# Patient Record
Sex: MALE | Race: WHITE | NOT HISPANIC OR LATINO | Employment: OTHER | URBAN - METROPOLITAN AREA
[De-identification: names, ages, dates, MRNs, and addresses within clinical notes are randomized per-mention and may not be internally consistent; named-entity substitution may affect disease eponyms.]

---

## 2017-02-20 ENCOUNTER — TRANSFERRED RECORDS (OUTPATIENT)
Dept: HEALTH INFORMATION MANAGEMENT | Facility: CLINIC | Age: 72
End: 2017-02-20

## 2017-03-27 ENCOUNTER — TRANSFERRED RECORDS (OUTPATIENT)
Dept: HEALTH INFORMATION MANAGEMENT | Facility: CLINIC | Age: 72
End: 2017-03-27

## 2017-05-08 ENCOUNTER — OFFICE VISIT (OUTPATIENT)
Dept: DERMATOLOGY | Facility: CLINIC | Age: 72
End: 2017-05-08
Payer: COMMERCIAL

## 2017-05-08 DIAGNOSIS — L82.0 INFLAMED SEBORRHEIC KERATOSIS: ICD-10-CM

## 2017-05-08 DIAGNOSIS — Z85.828 HISTORY OF NONMELANOMA SKIN CANCER: Primary | ICD-10-CM

## 2017-05-08 PROCEDURE — 99213 OFFICE O/P EST LOW 20 MIN: CPT | Mod: 25 | Performed by: DERMATOLOGY

## 2017-05-08 PROCEDURE — 17110 DESTRUCTION B9 LES UP TO 14: CPT | Performed by: DERMATOLOGY

## 2017-05-08 ASSESSMENT — PAIN SCALES - GENERAL: PAINLEVEL: NO PAIN (0)

## 2017-05-08 NOTE — MR AVS SNAPSHOT
After Visit Summary   5/8/2017    Andre Tyler    MRN: [id number]           Patient Information     Date Of Birth          1945        Visit Information        Provider Department      5/8/2017 4:00 PM Rachel Manuel MD [facility]        Care Instructions    Cryotherapy    What is it?    Use of a very cold liquid, such as liquid nitrogen, to freeze and destroy abnormal skin cells that need to be removed    What should I expect?    Tenderness and redness    A small blister that might grow and fill with dark purple blood. There may be crusting.    More than one treatment may be needed if the lesions do not go away.    How do I care for the treated area?    Gently wash the area with your hands when bathing.    Use a thin layer of Vaseline to help with healing. You may use a Band-Aid.     The area should heal within 7-10 days and may leave behind a pink or lighter color.     Do not use an antibiotic or Neosporin ointment.     You may take acetaminophen (Tylenol) for pain.     Call your Doctor if you have:    Severe pain    Signs of infection (warmth, redness, cloudy yellow drainage, and or a bad smell)    Questions or concerns    Who should I call with questions?       [facility]: [phone]       [facility]: [phone]       For urgent needs outside of business hours call the [facility] [place] at [phone]        and ask for the dermatology resident on call          Follow-ups after your visit        Your next 10 appointments already scheduled     May 08, 2018  9:45 AM CDT   Return Visit with Berta Monge MD   [facility]    [id number] [place] [address] [phone]              Who to contact     If you have questions or need follow up information about today's clinic visit or your schedule please contact [facility]  directly at [phone].  Normal or non-critical lab and imaging results will be communicated to you by Captimohart, letter or phone within 4 business days after the clinic has received the results. If you do not hear from us within 7 days, please contact the clinic through Captimohart or phone. If you have a critical or abnormal lab result, we will notify you by phone as soon as possible.  Submit refill requests through DineGasm or call your pharmacy and they will forward the refill request to us. Please allow 3 business days for your refill to be completed.          Additional Information About Your Visit        [employer] Information     DineGasm gives you secure access to your electronic health record. If you see a primary care provider, you can also send messages to your care team and make appointments. If you have questions, please call your primary care clinic.  If you do not have a primary care provider, please call [phone] and they will assist you.      DineGasm is an electronic gateway that provides easy, online access to your medical records. With DineGasm, you can request a clinic appointment, read your test results, renew a prescription or communicate with your care team.     To access your existing account, please contact your [facility] or call [phone] for assistance.        Care EveryWhere ID     This is your Care EveryWhere ID. This could be used by other organizations to access your [place] medical records  [id number]         Blood Pressure from Last 3 Encounters:   11/21/16 (!) 139/93   06/13/16 132/82   11/19/15 138/86    Weight from Last 3 Encounters:   06/13/16 96.6 kg (213 lb)   11/19/15 99.6 kg (219 lb 9.6 oz)   11/09/15 95.3 kg (210 lb)              Today, you had the following     No orders found for display       Primary Care Provider Office Phone # Fax #    Kelli Talavera [phone]       [facility] [address]  [place] [facility] [address]        Thank you!     Thank you for choosing [facility]  for your care. Our goal is always to provide you with excellent care. Hearing back from our patients is one way we can continue to improve our services. Please take a few minutes to complete the written survey that you may receive in the mail after your visit with us. Thank you!             Your Updated Medication List - Protect others around you: Learn how to safely use, store and throw away your medicines at www.disposemymeds.org.          This list is accurate as of: 5/8/17  4:19 PM.  Always use your most recent med list.                   Brand Name Dispense Instructions for use    GLUCOSAMINE PO          lisinopril-hydrochlorothiazide 10-12.5 MG per tablet    PRINZIDE/ZESTORETIC    90 tablet    Take 1 tablet by mouth daily

## 2017-05-08 NOTE — PATIENT INSTRUCTIONS
Cryotherapy    What is it?    Use of a very cold liquid, such as liquid nitrogen, to freeze and destroy abnormal skin cells that need to be removed    What should I expect?    Tenderness and redness    A small blister that might grow and fill with dark purple blood. There may be crusting.    More than one treatment may be needed if the lesions do not go away.    How do I care for the treated area?    Gently wash the area with your hands when bathing.    Use a thin layer of Vaseline to help with healing. You may use a Band-Aid.     The area should heal within 7-10 days and may leave behind a pink or lighter color.     Do not use an antibiotic or Neosporin ointment.     You may take acetaminophen (Tylenol) for pain.     Call your Doctor if you have:    Severe pain    Signs of infection (warmth, redness, cloudy yellow drainage, and or a bad smell)    Questions or concerns    Who should I call with questions?       [facility]: [phone]       [facility]: [phone]       For urgent needs outside of business hours call the [facility] at [phone]        and ask for the dermatology resident on call

## 2017-05-08 NOTE — PROGRESS NOTES
"[facility] Dermatology Note      Dermatology Problem List:  1. NMSC  -BCC, vertex scalp, s/p MMS 11/21/2016  -SCCis, R lateral thigh, s/p ED&C 11/21/2016  -SCCIS, right medial ankle, s/p Mohs 4/21/2016  -SCCIS, right ventral forearm, s/p ED&C 4/21/2016  -BCC, left preauricular, s/p Mohs 11/10/2015  -BCC, superficial type, left upper chest, s/p biopsy 4/9/2015, s/p ED&C 10/5/2015  -SCCIS, posterior right lower earlobe, records via [facility] transferred records, Dr. Arnulfo Keyes, 2011. s/p excision in 2005, recurrent  -SCCIS, left dorsal hand, s/p via [facility] transferred records, Dr. Arnulfo Keyes, 2011  2. Actinic keratosis  -s/p cryotherapy  3. HAK, right dorsal hand  -s/p biopsy 4/4/2016  -s/p cryotherapy  4. Compound melanocytic nevus, left lower back   -s/p biopsy 4/9/2015  5. Benign lichenoid keratosis, right lower leg  -s/p biopsy 4/9/2015    Encounter Date: May 8, 2017    CC:  Chief Complaint   Patient presents with     Derm Problem     6 month skin check.  Lesion on left lower back that itches.         History of Present Illness:  Mr. Andre Tyler is a 71 year old male  with a history of non-melanoma skin cancer presents for a total body skin exam.    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    The patient reports concern today for a \"crusty\" lesion on his back which does itch. He scratches it off, but it soon recurs.     Since his last visit, (in fact, just this morning) Mr. Tyler has been diagnosed with diabetes.    Past Medical History:   Patient Active Problem List   Diagnosis     Health Care Home     Hypertension goal BP (blood pressure) < 140/90     Squamous cell skin cancer     Prostate cancer - surgically treated     Advanced directives, counseling/discussion     " CARDIOVASCULAR SCREENING; LDL GOAL LESS THAN 130     Sensorineural hearing loss, bilateral     Past Medical History:   Diagnosis Date     Diabetes (H)      Hypertension goal BP (blood pressure) < 140/90      Prostate cancer (H)      Squamous cell skin cancer      Past Surgical History:   Procedure Laterality Date     APPENDECTOMY       HC MOHS TRUNK/ARM/LEG 1ST STAGE UP T0 5 BLOCKS       PROSTATE SURGERY  8/28/12       Social History:  The patient works as a  and is retired from working as a . The patient denies use of tanning beds. [employer] . Plays hockey.     Family History:  There is a family history of skin cancer in the patient's mother and sister (possible SCC).    Medications:  Current Outpatient Prescriptions   Medication Sig Dispense Refill     Glucosamine HCl (GLUCOSAMINE PO)        lisinopril-hydrochlorothiazide (PRINZIDE,ZESTORETIC) 10-12.5 MG per tablet Take 1 tablet by mouth daily 90 tablet 3          No Known Allergies      Review of Systems:   -Skin: As above in HPI. No additional skin concerns.  Const: No fevers, chills, changes in weight, or night sweats.    Physical exam:  GEN: This is a well developed, well-nourished male in no acute distress, in a pleasant mood.    SKIN: Total skin excluding the undergarment areas was performed. The exam included the head/face, neck, both arms, chest, back, abdomen, both legs, digits and/or nails.   -There are erythematous macules with overyling adherent scale on the right tragus, right preauricular and left helix.   -Mid back: There is a waxy stuck on traumatized tan to brown papule on an erythematous base  -There is no erythema, telangectasias, nodularity, or pigmentation on the sites of prior skin cancer (see Derm Problem List).  -No other lesions of concern on areas examined.     Impression/Plan:  1. History of nonmelanoma skin cancer, no clinical evidence or recurrence    2. Inflamed seborrheic keratosis, symptomatic.  Symptoms include pruritus and trauma:    Cryotherapy procedure note: After verbal consent and discussion of risks and benefits including but no limited to dyspigmentation/scar, blister, and pain, 1 were treated with 1-2mm freeze border for 1 cycle with liquid nitrogen. Post cryotherapy instructions were provided.     Follow-up in 3-4 months, earlier for new or changing lesions.     Staff Involved:  Scribe/Staff    Scribe Disclosure:   I, Davis Wolf, am serving as a scribe to document services personally performed by Dr. Manuel, based on data collection and the provider's statements to me.     Provider Disclosure:   I agree with above History, Review of Systems, Physical exam and Plan. I have reviewed the content of the documentation and have edited it as needed. I have personally performed the services documented here and the documentation accurately represents those services and the decisions I have made.   Rachel Manuel MD    Department of Dermatology  [facility]

## 2017-05-08 NOTE — NURSING NOTE
Dermatology Rooming Note    Andre Tyler's goals for this visit include:   Chief Complaint   Patient presents with     Derm Problem     6 month skin check.  Lesion on left lower back that itches.       Is a scribe okay for this visit:YES    Are records needed for this visit(If yes, obtain release of information): No     Vitals: There were no vitals taken for this visit.    Referring Provider:  No referring provider defined for this encounter.      Jessica Reyna RN

## 2017-05-08 NOTE — LETTER
"5/8/2017       RE: Andre Tyler  [address] [address]     Dear Colleague,    Thank you for referring your patient, Andre Tyler, to the [facility]. Please see a copy of my visit note below.    [facility] Dermatology Note      Dermatology Problem List:  1. NMSC  -BCC, vertex scalp, s/p MMS 11/21/2016  -SCCis, R lateral thigh, s/p ED&C 11/21/2016  -SCCIS, right medial ankle, s/p Mohs 4/21/2016  -SCCIS, right ventral forearm, s/p ED&C 4/21/2016  -BCC, left preauricular, s/p Mohs 11/10/2015  -BCC, superficial type, left upper chest, s/p biopsy 4/9/2015, s/p ED&C 10/5/2015  -SCCIS, posterior right lower earlobe, records via [facility] transferred records, Dr. Arnulfo Keyes, 2011. s/p excision in 2005, recurrent  -SCCIS, left dorsal hand, s/p via [facility] transferred records, Dr. Arnulfo Keyes, 2011  2. Actinic keratosis  -s/p cryotherapy  3. HAK, right dorsal hand  -s/p biopsy 4/4/2016  -s/p cryotherapy  4. Compound melanocytic nevus, left lower back   -s/p biopsy 4/9/2015  5. Benign lichenoid keratosis, right lower leg  -s/p biopsy 4/9/2015    Encounter Date: May 8, 2017    CC:  Chief Complaint   Patient presents with     Derm Problem     6 month skin check.  Lesion on left lower back that itches.         History of Present Illness:  Mr. Andre Tyler is a 71 year old male  with a history of non-melanoma skin cancer presents for a total body skin exam.    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    The patient reports concern today for a \"crusty\" lesion on his back which does itch. He scratches it off, but it soon recurs.     Since his last visit, (in fact, just this morning) Mr. Tyler " has been diagnosed with diabetes.    Past Medical History:   Patient Active Problem List   Diagnosis     Health Care Home     Hypertension goal BP (blood pressure) < 140/90     Squamous cell skin cancer     Prostate cancer - surgically treated     Advanced directives, counseling/discussion     CARDIOVASCULAR SCREENING; LDL GOAL LESS THAN 130     Sensorineural hearing loss, bilateral     Past Medical History:   Diagnosis Date     Diabetes (H)      Hypertension goal BP (blood pressure) < 140/90      Prostate cancer (H)      Squamous cell skin cancer      Past Surgical History:   Procedure Laterality Date     APPENDECTOMY       HC MOHS TRUNK/ARM/LEG 1ST STAGE UP T0 5 BLOCKS       PROSTATE SURGERY  8/28/12       Social History:  The patient works as a  and is retired from working as a . The patient denies use of tanning beds. [employer] . Plays hockey.     Family History:  There is a family history of skin cancer in the patient's mother and sister (possible SCC).    Medications:  Current Outpatient Prescriptions   Medication Sig Dispense Refill     Glucosamine HCl (GLUCOSAMINE PO)        lisinopril-hydrochlorothiazide (PRINZIDE,ZESTORETIC) 10-12.5 MG per tablet Take 1 tablet by mouth daily 90 tablet 3          No Known Allergies      Review of Systems:   -Skin: As above in HPI. No additional skin concerns.  Const: No fevers, chills, changes in weight, or night sweats.    Physical exam:  GEN: This is a well developed, well-nourished male in no acute distress, in a pleasant mood.    SKIN: Total skin excluding the undergarment areas was performed. The exam included the head/face, neck, both arms, chest, back, abdomen, both legs, digits and/or nails.   -There are erythematous macules with overyling adherent scale on the right tragus, right preauricular and left helix.   -Mid back: There is a waxy stuck on traumatized tan to brown papule on an erythematous base  -There is no erythema,  telangectasias, nodularity, or pigmentation on the sites of prior skin cancer (see Derm Problem List).  -No other lesions of concern on areas examined.     Impression/Plan:  1. History of nonmelanoma skin cancer, no clinical evidence or recurrence    2. Inflamed seborrheic keratosis, symptomatic. Symptoms include pruritus and trauma:    Cryotherapy procedure note: After verbal consent and discussion of risks and benefits including but no limited to dyspigmentation/scar, blister, and pain, 1 were treated with 1-2mm freeze border for 1 cycle with liquid nitrogen. Post cryotherapy instructions were provided.     Follow-up in 3-4 months, earlier for new or changing lesions.     Staff Involved:  Scribe/Staff    Scribe Disclosure:   I, Davis Wolf, am serving as a scribe to document services personally performed by Dr. Manuel, based on data collection and the provider's statements to me.     Provider Disclosure:   I agree with above History, Review of Systems, Physical exam and Plan. I have reviewed the content of the documentation and have edited it as needed. I have personally performed the services documented here and the documentation accurately represents those services and the decisions I have made.   Rachel Manuel MD    Department of Dermatology  [facility]        Again, thank you for allowing me to participate in the care of your patient.      Sincerely,    Rachel Manuel MD

## 2017-09-22 ENCOUNTER — OFFICE VISIT (OUTPATIENT)
Dept: FAMILY MEDICINE | Facility: CLINIC | Age: 72
End: 2017-09-22
Payer: COMMERCIAL

## 2017-09-22 VITALS
WEIGHT: 200 LBS | HEIGHT: 72 IN | DIASTOLIC BLOOD PRESSURE: 84 MMHG | HEART RATE: 76 BPM | SYSTOLIC BLOOD PRESSURE: 126 MMHG | BODY MASS INDEX: 27.09 KG/M2 | TEMPERATURE: 98 F

## 2017-09-22 DIAGNOSIS — Z23 NEED FOR PROPHYLACTIC VACCINATION AND INOCULATION AGAINST INFLUENZA: ICD-10-CM

## 2017-09-22 DIAGNOSIS — R35.0 URINARY FREQUENCY: Primary | ICD-10-CM

## 2017-09-22 DIAGNOSIS — R82.90 NONSPECIFIC FINDING ON EXAMINATION OF URINE: ICD-10-CM

## 2017-09-22 LAB
ALBUMIN UR-MCNC: NEGATIVE MG/DL
APPEARANCE UR: ABNORMAL
BACTERIA #/AREA URNS HPF: ABNORMAL /HPF
BILIRUB UR QL STRIP: NEGATIVE
COLOR UR AUTO: YELLOW
GLUCOSE UR STRIP-MCNC: NEGATIVE MG/DL
HGB UR QL STRIP: NEGATIVE
KETONES UR STRIP-MCNC: NEGATIVE MG/DL
LEUKOCYTE ESTERASE UR QL STRIP: ABNORMAL
NITRATE UR QL: POSITIVE
NON-SQ EPI CELLS #/AREA URNS LPF: ABNORMAL /LPF
PH UR STRIP: 6 PH (ref 5–7)
RBC #/AREA URNS AUTO: ABNORMAL /HPF
SOURCE: ABNORMAL
SP GR UR STRIP: 1.02 (ref 1–1.03)
UROBILINOGEN UR STRIP-ACNC: 1 EU/DL (ref 0.2–1)
WBC #/AREA URNS AUTO: ABNORMAL /HPF

## 2017-09-22 PROCEDURE — 90662 IIV NO PRSV INCREASED AG IM: CPT | Performed by: FAMILY MEDICINE

## 2017-09-22 PROCEDURE — G0008 ADMIN INFLUENZA VIRUS VAC: HCPCS | Performed by: FAMILY MEDICINE

## 2017-09-22 PROCEDURE — 99213 OFFICE O/P EST LOW 20 MIN: CPT | Mod: 25 | Performed by: FAMILY MEDICINE

## 2017-09-22 PROCEDURE — 87088 URINE BACTERIA CULTURE: CPT | Performed by: FAMILY MEDICINE

## 2017-09-22 PROCEDURE — 81001 URINALYSIS AUTO W/SCOPE: CPT | Performed by: FAMILY MEDICINE

## 2017-09-22 PROCEDURE — 87086 URINE CULTURE/COLONY COUNT: CPT | Performed by: FAMILY MEDICINE

## 2017-09-22 PROCEDURE — 87186 SC STD MICRODIL/AGAR DIL: CPT | Performed by: FAMILY MEDICINE

## 2017-09-22 RX ORDER — CIPROFLOXACIN 500 MG/1
500 TABLET, FILM COATED ORAL 2 TIMES DAILY
Qty: 14 TABLET | Refills: 0 | Status: SHIPPED | OUTPATIENT
Start: 2017-09-22 | End: 2018-05-08

## 2017-09-22 NOTE — MR AVS SNAPSHOT
After Visit Summary   9/22/2017    Andre Tyler    MRN: [id number]           Patient Information     Date Of Birth          1945        Visit Information        Provider Department      9/22/2017 1:40 PM Sommer Wilburn MD [facility]        Today's Diagnoses     Urinary frequency    -  1    Nonspecific finding on examination of urine        Need for prophylactic vaccination and inoculation against influenza           Follow-ups after your visit        Your next 10 appointments already scheduled     May 08, 2018  9:45 AM CDT   Return Visit with Berta Monge MD   [facility]    [address] [place] [address] [phone]              Who to contact     If you have questions or need follow up information about today's clinic visit or your schedule please contact [facility] directly at [phone].  Normal or non-critical lab and imaging results will be communicated to you by MyChart, letter or phone within 4 business days after the clinic has received the results. If you do not hear from us within 7 days, please contact the clinic through [email or web address] or phone. If you have a critical or abnormal lab result, we will notify you by phone as soon as possible.  Submit refill requests through ReadyPulse or call your pharmacy and they will forward the refill request to us. Please allow 3 business days for your refill to be completed.          Additional Information About Your Visit        MyChart Information     ReadyPulse gives you secure access to your electronic health record. If you see a primary care provider, you can also send messages to your care team and make appointments. If you have questions, please call your primary care clinic.  If you do not have a primary care provider, please call [phone] and they will assist you.        Care EveryWhere ID     This is your Care EveryWhere  ID. This could be used by other organizations to access your [place] medical records  [id number]        Your Vitals Were     Pulse Temperature Height BMI (Body Mass Index)          76 98  F (36.7  C) (Oral) 6' (1.829 m) 27.12 kg/m2         Blood Pressure from Last 3 Encounters:   09/22/17 126/84   11/21/16 (!) 139/93   06/13/16 132/82    Weight from Last 3 Encounters:   09/22/17 200 lb (90.7 kg)   06/13/16 213 lb (96.6 kg)   11/19/15 219 lb 9.6 oz (99.6 kg)              We Performed the Following     UA reflex to Microscopic and Culture     Urine Culture Aerobic Bacterial     Urine Microscopic          Today's Medication Changes          These changes are accurate as of: 9/22/17  2:25 PM.  If you have any questions, ask your nurse or doctor.               Start taking these medicines.        Dose/Directions    ciprofloxacin 500 MG tablet   Commonly known as:  CIPRO   Used for:  Urinary frequency, Nonspecific finding on examination of urine   Started by:  Sommer Wilburn MD        Dose:  500 mg   Take 1 tablet (500 mg) by mouth 2 times daily   Quantity:  14 tablet   Refills:  0            Where to get your medicines      These medications were sent to [facility] [place], [place] - [address]  [address] [facility] [address]     Phone:  [phone]     ciprofloxacin 500 MG tablet                Primary Care Provider Office Phone # Fax #    Kelli Diane Nam Talavera [phone]       [address]  [place] [address]        Equal Access to Services     [facility]: Hadii nory ku hadasho Soomaali, waaxda luqadaha, qaybta kaalmada adeegsamina, ariane wu . So [facility] [phone].    ATENCIÓN: Si habla español, tiene a wise disposición servicios gratuitos de asistencia lingüística. Llame al [phone].    We comply with applicable federal civil rights laws and [place] laws. We do not  discriminate on the basis of race, color, national origin, age, disability sex, sexual orientation or gender identity.            Thank you!     Thank you for choosing [facility]  for your care. Our goal is always to provide you with excellent care. Hearing back from our patients is one way we can continue to improve our services. Please take a few minutes to complete the written survey that you may receive in the mail after your visit with us. Thank you!             Your Updated Medication List - Protect others around you: Learn how to safely use, store and throw away your medicines at www.disposemymeds.org.          This list is accurate as of: 9/22/17  2:25 PM.  Always use your most recent med list.                   Brand Name Dispense Instructions for use Diagnosis    ciprofloxacin 500 MG tablet    CIPRO    14 tablet    Take 1 tablet (500 mg) by mouth 2 times daily    Urinary frequency, Nonspecific finding on examination of urine       GLUCOSAMINE PO       Basal cell carcinoma of left preauricular region       lisinopril-hydrochlorothiazide 10-12.5 MG per tablet    PRINZIDE/ZESTORETIC    90 tablet    Take 1 tablet by mouth daily    Hypertension goal BP (blood pressure) < 140/90       metFORMIN 500 MG tablet    GLUCOPHAGE     2 times daily

## 2017-09-22 NOTE — NURSING NOTE
Chief Complaint   Patient presents with     UTI       Initial /84  Pulse 76  Temp 98  F (36.7  C) (Oral)  Ht 6' (1.829 m)  Wt 200 lb (90.7 kg)  BMI 27.12 kg/m2 Estimated body mass index is 27.12 kg/(m^2) as calculated from the following:    Height as of this encounter: 6' (1.829 m).    Weight as of this encounter: 200 lb (90.7 kg).  Medication Reconciliation: complete  John Craven MA

## 2017-09-22 NOTE — PROGRESS NOTES
Results discussed with patient during the clinic visit.     .Sommer Wilburn MD.   Family Physician.  [facility].

## 2017-09-22 NOTE — PROGRESS NOTES
SUBJECTIVE:   Andre Tyler is a 72 year old male who presents to clinic today for the following health issues:    Genitourinary - Male  Onset: x long time on and off     Description:   Dysuria (painful urination): no   Hematuria (blood in urine): no   Frequency: YES  Are you urinating at night : YES  Hesitancy (delay in urine): no   Retention (unable to empty): YES- sometimes   Decrease in urinary flow: no   Incontinence: no     Progression of Symptoms:  same    Accompanying Signs & Symptoms:  Fever: no   Back/Flank pain: no   Urethral discharge: no   Testicle lumps/masses/pain: no   Nausea and/or vomiting: no   Abdominal pain: no     History:   History of frequent UTI's: YES  History of kidney stones: no   History of hernias: no   Personal or Family history of Prostate problems: yes  Sexually active: no     Precipitating factors:   None     Alleviating factors:AZO       Problem list and histories reviewed & adjusted, as indicated.  Additional history: as documented    Patient Active Problem List   Diagnosis     Health Care Home     Hypertension goal BP (blood pressure) < 140/90     Squamous cell skin cancer     Prostate cancer - surgically treated     Advanced directives, counseling/discussion     CARDIOVASCULAR SCREENING; LDL GOAL LESS THAN 130     Sensorineural hearing loss, bilateral     Past Surgical History:   Procedure Laterality Date     APPENDECTOMY       HC MOHS TRUNK/ARM/LEG 1ST STAGE UP T0 5 BLOCKS       PROSTATE SURGERY  8/28/12       Social History   Substance Use Topics     Smoking status: Never Smoker     Smokeless tobacco: Never Used     Alcohol use No     Family History   Problem Relation Age of Onset     Hypertension Father      DIABETES Father      Prostate Cancer Father      Alcohol/Drug Sister      C.A.D. No family hx of      CEREBROVASCULAR DISEASE No family hx of          Current Outpatient Prescriptions   Medication Sig Dispense Refill     metFORMIN (GLUCOPHAGE) 500 MG tablet 2 times  daily       Glucosamine HCl (GLUCOSAMINE PO)        lisinopril-hydrochlorothiazide (PRINZIDE,ZESTORETIC) 10-12.5 MG per tablet Take 1 tablet by mouth daily 90 tablet 3     BP Readings from Last 3 Encounters:   09/22/17 126/84   11/21/16 (!) 139/93   06/13/16 132/82    Wt Readings from Last 3 Encounters:   09/22/17 200 lb (90.7 kg)   06/13/16 213 lb (96.6 kg)   11/19/15 219 lb 9.6 oz (99.6 kg)            Labs reviewed in EPIC    Reviewed and updated as needed this visit by clinical staffTobacco  Allergies  Meds  Med Hx  Surg Hx  Fam Hx  Soc Hx      Reviewed and updated as needed this visit by Provider         ROS:  Constitutional, HEENT, cardiovascular, pulmonary, gi and gu systems are negative, except as otherwise noted.      OBJECTIVE:   /84  Pulse 76  Temp 98  F (36.7  C) (Oral)  Ht 6' (1.829 m)  Wt 200 lb (90.7 kg)  BMI 27.12 kg/m2  Body mass index is 27.12 kg/(m^2).  GENERAL: healthy, alert and no distress  ABDOMEN: soft, nontender, no hepatosplenomegaly, no masses and bowel sounds normal  BACK: no CVA tenderness, no paralumbar tenderness    Results for orders placed or performed in visit on 09/22/17   UA reflex to Microscopic and Culture   Result Value Ref Range    Color Urine Yellow     Appearance Urine Slightly Cloudy     Glucose Urine Negative NEG^Negative mg/dL    Bilirubin Urine Negative NEG^Negative    Ketones Urine Negative NEG^Negative mg/dL    Specific Gravity Urine 1.025 1.003 - 1.035    Blood Urine Negative NEG^Negative    pH Urine 6.0 5.0 - 7.0 pH    Protein Albumin Urine Negative NEG^Negative mg/dL    Urobilinogen Urine 1.0 0.2 - 1.0 EU/dL    Nitrite Urine Positive (A) NEG^Negative    Leukocyte Esterase Urine Small (A) NEG^Negative    Source Midstream Urine    Urine Microscopic   Result Value Ref Range    WBC Urine 25-50 (A) OTO2^O - 2 /HPF    RBC Urine 2-5 (A) OTO2^O - 2 /HPF    Squamous Epithelial /LPF Urine Few FEW^Few /LPF    Bacteria Urine Moderate (A) NEG^Negative /HPF        ASSESSMENT/PLAN:         ICD-10-CM    1. Urinary frequency R35.0 UA reflex to Microscopic and Culture     Urine Microscopic     ciprofloxacin (CIPRO) 500 MG tablet   2. Nonspecific finding on examination of urine R82.90 Urine Culture Aerobic Bacterial     ciprofloxacin (CIPRO) 500 MG tablet   3. Need for prophylactic vaccination and inoculation against influenza Z23      Push fluids by mouth.     [facility] He says he is up to date on his labs and other HM .       Sommer Wilburn MD  [facility]

## 2017-09-22 NOTE — PROGRESS NOTES
Injectable Influenza Immunization Documentation    1.  Is the person to be vaccinated sick today?   No    2. Does the person to be vaccinated have an allergy to a component   of the vaccine?   No    3. Has the person to be vaccinated ever had a serious reaction   to influenza vaccine in the past?   No    4. Has the person to be vaccinated ever had Guillain-Barré syndrome?   No    Form completed by John Craven MA

## 2017-09-24 LAB
BACTERIA SPEC CULT: ABNORMAL
SPECIMEN SOURCE: ABNORMAL

## 2017-09-25 NOTE — PROGRESS NOTES
Dear Andre Tyler,     Your urine culture came back positive for bacteria, Klebsiella Oxytoca.   Continue and complete the ciprofloxacin course.     Sommer Wilburn MD.   Family Physician.  [facility].

## 2017-09-28 ENCOUNTER — OFFICE VISIT (OUTPATIENT)
Dept: FAMILY MEDICINE | Facility: CLINIC | Age: 72
End: 2017-09-28
Payer: COMMERCIAL

## 2017-09-28 VITALS
HEIGHT: 72 IN | SYSTOLIC BLOOD PRESSURE: 130 MMHG | HEART RATE: 77 BPM | DIASTOLIC BLOOD PRESSURE: 80 MMHG | TEMPERATURE: 97.7 F | WEIGHT: 201 LBS | BODY MASS INDEX: 27.22 KG/M2 | OXYGEN SATURATION: 98 %

## 2017-09-28 DIAGNOSIS — R30.0 DYSURIA: ICD-10-CM

## 2017-09-28 DIAGNOSIS — N34.2 URETHRITIS: Primary | ICD-10-CM

## 2017-09-28 LAB
ALBUMIN UR-MCNC: NEGATIVE MG/DL
APPEARANCE UR: CLEAR
BILIRUB UR QL STRIP: NEGATIVE
COLOR UR AUTO: YELLOW
GLUCOSE UR STRIP-MCNC: NEGATIVE MG/DL
HGB UR QL STRIP: NEGATIVE
KETONES UR STRIP-MCNC: NEGATIVE MG/DL
LEUKOCYTE ESTERASE UR QL STRIP: NEGATIVE
NITRATE UR QL: NEGATIVE
PH UR STRIP: 5.5 PH (ref 5–7)
SOURCE: NORMAL
SP GR UR STRIP: >1.03 (ref 1–1.03)
UROBILINOGEN UR STRIP-ACNC: 0.2 EU/DL (ref 0.2–1)

## 2017-09-28 PROCEDURE — 87086 URINE CULTURE/COLONY COUNT: CPT | Performed by: FAMILY MEDICINE

## 2017-09-28 PROCEDURE — 81003 URINALYSIS AUTO W/O SCOPE: CPT | Performed by: FAMILY MEDICINE

## 2017-09-28 PROCEDURE — 99213 OFFICE O/P EST LOW 20 MIN: CPT | Performed by: FAMILY MEDICINE

## 2017-09-28 RX ORDER — CIPROFLOXACIN 500 MG/1
500 TABLET, FILM COATED ORAL 2 TIMES DAILY
Qty: 6 TABLET | Refills: 0 | Status: SHIPPED | OUTPATIENT
Start: 2017-09-28 | End: 2018-05-08

## 2017-09-28 NOTE — PROGRESS NOTES
SUBJECTIVE:   Andre Tyler is a 72 year old male who presents to clinic today for the following health issues:    Patient presents with:  RECHECK: UTI- was getting better after taking ciprofloxacin (CIPRO) 500 MG tablet but for the last 2 days symptoms got worse again, will be taking last dose of ciprofloxacin (CIPRO) 500 MG tablet tonight @ 6-7 PM    Patient presents for urethritis f/u visit today.  Patient states symptoms improved significantly after starting antibiotics, however they have returned towards the end of his treatment course.  Patient admits to some frequency, sensation of not emptying bladder completely, no burning when urinating, however has some penile discomfort when sitting. Of note, patient had his prostate removed 5 years ago.    Problem list and histories reviewed & adjusted, as indicated.  Additional history: as documented    Current Outpatient Prescriptions   Medication Sig Dispense Refill     ciprofloxacin (CIPRO) 500 MG tablet Take 1 tablet (500 mg) by mouth 2 times daily 6 tablet 0     metFORMIN (GLUCOPHAGE) 500 MG tablet 2 times daily       ciprofloxacin (CIPRO) 500 MG tablet Take 1 tablet (500 mg) by mouth 2 times daily 14 tablet 0     Glucosamine HCl (GLUCOSAMINE PO)        lisinopril-hydrochlorothiazide (PRINZIDE,ZESTORETIC) 10-12.5 MG per tablet Take 1 tablet by mouth daily 90 tablet 3       Reviewed and updated as needed this visit by clinical staff  Tobacco  Allergies  Meds  Med Hx  Surg Hx  Fam Hx  Soc Hx      Reviewed and updated as needed this visit by Provider         ROS:  Constitutional, HEENT, cardiovascular, pulmonary, gi and gu systems are negative, except as otherwise noted.    OBJECTIVE:   /80  Pulse 77  Temp 97.7  F (36.5  C) (Oral)  Ht 6' (1.829 m)  Wt 201 lb (91.2 kg)  SpO2 98%  BMI 27.26 kg/m2  Body mass index is 27.26 kg/(m^2).  GENERAL: healthy, alert and no distress  EYES: Eyes grossly normal to inspection, PERRL and conjunctivae and  sclerae normal  NECK: no adenopathy, no asymmetry, masses, or scars and thyroid normal to palpation  RESP: lungs clear to auscultation - no rales, rhonchi or wheezes  CV: regular rate and rhythm, normal S1 S2, no S3 or S4, no murmur, click or rub, no peripheral edema and peripheral pulses strong  ABDOMEN: soft, nontender, no hepatosplenomegaly, no masses and bowel sounds normal  MS: no gross musculoskeletal defects noted, no edema    Diagnostic Test Results:  Urinalysis - negative    ASSESSMENT/PLAN:     1. Urethritis   Patient adequately treated however with return of symptoms, recommend that patient use azo as needed and will extend course to total of 10 days.  Will f/u urine culture today.  - ciprofloxacin (CIPRO) 500 MG tablet; Take 1 tablet (500 mg) by mouth 2 times daily  Dispense: 6 tablet; Refill: 0    2. Dysuria  Recommend that patient use azo as needed for the remainder of treatment course.  - UA reflex to Microscopic and Culture  - Urine Culture Aerobic Bacterial  - ciprofloxacin (CIPRO) 500 MG tablet; Take 1 tablet (500 mg) by mouth 2 times daily  Dispense: 6 tablet; Refill: 0    F/u 1 week if symptoms fail to improve    Arnulfo Aguirre MD  [facility]

## 2017-09-28 NOTE — PATIENT INSTRUCTIONS
Urethritis in Men     An inflamed urethra can cause pain during urination.   The urethra is the tube that runs from the bladder through the penis. When the urethra is inflamed, it is called urethritis. The urethra becomes swollen and causes burning pain when you urinate. Other symptoms of urethritis may include itching or tingling of the penis or pus discharge from the penis. You may also have pain with sex and masturbation. Some men may not have symptoms.  What causes urethritis?  Urethritis can be caused by a bacterial or viral infection. Such an infection can lead to conditions such as a urinary tract infection (UTI) or sexually transmitted diseases (STD). Urethritis can also be caused by injury or sensitivity or allergy to chemicals in lotions and other products.  How is urethritis diagnosed?  Your healthcare provider will examine you and ask about your symptoms and health history. You may also have one or more of the following tests:    Urine test to take samples of urine and have them checked for problems.    Blood test to take a sample of blood and have it checked for problems.    Urethral discharge to take a sample of fluid from inside the urethra. A cotton swab is inserted into the opening of the penis and into the urethra.    Cystoscopy to allow the healthcare provider to look for problems in the urinary tract. The test uses a thin, flexible telescope called a cystoscope with a light and camera attached. The scope is inserted into the urethra.  How is urethritis treated?  Treatment depends on the cause of urethritis. If it s due to a bacterial infection, antibiotics (medicines that fight infection) will be given. Your healthcare provider can tell you more about your treatment options. In the meantime, your symptoms can be treated. To relieve pain and swelling, anti-inflammatory medications, such as ibuprofen, may be given. Untreated, symptoms may get worse. Also, scar tissue can form in the urethra,  causing it to narrow.  When to call your healthcare provider   Call your healthcare provider right away if you have any of the following:    Fever of 100.4 F (38.0 C) or higher     Blood in the urine or semen    Burning pain with urination    Increased urge to urinate    Discharge from the penis    Itching, tenderness, or swelling in the penis or groin    Pain during sex or masturbation   Preventing STDs  When it comes to sex, it s important to take care and be safe. Any sexual contact with the penis, vagina, anus, or mouth can spread an STD. The only sure way to prevent STDs is not having sex. But there are ways to make sex safer. Use a latex condom each time you have sex. And talk to your partner about STDs before you have sex.  Date Last Reviewed: 1/1/2017 2000-2017 [employer]. [address] [place] [address]. All rights reserved. This information is not intended as a substitute for professional medical care. Always follow your healthcare professional's instructions.      [facility]    If you have any questions regarding to your visit please contact your care team:       Team Purple:   Clinic Hours Telephone Number   Dr. Heidi Alejandro     7am-7pm  Monday - Thursday   7am-5pm  Fridays  [phone]  (Appointment scheduling available 24/7)    Questions about your Visit?   Team Line:  [phone]   Urgent [facility] - [place] - 11am-9pm Monday-Friday Saturday-Sunday- 9am-5pm   [place] - 5pm-9pm Monday-Friday Saturday-Sunday- 9am-5pm  [phone] - [place]   [phone] - [place]       What options do I have for visits at the clinic other than the traditional office visit?  To expand how we care for you, many of our providers are utilizing electronic visits (e-visits) and telephone visits, when medically appropriate, for interactions with their patients rather than a visit in the clinic.   We also  offer nurse visits for many medical concerns. Just like any other service, we will bill your insurance company for this type of visit based on time spent on the phone with your provider. Not all insurance companies cover these visits. Please check with your medical insurance if this type of visit is covered. You will be responsible for any charges that are not paid by your insurance.      E-visits via [employer]:  generally incur a $35.00 fee.  Telephone visits:  Time spent on the phone: *charged based on time that is spent on the phone in increments of 10 minutes. Estimated cost:   5-10 mins $30.00   11-20 mins. $59.00   21-30 mins. $85.00     Use [employer] (secure email communication and access to your chart) to send your primary care provider a message or make an appointment. Ask someone on your Team how to sign up for [employer].  For a Price Quote for your services, please call our Consumer Price Line at [phone].  As always, Thank you for trusting us with your health care needs!      Discharged By: Joyce

## 2017-09-28 NOTE — NURSING NOTE
Chief Complaint   Patient presents with     RECHECK     UTI- was getting better after taking ciprofloxacin (CIPRO) 500 MG tablet but for the last 2 days symptoms got worse again, will be taking last dose of ciprofloxacin (CIPRO) 500 MG tablet tonight @ 6-7 PM       Initial /80  Pulse 77  Temp 97.7  F (36.5  C) (Oral)  Ht 6' (1.829 m)  Wt 201 lb (91.2 kg)  SpO2 98%  BMI 27.26 kg/m2 Estimated body mass index is 27.26 kg/(m^2) as calculated from the following:    Height as of this encounter: 6' (1.829 m).    Weight as of this encounter: 201 lb (91.2 kg).  Medication Reconciliation: complete     An HELGA Yuan

## 2017-09-28 NOTE — MR AVS SNAPSHOT
After Visit Summary   9/28/2017    Andre Tyler    MRN: [id number]           Patient Information     Date Of Birth          1945        Visit Information        Provider Department      9/28/2017 6:00 PM Arnulfo Joaquin MD [facility]        Today's Diagnoses     Dysuria    -  1    Urethritis          Care Instructions      Urethritis in Men     An inflamed urethra can cause pain during urination.   The urethra is the tube that runs from the bladder through the penis. When the urethra is inflamed, it is called urethritis. The urethra becomes swollen and causes burning pain when you urinate. Other symptoms of urethritis may include itching or tingling of the penis or pus discharge from the penis. You may also have pain with sex and masturbation. Some men may not have symptoms.  What causes urethritis?  Urethritis can be caused by a bacterial or viral infection. Such an infection can lead to conditions such as a urinary tract infection (UTI) or sexually transmitted diseases (STD). Urethritis can also be caused by injury or sensitivity or allergy to chemicals in lotions and other products.  How is urethritis diagnosed?  Your healthcare provider will examine you and ask about your symptoms and health history. You may also have one or more of the following tests:    Urine test to take samples of urine and have them checked for problems.    Blood test to take a sample of blood and have it checked for problems.    Urethral discharge to take a sample of fluid from inside the urethra. A cotton swab is inserted into the opening of the penis and into the urethra.    Cystoscopy to allow the healthcare provider to look for problems in the urinary tract. The test uses a thin, flexible telescope called a cystoscope with a light and camera attached. The scope is inserted into the urethra.  How is urethritis treated?  Treatment depends on the cause of urethritis. If it s due to a  bacterial infection, antibiotics (medicines that fight infection) will be given. Your healthcare provider can tell you more about your treatment options. In the meantime, your symptoms can be treated. To relieve pain and swelling, anti-inflammatory medications, such as ibuprofen, may be given. Untreated, symptoms may get worse. Also, scar tissue can form in the urethra, causing it to narrow.  When to call your healthcare provider   Call your healthcare provider right away if you have any of the following:    Fever of 100.4 F (38.0 C) or higher     Blood in the urine or semen    Burning pain with urination    Increased urge to urinate    Discharge from the penis    Itching, tenderness, or swelling in the penis or groin    Pain during sex or masturbation   Preventing STDs  When it comes to sex, it s important to take care and be safe. Any sexual contact with the penis, vagina, anus, or mouth can spread an STD. The only sure way to prevent STDs is not having sex. But there are ways to make sex safer. Use a latex condom each time you have sex. And talk to your partner about STDs before you have sex.  Date Last Reviewed: 1/1/2017 2000-2017 [employer]. [address] [place] [address]. All rights reserved. This information is not intended as a substitute for professional medical care. Always follow your healthcare professional's instructions.      [facility]    If you have any questions regarding to your visit please contact your care team:       Team Purple:   Clinic Hours Telephone Number   Dr. Heidi Alejandro     7am-7pm  Monday - Thursday   7am-5pm  Fridays  [phone]  (Appointment scheduling available 24/7)    Questions about your Visit?   Team Line:  [phone]   Ur[facility] - [facility] - 11am-9pm Monday-Friday Saturday-Sunday- 9am-5pm   [place] - 5pm-9pm Monday-Friday Saturday-Sunday- 9am-5pm  [phone]  [phone] - [place]   [phone] - [place]       What options do I have for visits at the clinic other than the traditional office visit?  To expand how we care for you, many of our providers are utilizing electronic visits (e-visits) and telephone visits, when medically appropriate, for interactions with their patients rather than a visit in the clinic.   We also offer nurse visits for many medical concerns. Just like any other service, we will bill your insurance company for this type of visit based on time spent on the phone with your provider. Not all insurance companies cover these visits. Please check with your medical insurance if this type of visit is covered. You will be responsible for any charges that are not paid by your insurance.      E-visits via [employer]:  generally incur a $35.00 fee.  Telephone visits:  Time spent on the phone: *charged based on time that is spent on the phone in increments of 10 minutes. Estimated cost:   5-10 mins $30.00   11-20 mins. $59.00   21-30 mins. $85.00     Use [employer] (secure email communication and access to your chart) to send your primary care provider a message or make an appointment. Ask someone on your Team how to sign up for [employer].  For a Price Quote for your services, please call our Consumer Price Line at [phone].  As always, Thank you for trusting us with your health care needs!      Discharged By: An            Follow-ups after your visit        Follow-up notes from your care team     Return in about 1 week (around 10/5/2017).      Your next 10 appointments already scheduled     May 08, 2018  9:45 AM CDT   [facility] with [facility]a [facility]ge MD   [facility]    [address] [address] [place] [address] [phone]              Who to contact     If you have questions or need follow up information about today's clinic visit or your schedule please contact [facility] directly at  [phone].  Normal or non-critical lab and imaging results will be communicated to you by 8x8 Inchart, letter or phone within 4 business days after the clinic has received the results. If you do not hear from us within 7 days, please contact the clinic through [employer] or phone. If you have a critical or abnormal lab result, we will notify you by phone as soon as possible.  Submit refill requests through [employer] or call your pharmacy and they will forward the refill request to us. Please allow 3 business days for your refill to be completed.          Additional Information About Your Visit        8x8 Inchart Information     [employer] gives you secure access to your electronic health record. If you see a primary care provider, you can also send messages to your care team and make appointments. If you have questions, please call your primary care clinic.  If you do not have a primary care provider, please call [phone] and they will assist you.        Care EveryWhere ID     This is your Care EveryWhere ID. This could be used by other organizations to access your [facility] medical records  [id number]        Your Vitals Were     Pulse Temperature Height Pulse Oximetry BMI (Body Mass Index)       77 97.7  F (36.5  C) (Oral) 6' (1.829 m) 98% 27.26 kg/m2        Blood Pressure from Last 3 Encounters:   09/28/17 130/80   09/22/17 126/84   11/21/16 (!) 139/93    Weight from Last 3 Encounters:   09/28/17 201 lb (91.2 kg)   09/22/17 200 lb (90.7 kg)   06/13/16 213 lb (96.6 kg)              We Performed the Following     UA reflex to Microscopic and Culture     Urine Culture Aerobic Bacterial          Today's Medication Changes          These changes are accurate as of: 9/28/17  6:39 PM.  If you have any questions, ask your nurse or doctor.               These medicines have changed or have updated prescriptions.        Dose/Directions    * ciprofloxacin 500 MG tablet   Commonly known as:  CIPRO   This may have changed:  Another  medication with the same name was added. Make sure you understand how and when to take each.   Used for:  Urinary frequency, Nonspecific finding on examination of urine   Changed by:  Sommer Wilburn MD        Dose:  500 mg   Take 1 tablet (500 mg) by mouth 2 times daily   Quantity:  14 tablet   Refills:  0       * ciprofloxacin 500 MG tablet   Commonly known as:  CIPRO   This may have changed:  You were already taking a medication with the same name, and this prescription was added. Make sure you understand how and when to take each.   Used for:  Dysuria, Urethritis   Changed by:  Arnulfo Joaquin MD        Dose:  500 mg   Take 1 tablet (500 mg) by mouth 2 times daily   Quantity:  6 tablet   Refills:  0       * Notice:  This list has 2 medication(s) that are the same as other medications prescribed for you. Read the directions carefully, and ask your doctor or other care provider to review them with you.         Where to get your medicines      These medications were sent to [facility] [place]  [place], [place] - [address]  [address], [place] [address]     Phone:  [phone]     ciprofloxacin 500 MG tablet                Primary Care Provider Office Phone # Fax #    Kelli Roxi Talavera [phone] [address]  [place] [facility] [address]        Equal Access to Services     [facility]: Hadii aad ku hadasho Soomaali, waaxda luqadaha, qaybta kaalmada adeegyada, ariane kendrick. So [facility] [phone].    ATENCIÓN: Si habla español, tiene a wise disposición servicios gratuitos de asistencia lingüística. Denice al [phone].    We comply with applicable federal civil rights laws and [place] laws. We do not discriminate on the basis of race, color, national origin, age, disability sex, sexual orientation or gender identity.            Thank you!     Thank you for choosing [facility]  [place]  for your care. Our goal is always to provide you with excellent care. Hearing back from our patients is one way we can continue to improve our services. Please take a few minutes to complete the written survey that you may receive in the mail after your visit with us. Thank you!             Your Updated Medication List - Protect others around you: Learn how to safely use, store and throw away your medicines at www.disposemymeds.org.          This list is accurate as of: 9/28/17  6:39 PM.  Always use your most recent med list.                   Brand Name Dispense Instructions for use Diagnosis    * ciprofloxacin 500 MG tablet    CIPRO    14 tablet    Take 1 tablet (500 mg) by mouth 2 times daily    Urinary frequency, Nonspecific finding on examination of urine       * ciprofloxacin 500 MG tablet    CIPRO    6 tablet    Take 1 tablet (500 mg) by mouth 2 times daily    Dysuria, Urethritis       GLUCOSAMINE PO       Basal cell carcinoma of left preauricular region       lisinopril-hydrochlorothiazide 10-12.5 MG per tablet    PRINZIDE/ZESTORETIC    90 tablet    Take 1 tablet by mouth daily    Hypertension goal BP (blood pressure) < 140/90       metFORMIN 500 MG tablet    GLUCOPHAGE     2 times daily        * Notice:  This list has 2 medication(s) that are the same as other medications prescribed for you. Read the directions carefully, and ask your doctor or other care provider to review them with you.

## 2017-09-29 LAB
BACTERIA SPEC CULT: NO GROWTH
SPECIMEN SOURCE: NORMAL

## 2018-04-03 ENCOUNTER — TRANSFERRED RECORDS (OUTPATIENT)
Dept: HEALTH INFORMATION MANAGEMENT | Facility: CLINIC | Age: 73
End: 2018-04-03

## 2018-04-28 ENCOUNTER — TELEPHONE (OUTPATIENT)
Dept: FAMILY MEDICINE | Facility: CLINIC | Age: 73
End: 2018-04-28

## 2018-05-08 ENCOUNTER — OFFICE VISIT (OUTPATIENT)
Dept: DERMATOLOGY | Facility: CLINIC | Age: 73
End: 2018-05-08
Payer: COMMERCIAL

## 2018-05-08 DIAGNOSIS — L82.1 SEBORRHEIC KERATOSIS: ICD-10-CM

## 2018-05-08 DIAGNOSIS — L57.0 AK (ACTINIC KERATOSIS): ICD-10-CM

## 2018-05-08 DIAGNOSIS — L30.8 OTHER ECZEMA: Primary | ICD-10-CM

## 2018-05-08 DIAGNOSIS — D48.5 NEOPLASM OF UNCERTAIN BEHAVIOR OF SKIN: ICD-10-CM

## 2018-05-08 PROCEDURE — 17004 DESTROY PREMAL LESIONS 15/>: CPT | Performed by: DERMATOLOGY

## 2018-05-08 PROCEDURE — 88305 TISSUE EXAM BY PATHOLOGIST: CPT | Mod: TC | Performed by: DERMATOLOGY

## 2018-05-08 PROCEDURE — 11100 HC BIOPSY SKIN/SUBQ/MUC MEM, SINGLE LESION: CPT | Mod: 59 | Performed by: DERMATOLOGY

## 2018-05-08 PROCEDURE — 11101 HC BIOPSY SKIN/SUBQ/MUC MEM, EACH ADDTL LESION: CPT | Performed by: DERMATOLOGY

## 2018-05-08 PROCEDURE — 99213 OFFICE O/P EST LOW 20 MIN: CPT | Mod: 25 | Performed by: DERMATOLOGY

## 2018-05-08 RX ORDER — CLOBETASOL PROPIONATE 0.5 MG/G
CREAM TOPICAL
Qty: 30 G | Refills: 0 | Status: SHIPPED | OUTPATIENT
Start: 2018-05-08 | End: 2019-09-06

## 2018-05-08 ASSESSMENT — PAIN SCALES - GENERAL: PAINLEVEL: NO PAIN (0)

## 2018-05-08 NOTE — MR AVS SNAPSHOT
After Visit Summary   5/8/2018    Andre Tyler    MRN: [id number]           Patient Information     Date Of Birth          1945        Visit Information        Provider Department      5/8/2018 9:45 AM Berta Monge MD [facility]        Today's Diagnoses     Other eczema    -  1    AK (actinic keratosis)        Neoplasm of uncertain behavior of skin        Seborrheic keratosis          Care Instructions    CRYOTHERAPY    What is it?    Use of a very cold liquid, such as liquid nitrogen, to freeze and destroy abnormal skin cells that need to be removed    What should I expect?    Tenderness and redness    A small blister that might grow and fill with dark purple blood. There may be crusting.    More than one treatment may be needed if the lesions do not go away.    How do I care for the treated area?    Gently wash the area with your hands when bathing.    Use a thin layer of Vaselin to help with healing. You may use a Band-Aid.     The area should heal within 7-10 days.    Do not use an antibiotic or Neosporin ointment.     You may take acetaminophen (Tylenol) for pain.     Call your Doctor if you have:    Severe pain    Signs of infection (warmth, redness, cloudy yellow drainage, and or a bad smell)    Questions or concerns    Contact infromation:  [facility] [phone]  [facility] [phone]    Wound Care After a Biopsy    What is a skin biopsy?  A skin biopsy allows the doctor to examine a very small piece of tissue under the microscope to determine the diagnosis and the best treatment for the skin condition. A local anesthetic (numbing medicine)  is injected with a very small needle into the skin area to be tested. A small piece of skin is taken from the area. Sometimes a suture (stitch) is used.     What are the risks of a skin biopsy?  I will experience scar, bleeding, swelling, pain, crusting and  redness. I may experience incomplete removal or recurrence. Risks of this procedure are excessive bleeding, bruising, infection, nerve damage, numbness, thick (hypertrophic or keloidal) scar and non-diagnostic biopsy.    How should I care for my wound for the first 24 hours?    Keep the wound dry and covered for 24 hours    If it bleeds, hold direct pressure on the area for 15 minutes. If bleeding does not stop then go to the emergency room    Avoid strenuous exercise the first 1-2 days or as your doctor instructs you    How should I care for the wound after 24 hours?    After 24 hours, remove the bandage    You may bathe or shower as normal    If you had a scalp biopsy, you can shampoo as usual and can use shower water to clean the biopsy site daily    Clean the wound twice a day with gentle soap and water    Do not scrub, be gentle    Apply white petroleum/Vaseline after cleaning the wound with a cotton swab or a clean finger, and keep the site covered with a Bandaid /bandage. Bandages are not necessary with a scalp biopsy    If you are unable to cover the site with a Bandaid /bandage, re-apply ointment 2-3 times a day to keep the site moist. Moisture will help with healing    Avoid strenuous activity for first 1-2 days    Avoid lakes, rivers, pools, and oceans until the stitches are removed or the site is healed    How do I clean my wound?    Wash hands thoroughly with soap or use hand  before all wound care    Clean the wound with gentle soap and water    Apply white petroleum/Vaseline  to wound after it is clean    Replace the Bandaid /bandage to keep the wound covered for the first few days or as instructed by your doctor    If you had a scalp biopsy, warm shower water to the area on a daily basis should suffice    What should I use to clean my wound?     Cotton-tipped applicators (Qtips )    White petroleum jelly (Vaseline ). Use a clean new container and use Q-tips to apply.    Bandaids   as  needed    Gentle soap     How should I care for my wound long term?    Do not get your wound dirty    Keep up with wound care for one week or until the area is healed.    A small scab will form and fall off by itself when the area is completely healed. The area will be red and will become pink in color as it heals. Sun protection is very important for how your scar will turn out. Sunscreen with an SPF 30 or greater is recommended once the area is healed.    You should have some soreness but it should be mild and slowly go away over several days. Talk to your doctor about using tylenol for pain,    When should I call my doctor?  If you have increased:     Pain or swelling    Pus or drainage (clear or slightly yellow drainage is ok)    Temperature over 100F    Spreading redness or warmth around wound    When will I hear about my results?  The biopsy results can take 2-3 weeks to come back. The clinic will call you with the results, send you a [employer] message, or have you schedule a follow-up clinic or phone time to discuss the results. Contact our clinics if you do not hear from us in 3 weeks.     Who should I call with questions?    [facility]: [phone]     [facility]: [phone]    For urgent needs outside of business hours call the [facility] at [phone] and ask for the dermatology resident on call              Follow-ups after your visit        Follow-up notes from your care team     Return in about 1 year (around 5/8/2019).      Your next 10 appointments already scheduled     Nov 13, 2018 10:30 AM CST   Return Visit with Berta [facility]ge MD   [facility]    [address] [address] [place] [address] [phone]              Who to contact     If you have questions or need follow up information about today's clinic visit or your schedule please contact [facility]  directly at [phone].  Normal or non-critical lab and imaging results will be communicated to you by MyChart, letter or phone within 4 business days after the clinic has received the results. If you do not hear from us within 7 days, please contact the clinic through MyChart or phone. If you have a critical or abnormal lab result, we will notify you by phone as soon as possible.  Submit refill requests through [employer] or call your pharmacy and they will forward the refill request to us. Please allow 3 business days for your refill to be completed.          Additional Information About Your Visit        Care EveryWhere ID     This is your Care EveryWhere ID. This could be used by other organizations to access your [facility] medical records  [id number]         Blood Pressure from Last 3 Encounters:   09/28/17 130/80   09/22/17 126/84   11/21/16 (!) 139/93    Weight from Last 3 Encounters:   09/28/17 91.2 kg (201 lb)   09/22/17 90.7 kg (200 lb)   06/13/16 96.6 kg (213 lb)              We Performed the Following     BIOPSY SKIN/SUBQ/MUC MEM, EACH ADDTL LESION     BIOPSY SKIN/SUBQ/MUC MEM, SINGLE LESION     Dermatological path order and indications     DESTRUCT PREMALIGNANT LESION, 15 OR MORE          Today's Medication Changes          These changes are accurate as of 5/8/18 10:32 AM.  If you have any questions, ask your nurse or doctor.               Start taking these medicines.        Dose/Directions    clobetasol 0.05 % cream   Commonly known as:  TEMOVATE   Used for:  Other eczema   Started by:  Berta Monge MD        Apply twice daily for 2 weeks on the left calf   Quantity:  30 g   Refills:  0            Where to get your medicines      These medications were sent to [facility] [place], [place] - [address] [facility] [address]  [address] [facility] [address], [place] [address]     Phone:  [phone]     clobetasol 0.05 % cream                Primary Care Provider Office Phone # Fax #    Hegscgf  Roxi Talavera -[phone]       [address]  [place] [facility] [address]        Equal Access to Services     [facility]CO[facility] : Hadfawn nory gonzales megano Socoltali, waaxda luqadaha, qaybta kaalmada adeegyada, ariane alvarez augustojose d frias laJorgejae kendrick. So [facility] [phone].    ATENCIÓN: Si habla español, tiene a wise disposición servicios gratuitos de asistencia lingüística. Llame al [phone].    We comply with applicable federal civil rights laws and [place] laws. We do not discriminate on the basis of race, color, national origin, age, disability, sex, sexual orientation, or gender identity.            Thank you!     Thank you for choosing [facility]  for your care. Our goal is always to provide you with excellent care. Hearing back from our patients is one way we can continue to improve our services. Please take a few minutes to complete the written survey that you may receive in the mail after your visit with us. Thank you!             Your Updated Medication List - Protect others around you: Learn how to safely use, store and throw away your medicines at www.disposemymeds.org.          This list is accurate as of 5/8/18 10:32 AM.  Always use your most recent med list.                   Brand Name Dispense Instructions for use Diagnosis    clobetasol 0.05 % cream    TEMOVATE    30 g    Apply twice daily for 2 weeks on the left calf    Other eczema       GLUCOSAMINE PO       Basal cell carcinoma of left preauricular region       lisinopril-hydrochlorothiazide 10-12.5 MG per tablet    PRINZIDE/ZESTORETIC    90 tablet    Take 1 tablet by mouth daily    Hypertension goal BP (blood pressure) < 140/90       metFORMIN 500 MG tablet    GLUCOPHAGE     2 times daily

## 2018-05-08 NOTE — PATIENT INSTRUCTIONS
CRYOTHERAPY    What is it?    Use of a very cold liquid, such as liquid nitrogen, to freeze and destroy abnormal skin cells that need to be removed    What should I expect?    Tenderness and redness    A small blister that might grow and fill with dark purple blood. There may be crusting.    More than one treatment may be needed if the lesions do not go away.    How do I care for the treated area?    Gently wash the area with your hands when bathing.    Use a thin layer of Vaselin to help with healing. You may use a Band-Aid.     The area should heal within 7-10 days.    Do not use an antibiotic or Neosporin ointment.     You may take acetaminophen (Tylenol) for pain.     Call your Doctor if you have:    Severe pain    Signs of infection (warmth, redness, cloudy yellow drainage, and or a bad smell)    Questions or concerns    Contact infromation:  [facility] [phone]  [facility] [phone]    Wound Care After a Biopsy    What is a skin biopsy?  A skin biopsy allows the doctor to examine a very small piece of tissue under the microscope to determine the diagnosis and the best treatment for the skin condition. A local anesthetic (numbing medicine)  is injected with a very small needle into the skin area to be tested. A small piece of skin is taken from the area. Sometimes a suture (stitch) is used.     What are the risks of a skin biopsy?  I will experience scar, bleeding, swelling, pain, crusting and redness. I may experience incomplete removal or recurrence. Risks of this procedure are excessive bleeding, bruising, infection, nerve damage, numbness, thick (hypertrophic or keloidal) scar and non-diagnostic biopsy.    How should I care for my wound for the first 24 hours?    Keep the wound dry and covered for 24 hours    If it bleeds, hold direct pressure on the area for 15 minutes. If bleeding does not stop then go to the emergency room    Avoid  strenuous exercise the first 1-2 days or as your doctor instructs you    How should I care for the wound after 24 hours?    After 24 hours, remove the bandage    You may bathe or shower as normal    If you had a scalp biopsy, you can shampoo as usual and can use shower water to clean the biopsy site daily    Clean the wound twice a day with gentle soap and water    Do not scrub, be gentle    Apply white petroleum/Vaseline after cleaning the wound with a cotton swab or a clean finger, and keep the site covered with a Bandaid /bandage. Bandages are not necessary with a scalp biopsy    If you are unable to cover the site with a Bandaid /bandage, re-apply ointment 2-3 times a day to keep the site moist. Moisture will help with healing    Avoid strenuous activity for first 1-2 days    Avoid lakes, rivers, pools, and oceans until the stitches are removed or the site is healed    How do I clean my wound?    Wash hands thoroughly with soap or use hand  before all wound care    Clean the wound with gentle soap and water    Apply white petroleum/Vaseline  to wound after it is clean    Replace the Bandaid /bandage to keep the wound covered for the first few days or as instructed by your doctor    If you had a scalp biopsy, warm shower water to the area on a daily basis should suffice    What should I use to clean my wound?     Cotton-tipped applicators (Qtips )    White petroleum jelly (Vaseline ). Use a clean new container and use Q-tips to apply.    Bandaids   as needed    Gentle soap     How should I care for my wound long term?    Do not get your wound dirty    Keep up with wound care for one week or until the area is healed.    A small scab will form and fall off by itself when the area is completely healed. The area will be red and will become pink in color as it heals. Sun protection is very important for how your scar will turn out. Sunscreen with an SPF 30 or greater is recommended once the area is  healed.    You should have some soreness but it should be mild and slowly go away over several days. Talk to your doctor about using tylenol for pain,    When should I call my doctor?  If you have increased:     Pain or swelling    Pus or drainage (clear or slightly yellow drainage is ok)    Temperature over 100F    Spreading redness or warmth around wound    When will I hear about my results?  The biopsy results can take 2-3 weeks to come back. The clinic will call you with the results, send you a Pigeonlyt message, or have you schedule a follow-up clinic or phone time to discuss the results. Contact our clinics if you do not hear from us in 3 weeks.     Who should I call with questions?    [facility]: [phone]     [facility]: [phone]    For urgent needs outside of business hours call the [facility] at [phone] and ask for the dermatology resident on call

## 2018-05-08 NOTE — PROGRESS NOTES
[facility] Dermatology Note      Dermatology Problem List:  1. NMSC  -BCC, vertex scalp, s/p MMS 11/21/2016  -SCCis, R lateral thigh, s/p ED&C 11/21/2016  -SCCIS, right medial ankle, s/p Mohs 4/21/2016  -SCCIS, right ventral forearm, s/p ED&C 4/21/2016  -BCC, left preauricular, s/p Mohs 11/10/2015  -BCC, superficial type, left upper chest, s/p biopsy 4/9/2015, s/p ED&C 10/5/2015  -SCCIS, posterior right lower earlobe, records via [facility] transferred records, Dr. Arnulfo Keyes, 2011. s/p excision in 2005, recurrent  -SCCIS, left dorsal hand, s/p via [facility] transferred records, Dr. Arnulfo Keyes, 2011  2. Actinic keratosis  -s/p cryotherapy  3. HAK,   -right dorsal hand, s/p biopsy 4/4/2016  -left upper arm s/p biopsy 10/17/2017   -left dorsal hand, s/p biopsy 10/17/2017   4. Compound melanocytic nevus, left lower back   -s/p biopsy 4/9/2015  5. Benign lichenoid keratosis, right lower leg  -s/p biopsy 4/9/2015    Encounter Date: May 8, 2018    CC:  Chief Complaint   Patient presents with     Skin Check     area of concern left calf hx NMSC         History of Present Illness:  Mr. Andre Tyler is a 72 year old male  with a history of non-melanoma skin cancer presents for a total body skin exam. The patient was last seen 5/8/2017 by Dr. Manuel for skin check where cryo was used to treated inflamed SKs. The patient reports today that he has itchy spots on the left calf.     Past Medical History:   Patient Active Problem List   Diagnosis     Health Care Home     Hypertension goal BP (blood pressure) < 140/90     Squamous cell skin cancer     Prostate cancer - surgically treated     Advanced directives, counseling/discussion     CARDIOVASCULAR SCREENING; LDL GOAL LESS THAN 130     Sensorineural hearing loss, bilateral     Past Medical History:   Diagnosis Date     Diabetes (H)      Hypertension goal BP (blood pressure) < 140/90      Prostate cancer (H)       Squamous cell skin cancer      Past Surgical History:   Procedure Laterality Date     APPENDECTOMY       HC MOHS TRUNK/ARM/LEG 1ST STAGE UP T0 5 BLOCKS       PROSTATE SURGERY  8/28/12       Social History:  The patient works as a  and is retired from working as a . The patient denies use of tanning beds. [employer] . Plays hockey.     Family History:  There is a family history of skin cancer in the patient's mother and sister (possible SCC).    Medications:  Current Outpatient Prescriptions   Medication Sig Dispense Refill     Glucosamine HCl (GLUCOSAMINE PO)        lisinopril-hydrochlorothiazide (PRINZIDE,ZESTORETIC) 10-12.5 MG per tablet Take 1 tablet by mouth daily 90 tablet 3     metFORMIN (GLUCOPHAGE) 500 MG tablet 2 times daily            No Known Allergies      Review of Systems:   -Skin: As above in HPI. No additional skin concerns.  Const: No fevers, chills, changes in weight, or night sweats.    Physical exam:  GEN: This is a well developed, well-nourished male in no acute distress, in a pleasant mood.    SKIN: Total skin excluding the undergarment areas was performed. The exam included the head/face, neck, both arms, chest, back, abdomen, both legs, digits and/or nails.   -There are erythematous macules with overyling adherent scale on the right temple, left upper arm, right preauricular, left posterior neck, right lateral elbow , right hand, right forearm, right dorsal wrist.   1 cm erythematous patch with scale on the right elbow.   -There is no erythema, telangectasias, nodularity, or pigmentation on the sites of prior skin cancer  - There are pink scaly patches and plaques on the left calf X2, approx 1cm  =pearly 5mm papule  -No other lesions of concern on areas examined.     Impression/Plan:  1. History of nonmelanoma skin cancer, no clinical evidence or recurrence    Recommend sunscreens SPF #30 or greater, protective clothing and avoidance of tanning  beds.  2. AK   Cryotherapy procedure note: After verbal consent and discussion of risks and benefits including2 but no limited to dyspigmentation/scar, blister, and pain, 17 was(were) treated with 1-2mm freeze border for 2 cycles with liquid nitrogen. Post cryotherapy instructions were provided.   3. 1 cm erythematous patch with scale on the right elbow. SCC vs other . and 5mm pearly papule central chest-bcc versus other    After discussion of benefits and risks including but not limited to bleeding, infection, scar, incomplete removal, recurrence, and non-diagnostic biopsy, written consent and photographs were obtained. The area was cleaned with isopropyl alcohol. 0.5 mL of 1% lidocaine with epinephrine was injected to obtain adequate anesthesia of the lesion on the right elbow and central chest. A shave biopsy was performed at each site. Hemostasis was achieved with aluminium chloride. Vaseline and a sterile dressing were applied. The patient tolerated the procedure and no complications were noted. The patient was provided with verbal and written post care instructions.   4. Eczematous dermatitis, right lower leg    Start clobetasol 0.05% cream to the lower leg for 2-3 weeks. If this does not resolve return for further evaluation.   5. SK     No further intervention required at this time.     Follow-up in 1 year, earlier for new or changing lesions.     Staff Involved:  Scribe/Staff    Scribe Disclosure:   I, Sarai Webster, am serving as a scribe to document services personally performed by Dr. Berta Monge, based on data collection and the provider's statements to me.     Provider Disclosure:   The documentation recorded by the scribe accurately reflects the services I personally performed and the decisions made by me.    Berta oMnge MD    Department of Dermatology  [facility]: Phone: [phone], Fax:[phone]  [facility]  [facility]: Phone: [phone], Fax: [phone]

## 2018-05-10 LAB — COPATH REPORT: NORMAL

## 2018-05-25 ENCOUNTER — TELEPHONE (OUTPATIENT)
Dept: DERMATOLOGY | Facility: CLINIC | Age: 73
End: 2018-05-25

## 2018-05-25 NOTE — TELEPHONE ENCOUNTER
Notes Recorded by Christi Rahman LPN on 5/25/2018 at 4:14 PM  Writer spoke with patient and informed him that it is recommended to have the ED&C completed on June 11th as scheduled. Patient verbalized understanding and had no further questions or concerns.     Christi Rahman LPN    ------    Notes Recorded by Boby Nazario MD on 5/25/2018 at 3:51 PM  Sorry if my explanation was vague.   I think an ED&C is the appropriate treatment. Please keep him scheduled.     The pathologist only looks at about 1% of the margin of a biopsy. If the SCCIS is close to the viewed margin (like it was), there is a good chance it's still present somewhere along the other 99% of the margin. The ED&C is appropriate to treat the other 99% of the margin in case there is still some left.       ------    Notes Recorded by Christi Rahman LPN on 5/25/2018 at 10:52 AM  Dr. Nazario please clarify if you are having this path reread otherwise patient would like to proceed and is scheduled for an ED&C.    Thanks  Christi Rahman LPN    ------    Notes Recorded by Asha Carmona MA on 5/22/2018 at 2:03 PM  This cma talked with Patient, and went through results, patient verbalized understanding. Also let him know if it is removed we wouldn't need to have the procedure done, we are getting a clarification on the path report. Patient verbalized he understood but would like to have the ED and C if it is not. Patient scheduled June 11 at 1 pm with Dr. Nazario.     Asha Carmona [facility]   ------    Notes Recorded by Boby Nazario MD on 5/22/2018 at 12:52 AM  The way I read this, it looks like SCCIS excised but close to the lateral margin.   I think for a small lesion, in a low risk site, that may be excised, ED&C is appropriate.   If the patient would prefer a linear scar and a pathologist confirm things, excision is appropriate. Thank you.  ------    Notes Recorded by Christi Rahman LPN on 5/21/2018 at 2:07 PM  Dr. Nazario please review  and advise on chest MOHS vs Excision on spot on chest.     If MOHS would you like a consult? Patient has previously had MOHS in 2015 and 2016.     Photo in chart.    Christi Rahman LPN    ------    Notes Recorded by Berta Mathew MD on 5/21/2018 at 12:33 PM  5mm SCCIS on chest, up to Aravind if he wants excision or Mohs  Part A is AK,recheck at 6 months follow up        2wk ago       Copath Report Patient Name: LUI SOTO   MR#: [id number]   Specimen #: [id number]   Collected: 5/8/2018   Received: 5/8/2018   Reported: 5/10/2018 14:20   Ordering Phy(s): BERTA MATHEW     For improved result formatting, select 'View Enhanced Report Format' under    Linked Documents section.     SPECIMEN(S):   A: Skin, right elbow   B: Skin, central chest     FINAL DIAGNOSIS:   A. Skin, right elbow:   - Features suggestive of actinic keratosis  (see description)     B. Skin, central chest:   - Squamous cell carcinoma in situ, extending close to the lateral margin -    (see description)                Christi Rahman LPN

## 2018-06-11 ENCOUNTER — OFFICE VISIT (OUTPATIENT)
Dept: DERMATOLOGY | Facility: CLINIC | Age: 73
End: 2018-06-11
Payer: COMMERCIAL

## 2018-06-11 DIAGNOSIS — D04.5 SQUAMOUS CELL CARCINOMA IN SITU OF SKIN OF TRUNK: ICD-10-CM

## 2018-06-11 DIAGNOSIS — D48.5 NEOPLASM OF UNCERTAIN BEHAVIOR OF SKIN: Primary | ICD-10-CM

## 2018-06-11 PROCEDURE — 17261 DSTRJ MAL LES T/A/L .6-1.0CM: CPT | Performed by: DERMATOLOGY

## 2018-06-11 PROCEDURE — 11100 HC DESTR MALIG LESION TRUNK/ARM/LEG 1.1-2.0 CM: CPT | Mod: 59 | Performed by: DERMATOLOGY

## 2018-06-11 PROCEDURE — 88305 TISSUE EXAM BY PATHOLOGIST: CPT | Mod: TC | Performed by: DERMATOLOGY

## 2018-06-11 ASSESSMENT — PAIN SCALES - GENERAL: PAINLEVEL: NO PAIN (0)

## 2018-06-11 NOTE — MR AVS SNAPSHOT
After Visit Summary   6/11/2018    Andre Tyler    MRN: [id number]           Patient Information     Date Of Birth          1945        Visit Information        Provider Department      6/11/2018 1:00 PM Boby Nazario MD [facility]        Care Instructions    Wound Care:  Electrodesiccation and Curettage     I will experience scar, altered skin color, bleeding, swelling, pain, crusting and redness. I may experience altered sensation. Risks are excessive bleeding, infection, muscle weakness, thick (hypertrophic or keloidal) scar, and recurrence,. A second procedure may be recommended to obtain the best cosmetic or functional result.    What is electrodesiccation and curettage ?    Scraping off tissue (curettage)    Destroy tissue using electric current or cautery (electrodessication)    How do I perform wound care?    Keep dressing in place for two days. You may shower with the dressing in place(do not get wet)    After 2 days, wash hands and remove dressing. Clean wound with cotton-swab soaked in hydrogen peroxide to remove drainage and crust    Put on a thick layer of Vaseline on the wound using a cotton-swab     Cover the wound with a Band-AidTM to protect from dust and tight clothing    If wound is draining before two days, change your dressing as described above sooner    During wound care, do not allow the area to dry out or form a scab    What do I need?    Hydrogen peroxide     Cotton-swabs     Vaseline or petroleum jelly     Band-AidsTM or dressing supplies as needed     When should I call the doctor?  [facility]: [phone]  [facility]: [phone]  For urgent needs outside of business hours call the [facility] at [phone] and ask for the dermatology resident on call            Follow-ups after your visit        Follow-up notes from your care team     Return in about 6 months (around  12/11/2018) for skin check.      Your next 10 appointments already scheduled     Nov 13, 2018 10:30 AM CST   Return Visit with Berta Monge MD   [facility]    [address] [place] [id number] [phone]              Who to contact     If you have questions or need follow up information about today's clinic visit or your schedule please contact [facility] directly at [phone].  Normal or non-critical lab and imaging results will be communicated to you by MyChart, letter or phone within 4 business days after the clinic has received the results. If you do not hear from us within 7 days, please contact the clinic through MyChart or phone. If you have a critical or abnormal lab result, we will notify you by phone as soon as possible.  Submit refill requests through [employer] or call your pharmacy and they will forward the refill request to us. Please allow 3 business days for your refill to be completed.          Additional Information About Your Visit        Care EveryWhere ID     This is your Care EveryWhere ID. This could be used by other organizations to access your [facility] medical records  [id number]         Blood Pressure from Last 3 Encounters:   09/28/17 130/80   09/22/17 126/84   11/21/16 (!) 139/93    Weight from Last 3 Encounters:   09/28/17 91.2 kg (201 lb)   09/22/17 90.7 kg (200 lb)   06/13/16 96.6 kg (213 lb)              Today, you had the following     No orders found for display       Primary Care Provider Office Phone # Fax #    Kelli Roxi Talavera [phone]       [address]  [place] [facility] [address]        Equal Access to Services     [facility] : Hadii nory hodge Somanas, waaxda luqadaha, qaybta kaalmada adeegyada, ariane kendrick. So [facility] [phone].    ATENCIÓN: Si habla español, tiene a wise disposición servicios gratuitos de asistencia  lingüística. Denice al [phone].    We comply with applicable federal civil rights laws and [place] laws. We do not discriminate on the basis of race, color, national origin, age, disability, sex, sexual orientation, or gender identity.            Thank you!     Thank you for choosing [facility]  for your care. Our goal is always to provide you with excellent care. Hearing back from our patients is one way we can continue to improve our services. Please take a few minutes to complete the written survey that you may receive in the mail after your visit with us. Thank you!             Your Updated Medication List - Protect others around you: Learn how to safely use, store and throw away your medicines at www.disposemymeds.org.          This list is accurate as of 6/11/18  1:34 PM.  Always use your most recent med list.                   Brand Name Dispense Instructions for use Diagnosis    clobetasol 0.05 % cream    TEMOVATE    30 g    Apply twice daily for 2 weeks on the left calf    Other eczema       GLUCOSAMINE PO       Basal cell carcinoma of left preauricular region       lisinopril-hydrochlorothiazide 10-12.5 MG per tablet    PRINZIDE/ZESTORETIC    90 tablet    Take 1 tablet by mouth daily    Hypertension goal BP (blood pressure) < 140/90       metFORMIN 500 MG tablet    GLUCOPHAGE     2 times daily

## 2018-06-11 NOTE — NURSING NOTE
Excision Intake  There were no vitals taken for this visit.    artificial heart valve: No    artificial joint within the last 3 years: No    heart stent in the last 3 months: No    pacemaker: No    defibrillator: No    nerve/brain stimulator: No    bleeding disorder: No    coumadin use: No    heart valve disease: No    site location lower limb or groin: No    hepatitis B/C or HIV: No    smoker: No    Iodine allergy: No     Christi Rahman LPN

## 2018-06-11 NOTE — NURSING NOTE
Andre Tyler's goals for this visit include: ED&C central chest SCCIS and bump on right hand near thumb  He requests these members of his care team be copied on today's visit information:     PCP: Kelli Graham    Referring Provider:  No referring provider defined for this encounter.    There were no vitals taken for this visit.    Do you need any medication refills at today's visit? Florence Rahman LPN

## 2018-06-11 NOTE — PATIENT INSTRUCTIONS
Wound Care:  Electrodesiccation and Curettage     I will experience scar, altered skin color, bleeding, swelling, pain, crusting and redness. I may experience altered sensation. Risks are excessive bleeding, infection, muscle weakness, thick (hypertrophic or keloidal) scar, and recurrence,. A second procedure may be recommended to obtain the best cosmetic or functional result.    What is electrodesiccation and curettage ?    Scraping off tissue (curettage)    Destroy tissue using electric current or cautery (electrodessication)    How do I perform wound care?    Keep dressing in place for two days. You may shower with the dressing in place(do not get wet)    After 2 days, wash hands and remove dressing. Clean wound with cotton-swab soaked in hydrogen peroxide to remove drainage and crust    Put on a thick layer of Vaseline on the wound using a cotton-swab     Cover the wound with a Band-AidTM to protect from dust and tight clothing    If wound is draining before two days, change your dressing as described above sooner    During wound care, do not allow the area to dry out or form a scab    What do I need?    Hydrogen peroxide     Cotton-swabs     Vaseline or petroleum jelly     Band-AidsTM or dressing supplies as needed     When should I call the doctor?  [facility]: [phone]  [facility]: [phone]  For urgent needs outside of business hours call the [facility] at [phone] and ask for the dermatology resident on call

## 2018-06-11 NOTE — LETTER
6/11/2018         RE: Andre Tyler  [address] [facility] [address]        Dear Colleague,    Thank you for referring your patient, Andre Tyler, to the [facility]. Please see a copy of my visit note below.    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  The expected cure rates, healing times and anticipated scars of each option were discussed and the patient elects to proceed with ED and C.     The risks and benefits of the procedure were described to the patient.  These include but are not limited to bleeding, infection, scar, incomplete removal, and recurrence. Written informed consent was obtained. Time-out was performed. The above site was cleansed with and injected with 3.0 mL 1% lidocaine with epinephrine. Once anesthesia was obtained, the site was prepped with Chlorhexidine and rinsed with sterile saline. The lesion was curetted with 4mm curette in 3 directions with a 2 mm margin and this was followed by electrodessication.  This process was repeated three times. The defect measured 1.2 x 1.1 cm. Vaseline and a bandage were applied to the wound. The patient tolerated the procedure well and was given post care instructions.    -------------------------------------------------------------------------------------------      [facility] Dermatology Note      Dermatology Problem List:  1. NMSC  - SCCIS, central chest, s/p ED & C 6/11/18   - BCC, vertex scalp, s/p Mohs 11/21/2016  - SCCis, R lateral thigh, s/p ED & C 11/21/2016  - SCCIS, R medial ankle, s/p Mohs 4/21/2016  - SCCIS, R ventral forearm, s/p  ED & C 4/21/2016  - BCC, L preauricular, s/p Mohs 11/10/2015  - BCC, L upper, chest, s/p ED & C 10/5/2015  - SCCIS, posterior R lower earlobe, records via [facility] transferred records, Dr. Arnulfo Keyes, 2011. s/p excision in 2005, recurrent  - SCCIS, L dorsal hand, s/p via [facility] transferred records, Dr. Arnulfo Keyes, 2011  2. Actinic keratosis  - s/p cryotherapy  3. HAK   - R dorsal hand, s/p biopsy 4/4/2016  - L upper arm, s/p biopsy 10/17/2017   - L dorsal hand, s/p biopsy 10/17/2017   4. Compound melanocytic nevus, L lower back   - s/p biopsy 4/9/2015  5. Benign lichenoid keratosis, R lower leg  - s/p biopsy 4/9/2015      Encounter Date: Jun 11, 2018    CC:  Chief Complaint   Patient presents with     Procedure     ED&C central chest     Derm Problem     bump on right hand near thumb         History of Present Illness:  Mr. Andre Tyler is a 72 year old male with history of a non-melanoma skin cancer who presents today for electrodesiccation and curettage of a squamous cell carcinoma in situ of the central chest. At today's visit, the patient also reports a lesion of concern on base of his right thumb that he would like checked. He is concerned it could be another skin cancer. The lesion is mostly asymptomatic, but has been growing in size since his last visit. Otherwise, the patient reports no other painful, bleeding, nonhealing, or pruritic lesions, and denies new or changing moles.       Past Medical History:   Patient Active Problem List   Diagnosis     Health Care Home     Hypertension goal BP (blood pressure) < 140/90     Squamous cell skin cancer     Prostate cancer - surgically treated     Advanced directives, counseling/discussion     CARDIOVASCULAR SCREENING; LDL GOAL LESS THAN 130     Sensorineural hearing loss, bilateral     Past Medical History:   Diagnosis Date     Diabetes (H)      Hypertension goal BP (blood pressure) < 140/90      Prostate cancer (H)       Squamous cell skin cancer      Past Surgical History:   Procedure Laterality Date     APPENDECTOMY       HC MOHS TRUNK/ARM/LEG 1ST STAGE UP T0 5 BLOCKS       PROSTATE SURGERY  8/28/12       Social History:  The patient works as a  and is retired from working as a . The patient denies use of tanning beds. [employer] . Plays hockey.     Family History:  There is a family history of skin cancer in the patient's mother and sister (possible SCC).    Medications:  Current Outpatient Prescriptions   Medication Sig Dispense Refill     Glucosamine HCl (GLUCOSAMINE PO)        lisinopril-hydrochlorothiazide (PRINZIDE,ZESTORETIC) 10-12.5 MG per tablet Take 1 tablet by mouth daily 90 tablet 3     metFORMIN (GLUCOPHAGE) 500 MG tablet 2 times daily       clobetasol (TEMOVATE) 0.05 % cream Apply twice daily for 2 weeks on the left calf (Patient not taking: Reported on 6/11/2018) 30 g 0       No Known Allergies    Review of Systems:  -Skin Establ Pt: The patient denies any new rash, pruritus, or lesions that are symptomatic, changing or bleeding, except as per HPI.  -Constitutional: The patient is feeling generally well.    Physical exam:  Vitals: There were no vitals taken for this visit.  GEN: This is a well developed, well-nourished male in no acute distress, in a pleasant mood.    SKIN: Focused examination of the lesion of conern was performed.  - Approximately 8 mm flesh colored hyperkeratotic gritty papule on the base of right thumb.   - No other lesions of concern on areas examined.       Impression/Plan:  1. Neoplasm of uncertain behavior on the base of right thumb. The differential diagnosis includes SCC vs HAK.     After discussion of benefits and risks including but not limited to bleeding, infection, scar, incomplete removal, recurrence, and non-diagnostic biopsy, written consent and photographs were obtained. The area was cleaned with isopropyl alcohol. 0.75 mL of 1% lidocaine with  epinephrine was injected to obtain adequate anesthesia of the lesion on the base of right thumb. A shave biopsy was performed. Hemostasis was achieved with aluminium chloride. Vaseline and a sterile dressing were applied. The patient tolerated the procedure and no complications were noted. The patient was provided with verbal and written post care instructions.     Patient will be notified with results.     Follow-up pending biopsy results.        Staff Involved:    Scribe Disclosure  I, Igor Jalloh, am serving as a scribe to document services personally performed by Dr. Boby Nazario DO, based on data collection and the provider's statements to me.     Provider Disclosure:   The documentation recorded by the scribe accurately reflects the services I personally performed and the decisions made by me.  I personally performed the procedures today.    Boby Nazario DO    Department of Dermatology  [facility]: Phone: [phone], Fax:[phone]  [facility]: Phone: [phone], Fax: [phone]               Again, thank you for allowing me to participate in the care of your patient.        Sincerely,        Boby Nazario MD

## 2018-06-11 NOTE — PROGRESS NOTES
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  The expected cure rates, healing times and anticipated scars of each option were discussed and the patient elects to proceed with ED and C.     The risks and benefits of the procedure were described to the patient.  These include but are not limited to bleeding, infection, scar, incomplete removal, and recurrence. Written informed consent was obtained. Time-out was performed. The above site was cleansed with and injected with 3.0 mL 1% lidocaine with epinephrine. Once anesthesia was obtained, the site was prepped with Chlorhexidine and rinsed with sterile saline. The lesion was curetted with 4mm curette in 3 directions with a 2 mm margin and this was followed by electrodessication.  This process was repeated three times. The defect measured 1.2 x 1.1 cm. Vaseline and a bandage were applied to the wound. The patient tolerated the procedure well and was given post care instructions.    -------------------------------------------------------------------------------------------      [facility] Dermatology Note      Dermatology Problem List:  1. NMSC  - SCCIS, central chest, s/p ED & C 6/11/18   - BCC, vertex scalp, s/p Mohs 11/21/2016  - SCCis, R lateral thigh, s/p ED & C 11/21/2016  - SCCIS, R medial ankle, s/p Mohs 4/21/2016  - SCCIS, R ventral forearm, s/p ED & C 4/21/2016  - BCC, L preauricular, s/p Mohs 11/10/2015  - BCC, L upper, chest, s/p ED & C 10/5/2015  - SCCIS, posterior R lower earlobe, records via [facility] transferred records, Dr. Arnulfo Keyes, 2011. s/p excision in 2005,  recurrent  - SCCIS, L dorsal hand, s/p via [facility] transferred records, Dr. Arnulfo Keyes, 2011  2. Actinic keratosis  - s/p cryotherapy  3. HAK   - R dorsal hand, s/p biopsy 4/4/2016  - L upper arm, s/p biopsy 10/17/2017   - L dorsal hand, s/p biopsy 10/17/2017   4. Compound melanocytic nevus, L lower back   - s/p biopsy 4/9/2015  5. Benign lichenoid keratosis, R lower leg  - s/p biopsy 4/9/2015      Encounter Date: Jun 11, 2018    CC:  Chief Complaint   Patient presents with     Procedure     ED&C central chest     Derm Problem     bump on right hand near thumb         History of Present Illness:  Mr. Andre Tyler is a 72 year old male with history of a non-melanoma skin cancer who presents today for electrodesiccation and curettage of a squamous cell carcinoma in situ of the central chest. At today's visit, the patient also reports a lesion of concern on base of his right thumb that he would like checked. He is concerned it could be another skin cancer. The lesion is mostly asymptomatic, but has been growing in size since his last visit. Otherwise, the patient reports no other painful, bleeding, nonhealing, or pruritic lesions, and denies new or changing moles.       Past Medical History:   Patient Active Problem List   Diagnosis     Health Care Home     Hypertension goal BP (blood pressure) < 140/90     Squamous cell skin cancer     Prostate cancer - surgically treated     Advanced directives, counseling/discussion     CARDIOVASCULAR SCREENING; LDL GOAL LESS THAN 130     Sensorineural hearing loss, bilateral     Past Medical History:   Diagnosis Date     Diabetes (H)      Hypertension goal BP (blood pressure) < 140/90      Prostate cancer (H)      Squamous cell skin cancer      Past Surgical History:   Procedure Laterality Date     APPENDECTOMY       HC MOHS TRUNK/ARM/LEG 1ST STAGE UP T0 5 BLOCKS       PROSTATE SURGERY  8/28/12       Social History:  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 The patient denies use of tanning beds. [employer] . Plays hockey.     Family History:  There is a family history of skin cancer in the patient's mother and sister (possible SCC).    Medications:  Current Outpatient Prescriptions   Medication Sig Dispense Refill     Glucosamine HCl (GLUCOSAMINE PO)        lisinopril-hydrochlorothiazide (PRINZIDE,ZESTORETIC) 10-12.5 MG per tablet Take 1 tablet by mouth daily 90 tablet 3     metFORMIN (GLUCOPHAGE) 500 MG tablet 2 times daily       clobetasol (TEMOVATE) 0.05 % cream Apply twice daily for 2 weeks on the left calf (Patient not taking: Reported on 6/11/2018) 30 g 0       No Known Allergies    Review of Systems:  -Skin Establ Pt: The patient denies any new rash, pruritus, or lesions that are symptomatic, changing or bleeding, except as per HPI.  -Constitutional: The patient is feeling generally well.    Physical exam:  Vitals: There were no vitals taken for this visit.  GEN: This is a well developed, well-nourished male in no acute distress, in a pleasant mood.    SKIN: Focused examination of the lesion of conern was performed.  - Approximately 8 mm flesh colored hyperkeratotic gritty papule on the base of right thumb.   - No other lesions of concern on areas examined.       Impression/Plan:  1. Neoplasm of uncertain behavior on the base of right thumb. The differential diagnosis includes SCC vs HAK.     After discussion of benefits and risks including but not limited to bleeding, infection, scar, incomplete removal, recurrence, and non-diagnostic biopsy, written consent and photographs were obtained. The area was cleaned with isopropyl alcohol. 0.75 mL of 1% lidocaine with epinephrine was injected to obtain adequate anesthesia of the lesion on the base of right thumb. A shave biopsy was performed. Hemostasis was achieved with aluminium chloride. Vaseline and a sterile dressing were applied. The patient tolerated  the procedure and no complications were noted. The patient was provided with verbal and written post care instructions.     Patient will be notified with results.     Follow-up pending biopsy results.        Staff Involved:    Scribe Disclosure  I, Igor Jalloh, am serving as a scribe to document services personally performed by Dr. Boby Nazario DO, based on data collection and the provider's statements to me.     Provider Disclosure:   The documentation recorded by the scribe accurately reflects the services I personally performed and the decisions made by me.  I personally performed the procedures today.    Boby Nazario DO    Department of Dermatology  [facility]: Phone: [phone], Fax:[phone]  [facility]: Phone: [phone], Fax: [phone]

## 2018-06-13 LAB — COPATH REPORT: NORMAL

## 2018-06-15 ENCOUNTER — TELEPHONE (OUTPATIENT)
Dept: DERMATOLOGY | Facility: CLINIC | Age: 73
End: 2018-06-15

## 2018-06-15 NOTE — TELEPHONE ENCOUNTER
History of Skin cancer : Yes    History of Mohs Surgery: Yes    History of a solid organ transplant: no Organ(s):  Year(s):  Creatinine:  Bone Marrow Transplant : no (year, )    Immunosuppressive Medications: no (if yes, which ones: )    HIV or Hepatitis B or C : no    Chronic lymphocytic leukemia: no    Diabetes: Yes (Type 1 or 2: Type 2- takes metformin)    Any Bleeding disorders: no    Do you have a Pacemaker or Defibrillator: no (year placed: )    Artificial heart valve: no (mechanical or porcine: )    Joint Replacement in the last 2 years: no Joint(s):  Year(s):     Do you typically take Prophylactic Antibiotics before seeing a dentist or having a procedure?: no    If yes, verify that patient has the antibiotic on hand and instruct to take one hour before their surgery appointment.    If they do not have the antibiotic, verify the patients pharmacy and notify Dr. Harris by printing the pre-mohs call sheet.    Do you wear a C Pap Mask: no    If yes, and procedure is on the face, ask patient to bring in the mask with them (Mask only)    Do you have any mobility issues: no    If yes, is a van service is required to transport you to/from your appointment? no    Smoking History (tobacco of any sort): no    Do you have any other health issues that we should know about? no    Do you take any of the following Blood Thinners:    Aspirin: no    Plavix/Aggrastat/Brilinta: no    Warfarin: no (Last INR:  Date: )    Pradaxa/Eliquis/Xarelto: no    Ibuprofen (Advil/Motrin): no    Naproxen (Aleve): no    Vitamin E: no    Fish Oil: no    Ginkgo biloba: no    Other:    Done-Paient was instructed of the following: Ibuprofen, naproxen, Vitamin E, Fish Oil, and Ginkgo biloba should be stopped 1 week prior to and 1 week after the procedure.    Medication Allergies: in EHR    Are medications in Epic: in EHR    Done-Patient was reminded to take all medications as usual, other than those listed above, on the day of  surgery    Done-Patient was instructed to bring all medications to clinic on day of surgery    Done-Patient will bring a     (A  is helpful for the following reasons: some patients need medications to relax, but once given, patients should not drive; sometimes bandages obstruct your vision making it difficult to see and unsafe to drive; the day can get long so it s nice to have a eligio; sometimes the procedure wears people out/makes them quite tired)    Done-Photograph of the surgical site(s) is/are available    Done-Patient was reminded that this can be an all-day procedure and that no other appointments should be scheduled on the day of Mohs

## 2018-06-20 ENCOUNTER — TELEPHONE (OUTPATIENT)
Dept: DERMATOLOGY | Facility: CLINIC | Age: 73
End: 2018-06-20

## 2018-06-20 NOTE — TELEPHONE ENCOUNTER
Writer noted when completing path book that patient was scheduled at the [facility] for MOHS. Writer called patient to see if he wanted to be seen at the [facility] or  to which the patient states he thought he was scheduled at . Writer offered to help reschedule patient to which he states Tuesday actually works best for him and therefore will go to the [facility].     Patient is currently driving and is unsure where the [facility] is and unable to take down the address at this time.     Patient requesting to be called in the am on 6-21-18 with the address and details.     Christi Rahman LPN

## 2018-06-21 NOTE — TELEPHONE ENCOUNTER
I spoke with patient and he received a letter and map in the mail with details, but he appreciated the follow up.  Jessica Reyna RN

## 2018-06-24 ENCOUNTER — NURSE TRIAGE (OUTPATIENT)
Dept: NURSING | Facility: CLINIC | Age: 73
End: 2018-06-24

## 2018-06-24 ENCOUNTER — OFFICE VISIT (OUTPATIENT)
Dept: URGENT CARE | Facility: URGENT CARE | Age: 73
End: 2018-06-24
Payer: COMMERCIAL

## 2018-06-24 VITALS
WEIGHT: 200 LBS | RESPIRATION RATE: 16 BRPM | OXYGEN SATURATION: 97 % | BODY MASS INDEX: 27.12 KG/M2 | HEART RATE: 80 BPM | TEMPERATURE: 98.1 F | DIASTOLIC BLOOD PRESSURE: 87 MMHG | SYSTOLIC BLOOD PRESSURE: 141 MMHG

## 2018-06-24 DIAGNOSIS — R30.0 DYSURIA: ICD-10-CM

## 2018-06-24 DIAGNOSIS — R35.0 URINARY FREQUENCY: ICD-10-CM

## 2018-06-24 DIAGNOSIS — N30.01 ACUTE CYSTITIS WITH HEMATURIA: Primary | ICD-10-CM

## 2018-06-24 LAB
ALBUMIN UR-MCNC: NEGATIVE MG/DL
APPEARANCE UR: CLEAR
BILIRUB UR QL STRIP: NEGATIVE
COLOR UR AUTO: YELLOW
GLUCOSE UR STRIP-MCNC: NEGATIVE MG/DL
HGB UR QL STRIP: ABNORMAL
KETONES UR STRIP-MCNC: NEGATIVE MG/DL
LEUKOCYTE ESTERASE UR QL STRIP: NEGATIVE
NITRATE UR QL: NEGATIVE
NON-SQ EPI CELLS #/AREA URNS LPF: ABNORMAL /LPF
PH UR STRIP: 5.5 PH (ref 5–7)
RBC #/AREA URNS AUTO: ABNORMAL /HPF
SOURCE: ABNORMAL
SP GR UR STRIP: >1.03 (ref 1–1.03)
UROBILINOGEN UR STRIP-ACNC: 0.2 EU/DL (ref 0.2–1)
WBC #/AREA URNS AUTO: ABNORMAL /HPF

## 2018-06-24 PROCEDURE — 99213 OFFICE O/P EST LOW 20 MIN: CPT | Performed by: FAMILY MEDICINE

## 2018-06-24 PROCEDURE — 81001 URINALYSIS AUTO W/SCOPE: CPT | Performed by: FAMILY MEDICINE

## 2018-06-24 PROCEDURE — 87086 URINE CULTURE/COLONY COUNT: CPT | Performed by: FAMILY MEDICINE

## 2018-06-24 RX ORDER — CIPROFLOXACIN 500 MG/1
500 TABLET, FILM COATED ORAL 2 TIMES DAILY
Qty: 14 TABLET | Refills: 0 | Status: SHIPPED | OUTPATIENT
Start: 2018-06-24 | End: 2018-07-01

## 2018-06-24 NOTE — MR AVS SNAPSHOT
After Visit Summary   6/24/2018    Andre Tyler    MRN: [id number]           Patient Information     Date Of Birth          1945        Visit Information        Provider Department      6/24/2018 1:50 PM Carmel Real MD [facility]        Today's Diagnoses     Acute cystitis with hematuria    -  1    Dysuria        Urinary frequency          Care Instructions      Blood in the Urine    Blood in the urine (hematuria) has many possible causes. If it occurs after an injury (such as a car accident or fall), it is most often a sign of bruising to the kidney or bladder. Common causes of blood in the urine include urinary tract infections, kidney stones, inflammation, tumors, or certain other diseases of the kidney or bladder. Menstruation can cause blood to appear in the urine sample, although it is not coming from the urinary tract.  If only a trace amount of blood is present, it will show up on the urine test, even though the urine may be yellow and not pink or red. This may occur with any of the above conditions, as well as heavy exercise or high fever. In this case, your doctor may want to repeat the urine test on another day. This will show if the blood is still present. If it is, then other tests can be done to find out the cause.  Home care  Follow these home care guidelines:    If your urine does not appear bloody (pink, brown or red) then you do not need to restrict your activity in any way.    If you can see blood in your urine, rest and avoid heavy exertion until your next exam. Do not use aspirin, blood thinners, or anti-platelet or anti-inflammatory medicines. These include ibuprofen and naproxen. These thin the blood and may increase bleeding.  Follow-up care  Follow up with your healthcare provider, or as advised. If you were injured and had blood in your urine, you should have a repeat urine test in 1 to 2 days. Contact your doctor for this test.  A  radiologist will review any X-rays that were taken. You will be told of any new findings that may affect your care.  When to seek medical advice  Call your healthcare provider right away if any of these occur:    Bright red blood or blood clots in the urine (if you did not have this before)    Weakness, dizziness or fainting    New groin, abdominal, or back pain    Fever of 100.4 F (38 C) or higher, or as directed by your healthcare provider    Repeated vomiting    Bleeding from the nose or gums or easy bruising  Date Last Reviewed: 9/1/2016 2000-2017 [employer]. [address] [place] [address]. All rights reserved. This information is not intended as a substitute for professional medical care. Always follow your healthcare professional's instructions.                Follow-ups after your visit        Follow-up notes from your care team     Return in about 2 weeks (around 7/8/2018) for Follow up.      Your next 10 appointments already scheduled     Jun 26, 2018  7:30 AM CDT   (Arrive by 7:15 AM)   Return Mohs with Boby Nazario MD   [facility] Dermatologic Surgery ([facility])    [address] [address] [phone]            Nov 13, 2018 10:30 AM CST   Return Visit with Berta Monge MD   [facility]    [id number] [place] [address] [phone]              Who to contact     If you have questions or need follow up information about today's clinic visit or your schedule please contact [facility] directly at [phone].  Normal or non-critical lab and imaging results will be communicated to you by MyChart, letter or phone within 4 business days after the clinic has received the results. If you do not hear from us within 7 days, please contact the clinic through MyChart or phone. If you have a critical or abnormal lab result, we will notify  you by phone as soon as possible.  Submit refill requests through [employer] or call your pharmacy and they will forward the refill request to us. Please allow 3 business days for your refill to be completed.          Additional Information About Your Visit        Care EveryWhere ID     This is your Care EveryWhere ID. This could be used by other organizations to access your [place] medical records  [id number]        Your Vitals Were     Pulse Temperature Respirations Pulse Oximetry BMI (Body Mass Index)       80 98.1  F (36.7  C) (Oral) 16 97% 27.12 kg/m2        Blood Pressure from Last 3 Encounters:   06/24/18 141/87   09/28/17 130/80   09/22/17 126/84    Weight from Last 3 Encounters:   06/24/18 200 lb (90.7 kg)   09/28/17 201 lb (91.2 kg)   09/22/17 200 lb (90.7 kg)              We Performed the Following     *UA reflex to Microscopic and Culture ([place] [facility] (except [place] and [place])     Urine Culture Aerobic Bacterial     Urine Microscopic          Today's Medication Changes          These changes are accurate as of 6/24/18  2:47 PM.  If you have any questions, ask your nurse or doctor.               Start taking these medicines.        Dose/Directions    ciprofloxacin 500 MG tablet   Commonly known as:  CIPRO   Used for:  Acute cystitis with hematuria   Started by:  Carmel Real MD        Dose:  500 mg   Take 1 tablet (500 mg) by mouth 2 times daily for 7 days   Quantity:  14 tablet   Refills:  0            Where to get your medicines      These medications were sent to [facility] [place] - [address]  [address], [facility] [address]     Phone:  [phone]     ciprofloxacin 500 MG tablet                Primary Care Provider Office Phone # Fax #    Kelli Roxi Talavera [phone]       [address]  [place] [address]        Equal Access to Services     KARTHIK ACOSTA AH: Yazan hodge  Steffi, gabrielleda lumeshaadaha, qaybta kayarely olvera, ariane darrinin hayaan augustojose d mariposayoly laJorgejae aroldo. So [facility] [phone].    ATENCIÓN: Si deborah breaux, tiene a wise disposición servicios gratuitos de asistencia lingüística. Denice al [phone].    We comply with applicable federal civil rights laws and [place] laws. We do not discriminate on the basis of race, color, national origin, age, disability, sex, sexual orientation, or gender identity.            Thank you!     Thank you for choosing [facility]  for your care. Our goal is always to provide you with excellent care. Hearing back from our patients is one way we can continue to improve our services. Please take a few minutes to complete the written survey that you may receive in the mail after your visit with us. Thank you!             Your Updated Medication List - Protect others around you: Learn how to safely use, store and throw away your medicines at www.disposemymeds.org.          This list is accurate as of 6/24/18  2:47 PM.  Always use your most recent med list.                   Brand Name Dispense Instructions for use Diagnosis    ciprofloxacin 500 MG tablet    CIPRO    14 tablet    Take 1 tablet (500 mg) by mouth 2 times daily for 7 days    Acute cystitis with hematuria       clobetasol 0.05 % cream    TEMOVATE    30 g    Apply twice daily for 2 weeks on the left calf    Other eczema       GLUCOSAMINE PO       Basal cell carcinoma of left preauricular region       lisinopril-hydrochlorothiazide 10-12.5 MG per tablet    PRINZIDE/ZESTORETIC    90 tablet    Take 1 tablet by mouth daily    Hypertension goal BP (blood pressure) < 140/90       metFORMIN 500 MG tablet    GLUCOPHAGE     2 times daily

## 2018-06-24 NOTE — PATIENT INSTRUCTIONS
Blood in the Urine    Blood in the urine (hematuria) has many possible causes. If it occurs after an injury (such as a car accident or fall), it is most often a sign of bruising to the kidney or bladder. Common causes of blood in the urine include urinary tract infections, kidney stones, inflammation, tumors, or certain other diseases of the kidney or bladder. Menstruation can cause blood to appear in the urine sample, although it is not coming from the urinary tract.  If only a trace amount of blood is present, it will show up on the urine test, even though the urine may be yellow and not pink or red. This may occur with any of the above conditions, as well as heavy exercise or high fever. In this case, your doctor may want to repeat the urine test on another day. This will show if the blood is still present. If it is, then other tests can be done to find out the cause.  Home care  Follow these home care guidelines:    If your urine does not appear bloody (pink, brown or red) then you do not need to restrict your activity in any way.    If you can see blood in your urine, rest and avoid heavy exertion until your next exam. Do not use aspirin, blood thinners, or anti-platelet or anti-inflammatory medicines. These include ibuprofen and naproxen. These thin the blood and may increase bleeding.  Follow-up care  Follow up with your healthcare provider, or as advised. If you were injured and had blood in your urine, you should have a repeat urine test in 1 to 2 days. Contact your doctor for this test.  A radiologist will review any X-rays that were taken. You will be told of any new findings that may affect your care.  When to seek medical advice  Call your healthcare provider right away if any of these occur:    Bright red blood or blood clots in the urine (if you did not have this before)    Weakness, dizziness or fainting    New groin, abdominal, or back pain    Fever of 100.4 F (38 C) or higher, or as directed by  your healthcare provider    Repeated vomiting    Bleeding from the nose or gums or easy bruising  Date Last Reviewed: 9/1/2016 2000-2017 [employer]. [address] [place] [address], [place] [address]. All rights reserved. This information is not intended as a substitute for professional medical care. Always follow your healthcare professional's instructions.

## 2018-06-24 NOTE — TELEPHONE ENCOUNTER
"\"I think I have a urinary tract infection\". Jayson has had a UTI in the past and [facility] to [facility]. [facility]  Vidhi WALTERS RN [facility]     "

## 2018-06-24 NOTE — PROGRESS NOTES
URINARY TRACT SYMPTOMS  Onset: x 2-3 weeks    Description:   Painful urination (Dysuria): YES  Blood in urine (Hematuria): no   Delay in urine (Hesitency): no     Intensity: mild    Progression of Symptoms:  same    Accompanying Signs & Symptoms:  Fever/chills: no   Flank pain no   Nausea and vomiting: no   Abdominal/Pelvic Pain: no     History:   History of frequent UTI's: no   History of kidney stones: no   Sexually Active: no     Precipitating factors:   unknown    Therapies Tried and outcome:  Increase fluid intake      Reports that he had a UTI in 9/2017- symptoms feel the same but less severe.   Urine culture grew Klebsiella oxytoca that was pan sensitive.    Problem list and histories reviewed & adjusted, as indicated.  Additional history: as documented    Patient Active Problem List   Diagnosis     Health Care Home     Hypertension goal BP (blood pressure) < 140/90     Squamous cell skin cancer     Prostate cancer - surgically treated     Advanced directives, counseling/discussion     CARDIOVASCULAR SCREENING; LDL GOAL LESS THAN 130     Sensorineural hearing loss, bilateral     Past Surgical History:   Procedure Laterality Date     APPENDECTOMY       HC MOHS TRUNK/ARM/LEG 1ST STAGE UP T0 5 BLOCKS       PROSTATE SURGERY  8/28/12       Social History   Substance Use Topics     Smoking status: Never Smoker     Smokeless tobacco: Never Used     Alcohol use No     Family History   Problem Relation Age of Onset     Hypertension Father      Diabetes Father      Prostate Cancer Father      Alcohol/Drug Sister      C.A.D. No family hx of      Cerebrovascular Disease No family hx of          Current Outpatient Prescriptions   Medication Sig Dispense Refill     ciprofloxacin (CIPRO) 500 MG tablet Take 1 tablet (500 mg) by mouth 2 times daily for 7 days 14 tablet 0     Glucosamine HCl (GLUCOSAMINE PO)        lisinopril-hydrochlorothiazide (PRINZIDE,ZESTORETIC) 10-12.5 MG per tablet Take 1 tablet by mouth daily 90  tablet 3     metFORMIN (GLUCOPHAGE) 500 MG tablet 2 times daily       clobetasol (TEMOVATE) 0.05 % cream Apply twice daily for 2 weeks on the left calf (Patient not taking: Reported on 6/11/2018) 30 g 0     No Known Allergies         ROS:  Constitutional, HEENT, cardiovascular, pulmonary, gi  systems are negative, except as otherwise noted.    OBJECTIVE:     /87  Pulse 80  Temp 98.1  F (36.7  C) (Oral)  Resp 16  Wt 200 lb (90.7 kg)  SpO2 97%  BMI 27.12 kg/m2  Body mass index is 27.12 kg/(m^2).  GENERAL: healthy, alert and no distress  ABDOMEN: soft, nontender, no hepatosplenomegaly, no masses and bowel sounds normal  PSYCH: mentation appears normal, affect normal/bright    Diagnostic Test Results:  Reviewed and discussed with patient prior to discharge.  Results for orders placed or performed in visit on 06/24/18   *UA reflex to Microscopic and Culture ([place] [facility] (except [place] and [place])   Result Value Ref Range    Color Urine Yellow     Appearance Urine Clear     Glucose Urine Negative NEG^Negative mg/dL    Bilirubin Urine Negative NEG^Negative    Ketones Urine Negative NEG^Negative mg/dL    Specific Gravity Urine >1.030 1.003 - 1.035    Blood Urine Small (A) NEG^Negative    pH Urine 5.5 5.0 - 7.0 pH    Protein Albumin Urine Negative NEG^Negative mg/dL    Urobilinogen Urine 0.2 0.2 - 1.0 EU/dL    Nitrite Urine Negative NEG^Negative    Leukocyte Esterase Urine Negative NEG^Negative    Source Midstream Urine    Urine Microscopic   Result Value Ref Range    WBC Urine 0 - 5 OTO5^0 - 5 /HPF    RBC Urine 5-10 (A) OTO2^O - 2 /HPF    Squamous Epithelial /LPF Urine Few FEW^Few /LPF         ASSESSMENT/PLAN:     Andre was seen today for uti.    Diagnoses and all orders for this visit:    Acute cystitis with hematuria  -     ciprofloxacin (CIPRO) 500 MG tablet; Take 1 tablet (500 mg) by mouth 2 times daily for 7 days  -     Urine Culture Aerobic Bacterial    Dysuria  -     *UA reflex to  Microscopic and Culture ([place] [facility] (except [place] and [place])  -     Urine Microscopic    Urinary frequency  -     *UA reflex to Microscopic and Culture ([place] [facility] (except [place] and [place])  -     Urine Microscopic      Follow up with PCP in 2 weeks, repeat urinalysis; if hematuria persists consider referral to Urology for further evaluation. Patient verbalized understanding.    Patient education and Handout given       The patient was in agreement with the plan today and had no questions or concerns prior to leaving the clinic.        Carmel Real MD  [facility]

## 2018-06-25 LAB
BACTERIA SPEC CULT: NO GROWTH
SPECIMEN SOURCE: NORMAL

## 2018-06-26 ENCOUNTER — OFFICE VISIT (OUTPATIENT)
Dept: DERMATOLOGY | Facility: CLINIC | Age: 73
End: 2018-06-26
Payer: COMMERCIAL

## 2018-06-26 VITALS — DIASTOLIC BLOOD PRESSURE: 90 MMHG | SYSTOLIC BLOOD PRESSURE: 139 MMHG | HEART RATE: 62 BPM

## 2018-06-26 DIAGNOSIS — C44.622: Primary | ICD-10-CM

## 2018-06-26 ASSESSMENT — PAIN SCALES - GENERAL
PAINLEVEL: NO PAIN (0)
PAINLEVEL: NO PAIN (0)

## 2018-06-26 NOTE — NURSING NOTE
Granulating wound closure. Vaseline and telfa applied. Pressure dressing applied with dental rolls and tape. No bleeding noted. Denies pain.  Wound care instructions reviewed. Supplies given. All questions answered.   Manuela Alvares LPN

## 2018-06-26 NOTE — MR AVS SNAPSHOT
After Visit Summary   6/26/2018    Andre Tyler    MRN: [id number]           Patient Information     Date Of Birth          1945        Visit Information        Provider Department      6/26/2018 7:30 AM Boby Nazario MD [facility] Dermatologic Surgery        Care Instructions    A dressing that can be helpful and is somewhat waterproof is called duoderm extra thin - this can be cut to size.     https://www.amazon.com/s/?ie=UTF8&keywords=duoderm+extra+thin&tag=googhydr-20&index=aps&xefuuy=[email or web address]&[email or web address]&[email or web address]&hvpone=&hvptwo=&hvqmt=b&hvdev=c&hvdvcmdl=&hvlocint=&[id number]    Wound care instructions for Granulating Wounds    For the First 48 hours After Surgery:  1. Leave the pressure bandage on and keep it dry. If it should come loose, you may retape it, but do not take it off.  2. Relax and take it easy. Do not do any vigorous exercise, heavy lifting, or bending forward. This could cause the wound to bleed.  3. Post-operative pain is usually mild. You may take plain or extra strength Tylenol every 4 hours as needed (do not take more than 4,000mg in one day). Do not take any medicine that contains aspirin, ibuprofen or motrin unless you have been recommended these by a doctor.  Avoid alcohol and vitamin E as these may increase your tendency to bleed.  4. You may put an ice pack around the bandaged area for 20 minutes every 2-3 hours. This may help reduce swelling, bruising, and pain. Make sure the ice pack is waterproof so that the pressure bandage does not get wet.   5. You may see a small amount of drainage or blood on your pressure bandage. This is normal. However, if drainage or bleeding continues or saturates the bandage, you will need to apply firm pressure over the bandage with a washcloth for 15 minutes. If bleeding continues after applying pressure for 15 minutes then go to the nearest  emergency room.    After 48 hours you should remove the bandage and begin daily dressing changes as follows:    1. Remove Dressing    2.   Clean and dry the area with tap water using a Q-tip or sterile gauze pad    3.   Apply polysporin ointment, bacitracin ointment, aquaphor ointment, or vaseline ointment over entire wound. Do NOT use neosporin ointment.    4.   Cover the wound with a band-aid or a sterile non-stick gauze pad and micropore paper tape.    Repeat these instructions at least once a day until the wound has completely healed.    No dietary restrictions.    Continue normal activity    The wound will not heal better when exposed to air and allowed to dry out. The wound will heal faster with a better cosmetic result if it is kept moist with ointment and covered with a bandage.  Do not let the wound dry out.    What to expect:    All wounds develop a small ring of redness surrounding the wound that indicate healing. Severe itching with extensive redness usually indicates sensitivity to the ointment or bandage tape used to dress the wound. You should call the nurse triage line if this occurs.    It is normal for there to be bruising or swelling around your surgical site, especially when it is near, or on, the eyelid.    If your wound is draining, this is normal. Larger wounds tend to drain more than smaller wounds. Please call if the draining is extensive or if there is yellow/green discharge. After about a week, the wound size should shrink. The wound is healed when you can see skin has formed over the entire area. A healed wound has a healthy shiny appearance and is red/dark pink in color. Wounds may take four to six weeks to heal. Larger wounds generally take a few weeks longer to heal than smaller wounds. Once the wound is healed, you may stop dressing changes.    There may be a tightness as the wound heals, but this is normal and will gradually decrease and disappear.    Your wound may be sensitive to  temperature changes after it is healed. Avoid extreme temperature changes if you are having discomfort, but this will improve with time.    If the wound seems to itch once it is healed, you may use vaseline to help relieve the itching.     Call Us If:  1. You have pain that is not controlled with Tylenol.  2. You have signs or symptoms of an infection, such as: fever over 100 degrees F, redness, warmth, or foul-smelling or yellow/creamy drainage from the wound.  Who should I call with questions?    [facility]: [phone]     [facility]: [phone]    For urgent needs outside of business hours call the [facility] at [phone] and ask for the dermatology resident on call                  Follow-ups after your visit        Your next 10 appointments already scheduled     Nov 13, 2018 10:30 AM CST   Return Visit with Berta [facility]ge MD   [facility]    [address] [place] [address] [phone]              Who to contact     Please call your clinic at [phone] to:    Ask questions about your health    Make or cancel appointments    Discuss your medicines    Learn about your test results    Speak to your doctor            Additional Information About Your Visit        Care EveryWhere ID     This is your Care EveryWhere ID. This could be used by other organizations to access your [place] medical records  [id number]        Your Vitals Were     Pulse                   62            Blood Pressure from Last 3 Encounters:   06/26/18 139/90   06/24/18 141/87   09/28/17 130/80    Weight from Last 3 Encounters:   06/24/18 90.7 kg (200 lb)   09/28/17 91.2 kg (201 lb)   09/22/17 90.7 kg (200 lb)              Today, you had the following     No orders found for display       Primary Care Provider Office Phone # Fax #    Kelli Talavera [phone]        [address]  [place] [facility] [address]        Equal Access to Services     DEJONPARADISE DAVE : Hadii aad ku hadroqueisabela Somanas, waaxda luqadaha, qaybta kaalmada wade, ariane mongetawanaemory wu . So [facility] [phone].    ATENCIÓN: Si habla español, tiene a wise disposición servicios gratuitos de asistencia lingüística. Llame al [phone].    We comply with applicable federal civil rights laws and [place] laws. We do not discriminate on the basis of race, color, national origin, age, disability, sex, sexual orientation, or gender identity.            Thank you!     Thank you for choosing [facility] DERMATOLOGIC SURGERY  for your care. Our goal is always to provide you with excellent care. Hearing back from our patients is one way we can continue to improve our services. Please take a few minutes to complete the written survey that you may receive in the mail after your visit with us. Thank you!             Your Updated Medication List - Protect others around you: Learn how to safely use, store and throw away your medicines at www.disposemymeds.org.          This list is accurate as of 6/26/18 10:32 AM.  Always use your most recent med list.                   Brand Name Dispense Instructions for use Diagnosis    ciprofloxacin 500 MG tablet    CIPRO    14 tablet    Take 1 tablet (500 mg) by mouth 2 times daily for 7 days    Acute cystitis with hematuria       clobetasol 0.05 % cream    TEMOVATE    30 g    Apply twice daily for 2 weeks on the left calf    Other eczema       GLUCOSAMINE PO       Basal cell carcinoma of left preauricular region       lisinopril-hydrochlorothiazide 10-12.5 MG per tablet    PRINZIDE/ZESTORETIC    90 tablet    Take 1 tablet by mouth daily    Hypertension goal BP (blood pressure) < 140/90       metFORMIN 500 MG tablet    GLUCOPHAGE     2 times daily

## 2018-06-26 NOTE — NURSING NOTE
Chief Complaint   Patient presents with     Skin Cancer     Hilario is here today to have MOHS on the right base of thumb for an SCC.      Nena Sutherland, CMA

## 2018-06-26 NOTE — PATIENT INSTRUCTIONS
A dressing that can be helpful and is somewhat waterproof is called duoderm extra thin - this can be cut to size.     https://www.amazon.com/s/?[email or web address]&keywords=duoderm+extra+thin&tag=googhydr-20&index=aps&rjdnoj=[email or web address]&hvpos=[email or web address]&hvnetw=g&qdsotm=[email or web address]&hvpone=&hvptwo=&hvqmt=b&hvdev=c&hvdvcmdl=&hvlocint=&atkecphg=[id number]&hvtargid=[id number]=pd_[email or web address]    Wound care instructions for Granulating Wounds    For the First 48 hours After Surgery:  1. Leave the pressure bandage on and keep it dry. If it should come loose, you may retape it, but do not take it off.  2. Relax and take it easy. Do not do any vigorous exercise, heavy lifting, or bending forward. This could cause the wound to bleed.  3. Post-operative pain is usually mild. You may take plain or extra strength Tylenol every 4 hours as needed (do not take more than 4,000mg in one day). Do not take any medicine that contains aspirin, ibuprofen or motrin unless you have been recommended these by a doctor.  Avoid alcohol and vitamin E as these may increase your tendency to bleed.  4. You may put an ice pack around the bandaged area for 20 minutes every 2-3 hours. This may help reduce swelling, bruising, and pain. Make sure the ice pack is waterproof so that the pressure bandage does not get wet.   5. You may see a small amount of drainage or blood on your pressure bandage. This is normal. However, if drainage or bleeding continues or saturates the bandage, you will need to apply firm pressure over the bandage with a washcloth for 15 minutes. If bleeding continues after applying pressure for 15 minutes then go to the nearest emergency room.    After 48 hours you should remove the bandage and begin daily dressing changes as follows:    1. Remove Dressing    2.   Clean and dry the area with tap water using a Q-tip or sterile gauze pad    3.   Apply polysporin ointment, bacitracin ointment, aquaphor ointment, or vaseline ointment  over entire wound. Do NOT use neosporin ointment.    4.   Cover the wound with a band-aid or a sterile non-stick gauze pad and micropore paper tape.    Repeat these instructions at least once a day until the wound has completely healed.    No dietary restrictions.    Continue normal activity    The wound will not heal better when exposed to air and allowed to dry out. The wound will heal faster with a better cosmetic result if it is kept moist with ointment and covered with a bandage.  Do not let the wound dry out.    What to expect:    All wounds develop a small ring of redness surrounding the wound that indicate healing. Severe itching with extensive redness usually indicates sensitivity to the ointment or bandage tape used to dress the wound. You should call the nurse triage line if this occurs.    It is normal for there to be bruising or swelling around your surgical site, especially when it is near, or on, the eyelid.    If your wound is draining, this is normal. Larger wounds tend to drain more than smaller wounds. Please call if the draining is extensive or if there is yellow/green discharge. After about a week, the wound size should shrink. The wound is healed when you can see skin has formed over the entire area. A healed wound has a healthy shiny appearance and is red/dark pink in color. Wounds may take four to six weeks to heal. Larger wounds generally take a few weeks longer to heal than smaller wounds. Once the wound is healed, you may stop dressing changes.    There may be a tightness as the wound heals, but this is normal and will gradually decrease and disappear.    Your wound may be sensitive to temperature changes after it is healed. Avoid extreme temperature changes if you are having discomfort, but this will improve with time.    If the wound seems to itch once it is healed, you may use vaseline to help relieve the itching.     Call Us If:  1. You have pain that is not controlled with  Tylenol.  2. You have signs or symptoms of an infection, such as: fever over 100 degrees F, redness, warmth, or foul-smelling or yellow/creamy drainage from the wound.  Who should I call with questions?    [facility]: [phone]     [facility]: [phone]    For urgent needs outside of business hours call the [facility] at [phone] and ask for the dermatology resident on call

## 2018-06-26 NOTE — NURSING NOTE
1st layer performed on the right base of the thumb. Injected 4.5ml of Xylocaine  (Lidocaine 1% and Epinephrine  (1:100,000)      Present for procedure:  ELMA Mixon, Resident MD

## 2018-06-26 NOTE — LETTER
6/26/2018       RE: Andre Tyler  [address] [address]     Dear Colleague,    Thank you for referring your patient, Andre Tyler, to the [facility] DERMATOLOGIC SURGERY at [facility]. Please see a copy of my visit note below.    MOHS MICROGRAPHIC SURGERY REPORT   Jun 26, 2018    Surgeon:  Boby Nazario DO  Resident:  Lilibeth Villauneva MD   Scrub:  Nena Sutherland CMA     INDICATION:    Preoperative Diagnosis: primary squamous cell carcinoma, well differentiated   Location: base of right thumb   Postoperative Diagnosis: same  Preoperative Lesion size: 1.1 x 1.1 cm     After appropriate discussion and informed consent for Mohs surgery and possible repair of the Mohs surgery defect, the patient underwent Mohs surgery as follows:    STAGE I:  The patient was placed on the operating room table.  The area was cleansed with chlorhexidine and infiltrated with 1% Lidocaine and epinephrine. Tumor was debulked. Using a #15-blade, complete excision was made around the tumor in 1 section.  Hemostasis was obtained by electrodesiccation.  A dressing was placed.  Tissue was kept as 1 tissue block that was subsequently mapped, color coded and processed in the Mohs Laboratory.  Microscopic tumor (SCCIS) was found in the tissue block.    STAGE II: The patient returned to the operating room.  By reference to the Mohs map, the area of positivity was delineated, infiltrated with the anesthetic mixture described above and excised in 1 section. Tissue was kept as 1 tissue block that was again mapped, color coded and processed in the Mohs Laboratory.  Hemostasis was obtained in the usual manner and a dressing placed.  Microscopic tumor was not found in the tissue block.    With the lesion clear of micrographic tumor, surgery was considered complete.  The defect extended to the fascia and measured 3.0 x 1.8 cm.    RECONSTRUCTIVE DERMATOLOGIC SURGERY REPORT  We discussed  the options for wound management in full with the patient including risks/benefits/possible outcomes.     The patient is status post Mohs' micrographic surgery.  The surgical site was examined with attention to normal anatomic and functional relationships.  After consideration and discussion of multiple options with the patient, it was determined that healing by second intention was preferred by the patient as he hoped to avoid additional cutting and suturing. The patient verbalized understanding and agrees with this plan. It is also understood that should second intention healing be sub-optimal, additional procedures such as scar revision, steroid injection or dermabrasion may be recommended.     The open wound was cleaned with hibiclens, and a thick layer of ointment was applied.  A pressure dressing consisting of non-adherent gauze, gauze and hypafix was applied. Wound care was discussed with patient both orally and in writing. The patient stated understanding and agreement of the course of care. He was discharged from the surgical suite in good condition.      He will return for evaluation of his wound as needed.    Staff Involved:    Scribe Disclosure  I, Igor Torres, am serving as a scribe to document services personally performed by Dr. Boby Nazario DO, based on data collection and the provider's statements to me.     Provider Disclosure:   The documentation recorded by the scribe accurately reflects the services I personally performed and the decisions made by me.  I personally performed the procedures today.    Boby Nazario DO    Department of Dermatology  [facility]: Phone: [phone], Fax:[phone]  [facility]: Phone: [phone], Fax: [phone]

## 2018-06-26 NOTE — NURSING NOTE
2nd layer performed on the right base of the thumb. Injected 1.5ml of Xylocaine  (Lidocaine 1% and Epinephrine  (1:100,000)      Present for procedure:  ELMA Mixon, Resident MD

## 2018-06-26 NOTE — PROGRESS NOTES
MOHS MICROGRAPHIC SURGERY REPORT   Jun 26, 2018    Surgeon:  Boby Nazario DO  Resident:  Lilibeth Villanueva MD   Scrub:  Nena Sutherland CMA     INDICATION:    Preoperative Diagnosis: primary squamous cell carcinoma, well differentiated   Location: base of right thumb   Postoperative Diagnosis: same  Preoperative Lesion size: 1.1 x 1.1 cm     After appropriate discussion and informed consent for Mohs surgery and possible repair of the Mohs surgery defect, the patient underwent Mohs surgery as follows:    STAGE I:  The patient was placed on the operating room table.  The area was cleansed with chlorhexidine and infiltrated with 1% Lidocaine and epinephrine. Tumor was debulked. Using a #15-blade, complete excision was made around the tumor in 1 section.  Hemostasis was obtained by electrodesiccation.  A dressing was placed.  Tissue was kept as 1 tissue block that was subsequently mapped, color coded and processed in the Mohs Laboratory.  Microscopic tumor (SCCIS) was found in the tissue block.    STAGE II: The patient returned to the operating room.  By reference to the Mohs map, the area of positivity was delineated, infiltrated with the anesthetic mixture described above and excised in 1 section. Tissue was kept as 1 tissue block that was again mapped, color coded and processed in the Mohs Laboratory.  Hemostasis was obtained in the usual manner and a dressing placed.  Microscopic tumor was not found in the tissue block.    With the lesion clear of micrographic tumor, surgery was considered complete.  The defect extended to the fascia and measured 3.0 x 1.8 cm.    RECONSTRUCTIVE DERMATOLOGIC SURGERY REPORT  We discussed the options for wound management in full with the patient including risks/benefits/possible outcomes.     The patient is status post Mohs' micrographic surgery.  The surgical site was examined with attention to normal anatomic and functional relationships.  After consideration and discussion of  multiple options with the patient, it was determined that healing by second intention was preferred by the patient as he hoped to avoid additional cutting and suturing. The patient verbalized understanding and agrees with this plan. It is also understood that should second intention healing be sub-optimal, additional procedures such as scar revision, steroid injection or dermabrasion may be recommended.     The open wound was cleaned with hibiclens, and a thick layer of ointment was applied.  A pressure dressing consisting of non-adherent gauze, gauze and hypafix was applied. Wound care was discussed with patient both orally and in writing. The patient stated understanding and agreement of the course of care. He was discharged from the surgical suite in good condition.      He will return for evaluation of his wound as needed.          Picture placed in chart for future reference.     Staff Involved:    Scribe Disclosure  I, Igor Torres, am serving as a scribe to document services personally performed by Dr. Boby Nazario DO, based on data collection and the provider's statements to me.     Provider Disclosure:   The documentation recorded by the scribe accurately reflects the services I personally performed and the decisions made by me.  I personally performed the procedures today.    Boby Nazario DO    Department of Dermatology  [facility]: Phone: [phone], Fax:[phone]  [facility]: Phone: [phone], Fax: [phone]

## 2018-06-28 ENCOUNTER — TELEPHONE (OUTPATIENT)
Dept: DERMATOLOGY | Facility: CLINIC | Age: 73
End: 2018-06-28

## 2018-06-28 NOTE — TELEPHONE ENCOUNTER
Follow up call completed following Mohs procedure with Dr. Nazario.     The following questions were asked:    What are you doing to manage your pain? Nothing at this time  Is it helping? n/a  Are you applying ice?  no  Have you had any noticeable bleeding through the bandage? no   Do you have any concerns? None at this time.          Please call [phone] option 3 if you have any additional questions.

## 2018-11-13 ENCOUNTER — OFFICE VISIT (OUTPATIENT)
Dept: DERMATOLOGY | Facility: CLINIC | Age: 73
End: 2018-11-13
Payer: COMMERCIAL

## 2018-11-13 DIAGNOSIS — D48.5 NEOPLASM OF UNCERTAIN BEHAVIOR OF SKIN: ICD-10-CM

## 2018-11-13 DIAGNOSIS — L57.0 ACTINIC KERATOSIS: ICD-10-CM

## 2018-11-13 DIAGNOSIS — Z85.828 HISTORY OF NONMELANOMA SKIN CANCER: Primary | ICD-10-CM

## 2018-11-13 DIAGNOSIS — L82.1 SEBORRHEIC KERATOSIS: ICD-10-CM

## 2018-11-13 PROCEDURE — 17000 DESTRUCT PREMALG LESION: CPT | Performed by: DERMATOLOGY

## 2018-11-13 PROCEDURE — 11100 HC BIOPSY SKIN/SUBQ/MUC MEM, SINGLE LESION: CPT | Mod: 59 | Performed by: DERMATOLOGY

## 2018-11-13 PROCEDURE — 88305 TISSUE EXAM BY PATHOLOGIST: CPT | Mod: TC | Performed by: DERMATOLOGY

## 2018-11-13 PROCEDURE — 99213 OFFICE O/P EST LOW 20 MIN: CPT | Mod: 25 | Performed by: DERMATOLOGY

## 2018-11-13 PROCEDURE — 17003 DESTRUCT PREMALG LES 2-14: CPT | Performed by: DERMATOLOGY

## 2018-11-13 ASSESSMENT — PAIN SCALES - GENERAL: PAINLEVEL: NO PAIN (0)

## 2018-11-13 NOTE — LETTER
"    Patient:  Lui Soto  :   1945  MRN:     [id number]        Mr.Lawrence Soto  [address] [address]        2018    Dear Lui Soto,     We are writing to inform you of your test results that show a harmless keratosis.     Thank you for taking the time to be seen in our dermatology clinic. If you have further questions or concerns, please contact the clinic(see phone number listed below).       Sincerely,     Berta Monge MD      Department of Dermatology   [facility]: Phone: [phone], Fax:[phone]   [facility]: Phone: [phone], Fax: [phone]     Resulted Orders   Dermatological path order and indications   Result Value Ref Range    Copath Report       Patient Name: LUI SOTO  MR#: [id number]  Specimen #: [id number]  Collected: 2018  Received: 2018  Reported: 2018 11:57  Ordering Phy(s): BERTA MONGE    For improved result formatting, select 'View Enhanced Report Format' under   Linked Documents section.    SPECIMEN(S):  Shave, left dorsal hand    FINAL DIAGNOSIS:  Shave, left dorsal hand:  - Verrucous keratosis - (see description)    I have personally reviewed all specimens and/or slides, including the   listed special stains, and used them  with my medical judgement to determine or confirm the final diagnosis.    Electronically signed out by:    Ramsey Varma M.D., [facility]    CLINICAL HISTORY:  The patient is a 73 year old male.    GROSS:  The specimen is received in formalin with proper patient identification,   labeled \"L. dorsal hand\" and the  specimen consists of a single, irregular skin shave measuring 0.8 x 0.6   cm.  The skin surface displays a 0.7 x  0.5 cm white-tan lesion.  The resection ma rgin is inked black.  It is   trisected and entirely submitted in  cassette A1. (Dictated by: Malka" Mayuri PEREA ASCP 11/14/2018 12:31 PM)    MICROSCOPIC:  The specimen exhibits compact orthokeratosis, papillomatous epidermal   hyperplasia with hypergranulosis,  telangiectasia and papillary dermal fibrosis.    CPT Codes:  A: 55922-ZS6.P, 46596-ZN0.T    TESTING LAB LOCATION:  [facility], [address]   [address]  [phone]    COLLECTION SITE:  Client: [facility]  Location: DAVID BENNETT)

## 2018-11-13 NOTE — PROGRESS NOTES
[facility] Dermatology Note      Dermatology Problem List:  1. 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 Arnulfo Keyes, 2011. s/p excision in 2005, recurrent  -SCCIS, left dorsal hand, s/p via [facility] transferred records, Dr. Arnulfo Keyes, 2011  2. Actinic keratosis  -s/p cryotherapy  3. HAK,   -right dorsal hand, s/p biopsy 4/4/2016  -left upper arm s/p biopsy 10/17/2017   -left dorsal hand, s/p biopsy 10/17/2017   4. Compound melanocytic nevus, left lower back   -s/p biopsy 4/9/2015  5. Benign lichenoid keratosis, right lower leg  -s/p biopsy 4/9/2015    Encounter Date: Nov 13, 2018    CC:  Chief Complaint   Patient presents with     Skin Check     no areas of concern hx NMSC         History of Present Illness:  Mr. Andre Tyler is a 73 year old male  with a history of non-melanoma skin cancer presents for a total body skin exam. The patient was last seen 6/26/18 for excision of an SCC on the right base of the thumb. Today, the patient reports that this area healed well. In terms of new concerns, there is a lesion on the left leg which he would like to make sure gets examined. Otherwise, the patient denies any new bleeding, crusting, or changing lesions of concern.     Past Medical History:   Patient Active Problem List   Diagnosis     Health Care Home     Hypertension goal BP (blood pressure) < 140/90     Squamous cell skin cancer     Prostate cancer - surgically treated     Advanced directives,  counseling/discussion     CARDIOVASCULAR SCREENING; LDL GOAL LESS THAN 130     Sensorineural hearing loss, bilateral     Past Medical History:   Diagnosis Date     Diabetes (H)      Hypertension goal BP (blood pressure) < 140/90      Prostate cancer (H)      Squamous cell skin cancer      Past Surgical History:   Procedure Laterality Date     APPENDECTOMY       HC MOHS TRUNK/ARM/LEG 1ST STAGE UP T0 5 BLOCKS       PROSTATE SURGERY  8/28/12       Social History:  The patient works as a  and is retired from working as a . The patient denies use of tanning beds. [employer] . Plays hockey. Has triplet grandkids.     Family History:  There is a family history of skin cancer in the patient's mother and sister (possible SCC).  Kept in chart for convenience.       Medications:  Current Outpatient Prescriptions   Medication Sig Dispense Refill     Glucosamine HCl (GLUCOSAMINE PO)        lisinopril-hydrochlorothiazide (PRINZIDE,ZESTORETIC) 10-12.5 MG per tablet Take 1 tablet by mouth daily 90 tablet 3     metFORMIN (GLUCOPHAGE) 500 MG tablet 2 times daily       clobetasol (TEMOVATE) 0.05 % cream Apply twice daily for 2 weeks on the left calf (Patient not taking: Reported on 6/11/2018) 30 g 0          No Known Allergies      Review of Systems:   -Skin: As above in HPI. No additional skin concerns.  Const: No fevers, chills, changes in weight, or night sweats.    Physical exam:  GEN: This is a well developed, well-nourished male in no acute distress, in a pleasant mood.    SKIN: Total skin excluding the undergarment areas was performed. The exam included the head/face, neck, both arms, chest, back, abdomen, both legs, digits and/or nails. Exam included the buttocks. Declines genital exam. Declines genital exam  - There are waxy stuck on tan to brown papules on the left thigh, trunk, extremities.  - There is no erythema, telangectasias, nodularity, or pigmentation on the sites of prior skin  cancer.  - There are erythematous macules with overyling adherent scale on the right elbow, right nasal sidewall, left temple, left cheek, right tragus, right cheek, right antihelix, left frontal scalp, left dorsal hand  - 1.2 cm erythematous patch on the left dorsal hand  - Excoriated stuck on pink papule on the right clavicle  -No other lesions of concern on areas examined.     Impression/Plan:  1. History of nonmelanoma skin cancer, no clinical evidence or recurrence    Recommend sunscreens SPF #30 or greater, protective clothing and avoidance of tanning beds.    2. AK, including the lesion biopsied on the right elbow, right nasal sidewall, left temple, left cheek, right tragus, right cheek, right antihelix, left frontal scalp, left dorsal hand  Cryotherapy procedure note: After verbal consent and discussion of risks and benefits including2 but no limited to dyspigmentation/scar, blister, and pain, 12 was(were) treated with 1-2mm freeze border for 2 cycles with liquid nitrogen. Post cryotherapy instructions were provided.     3. 1.2 cm erythematous patch on the left dorsal hand. NUB. Differential includes AK vs other.     Shave biopsy:  After discussion of benefits and risks including but not limited to bleeding/bruising, pain/swelling, infection, scar, incomplete removal, nerve damage/numbness, recurrence, and non-diagnostic biopsy, written consent, verbal consent and photographs were obtained. Time-out was performed. The area was cleaned with isopropyl alcohol. 0.5mL of 1% lidocaine with epinephrine was injected to obtain adequate anesthesia of the lesion on the left dorsal hand. A shave biopsy was performed. Hemostasis was achieved with aluminium chloride. Vaseline and a sterile dressing were applied. The patient tolerated the procedure and no complications were noted. The patient was provided with verbal and written post care instructions.     4. SKs, left thigh, trunk, extremities    No further intervention  needed.     5. Excoriated stuck on papule on the right clavicle. Appears to be an SK    Will recheck at next appt.    Follow-up in 4 months for waist up skin exam, earlier for new or changing lesions.      Staff Involved:  Scribe/Staff    Scribe Disclosure  I, Bharath Donato, am serving as a scribe to document services personally performed by Dr. Berta Monge MD, based on data collection and the provider's statements to me.     Provider Disclosure:   The documentation recorded by the scribe accurately reflects the services I personally performed and the decisions made by me.    Berta Monge MD    Department of Dermatology  [facility]: Phone: [phone], Fax:[phone]  [facility]: Phone: [phone], Fax: [phone]

## 2018-11-13 NOTE — MR AVS SNAPSHOT
After Visit Summary   11/13/2018    Andre Tyler    MRN: [id number]           Patient Information     Date Of Birth          1945        Visit Information        Provider Department      11/13/2018 10:30 AM Berta Monge MD [facility]        Today's Diagnoses     History of nonmelanoma skin cancer    -  1    Neoplasm of uncertain behavior of skin        Actinic keratosis        Seborrheic keratosis          Care Instructions    Cryotherapy    What is it?    Use of a very cold liquid, such as liquid nitrogen, to freeze and destroy abnormal skin cells that need to be removed    What should I expect?    Tenderness and redness    A small blister that might grow and fill with dark purple blood. There may be crusting.    More than one treatment may be needed if the lesions do not go away.    How do I care for the treated area?    Gently wash the area with your hands when bathing.    Use a thin layer of Vaseline to help with healing. You may use a Band-Aid.     The area should heal within 7-10 days and may leave behind a pink or lighter color.     Do not use an antibiotic or Neosporin ointment.     You may take acetaminophen (Tylenol) for pain.     Call your Doctor if you have:    Severe pain    Signs of infection (warmth, redness, cloudy yellow drainage, and or a bad smell)    Questions or concerns    Who should I call with questions?       [facility]: [phone]       [facility]: [phone]       For urgent needs outside of business hours call the [facility] at [phone]        and ask for the dermatology resident on call  Wound Care After a Biopsy    What is a skin biopsy?  A skin biopsy allows the doctor to examine a very small piece of tissue under the microscope to determine the diagnosis and the best treatment for the skin condition. A local anesthetic (numbing medicine)  is injected with a very  small needle into the skin area to be tested. A small piece of skin is taken from the area. Sometimes a suture (stitch) is used.     What are the risks of a skin biopsy?  I will experience scar, bleeding, swelling, pain, crusting and redness. I may experience incomplete removal or recurrence. Risks of this procedure are excessive bleeding, bruising, infection, nerve damage, numbness, thick (hypertrophic or keloidal) scar and non-diagnostic biopsy.    How should I care for my wound for the first 24 hours?    Keep the wound dry and covered for 24 hours    If it bleeds, hold direct pressure on the area for 15 minutes. If bleeding does not stop then go to the emergency room    Avoid strenuous exercise the first 1-2 days or as your doctor instructs you    How should I care for the wound after 24 hours?    After 24 hours, remove the bandage    You may bathe or shower as normal    If you had a scalp biopsy, you can shampoo as usual and can use shower water to clean the biopsy site daily    Clean the wound twice a day with gentle soap and water    Do not scrub, be gentle    Apply white petroleum/Vaseline after cleaning the wound with a cotton swab or a clean finger, and keep the site covered with a Bandaid /bandage. Bandages are not necessary with a scalp biopsy    If you are unable to cover the site with a Bandaid /bandage, re-apply ointment 2-3 times a day to keep the site moist. Moisture will help with healing    Avoid strenuous activity for first 1-2 days    Avoid lakes, rivers, pools, and oceans until the stitches are removed or the site is healed    How do I clean my wound?    Wash hands thoroughly with soap or use hand  before all wound care    Clean the wound with gentle soap and water    Apply white petroleum/Vaseline  to wound after it is clean    Replace the Bandaid /bandage to keep the wound covered for the first few days or as instructed by your doctor    If you had a scalp biopsy, warm shower water to  the area on a daily basis should suffice    What should I use to clean my wound?     Cotton-tipped applicators (Qtips )    White petroleum jelly (Vaseline ). Use a clean new container and use Q-tips to apply.    Bandaids   as needed    Gentle soap     How should I care for my wound long term?    Do not get your wound dirty    your doctor s office.Keep up with wound care for one week or until the area is healed.    A small scab will form and fall off by itself when the area is completely healed. The area will be red and will become pink in color as it heals. Sun protection is very important for how your scar will turn out. Sunscreen with an SPF 30 or greater is recommended once the area is healed.      You should have some soreness but it should be mild and slowly go away over several days. Talk to your doctor about using tylenol for pain,    When should I call my doctor?  If you have increased:     Pain or swelling    Pus or drainage (clear or slightly yellow drainage is ok)    Temperature over 100F    Spreading redness or warmth around wound    When will I hear about my results?  The biopsy results can take 2-3 weeks to come back. The clinic will call you with the results, send you a RadioFrame message, or have you schedule a follow-up clinic or phone time to discuss the results. Contact our clinics if you do not hear from us in 3 weeks.     Who should I call with questions?    [facility]: [phone]     [facility]: [phone]    For urgent needs outside of business hours call the [facility] at [phone] and ask for the dermatology resident on call              Follow-ups after your visit        Follow-up notes from your care team     Return in about 4 months (around 3/13/2019) for wait up exam.      Your next 10 appointments already scheduled     Mar 26, 2019 10:30 AM CDT   Return Visit with Berta Monge MD   [facility] (  [facility]    [address] [address] [place] [id number] [phone]              Who to contact     If you have questions or need follow up information about today's clinic visit or your schedule please contact [facility] directly at [phone].  Normal or non-critical lab and imaging results will be communicated to you by MyChart, letter or phone within 4 business days after the clinic has received the results. If you do not hear from us within 7 days, please contact the clinic through MyChart or phone. If you have a critical or abnormal lab result, we will notify you by phone as soon as possible.  Submit refill requests through TouchPo Android POS or call your pharmacy and they will forward the refill request to us. Please allow 3 business days for your refill to be completed.          Additional Information About Your Visit        ThinkEcoharKiko Information     TouchPo Android POS is an electronic gateway that provides easy, online access to your medical records. With TouchPo Android POS, you can request a clinic appointment, read your test results, renew a prescription or communicate with your care team.     To sign up for TouchPo Android POS visit the website at [email or web address]   You will be asked to enter the access code listed below, as well as some personal information. Please follow the directions to create your username and password.     Your access code is: [email or web address]  Expires: 2019 10:58 AM     Your access code will  in 90 days. If you need help or a new code, please contact your [facility] or call [phone] for assistance.        Care EveryWhere ID     This is your Care EveryWhere ID. This could be used by other organizations to access your [facility] medical records  [id number]         Blood Pressure from Last 3 Encounters:   18 139/90   18 141/87   17 130/80    Weight from Last 3 Encounters:   18 90.7 kg (200 lb)   17 91.2  kg (201 lb)   09/22/17 90.7 kg (200 lb)              We Performed the Following     BIOPSY SKIN/SUBQ/MUC MEM, SINGLE LESION     Dermatological path order and indications     DESTRUCT PREMALIGNANT LESION, 2-14     DESTRUCT PREMALIGNANT LESION, FIRST        Primary Care Provider Office Phone # Fax Valerie Diane Nam Talavera [phone] [address] [facility] [address]        Equal Access to Services     [facility] : Hadii aad ku hadasho Soomaali, waaxda luqadaha, qaybta kaalmada adeegyada, waxay idiin hayaan adeeg kharash lakaitlyn . So [facility] [phone].    ATENCIÓN: Si habla español, tiene a wise disposición servicios gratuitos de asistencia lingüística. [facility] [phone].    We comply with applicable federal civil rights laws and [place] laws. We do not discriminate on the basis of race, color, national origin, age, disability, sex, sexual orientation, or gender identity.            Thank you!     Thank you for choosing [facility]  for your care. Our goal is always to provide you with excellent care. Hearing back from our patients is one way we can continue to improve our services. Please take a few minutes to complete the written survey that you may receive in the mail after your visit with us. Thank you!             Your Updated Medication List - Protect others around you: Learn how to safely use, store and throw away your medicines at www.disposemymeds.org.          This list is accurate as of 11/13/18 11:04 AM.  Always use your most recent med list.                   Brand Name Dispense Instructions for use Diagnosis    clobetasol 0.05 % cream    TEMOVATE    30 g    Apply twice daily for 2 weeks on the left calf    Other eczema       GLUCOSAMINE PO       Basal cell carcinoma of left preauricular region       lisinopril-hydrochlorothiazide 10-12.5 MG per tablet    PRINZIDE/ZESTORETIC    90 tablet    Take 1 tablet by mouth daily    Hypertension  goal BP (blood pressure) < 140/90       metFORMIN 500 MG tablet    GLUCOPHAGE     2 times daily

## 2018-11-13 NOTE — PATIENT INSTRUCTIONS
Cryotherapy    What is it?    Use of a very cold liquid, such as liquid nitrogen, to freeze and destroy abnormal skin cells that need to be removed    What should I expect?    Tenderness and redness    A small blister that might grow and fill with dark purple blood. There may be crusting.    More than one treatment may be needed if the lesions do not go away.    How do I care for the treated area?    Gently wash the area with your hands when bathing.    Use a thin layer of Vaseline to help with healing. You may use a Band-Aid.     The area should heal within 7-10 days and may leave behind a pink or lighter color.     Do not use an antibiotic or Neosporin ointment.     You may take acetaminophen (Tylenol) for pain.     Call your Doctor if you have:    Severe pain    Signs of infection (warmth, redness, cloudy yellow drainage, and or a bad smell)    Questions or concerns    Who should I call with questions?       [facility]: [phone]       [facility]: [phone]       For urgent needs outside of business hours call the [facility] at [phone]        and ask for the dermatology resident on call  Wound Care After a Biopsy    What is a skin biopsy?  A skin biopsy allows the doctor to examine a very small piece of tissue under the microscope to determine the diagnosis and the best treatment for the skin condition. A local anesthetic (numbing medicine)  is injected with a very small needle into the skin area to be tested. A small piece of skin is taken from the area. Sometimes a suture (stitch) is used.     What are the risks of a skin biopsy?  I will experience scar, bleeding, swelling, pain, crusting and redness. I may experience incomplete removal or recurrence. Risks of this procedure are excessive bleeding, bruising, infection, nerve damage, numbness, thick (hypertrophic or keloidal) scar and non-diagnostic biopsy.    How should I care for  my wound for the first 24 hours?    Keep the wound dry and covered for 24 hours    If it bleeds, hold direct pressure on the area for 15 minutes. If bleeding does not stop then go to the emergency room    Avoid strenuous exercise the first 1-2 days or as your doctor instructs you    How should I care for the wound after 24 hours?    After 24 hours, remove the bandage    You may bathe or shower as normal    If you had a scalp biopsy, you can shampoo as usual and can use shower water to clean the biopsy site daily    Clean the wound twice a day with gentle soap and water    Do not scrub, be gentle    Apply white petroleum/Vaseline after cleaning the wound with a cotton swab or a clean finger, and keep the site covered with a Bandaid /bandage. Bandages are not necessary with a scalp biopsy    If you are unable to cover the site with a Bandaid /bandage, re-apply ointment 2-3 times a day to keep the site moist. Moisture will help with healing    Avoid strenuous activity for first 1-2 days    Avoid lakes, rivers, pools, and oceans until the stitches are removed or the site is healed    How do I clean my wound?    Wash hands thoroughly with soap or use hand  before all wound care    Clean the wound with gentle soap and water    Apply white petroleum/Vaseline  to wound after it is clean    Replace the Bandaid /bandage to keep the wound covered for the first few days or as instructed by your doctor    If you had a scalp biopsy, warm shower water to the area on a daily basis should suffice    What should I use to clean my wound?     Cotton-tipped applicators (Qtips )    White petroleum jelly (Vaseline ). Use a clean new container and use Q-tips to apply.    Bandaids   as needed    Gentle soap     How should I care for my wound long term?    Do not get your wound dirty    your doctor s office.Keep up with wound care for one week or until the area is healed.    A small scab will form and fall off by itself when the  area is completely healed. The area will be red and will become pink in color as it heals. Sun protection is very important for how your scar will turn out. Sunscreen with an SPF 30 or greater is recommended once the area is healed.      You should have some soreness but it should be mild and slowly go away over several days. Talk to your doctor about using tylenol for pain,    When should I call my doctor?  If you have increased:     Pain or swelling    Pus or drainage (clear or slightly yellow drainage is ok)    Temperature over 100F    Spreading redness or warmth around wound    When will I hear about my results?  The biopsy results can take 2-3 weeks to come back. The clinic will call you with the results, send you a bitmovin message, or have you schedule a follow-up clinic or phone time to discuss the results. Contact our clinics if you do not hear from us in 3 weeks.     Who should I call with questions?    [facility]: [phone]     [facility]: [phone]    For urgent needs outside of business hours call the [facility] at [phone] and ask for the dermatology resident on call

## 2018-11-13 NOTE — NURSING NOTE
Andre Tyler's goals for this visit include:   Chief Complaint   Patient presents with     Skin Check     no areas of concern hx NMSC       He requests these members of his care team be copied on today's visit information:     PCP: Kelli Graham    Referring Provider:  No referring provider defined for this encounter.    There were no vitals taken for this visit.    Do you need any medication refills at today's visit? No    Christi Rahman LPN

## 2018-11-16 LAB — COPATH REPORT: NORMAL

## 2019-03-26 ENCOUNTER — OFFICE VISIT (OUTPATIENT)
Dept: DERMATOLOGY | Facility: CLINIC | Age: 74
End: 2019-03-26
Payer: COMMERCIAL

## 2019-03-26 DIAGNOSIS — Z85.828 HISTORY OF NONMELANOMA SKIN CANCER: Primary | ICD-10-CM

## 2019-03-26 PROCEDURE — 99213 OFFICE O/P EST LOW 20 MIN: CPT | Performed by: DERMATOLOGY

## 2019-03-26 ASSESSMENT — PAIN SCALES - GENERAL: PAINLEVEL: NO PAIN (0)

## 2019-03-26 NOTE — NURSING NOTE
Andre Tyler's goals for this visit include:   Chief Complaint   Patient presents with     RECHECK     Recheck; no areas of concern       He requests these members of his care team be copied on today's visit information:     PCP: No Ref-Primary, Physician    Referring Provider:  No referring provider defined for this encounter.    There were no vitals taken for this visit.    Do you need any medication refills at today's visit? No  Opal Azevedo LPN

## 2019-03-26 NOTE — PROGRESS NOTES
[facility] Dermatology Note      Dermatology Problem List:  1. 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 Arnulfo Keyes, 2011. s/p excision in 2005, recurrent  -SCCIS, left dorsal hand, s/p via [facility] transferred records, Dr. Arnulfo Keyes, 2011  2. Actinic keratosis  -s/p cryotherapy  3. HAK,   -right dorsal hand, s/p biopsy 4/4/2016  -left upper arm s/p biopsy 10/17/2017   -left dorsal hand, s/p biopsy 10/17/2017   4. Compound melanocytic nevus, left lower back   -s/p biopsy 4/9/2015  5. Benign lichenoid keratosis, right lower leg  -s/p biopsy 4/9/2015    Encounter Date: Mar 26, 2019    CC:  Chief Complaint   Patient presents with     RECHECK     Recheck; no areas of concern         History of Present Illness:  Mr. Andre Tyler is a 73 year old male  with a history of non-melanoma skin cancer presents for a skin exam. He was last seen in dermatology on 11/13/18 when a verrucous keratosis on the left dorsal hand was biopsied. Today, the patient reports that he has no new areas of concern to address.     Past Medical History:   Patient Active Problem List   Diagnosis     Health Care Home     Hypertension goal BP (blood pressure) < 140/90     Squamous cell skin cancer     Prostate cancer - surgically treated     Advanced directives, counseling/discussion     CARDIOVASCULAR SCREENING; LDL GOAL LESS THAN 130     Sensorineural hearing loss, bilateral     Past Medical History:   Diagnosis Date     Diabetes (H)       Hypertension goal BP (blood pressure) < 140/90      Prostate cancer (H)      Squamous cell skin cancer      Past Surgical History:   Procedure Laterality Date     APPENDECTOMY       HC MOHS TRUNK/ARM/LEG 1ST STAGE UP T0 5 BLOCKS       PROSTATE SURGERY  8/28/12       Social History:  The patient works as a  and is retired from working as a . The patient denies use of tanning beds. [employer] . Plays hockey. Has triplet grandkids.     Family History:  There is a family history of skin cancer in the patient's mother and sister (possible SCC).  Kept in chart for convenience.       Medications:  Current Outpatient Medications   Medication Sig Dispense Refill     Glucosamine HCl (GLUCOSAMINE PO)        lisinopril-hydrochlorothiazide (PRINZIDE,ZESTORETIC) 10-12.5 MG per tablet Take 1 tablet by mouth daily 90 tablet 3     metFORMIN (GLUCOPHAGE) 500 MG tablet 2 times daily       clobetasol (TEMOVATE) 0.05 % cream Apply twice daily for 2 weeks on the left calf (Patient not taking: Reported on 6/11/2018) 30 g 0          No Known Allergies      Review of Systems:   -Skin: As above in HPI. No additional skin concerns.  Const: No fevers, chills, changes in weight, or night sweats.    Physical exam:  GEN: This is a well developed, well-nourished male in no acute distress, in a pleasant mood.    SKIN: Total skin excluding the undergarment areas was performed. The exam included the head/face, neck, both arms, chest, back, abdomen, both legs, digits and/or nails.  - Right clavicle is clear.   - There is no erythema, telangectasias, nodularity, or pigmentation on the sites of prior skin cancer.  -No other lesions of concern on areas examined.     Impression/Plan:  1. History of nonmelanoma skin cancer, no clinical evidence or recurrence    Recommend sunscreens SPF #30 or greater, protective clothing and avoidance of tanning beds.    2. The patient is taking hydrochlorothiazide - reviewed with him  that this might be putting him at increased risk of skin cancer. Recommended follow up with PCP for discussion of starting an alternate medication. IF not possible, then we will follow his skin closely.     3. Excoriated stuck on papule on the right clavicle - resolved.     No further intervention needed.     Follow-up in 6 months.     Staff Involved:  Scribe/Staff    Scribe Disclosure  I, Bharath Donato, am serving as a scribe to document services personally performed by Dr. Berta Monge MD, based on data collection and the provider's statements to me.     Provider Disclosure:   The documentation recorded by the scribe accurately reflects the services I personally performed and the decisions made by me.    Berta Monge MD    Department of Dermatology  [facility]: Phone: [phone], Fax:[phone]  [facility]: Phone: [phone], Fax: [phone]

## 2019-03-26 NOTE — LETTER
3/26/2019         RE: Andre Tyler  [address] [place] [address]        Dear Colleague,    Thank you for referring your patient, Andre Tyler, to the [facility]. Please see a copy of my visit note below.    [facility] Dermatology Note      Dermatology Problem List:  1. 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 Arnulfo Keyes, 2011. s/p excision in 2005, recurrent  -SCCIS, left dorsal hand, s/p via [facility] transferred records, Dr. Arnulfo Keyes, 2011  2. Actinic keratosis  -s/p cryotherapy  3. HAK,   -right dorsal hand, s/p biopsy 4/4/2016  -left upper arm s/p biopsy 10/17/2017   -left dorsal hand, s/p biopsy 10/17/2017   4. Compound melanocytic nevus, left lower back   -s/p biopsy 4/9/2015  5. Benign lichenoid keratosis, right lower leg  -s/p biopsy 4/9/2015    Encounter Date: Mar 26, 2019    CC:  Chief Complaint   Patient presents with     RECHECK     Recheck; no areas of concern         History of Present Illness:  Mr. Andre Tyler is a 73 year old male  with a history of non-melanoma skin cancer presents for a skin exam. He was last seen in dermatology on 11/13/18 when a verrucous keratosis on the left dorsal hand was biopsied. Today, the patient reports that he has no new areas of concern to address.     Past Medical History:   Patient Active Problem List   Diagnosis     Health Care Home     Hypertension goal BP (blood pressure) < 140/90     Squamous cell skin  cancer     Prostate cancer - surgically treated     Advanced directives, counseling/discussion     CARDIOVASCULAR SCREENING; LDL GOAL LESS THAN 130     Sensorineural hearing loss, bilateral     Past Medical History:   Diagnosis Date     Diabetes (H)      Hypertension goal BP (blood pressure) < 140/90      Prostate cancer (H)      Squamous cell skin cancer      Past Surgical History:   Procedure Laterality Date     APPENDECTOMY       HC MOHS TRUNK/ARM/LEG 1ST STAGE UP T0 5 BLOCKS       PROSTATE SURGERY  8/28/12       Social History:  The patient works as a  and is retired from working as a . The patient denies use of tanning beds. [employer] . Plays hockey. Has triplet grandkids.     Family History:  There is a family history of skin cancer in the patient's mother and sister (possible SCC).  Kept in chart for convenience.       Medications:  Current Outpatient Medications   Medication Sig Dispense Refill     Glucosamine HCl (GLUCOSAMINE PO)        lisinopril-hydrochlorothiazide (PRINZIDE,ZESTORETIC) 10-12.5 MG per tablet Take 1 tablet by mouth daily 90 tablet 3     metFORMIN (GLUCOPHAGE) 500 MG tablet 2 times daily       clobetasol (TEMOVATE) 0.05 % cream Apply twice daily for 2 weeks on the left calf (Patient not taking: Reported on 6/11/2018) 30 g 0          No Known Allergies      Review of Systems:   -Skin: As above in HPI. No additional skin concerns.  Const: No fevers, chills, changes in weight, or night sweats.    Physical exam:  GEN: This is a well developed, well-nourished male in no acute distress, in a pleasant mood.    SKIN: Total skin excluding the undergarment areas was performed. The exam included the head/face, neck, both arms, chest, back, abdomen, both legs, digits and/or nails.  - Right clavicle is clear.   - There is no erythema, telangectasias, nodularity, or pigmentation on the sites of prior skin cancer.  -No other lesions of concern on areas examined.      Impression/Plan:  1. History of nonmelanoma skin cancer, no clinical evidence or recurrence    Recommend sunscreens SPF #30 or greater, protective clothing and avoidance of tanning beds.    2. The patient is taking hydrochlorothiazide - reviewed with him that this might be putting him at increased risk of skin cancer. Recommended follow up with PCP for discussion of starting an alternate medication. IF not possible, then we will follow his skin closely.     3. Excoriated stuck on papule on the right clavicle - resolved.     No further intervention needed.     Follow-up in 6 months.     Staff Involved:  Scribe/Staff    Scribe Disclosure  I, Bharath Donato, am serving as a scribe to document services personally performed by Dr. Berta Monge MD, based on data collection and the provider's statements to me.     Provider Disclosure:   The documentation recorded by the scribe accurately reflects the services I personally performed and the decisions made by me.    Berta Monge MD    Department of Dermatology  [facility] [place][note 2]: Phone: [phone], Fax:[phone]  [facility]: Phone: [phone], Fax: [phone]          Again, thank you for allowing me to participate in the care of your patient.        Sincerely,        Berta Monge MD

## 2019-04-30 LAB — RETINOPATHY: NEGATIVE

## 2019-07-25 ENCOUNTER — TELEPHONE (OUTPATIENT)
Dept: FAMILY MEDICINE | Facility: CLINIC | Age: 74
End: 2019-07-25

## 2019-07-25 NOTE — TELEPHONE ENCOUNTER
Panel Management Review      Patient has the following on his problem list:     Hypertension   Last three blood pressure readings:  BP Readings from Last 3 Encounters:   06/26/18 139/90   06/24/18 141/87   09/28/17 130/80     Blood pressure: MONITOR    HTN Guidelines:  Less than 140/90      Composite cancer screening  Chart review shows that this patient is due/due soon for the following Fecal Colorectal (FIT)  Summary:    Patient is due/failing the following:   FOLLOW UP and PHYSICAL    Action needed:   Patient needs office visit for HTN and physical.    Type of outreach:    Sent letter.    Questions for provider review:    None                                                                                                                                    Ysabel Vasquez MA       Chart routed to none .

## 2019-07-25 NOTE — LETTER
July 25, 2019          Andre Tyler,  [address] [facility] [address]        Dear Andre Tyler      Monitoring and managing your preventative and chronic health conditions are very important to us. Our records indicate that you have not scheduled for Annual physical exam and blood pressure check which was recommended by Dr. Joaquin      If you have received your health care elsewhere, please call the clinic so the information can be documented in your chart.    Please call [phone] or message us through your [email or web address] account to schedule an appointment or provide information for your chart.     Feel free to contact us if you have any questions or concerns!    I look forward to seeing you and working with you on your health care needs.     Sincerely,       [facility] Team/AK

## 2019-08-06 ENCOUNTER — TRANSFERRED RECORDS (OUTPATIENT)
Dept: HEALTH INFORMATION MANAGEMENT | Facility: CLINIC | Age: 74
End: 2019-08-06

## 2019-08-06 LAB — HBA1C MFR BLD: 6.9 % (ref 4–6)

## 2019-08-22 ENCOUNTER — OFFICE VISIT (OUTPATIENT)
Dept: URGENT CARE | Facility: URGENT CARE | Age: 74
End: 2019-08-22
Payer: COMMERCIAL

## 2019-08-22 VITALS
DIASTOLIC BLOOD PRESSURE: 94 MMHG | RESPIRATION RATE: 16 BRPM | HEART RATE: 80 BPM | OXYGEN SATURATION: 97 % | TEMPERATURE: 98.5 F | SYSTOLIC BLOOD PRESSURE: 156 MMHG | BODY MASS INDEX: 27.37 KG/M2 | WEIGHT: 201.8 LBS

## 2019-08-22 DIAGNOSIS — N30.00 ACUTE CYSTITIS WITHOUT HEMATURIA: ICD-10-CM

## 2019-08-22 DIAGNOSIS — R30.0 DYSURIA: Primary | ICD-10-CM

## 2019-08-22 LAB
ALBUMIN UR-MCNC: NEGATIVE MG/DL
APPEARANCE UR: CLEAR
BACTERIA #/AREA URNS HPF: ABNORMAL /HPF
BILIRUB UR QL STRIP: NEGATIVE
COLOR UR AUTO: YELLOW
GLUCOSE UR STRIP-MCNC: NEGATIVE MG/DL
HGB UR QL STRIP: ABNORMAL
KETONES UR STRIP-MCNC: NEGATIVE MG/DL
LEUKOCYTE ESTERASE UR QL STRIP: ABNORMAL
NITRATE UR QL: NEGATIVE
NON-SQ EPI CELLS #/AREA URNS LPF: ABNORMAL /LPF
PH UR STRIP: 5.5 PH (ref 5–7)
RBC #/AREA URNS AUTO: ABNORMAL /HPF
SOURCE: ABNORMAL
SP GR UR STRIP: >1.03 (ref 1–1.03)
UROBILINOGEN UR STRIP-ACNC: 0.2 EU/DL (ref 0.2–1)
WBC #/AREA URNS AUTO: ABNORMAL /HPF

## 2019-08-22 PROCEDURE — 99213 OFFICE O/P EST LOW 20 MIN: CPT | Performed by: PHYSICIAN ASSISTANT

## 2019-08-22 PROCEDURE — 81001 URINALYSIS AUTO W/SCOPE: CPT | Performed by: PHYSICIAN ASSISTANT

## 2019-08-22 PROCEDURE — 87086 URINE CULTURE/COLONY COUNT: CPT | Performed by: PHYSICIAN ASSISTANT

## 2019-08-22 RX ORDER — SULFAMETHOXAZOLE/TRIMETHOPRIM 800-160 MG
1 TABLET ORAL 2 TIMES DAILY
Qty: 10 TABLET | Refills: 0 | Status: SHIPPED | OUTPATIENT
Start: 2019-08-22 | End: 2019-09-06

## 2019-08-22 ASSESSMENT — ENCOUNTER SYMPTOMS
WHEEZING: 0
CHILLS: 0
SHORTNESS OF BREATH: 0
EYE ITCHING: 0
NEUROLOGICAL NEGATIVE: 1
MUSCULOSKELETAL NEGATIVE: 1
ADENOPATHY: 0
ABDOMINAL PAIN: 0
RESPIRATORY NEGATIVE: 1
DYSURIA: 1
DIARRHEA: 0
LIGHT-HEADEDNESS: 0
HEMATURIA: 0
HEADACHES: 0
ENDOCRINE NEGATIVE: 1
GASTROINTESTINAL NEGATIVE: 1
POLYDIPSIA: 0
FREQUENCY: 0
CARDIOVASCULAR NEGATIVE: 1
DIAPHORESIS: 0
PALPITATIONS: 0
NAUSEA: 0
SORE THROAT: 0
CONSTITUTIONAL NEGATIVE: 1
WEAKNESS: 0
EYES NEGATIVE: 1
RHINORRHEA: 0
EYE REDNESS: 0
FEVER: 0
DIZZINESS: 0
MYALGIAS: 0
CHEST TIGHTNESS: 0
VOMITING: 0
COUGH: 0
EYE DISCHARGE: 0

## 2019-08-22 NOTE — PROGRESS NOTES
Chief Complaint:    Chief Complaint   Patient presents with     UTI     Irritation for a few weeks        HPI:    Andre Tyler is a 73 year old male who has symptoms of dysuria for 2 week(s).  he denies suprapubic pain and pressure, back pain, nausea, vomiting, fever and chills, flank pain, scrotal pain or swelling.  No discharge from the penis.    He has had UTI's in the past and states that this feels the same.    Patient has a significant Hx of Prostate cancer surgically treated in 2012    ROS:      Review of Systems   Constitutional: Negative.  Negative for chills, diaphoresis and fever.   HENT: Negative.  Negative for congestion, ear pain, rhinorrhea and sore throat.    Eyes: Negative.  Negative for discharge, redness and itching.   Respiratory: Negative.  Negative for cough, chest tightness, shortness of breath and wheezing.    Cardiovascular: Negative.  Negative for chest pain and palpitations.   Gastrointestinal: Negative.  Negative for abdominal pain, diarrhea, nausea and vomiting.   Endocrine: Negative.  Negative for polydipsia and polyuria.   Genitourinary: Positive for dysuria. Negative for frequency, hematuria, penile pain, penile swelling, scrotal swelling, testicular pain and urgency.   Musculoskeletal: Negative.  Negative for myalgias.   Skin: Negative for rash.   Allergic/Immunologic: Negative for immunocompromised state.   Neurological: Negative.  Negative for dizziness, weakness, light-headedness and headaches.   Hematological: Negative for adenopathy.       Family History   Family History   Problem Relation Age of Onset     Hypertension Father      Diabetes Father      Prostate Cancer Father      Alcohol/Drug Sister      C.A.D. No family hx of      Cerebrovascular Disease No family hx of         Problem history  Patient Active Problem List   Diagnosis     Health Care Home     Hypertension goal BP (blood pressure) < 140/90     Squamous cell skin cancer     Prostate cancer - surgically treated      Advanced directives, counseling/discussion     CARDIOVASCULAR SCREENING; LDL GOAL LESS THAN 130     Sensorineural hearing loss, bilateral        Allergies  No Known Allergies     Social History  Social History     Socioeconomic History     Marital status:      Spouse name: Not on file     Number of children: 3     Years of education: Not on file     Highest education level: Not on file   Occupational History     Employer: RETIRED   Social Needs     Financial resource strain: Not on file     Food insecurity:     Worry: Not on file     Inability: Not on file     Transportation needs:     Medical: Not on file     Non-medical: Not on file   Tobacco Use     Smoking status: Never Smoker     Smokeless tobacco: Never Used   Substance and Sexual Activity     Alcohol use: No     Drug use: No     Sexual activity: Yes     Partners: Female   Lifestyle     Physical activity:     Days per week: Not on file     Minutes per session: Not on file     Stress: Not on file   Relationships     Social connections:     Talks on phone: Not on file     Gets together: Not on file     Attends [employer] service: Not on file     Active member of club or organization: Not on file     Attends meetings of clubs or organizations: Not on file     Relationship status: Not on file     Intimate partner violence:     Fear of current or ex partner: Not on file     Emotionally abused: Not on file     Physically abused: Not on file     Forced sexual activity: Not on file   Other Topics Concern     Parent/sibling w/ CABG, MI or angioplasty before 65F 55M? Not Asked   Social History Narrative     Not on file        Current Meds    Current Outpatient Medications:      Glucosamine HCl (GLUCOSAMINE PO), , Disp: , Rfl:      lisinopril-hydrochlorothiazide (PRINZIDE,ZESTORETIC) 10-12.5 MG per tablet, Take 1 tablet by mouth daily, Disp: 90 tablet, Rfl: 3     metFORMIN (GLUCOPHAGE) 500 MG tablet, 2 times daily, Disp: , Rfl:       sulfamethoxazole-trimethoprim (BACTRIM DS/SEPTRA DS) 800-160 MG tablet, Take 1 tablet by mouth 2 times daily for 5 days, Disp: 10 tablet, Rfl: 0     clobetasol (TEMOVATE) 0.05 % cream, Apply twice daily for 2 weeks on the left calf (Patient not taking: Reported on 6/11/2018), Disp: 30 g, Rfl: 0     OBJECTIVE     Vital signs noted and reviewed by Matias Miguel PA-C  BP (!) 156/94   Pulse 80   Temp 98.5  F (36.9  C) (Oral)   Resp 16   Wt 91.5 kg (201 lb 12.8 oz)   SpO2 97%   BMI 27.37 kg/m       Physical Exam   Constitutional: He appears well-developed and well-nourished. He is cooperative.  Non-toxic appearance. He does not have a sickly appearance. He does not appear ill. No distress.   HENT:   Head: Normocephalic and atraumatic.   Right Ear: Hearing, tympanic membrane, external ear and ear canal normal. Tympanic membrane is not perforated, not erythematous, not retracted and not bulging.   Left Ear: Hearing, tympanic membrane, external ear and ear canal normal. Tympanic membrane is not perforated, not erythematous, not retracted and not bulging.   Nose: Nose normal. No mucosal edema or rhinorrhea.   Mouth/Throat: Oropharynx is clear and moist and mucous membranes are normal. No oropharyngeal exudate, posterior oropharyngeal edema, posterior oropharyngeal erythema or tonsillar abscesses. Tonsils are 0 on the right. Tonsils are 0 on the left. No tonsillar exudate.   Eyes: Pupils are equal, round, and reactive to light. EOM are normal.   Neck: Normal range of motion. Neck supple.   Cardiovascular: Normal rate, regular rhythm, S1 normal, S2 normal, normal heart sounds and intact distal pulses. Exam reveals no gallop, no distant heart sounds and no friction rub.   No murmur heard.  Pulmonary/Chest: Effort normal and breath sounds normal. No respiratory distress. He has no decreased breath sounds. He has no wheezes. He has no rhonchi. He has no rales.   Abdominal: Soft. Bowel sounds are normal. He exhibits no  distension. There is no tenderness.   Lymphadenopathy:     He has no cervical adenopathy.   Neurological: He is alert. No cranial nerve deficit.   Skin: Skin is warm and dry. No rash noted. He is not diaphoretic.   Psychiatric: He has a normal mood and affect. His speech is normal and behavior is normal. Judgment and thought content normal. Cognition and memory are normal. He is attentive.   Nursing note and vitals reviewed.            Labs:     Results for orders placed or performed in visit on 08/22/19   UA reflex to Microscopic and Culture   Result Value Ref Range    Color Urine Yellow     Appearance Urine Clear     Glucose Urine Negative NEG^Negative mg/dL    Bilirubin Urine Negative NEG^Negative    Ketones Urine Negative NEG^Negative mg/dL    Specific Gravity Urine >1.030 1.003 - 1.035    Blood Urine Trace (A) NEG^Negative    pH Urine 5.5 5.0 - 7.0 pH    Protein Albumin Urine Negative NEG^Negative mg/dL    Urobilinogen Urine 0.2 0.2 - 1.0 EU/dL    Nitrite Urine Negative NEG^Negative    Leukocyte Esterase Urine Trace (A) NEG^Negative    Source Midstream Urine    Urine Microscopic   Result Value Ref Range    WBC Urine 5-10 (A) OTO5^0 - 5 /HPF    RBC Urine O - 2 OTO2^O - 2 /HPF    Squamous Epithelial /LPF Urine Few FEW^Few /LPF    Bacteria Urine Few (A) NEG^Negative /HPF           ASSESSMENT     1. Dysuria    2. Acute cystitis without hematuria           PLAN    Urinalysis discussed with patient  We will call with culture results if resistant.  Rx for Bactrim today  Treatment currentguidelines - also push fluids.   Follow up with PCP in 2-3 days if symptoms are not improving.  Worrisome symptoms discussed with instructions to go to the ED.  Patient verbalized understanding and agreed with this plan.          Matias Miguel PA-C  8/22/2019, 11:42 AM

## 2019-08-22 NOTE — LETTER
[facility]  [address]  [facility] [address]  Phone: [phone]    08/24/19    Andre Tyler  [address] [facility] [address]      To whom it may concern:     The results of your recent lab results were normal. Enclosed are a copy of the results. Please call us with any questions/concerns regarding your results. Thank you for choosing [facility]. Have a great day!  Results for orders placed or performed in visit on 08/22/19   UA reflex to Microscopic and Culture   Result Value Ref Range    Color Urine Yellow     Appearance Urine Clear     Glucose Urine Negative NEG^Negative mg/dL    Bilirubin Urine Negative NEG^Negative    Ketones Urine Negative NEG^Negative mg/dL    Specific Gravity Urine >1.030 1.003 - 1.035    Blood Urine Trace (A) NEG^Negative    pH Urine 5.5 5.0 - 7.0 pH    Protein Albumin Urine Negative NEG^Negative mg/dL    Urobilinogen Urine 0.2 0.2 - 1.0 EU/dL    Nitrite Urine Negative NEG^Negative    Leukocyte Esterase Urine Trace (A) NEG^Negative    Source Midstream Urine    Urine Microscopic   Result Value Ref Range    WBC Urine 5-10 (A) OTO5^0 - 5 /HPF    RBC Urine O - 2 OTO2^O - 2 /HPF    Squamous Epithelial /LPF Urine Few FEW^Few /LPF    Bacteria Urine Few (A) NEG^Negative /HPF   Urine Culture Aerobic Bacterial   Result Value Ref Range    Specimen Description Midstream Urine     Culture Micro No growth      Your urine culture was negative. Finish the antibiotic and follow up with your Primary Care Provider if no resolution of symptoms      Sincerely,      Matias Miguel PA-C

## 2019-08-23 LAB
BACTERIA SPEC CULT: NO GROWTH
SPECIMEN SOURCE: NORMAL

## 2019-08-24 ENCOUNTER — OFFICE VISIT (OUTPATIENT)
Dept: URGENT CARE | Facility: URGENT CARE | Age: 74
End: 2019-08-24
Payer: COMMERCIAL

## 2019-08-24 VITALS
DIASTOLIC BLOOD PRESSURE: 86 MMHG | BODY MASS INDEX: 27.26 KG/M2 | RESPIRATION RATE: 18 BRPM | WEIGHT: 201 LBS | SYSTOLIC BLOOD PRESSURE: 131 MMHG | TEMPERATURE: 97.6 F | OXYGEN SATURATION: 96 % | HEART RATE: 88 BPM

## 2019-08-24 DIAGNOSIS — N30.01 ACUTE CYSTITIS WITH HEMATURIA: Primary | ICD-10-CM

## 2019-08-24 DIAGNOSIS — Z90.79 H/O PROSTATECTOMY: ICD-10-CM

## 2019-08-24 DIAGNOSIS — R30.0 DYSURIA: ICD-10-CM

## 2019-08-24 LAB
ALBUMIN UR-MCNC: NEGATIVE MG/DL
APPEARANCE UR: CLEAR
BILIRUB UR QL STRIP: NEGATIVE
COLOR UR AUTO: YELLOW
GLUCOSE UR STRIP-MCNC: NEGATIVE MG/DL
HGB UR QL STRIP: ABNORMAL
KETONES UR STRIP-MCNC: NEGATIVE MG/DL
LEUKOCYTE ESTERASE UR QL STRIP: ABNORMAL
NITRATE UR QL: NEGATIVE
NON-SQ EPI CELLS #/AREA URNS LPF: ABNORMAL /LPF
PH UR STRIP: 5.5 PH (ref 5–7)
RBC #/AREA URNS AUTO: ABNORMAL /HPF
SOURCE: ABNORMAL
SP GR UR STRIP: >1.03 (ref 1–1.03)
UROBILINOGEN UR STRIP-ACNC: 0.2 EU/DL (ref 0.2–1)
WBC #/AREA URNS AUTO: ABNORMAL /HPF

## 2019-08-24 PROCEDURE — 81001 URINALYSIS AUTO W/SCOPE: CPT | Performed by: NURSE PRACTITIONER

## 2019-08-24 PROCEDURE — 99214 OFFICE O/P EST MOD 30 MIN: CPT | Performed by: NURSE PRACTITIONER

## 2019-08-24 RX ORDER — DOXYCYCLINE HYCLATE 100 MG
100 TABLET ORAL 2 TIMES DAILY
Qty: 14 TABLET | Refills: 0 | Status: SHIPPED | OUTPATIENT
Start: 2019-08-24 | End: 2019-09-06

## 2019-08-24 ASSESSMENT — ENCOUNTER SYMPTOMS
FEVER: 0
FLANK PAIN: 0
HEMATURIA: 0
DIFFICULTY URINATING: 1
FREQUENCY: 1
CHILLS: 0
MYALGIAS: 0

## 2019-08-24 NOTE — PROGRESS NOTES
SUBJECTIVE:   Andre Tyler is a 73 year old male presenting with a chief complaint of   Chief Complaint   Patient presents with     UTI     seen thurs and not getting better       He is an established patient of [facility].    UTI    Onset of symptoms was 2 weeks intermittently  Course of illness is waxing and waning, was seen and evaluated on 8/22/2019, started on Bactrim-DS x 2 days  Current and associated symptoms frequency, urgency, hesitancy and voiding in small amounts  Treatment and measures tried increased water and   Predisposing factors include history of frequent UTI's, history of prostate CA with HX of prostatectomy  Patient denies rigors, flank pain, temperature > 101 degrees F. and vomiting       Review of Systems   Constitutional: Negative for chills and fever.   Genitourinary: Positive for decreased urine volume, difficulty urinating and frequency. Negative for discharge, flank pain, hematuria, penile pain, penile swelling and scrotal swelling.   Musculoskeletal: Negative for myalgias.   All other systems reviewed and are negative.      Past Medical History:   Diagnosis Date     Diabetes (H)      Hypertension goal BP (blood pressure) < 140/90      Prostate cancer (H)      Squamous cell skin cancer      Family History   Problem Relation Age of Onset     Hypertension Father      Diabetes Father      Prostate Cancer Father      Alcohol/Drug Sister      C.A.D. No family hx of      Cerebrovascular Disease No family hx of      Current Outpatient Medications   Medication Sig Dispense Refill     clobetasol (TEMOVATE) 0.05 % cream Apply twice daily for 2 weeks on the left calf 30 g 0     doxycycline hyclate (VIBRA-TABS) 100 MG tablet Take 1 tablet (100 mg) by mouth 2 times daily for 7 days 14 tablet 0     Glucosamine HCl (GLUCOSAMINE PO)        lisinopril-hydrochlorothiazide (PRINZIDE,ZESTORETIC) 10-12.5 MG per tablet Take 1 tablet by mouth daily 90 tablet 3     metFORMIN (GLUCOPHAGE) 500 MG tablet 2 times  daily       sulfamethoxazole-trimethoprim (BACTRIM DS/SEPTRA DS) 800-160 MG tablet Take 1 tablet by mouth 2 times daily for 5 days 10 tablet 0     Social History     Tobacco Use     Smoking status: Never Smoker     Smokeless tobacco: Never Used   Substance Use Topics     Alcohol use: No       OBJECTIVE  /86 (BP Location: Left arm, Patient Position: Chair, Cuff Size: Adult Large)   Pulse 88   Temp 97.6  F (36.4  C) (Oral)   Resp 18   Wt 91.2 kg (201 lb)   SpO2 96%   BMI 27.26 kg/m      Physical Exam   Constitutional: He is oriented to person, place, and time. No distress.   Cardiovascular: Normal rate, regular rhythm, normal heart sounds and intact distal pulses. Exam reveals no gallop and no friction rub.   No murmur heard.  Pulmonary/Chest: Effort normal and breath sounds normal. No respiratory distress. He has no wheezes.   Abdominal: Soft. He exhibits no distension and no mass. There is no tenderness. There is no guarding.   Neurological: He is alert and oriented to person, place, and time.   Skin: Skin is warm. Capillary refill takes less than 2 seconds. No pallor.   Psychiatric: He has a normal mood and affect.       Labs:  Results for orders placed or performed in visit on 08/24/19 (from the past 24 hour(s))   *UA reflex to Microscopic and Culture ([facility] (except [place] and [place])   Result Value Ref Range    Color Urine Yellow     Appearance Urine Clear     Glucose Urine Negative NEG^Negative mg/dL    Bilirubin Urine Negative NEG^Negative    Ketones Urine Negative NEG^Negative mg/dL    Specific Gravity Urine >1.030 1.003 - 1.035    Blood Urine Moderate (A) NEG^Negative    pH Urine 5.5 5.0 - 7.0 pH    Protein Albumin Urine Negative NEG^Negative mg/dL    Urobilinogen Urine 0.2 0.2 - 1.0 EU/dL    Nitrite Urine Negative NEG^Negative    Leukocyte Esterase Urine Trace (A) NEG^Negative    Source Midstream Urine    Urine Microscopic   Result Value Ref Range    WBC Urine 0 - 5  OTO5^0 - 5 /HPF    RBC Urine 10-25 (A) OTO2^O - 2 /HPF    Squamous Epithelial /LPF Urine Few FEW^Few /LPF         ASSESSMENT:    ICD-10-CM    1. Acute cystitis with hematuria N30.01 doxycycline hyclate (VIBRA-TABS) 100 MG tablet     UROLOGY ADULT REFERRAL   2. H/O prostatectomy Z90.79 UROLOGY ADULT REFERRAL   3. Dysuria R30.0 *UA reflex to Microscopic and Culture ([facility] (except [place] and [place])     Urine Microscopic        Medical Decision Making:    Differential Diagnosis:  UTI: Urethritis and Interstitial Cystitis    Serious Comorbid Conditions:  Adult:  Diabetes and HTN    PLAN: Discussed with patient causes and treatment options with persistent urinary discomfort in the setting of previous prostatectomy. Reviewed Urology notes and advised patient need for follow up at this time with UA noting hematuria. Patient advised to stop Bactrim DS and begin course of Doxcycline at this time and monitor symptoms. Process for urine culture discussed and signs and symptoms of pyelonephritis mentioned.    Advised importance of increased water intake and completion of treatment course. Also discussed avoiding citrus or cranberry juice. Patient agreed to the plan of care with no further questions or concerns.     Fortino Lau, APRN, CNP      Patient Instructions     Patient Education     Bladder Infection, Male (Adult)    You have a bladder infection.  Urine is normally free of bacteria. But bacteria can get into the urinary tract from the skin around the rectum or it may travel in the blood from elsewhere in the body.  This is called a urinary tract infection (UTI). An infection can occur anywhere in the urinary tract. It could be in a kidney (pyelonephritis)or in the bladder (cystitis) and urethra (urethritis). The urethra is the tube that drains the urine from the bladder through the tip of the penis.  The most common place for a UTI is in the bladder. This is called a bladder infection.  Most bladder infections are easily treated. They are not serious unless the infection spreads up to the kidney.  The terms bladder infection, UTI, and cystitis are often used to describe the same thing, but they aren t always the same. Cystitis is an inflammation of the bladder. The most common cause of cystitis is an infection.   Keep in mind:    Infections in the urine are called UTIs.    Cystitis is usually caused by a UTI.    Not all UTIs and cases of cystitis are bladder infections.    Bladder infections are the most common type of cystitis.  Symptoms of a bladder infection  The infection causes inflammation in the urethra and bladder. This inflammation causes many of the symptoms. The most common symptoms of a bladder infection are:    Pain or burning when urinating    Having to go more often than usual    Feeling like you need to go right away    Only a small amount comes out    Blood in urine    Discomfort in your belly (abdomen), usually in the lower abdomen, above the pubic bone    Cloudy, strong, or bad smelling urine    Unable to urinate (retention)    Urinary incontinence    Fever    Loss of appetite  Older adults may also feel confused.  Causes of a bladder infection  Bladder infections are not contagious. You can't get one from someone else, from a toilet seat, or from sharing a bath.  The most common cause of bladder infections is bacteria from the bowels. The bacteria get onto the skin around the opening of the urethra. From there they can get into the urine and travel up to the bladder. This causes inflammation and an infection. This usually happens because of:    An enlarged prostate    Poor cleaning of the genitals    Procedures that put a tube in your bladder, like a Finch catheter    Bowel incontinence    Older age    Not emptying your bladder (The urine stays there, giving the bacteria a chance to grow.)    Dehydration (This allows urine to stay in the bladder longer.)    Constipation (This  can cause the bowels to push on the bladder or urethra and keep the bladder from emptying.)  Treatment  Bladder infections are treated with antibiotics. They usually clear up quickly without complications. Treatment helps prevent a more serious kidney infection.  Medicines  Medicines can help in the treatment of a bladder infection:    You may have been given phenazopyridine to ease burning when you urinate. It will cause your urine to be bright orange. It can stain clothing.    You may have been prescribed antibiotics. Take this medicine until you have finished it, even if you feel better. Taking all of the medicine will make sure the infection has cleared.  You can use acetaminophen or ibuprofen for pain, fever, or discomfort, unless another medicine was prescribed. You can also alternate them, or use both together. They work differently and are a different class of medicines, so taking them together is not an overdose. If you have chronic liver or kidney disease, talk with your healthcare provider before using these medicines. Also talk with your provider if you ve had a stomach ulcer or GI bleeding or are taking blood thinner medicines.  Home care  Here are some guidelines to help you care for yourself at home:    Drink plenty of fluids, unless your healthcare provider told you not to. Fluids will prevent dehydration and flush out your bladder.    Use good personal hygiene. Wipe from front to back after using the toilet, and clean your penis regularly. If you aren t circumcised, retract the foreskin when cleaning.    Urinate more frequently, and don t try to hold it in for long periods of time, if possible.    Wear loose-fitting clothes and cotton underwear. Avoid tight-fitting pants. This helps keep you clean and dry.    Change your diet to prevent constipation. This means eating more fresh foods and more fiber, and less junk and fatty foods.    Avoid sex until your symptoms are gone.    Avoid caffeine,  alcohol, and spicy foods. These can irritate the bladder.  Follow-up care  Follow up with your healthcare provider, or as advised if all symptoms have not cleared up within 5 days. It is important to keep your follow-up appointment. You can talk with your provider to see if you need more tests of the urinary tract. This is especially important if you have infections that keep coming back.  If a culture was done, you will be told if your treatment needs to be changed. If directed, you can call to find out the results.  If X-rays were taken, you will be told of any findings that may affect your care.  Call 911  Call 911 if any of these occur:    Trouble breathing    Difficulty waking up    Feeling confused    Fainting or loss of consciousness    Rapid heart rate  When to seek medical advice  Call your healthcare provider right away if any of these occur:    Fever of 100.4 F (38 C) or higher, or as directed by your healthcare provider    Your symptoms don t improve after 2 days of treatment    Back or abdominal pain that gets worse    Repeated vomiting, or you aren t able to keep medicine down    Weakness or dizziness  Date Last Reviewed: 10/1/2016    9978-8950 [employer]. [address] [place] [address]. All rights reserved. This information is not intended as a substitute for professional medical care. Always follow your healthcare professional's instructions.           Patient Education     Hematuria: Possible Causes     Many things can lead to blood in the urine (hematuria). The blood may be seen with the eye (macroscopic or gross hematuria). Or it may only be seen when the urine is looked at under a microscope (microscopic hematuria). Some of the most common causes of blood in the urine are listed below. Often, no cause for the blood can be found. This is called idiopathic hematuria.    Kidney or bladder stones are collections of crystals. They form in the urine. Stones may be found  anywhere in the urinary tract. But they form most often in the kidneys or bladder. In addition to blood in the urine, they can cause severe pain.    BPH stands for benign prostatic hyperplasia. It is enlargement of the prostate gland. It happens as men age. BPH often causes problems with urination. It sometimes causes blood in the urine.    A urinary tract infection (UTI) is due to bacteria growing in the urinary tract. It can cause blood in the urine. Other symptoms include burning or pain with urination. You may need to urinate often or urgently. You may also have a fever.    Damage to the urinary tract may cause blood in the urine. This damage may be due to a blow or accident. It may also result from the use of a urinary catheter. Very hard exercise may sometimes irritate the urinary tract and cause bleeding.    Cancer may occur anywhere in the urinary tract. A tumor may sometimes cause no symptoms other than bleeding.     Other possible causes of bleeding include:    Prostatitis (infection of the prostate gland)    Taking anticoagulants    Blockage in the urinary tract    Disease or inflammation of the kidney    Cystic diseases of the kidneys    Sickle cell anemia    Vigorous exercise    Endometriosis  Date Last Reviewed: 12/1/2016 2000-2018 [employer]. [address] [place] [address], [place] [address]. All rights reserved. This information is not intended as a substitute for professional medical care. Always follow your healthcare professional's instructions.

## 2019-08-24 NOTE — PATIENT INSTRUCTIONS
Patient Education     Bladder Infection, Male (Adult)    You have a bladder infection.  Urine is normally free of bacteria. But bacteria can get into the urinary tract from the skin around the rectum or it may travel in the blood from elsewhere in the body.  This is called a urinary tract infection (UTI). An infection can occur anywhere in the urinary tract. It could be in a kidney (pyelonephritis)or in the bladder (cystitis) and urethra (urethritis). The urethra is the tube that drains the urine from the bladder through the tip of the penis.  The most common place for a UTI is in the bladder. This is called a bladder infection. Most bladder infections are easily treated. They are not serious unless the infection spreads up to the kidney.  The terms bladder infection, UTI, and cystitis are often used to describe the same thing, but they aren t always the same. Cystitis is an inflammation of the bladder. The most common cause of cystitis is an infection.   Keep in mind:    Infections in the urine are called UTIs.    Cystitis is usually caused by a UTI.    Not all UTIs and cases of cystitis are bladder infections.    Bladder infections are the most common type of cystitis.  Symptoms of a bladder infection  The infection causes inflammation in the urethra and bladder. This inflammation causes many of the symptoms. The most common symptoms of a bladder infection are:    Pain or burning when urinating    Having to go more often than usual    Feeling like you need to go right away    Only a small amount comes out    Blood in urine    Discomfort in your belly (abdomen), usually in the lower abdomen, above the pubic bone    Cloudy, strong, or bad smelling urine    Unable to urinate (retention)    Urinary incontinence    Fever    Loss of appetite  Older adults may also feel confused.  Causes of a bladder infection  Bladder infections are not contagious. You can't get one from someone else, from a toilet seat, or from  sharing a bath.  The most common cause of bladder infections is bacteria from the bowels. The bacteria get onto the skin around the opening of the urethra. From there they can get into the urine and travel up to the bladder. This causes inflammation and an infection. This usually happens because of:    An enlarged prostate    Poor cleaning of the genitals    Procedures that put a tube in your bladder, like a Finch catheter    Bowel incontinence    Older age    Not emptying your bladder (The urine stays there, giving the bacteria a chance to grow.)    Dehydration (This allows urine to stay in the bladder longer.)    Constipation (This can cause the bowels to push on the bladder or urethra and keep the bladder from emptying.)  Treatment  Bladder infections are treated with antibiotics. They usually clear up quickly without complications. Treatment helps prevent a more serious kidney infection.  Medicines  Medicines can help in the treatment of a bladder infection:    You may have been given phenazopyridine to ease burning when you urinate. It will cause your urine to be bright orange. It can stain clothing.    You may have been prescribed antibiotics. Take this medicine until you have finished it, even if you feel better. Taking all of the medicine will make sure the infection has cleared.  You can use acetaminophen or ibuprofen for pain, fever, or discomfort, unless another medicine was prescribed. You can also alternate them, or use both together. They work differently and are a different class of medicines, so taking them together is not an overdose. If you have chronic liver or kidney disease, talk with your healthcare provider before using these medicines. Also talk with your provider if you ve had a stomach ulcer or GI bleeding or are taking blood thinner medicines.  Home care  Here are some guidelines to help you care for yourself at home:    Drink plenty of fluids, unless your healthcare provider told you not  to. Fluids will prevent dehydration and flush out your bladder.    Use good personal hygiene. Wipe from front to back after using the toilet, and clean your penis regularly. If you aren t circumcised, retract the foreskin when cleaning.    Urinate more frequently, and don t try to hold it in for long periods of time, if possible.    Wear loose-fitting clothes and cotton underwear. Avoid tight-fitting pants. This helps keep you clean and dry.    Change your diet to prevent constipation. This means eating more fresh foods and more fiber, and less junk and fatty foods.    Avoid sex until your symptoms are gone.    Avoid caffeine, alcohol, and spicy foods. These can irritate the bladder.  Follow-up care  Follow up with your healthcare provider, or as advised if all symptoms have not cleared up within 5 days. It is important to keep your follow-up appointment. You can talk with your provider to see if you need more tests of the urinary tract. This is especially important if you have infections that keep coming back.  If a culture was done, you will be told if your treatment needs to be changed. If directed, you can call to find out the results.  If X-rays were taken, you will be told of any findings that may affect your care.  Call 911  Call 911 if any of these occur:    Trouble breathing    Difficulty waking up    Feeling confused    Fainting or loss of consciousness    Rapid heart rate  When to seek medical advice  Call your healthcare provider right away if any of these occur:    Fever of 100.4 F (38 C) or higher, or as directed by your healthcare provider    Your symptoms don t improve after 2 days of treatment    Back or abdominal pain that gets worse    Repeated vomiting, or you aren t able to keep medicine down    Weakness or dizziness  Date Last Reviewed: 10/1/2016    [id number] [employer]. [address] [place] [address], [place] [address]. All rights reserved. This information is not intended as a  substitute for professional medical care. Always follow your healthcare professional's instructions.           Patient Education     Hematuria: Possible Causes     Many things can lead to blood in the urine (hematuria). The blood may be seen with the eye (macroscopic or gross hematuria). Or it may only be seen when the urine is looked at under a microscope (microscopic hematuria). Some of the most common causes of blood in the urine are listed below. Often, no cause for the blood can be found. This is called idiopathic hematuria.    Kidney or bladder stones are collections of crystals. They form in the urine. Stones may be found anywhere in the urinary tract. But they form most often in the kidneys or bladder. In addition to blood in the urine, they can cause severe pain.    BPH stands for benign prostatic hyperplasia. It is enlargement of the prostate gland. It happens as men age. BPH often causes problems with urination. It sometimes causes blood in the urine.    A urinary tract infection (UTI) is due to bacteria growing in the urinary tract. It can cause blood in the urine. Other symptoms include burning or pain with urination. You may need to urinate often or urgently. You may also have a fever.    Damage to the urinary tract may cause blood in the urine. This damage may be due to a blow or accident. It may also result from the use of a urinary catheter. Very hard exercise may sometimes irritate the urinary tract and cause bleeding.    Cancer may occur anywhere in the urinary tract. A tumor may sometimes cause no symptoms other than bleeding.     Other possible causes of bleeding include:    Prostatitis (infection of the prostate gland)    Taking anticoagulants    Blockage in the urinary tract    Disease or inflammation of the kidney    Cystic diseases of the kidneys    Sickle cell anemia    Vigorous exercise    Endometriosis  Date Last Reviewed: 12/1/2016 2000-2018 [employer]. [address] [place] [address]  [place] [address]. All rights reserved. This information is not intended as a substitute for professional medical care. Always follow your healthcare professional's instructions.

## 2019-09-06 ENCOUNTER — OFFICE VISIT (OUTPATIENT)
Dept: UROLOGY | Facility: CLINIC | Age: 74
End: 2019-09-06
Payer: COMMERCIAL

## 2019-09-06 VITALS — DIASTOLIC BLOOD PRESSURE: 89 MMHG | OXYGEN SATURATION: 94 % | HEART RATE: 82 BPM | SYSTOLIC BLOOD PRESSURE: 129 MMHG

## 2019-09-06 DIAGNOSIS — R10.2 PELVIC PAIN IN MALE: Primary | ICD-10-CM

## 2019-09-06 DIAGNOSIS — R31.29 MICROSCOPIC HEMATURIA: ICD-10-CM

## 2019-09-06 DIAGNOSIS — Z85.46 PERSONAL HISTORY OF MALIGNANT NEOPLASM OF PROSTATE: ICD-10-CM

## 2019-09-06 LAB — PSA SERPL-MCNC: <0.01 UG/L (ref 0–4)

## 2019-09-06 PROCEDURE — 36415 COLL VENOUS BLD VENIPUNCTURE: CPT | Performed by: UROLOGY

## 2019-09-06 PROCEDURE — 99204 OFFICE O/P NEW MOD 45 MIN: CPT | Mod: 25 | Performed by: UROLOGY

## 2019-09-06 PROCEDURE — 51798 US URINE CAPACITY MEASURE: CPT | Performed by: UROLOGY

## 2019-09-06 PROCEDURE — 84153 ASSAY OF PSA TOTAL: CPT | Performed by: UROLOGY

## 2019-09-06 NOTE — PATIENT INSTRUCTIONS
Your cystoscopy is scheduled 9/30/2019 @ 10:00am. No preparation necessary. Please call  if you need to reschedule this appointment.    Please complete your CT Scan and KUB xray sometime prior to your appointment for cystoscopy.   Please call  to schedule the CT scan at [facility], [address] [place] [address].  Please contact your insurance company to make sure the CT scan is covered under your insurance plan.

## 2019-09-06 NOTE — PROGRESS NOTES
S: Patient is a pleasant 74-year-old  male who is request be seen by Lukasz Lau for a consultation with regard to patient's irritative voiding symptoms, pelvic discomfort with microscopic hematuria.  Patient was seen at urgent care recently with suprapubic discomfort back discomfort scrotal pain.  He was placed on antibiotics twice however urine analysis only show microscopic hematuria (10-25 rbc/hp) without any evidence of infection.  Symptoms still persist.  He has no gross hematuria.  He does have mild irritative voiding symptoms otherwise no changes from before.  He has no fever, nausea or vomiting.  Current Outpatient Medications   Medication Sig Dispense Refill     Glucosamine HCl (GLUCOSAMINE PO)        lisinopril-hydrochlorothiazide (PRINZIDE,ZESTORETIC) 10-12.5 MG per tablet Take 1 tablet by mouth daily 90 tablet 3     metFORMIN (GLUCOPHAGE) 500 MG tablet 2 times daily       No Known Allergies  Past Medical History:   Diagnosis Date     Diabetes (H)      Hypertension goal BP (blood pressure) < 140/90      Prostate cancer (H)      Squamous cell skin cancer      Past Surgical History:   Procedure Laterality Date     APPENDECTOMY       HC MOHS TRUNK/ARM/LEG 1ST STAGE UP T0 5 BLOCKS       PROSTATE SURGERY  8/28/12      Family History   Problem Relation Age of Onset     Hypertension Father      Diabetes Father      Prostate Cancer Father      Alcohol/Drug Sister      C.A.D. No family hx of      Cerebrovascular Disease No family hx of      Social History     Socioeconomic History     Marital status:      Spouse name: None     Number of children: 3     Years of education: None     Highest education level: None   Occupational History     Employer: RETIRED   Social Needs     Financial resource strain: None     Food insecurity:     Worry: None     Inability: None     Transportation needs:     Medical: None     Non-medical: None   Tobacco Use     Smoking status: Never Smoker     Smokeless tobacco:  Never Used   Substance and Sexual Activity     Alcohol use: No     Drug use: No     Sexual activity: Yes     Partners: Female   Lifestyle     Physical activity:     Days per week: None     Minutes per session: None     Stress: None   Relationships     Social connections:     Talks on phone: None     Gets together: None     Attends Christian service: None     Active member of club or organization: None     Attends meetings of clubs or organizations: None     Relationship status: None     Intimate partner violence:     Fear of current or ex partner: None     Emotionally abused: None     Physically abused: None     Forced sexual activity: None   Other Topics Concern     Parent/sibling w/ CABG, MI or angioplasty before 65F 55M? Not Asked   Social History Narrative     None       REVIEW OF SYSTEMS  =================  C: NEGATIVE for fever, chills, change in weight  I: NEGATIVE for worrisome rashes, moles or lesions  E/M: NEGATIVE for ear, mouth and throat problems  R: NEGATIVE for significant cough or SHORTNESS OF BREATH  CV:  NEGATIVE for chest pain, palpitations or peripheral edema  GI: NEGATIVE for nausea, abdominal pain, heartburn, or change in bowel habits  NEURO: NEGATIVE numbness/weakness  : see HPI  PSYCH: NEGATIVE depression/anxiety  LYmph: no new enlarged lymph nodes  Ortho: no new trauma/movements      Physical Exam:  /89 (BP Location: Right arm, Patient Position: Chair, Cuff Size: Adult Large)   Pulse 82   SpO2 94%    Patient is pleasant, in no acute distress, good general condition.  Heart:  negative, PMI normal  Lung: no evidence of respiratory distress    Abdomen: Soft, nondistended, non tender. No masses. No rebound or guarding.   Exam: Penis no discharge.  Testes without any masses but no scrotal skin lesion.  Minimal residual urine.  Skin: Warm and dry.  No redness.  Neuro: grossly normal  Musculaskeletal: moving all extremities  Psych normal mood and affect  Musculoskeletal  moving all  extremities  Hematologic/Lymphatic/Immunologic: normal ant/post cervical, axillary, supraclavicular and inguinal nodes    Assessment/Plan:   74-year-old gentleman with some pelvic discomfort, microscopic hematuria with history of proximal cancer in the past status post radical prostatectomy.  Patient has not had his PSA checked for several years therefore I will obtain one today.  With regard to his voiding symptoms and microscopic hematuria and will schedule patient for CT urogram cystoscopy next.

## 2019-09-09 ENCOUNTER — ANCILLARY PROCEDURE (OUTPATIENT)
Dept: CT IMAGING | Facility: CLINIC | Age: 74
End: 2019-09-09
Attending: UROLOGY
Payer: COMMERCIAL

## 2019-09-09 DIAGNOSIS — R31.29 MICROSCOPIC HEMATURIA: ICD-10-CM

## 2019-09-09 LAB
CREAT BLD-MCNC: 1 MG/DL (ref 0.5–1.2)
GFR SERPL CREATININE-BSD FRML MDRD: 75 ML/MIN/{1.73_M2}
GFRB: 73

## 2019-09-09 PROCEDURE — 74178 CT ABD&PLV WO CNTR FLWD CNTR: CPT | Mod: TC

## 2019-09-09 RX ORDER — IOPAMIDOL 755 MG/ML
100 INJECTION, SOLUTION INTRAVASCULAR ONCE
Status: COMPLETED | OUTPATIENT
Start: 2019-09-09 | End: 2019-09-09

## 2019-09-09 RX ADMIN — IOPAMIDOL 100 ML: 755 INJECTION, SOLUTION INTRAVASCULAR at 10:39

## 2019-09-24 ENCOUNTER — OFFICE VISIT (OUTPATIENT)
Dept: DERMATOLOGY | Facility: CLINIC | Age: 74
End: 2019-09-24
Payer: COMMERCIAL

## 2019-09-24 DIAGNOSIS — Z85.828 HISTORY OF NONMELANOMA SKIN CANCER: Primary | ICD-10-CM

## 2019-09-24 DIAGNOSIS — L57.0 ACTINIC KERATOSIS: ICD-10-CM

## 2019-09-24 DIAGNOSIS — B35.9 TINEA: ICD-10-CM

## 2019-09-24 PROCEDURE — 11103 TANGNTL BX SKIN EA SEP/ADDL: CPT | Performed by: DERMATOLOGY

## 2019-09-24 PROCEDURE — 17003 DESTRUCT PREMALG LES 2-14: CPT | Performed by: DERMATOLOGY

## 2019-09-24 PROCEDURE — 88305 TISSUE EXAM BY PATHOLOGIST: CPT | Mod: TC | Performed by: DERMATOLOGY

## 2019-09-24 PROCEDURE — 99213 OFFICE O/P EST LOW 20 MIN: CPT | Mod: 25 | Performed by: DERMATOLOGY

## 2019-09-24 PROCEDURE — 17000 DESTRUCT PREMALG LESION: CPT | Mod: 59 | Performed by: DERMATOLOGY

## 2019-09-24 PROCEDURE — 11102 TANGNTL BX SKIN SINGLE LES: CPT | Performed by: DERMATOLOGY

## 2019-09-24 RX ORDER — KETOCONAZOLE 20 MG/G
CREAM TOPICAL DAILY
Qty: 60 G | Refills: 0 | Status: SHIPPED | OUTPATIENT
Start: 2019-09-24 | End: 2023-02-21

## 2019-09-24 ASSESSMENT — PAIN SCALES - GENERAL: PAINLEVEL: MILD PAIN (2)

## 2019-09-24 NOTE — PATIENT INSTRUCTIONS
Use clobetasol for 3 days on red spots on foot then transition to ketaconazole all over feet twice daily for 2 months. Call if worsens or does not improve.    Cryotherapy    What is it?    Use of a very cold liquid, such as liquid nitrogen, to freeze and destroy abnormal skin cells that need to be removed    What should I expect?    Tenderness and redness    A small blister that might grow and fill with dark purple blood. There may be crusting.    More than one treatment may be needed if the lesions do not go away.    How do I care for the treated area?    Gently wash the area with your hands when bathing.    Use a thin layer of Vaseline to help with healing. You may use a Band-Aid.     The area should heal within 7-10 days and may leave behind a pink or lighter color.     Do not use an antibiotic or Neosporin ointment.     You may take acetaminophen (Tylenol) for pain.     Call your Doctor if you have:    Severe pain    Signs of infection (warmth, redness, cloudy yellow drainage, and or a bad smell)    Questions or concerns    Who should I call with questions?       [facility]: [phone]       [facility]: [phone]       For urgent needs outside of business hours call the [facility] at [phone]        and ask for the dermatology resident on call      Wound Care After a Biopsy    What is a skin biopsy?  A skin biopsy allows the doctor to examine a very small piece of tissue under the microscope to determine the diagnosis and the best treatment for the skin condition. A local anesthetic (numbing medicine)  is injected with a very small needle into the skin area to be tested. A small piece of skin is taken from the area. Sometimes a suture (stitch) is used.     What are the risks of a skin biopsy?  I will experience scar, bleeding, swelling, pain, crusting and redness. I may experience incomplete removal or recurrence. Risks of this  procedure are excessive bleeding, bruising, infection, nerve damage, numbness, thick (hypertrophic or keloidal) scar and non-diagnostic biopsy.    How should I care for my wound for the first 24 hours?    Keep the wound dry and covered for 24 hours    If it bleeds, hold direct pressure on the area for 15 minutes. If bleeding does not stop then go to the emergency room    Avoid strenuous exercise the first 1-2 days or as your doctor instructs you    How should I care for the wound after 24 hours?    After 24 hours, remove the bandage    You may bathe or shower as normal    If you had a scalp biopsy, you can shampoo as usual and can use shower water to clean the biopsy site daily    Clean the wound twice a day with gentle soap and water    Do not scrub, be gentle    Apply white petroleum/Vaseline after cleaning the wound with a cotton swab or a clean finger, and keep the site covered with a Bandaid /bandage. Bandages are not necessary with a scalp biopsy    If you are unable to cover the site with a Bandaid /bandage, re-apply ointment 2-3 times a day to keep the site moist. Moisture will help with healing    Avoid strenuous activity for first 1-2 days    Avoid lakes, rivers, pools, and oceans until the stitches are removed or the site is healed    How do I clean my wound?    Wash hands thoroughly with soap or use hand  before all wound care    Clean the wound with gentle soap and water    Apply white petroleum/Vaseline  to wound after it is clean    Replace the Bandaid /bandage to keep the wound covered for the first few days or as instructed by your doctor    If you had a scalp biopsy, warm shower water to the area on a daily basis should suffice    What should I use to clean my wound?     Cotton-tipped applicators (Qtips )    White petroleum jelly (Vaseline ). Use a clean new container and use Q-tips to apply.    Bandaids   as needed    Gentle soap     How should I care for my wound long term?    Do not get  your wound dirty    Keep up with wound care for one week or until the area is healed.    A small scab will form and fall off by itself when the area is completely healed. The area will be red and will become pink in color as it heals. Sun protection is very important for how your scar will turn out. Sunscreen with an SPF 30 or greater is recommended once the area is healed.    You should have some soreness but it should be mild and slowly go away over several days. Talk to your doctor about using tylenol for pain,    When should I call my doctor?  If you have increased:     Pain or swelling    Pus or drainage (clear or slightly yellow drainage is ok)    Temperature over 100F    Spreading redness or warmth around wound    When will I hear about my results?  The biopsy results can take 2-3 weeks to come back. The clinic will call you with the results, send you a [employer] message, or have you schedule a follow-up clinic or phone time to discuss the results. Contact our clinics if you do not hear from us in 3 weeks.     Who should I call with questions?    [facility]: [phone]     [facility]: [phone]    For urgent needs outside of business hours call the [facility] at [phone] and ask for the dermatology resident on call

## 2019-09-24 NOTE — NURSING NOTE
The following medication was given:     MEDICATION:  Lidocaine with epinephrine 1% [id number]  ROUTE: SQ  SITE: see procedure note  DOSE: see procedure note  LOT #: -EV  : Jerrica  EXPIRATION DATE: 7/1/2020  NDC#: 6240-2482-48   Was there drug waste?   Multi-dose vial: Yes    Kathe Chery LPN  September 24, 2019

## 2019-09-24 NOTE — LETTER
9/24/2019         RE: Andre Tyler  [address] [address]        Dear Colleague,    Thank you for referring your patient, Andre Tyler, to the [facility]. Please see a copy of my visit note below.    [facility] Note      Dermatology Problem List:  1. 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 Arnulfo Keyes, 2011. s/p excision in 2005, recurrent  - SCCIS, left dorsal hand, s/p via [facility] transferred records, Dr. Arnulfo Keyes, 2011  2. Actinic keratosis  - s/p cryotherapy  3. HAK:  - right dorsal hand, s/p biopsy 4/4/2016  - left upper arm s/p biopsy 10/17/2017   - left dorsal hand, s/p biopsy 10/17/2017   4. Compound melanocytic nevus, left lower back   - s/p biopsy 4/9/2015  5. Benign lichenoid keratosis, right lower leg  - s/p biopsy 4/9/2015    Encounter Date: Sep 24, 2019    CC:  Chief Complaint   Patient presents with     Skin Check     area of concern: right foot         History of Present Illness:  Mr. Andre Tyler is a 74 year old male with a history of NMSC and AKs who presents for a skin exam. He was last seen on 3/26/2019 when no evidence of skin cancer was found. Today he reports an area of concern on his right foot. He noticed a rash on his right foot about 6 weeks ago while camping. He used clobetasol cream on it, which resolved it, but it might be returning. No other concerns.    Past Medical History:   Patient Active  Problem List   Diagnosis     Health Care Home     Hypertension goal BP (blood pressure) < 140/90     Squamous cell skin cancer     Prostate cancer - surgically treated     Advanced directives, counseling/discussion     CARDIOVASCULAR SCREENING; LDL GOAL LESS THAN 130     Sensorineural hearing loss, bilateral     Past Medical History:   Diagnosis Date     Diabetes (H)      Hypertension goal BP (blood pressure) < 140/90      Prostate cancer (H)      Squamous cell skin cancer      Past Surgical History:   Procedure Laterality Date     APPENDECTOMY       HC MOHS TRUNK/ARM/LEG 1ST STAGE UP T0 5 BLOCKS       PROSTATE SURGERY  8/28/12       Social History:  The patient works as a  and is retired from working as a . [employer] . Plays hockey. He has 7 grandchildren, including triplets.  Reviewed   Family History:  There is a family history of skin cancer in the patient's mother and sister (possible SCC).  Kept in chart for convenience.       Medications:  Current Outpatient Medications   Medication Sig Dispense Refill     Glucosamine HCl (GLUCOSAMINE PO)        lisinopril-hydrochlorothiazide (PRINZIDE,ZESTORETIC) 10-12.5 MG per tablet Take 1 tablet by mouth daily 90 tablet 3     metFORMIN (GLUCOPHAGE) 500 MG tablet 2 times daily            No Known Allergies      Review of Systems:   - Skin: As above in HPI. No additional skin concerns.  - Const: Patient is in a normal state of health.     Physical exam:  GEN: This is a well developed, well-nourished male in no acute distress, in a pleasant mood.    SKIN: Total skin excluding the undergarment areas was performed. The exam included the head/face, neck, both arms, chest, back, abdomen, both legs, digits and/or nails.  - There is no erythema, telangectasias, nodularity, or pigmentation on the sites of prior skin cancer.  - Grouped vesicles, right lower lip  - 3 red scaly papules less than 5 mm, right dorsal foot  - 5 mm keratotic papule,  right lateral hand  - 5 mm keratotic papule, right lateral wrist  - There are erythematous macules with overyling adherent scale on the right cheek, right jawline, forehead, philtrum, left lateral forearm  - 3 mm keratotic papule, right forearm  -No other lesions of concern on areas examined.     Impression/Plan:  1. History of nonmelanoma skin cancer - no clinical evidence of recurrence    Recommend sunscreens SPF #30 or greater, protective clothing and avoidance of tanning beds.    2. Actinic keratoses, right cheek, right jawline, forehead, philtrum, left lateral forearm  Cryotherapy procedure note: After verbal consent and discussion of risks and benefits including but no limited to dyspigmentation/scar, blister, and pain, 6 were treated with 1-2mm freeze border for 2 cycles with liquid nitrogen. Post cryotherapy instructions were provided.     AK on philtrum not treated today, as patient has cold sore. Recommend patient comes back for cryo within 3-6 months.     3. Rash, right foot - potentially fungal as patient appears to have fungus in toenails. Improving with clobetasol, but not entirely resolved. Could also be eczema.    Continue clobetasol cream for 3 days on red spots on foot. Then transition to ketoconazole.     Start ketoconazole cream. Apply to entire foot 2x daily for 2 months.    4. NUB, 5 mm keratotic papule, right lateral hand, Ddx AK vs SCC    Shave biopsy:  After discussion of benefits and risks including but not limited to bleeding/bruising, pain/swelling, infection, scar, incomplete removal, nerve damage/numbness, recurrence, and non-diagnostic biopsy, written consent, verbal consent and photographs were obtained. Time-out was performed. The area was cleaned with isopropyl alcohol. 0.5mL of 1% lidocaine with epinephrine was injected to obtain adequate anesthesia of the lesion on the right lateral hadn. A shave biopsy was performed. Hemostasis was achieved with aluminium chloride. Vaseline and a  sterile dressing were applied. The patient tolerated the procedure and no complications were noted. The patient was provided with verbal and written post care instructions.     5. NUB, 5 mm keratotic papule, right lateral wrist, Ddx AK vs SCC    Shave biopsy:  After discussion of benefits and risks including but not limited to bleeding/bruising, pain/swelling, infection, scar, incomplete removal, nerve damage/numbness, recurrence, and non-diagnostic biopsy, written consent, verbal consent and photographs were obtained. Time-out was performed. The area was cleaned with isopropyl alcohol. 0.5mL of 1% lidocaine with epinephrine was injected to obtain adequate anesthesia of the lesion on the right lateral wrist. A shave biopsy was performed. Hemostasis was achieved with aluminium chloride. Vaseline and a sterile dressing were applied. The patient tolerated the procedure and no complications were noted. The patient was provided with verbal and written post care instructions.     6. NUB, 3 mm keratotic papule, right forearm, Ddx AK vs other    Shave biopsy:  After discussion of benefits and risks including but not limited to bleeding/bruising, pain/swelling, infection, scar, incomplete removal, nerve damage/numbness, recurrence, and non-diagnostic biopsy, written consent, verbal consent and photographs were obtained. Time-out was performed. The area was cleaned with isopropyl alcohol. 0.5mL of 1% lidocaine with epinephrine was injected to obtain adequate anesthesia of the lesion on the right forearm. A shave biopsy was performed. Hemostasis was achieved with aluminium chloride. Vaseline and a sterile dressing were applied. The patient tolerated the procedure and no complications were noted. The patient was provided with verbal and written post care instructions.       Follow-up in 2 months with  for foot rash and AK on philtrum cryo, earlier for new or changing lesions. Follow up for a total skin exam in 6 months.      Staff Involved:  Scribe/Staff    Scribe Disclosure  I, Qing Mathis, am serving as a scribe to document services personally performed by Dr. Berta Monge MD, based on data collection and the provider's statements to me.    Provider Disclosure:   The documentation recorded by the scribe accurately reflects the services I personally performed and the decisions made by me.    Berta Monge MD    Department of Dermatology  [facility]: Phone: [phone], Fax:[phone]  [facility]: Phone: [phone], Fax: [phone]              Again, thank you for allowing me to participate in the care of your patient.        Sincerely,        Berta Monge MD

## 2019-09-24 NOTE — NURSING NOTE
Andre Tyler's goals for this visit include:   Chief Complaint   Patient presents with     Skin Check     area of concern: right foot       He requests these members of his care team be copied on today's visit information: Yes    PCP: No Ref-Primary, Physician    Referring Provider:  No referring provider defined for this encounter.    There were no vitals taken for this visit.    Do you need any medication refills at today's visit? No    Kathe Chery LPN

## 2019-09-24 NOTE — PROGRESS NOTES
[facility] Dermatology Note      Dermatology Problem List:  1. 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 Arnulfo Keyes, 2011. s/p excision in 2005, recurrent  - SCCIS, left dorsal hand, s/p via [facility] transferred records, Dr. Arnulfo Keyes, 2011  2. Actinic keratosis  - s/p cryotherapy  3. HAK:  - right dorsal hand, s/p biopsy 4/4/2016  - left upper arm s/p biopsy 10/17/2017   - left dorsal hand, s/p biopsy 10/17/2017   4. Compound melanocytic nevus, left lower back   - s/p biopsy 4/9/2015  5. Benign lichenoid keratosis, right lower leg  - s/p biopsy 4/9/2015    Encounter Date: Sep 24, 2019    CC:  Chief Complaint   Patient presents with     Skin Check     area of concern: right foot         History of Present Illness:  Mr. Andre Tyler is a 74 year old male with a history of NMSC and AKs who presents for a skin exam. He was last seen on 3/26/2019 when no evidence of skin cancer was found. Today he reports an area of concern on his right foot. He noticed a rash on his right foot about 6 weeks ago while camping. He used clobetasol cream on it, which resolved it, but it might be returning. No other concerns.    Past Medical History:   Patient Active Problem List   Diagnosis     Health Care Home     Hypertension goal BP (blood pressure) < 140/90     Squamous cell skin cancer     Prostate cancer - surgically treated     Advanced directives, counseling/discussion     CARDIOVASCULAR SCREENING; LDL GOAL  LESS THAN 130     Sensorineural hearing loss, bilateral     Past Medical History:   Diagnosis Date     Diabetes (H)      Hypertension goal BP (blood pressure) < 140/90      Prostate cancer (H)      Squamous cell skin cancer      Past Surgical History:   Procedure Laterality Date     APPENDECTOMY       HC MOHS TRUNK/ARM/LEG 1ST STAGE UP T0 5 BLOCKS       PROSTATE SURGERY  8/28/12       Social History:  The patient works as a  and is retired from working as a . [employer] . Plays hockey. He has 7 grandchildren, including triplets.  Reviewed   Family History:  There is a family history of skin cancer in the patient's mother and sister (possible SCC).  Kept in chart for convenience.       Medications:  Current Outpatient Medications   Medication Sig Dispense Refill     Glucosamine HCl (GLUCOSAMINE PO)        lisinopril-hydrochlorothiazide (PRINZIDE,ZESTORETIC) 10-12.5 MG per tablet Take 1 tablet by mouth daily 90 tablet 3     metFORMIN (GLUCOPHAGE) 500 MG tablet 2 times daily            No Known Allergies      Review of Systems:   - Skin: As above in HPI. No additional skin concerns.  - Const: Patient is in a normal state of health.     Physical exam:  GEN: This is a well developed, well-nourished male in no acute distress, in a pleasant mood.    SKIN: Total skin excluding the undergarment areas was performed. The exam included the head/face, neck, both arms, chest, back, abdomen, both legs, digits and/or nails.  - There is no erythema, telangectasias, nodularity, or pigmentation on the sites of prior skin cancer.  - Grouped vesicles, right lower lip  - 3 red scaly papules less than 5 mm, right dorsal foot  - 5 mm keratotic papule, right lateral hand  - 5 mm keratotic papule, right lateral wrist  - There are erythematous macules with overyling adherent scale on the right cheek, right jawline, forehead, philtrum, left lateral forearm  - 3 mm keratotic papule, right forearm  -No other  lesions of concern on areas examined.     Impression/Plan:  1. History of nonmelanoma skin cancer - no clinical evidence of recurrence    Recommend sunscreens SPF #30 or greater, protective clothing and avoidance of tanning beds.    2. Actinic keratoses, right cheek, right jawline, forehead, philtrum, left lateral forearm  Cryotherapy procedure note: After verbal consent and discussion of risks and benefits including but no limited to dyspigmentation/scar, blister, and pain, 6 were treated with 1-2mm freeze border for 2 cycles with liquid nitrogen. Post cryotherapy instructions were provided.     AK on philtrum not treated today, as patient has cold sore. Recommend patient comes back for cryo within 3-6 months.     3. Rash, right foot - potentially fungal as patient appears to have fungus in toenails. Improving with clobetasol, but not entirely resolved. Could also be eczema.    Continue clobetasol cream for 3 days on red spots on foot. Then transition to ketoconazole.     Start ketoconazole cream. Apply to entire foot 2x daily for 2 months.    4. NUB, 5 mm keratotic papule, right lateral hand, Ddx AK vs SCC    Shave biopsy:  After discussion of benefits and risks including but not limited to bleeding/bruising, pain/swelling, infection, scar, incomplete removal, nerve damage/numbness, recurrence, and non-diagnostic biopsy, written consent, verbal consent and photographs were obtained. Time-out was performed. The area was cleaned with isopropyl alcohol. 0.5mL of 1% lidocaine with epinephrine was injected to obtain adequate anesthesia of the lesion on the right lateral hadn. A shave biopsy was performed. Hemostasis was achieved with aluminium chloride. Vaseline and a sterile dressing were applied. The patient tolerated the procedure and no complications were noted. The patient was provided with verbal and written post care instructions.     5. NUB, 5 mm keratotic papule, right lateral wrist, Ddx AK vs SCC    Shave  biopsy:  After discussion of benefits and risks including but not limited to bleeding/bruising, pain/swelling, infection, scar, incomplete removal, nerve damage/numbness, recurrence, and non-diagnostic biopsy, written consent, verbal consent and photographs were obtained. Time-out was performed. The area was cleaned with isopropyl alcohol. 0.5mL of 1% lidocaine with epinephrine was injected to obtain adequate anesthesia of the lesion on the right lateral wrist. A shave biopsy was performed. Hemostasis was achieved with aluminium chloride. Vaseline and a sterile dressing were applied. The patient tolerated the procedure and no complications were noted. The patient was provided with verbal and written post care instructions.     6. NUB, 3 mm keratotic papule, right forearm, Ddx AK vs other    Shave biopsy:  After discussion of benefits and risks including but not limited to bleeding/bruising, pain/swelling, infection, scar, incomplete removal, nerve damage/numbness, recurrence, and non-diagnostic biopsy, written consent, verbal consent and photographs were obtained. Time-out was performed. The area was cleaned with isopropyl alcohol. 0.5mL of 1% lidocaine with epinephrine was injected to obtain adequate anesthesia of the lesion on the right forearm. A shave biopsy was performed. Hemostasis was achieved with aluminium chloride. Vaseline and a sterile dressing were applied. The patient tolerated the procedure and no complications were noted. The patient was provided with verbal and written post care instructions.       Follow-up in 2 months with  for foot rash and AK on philtrum cryo, earlier for new or changing lesions. Follow up for a total skin exam in 6 months.     Staff Involved:  Scribe/Staff    Scribe Disclosure  I, Qing Mathis, am serving as a scribe to document services personally performed by Dr. Berta Monge MD, based on data collection and the provider's statements to me.    Provider Disclosure:   The  documentation recorded by the scribe accurately reflects the services I personally performed and the decisions made by me.    Berta Monge MD    Department of Dermatology  [facility]: Phone: [phone], Fax:[phone]  [facility]: Phone: [phone], Fax: [phone]

## 2019-09-30 ENCOUNTER — TELEPHONE (OUTPATIENT)
Dept: DERMATOLOGY | Facility: CLINIC | Age: 74
End: 2019-09-30

## 2019-09-30 ENCOUNTER — OFFICE VISIT (OUTPATIENT)
Dept: UROLOGY | Facility: CLINIC | Age: 74
End: 2019-09-30
Payer: COMMERCIAL

## 2019-09-30 ENCOUNTER — PREP FOR PROCEDURE (OUTPATIENT)
Dept: UROLOGY | Facility: CLINIC | Age: 74
End: 2019-09-30

## 2019-09-30 VITALS — HEART RATE: 86 BPM | RESPIRATION RATE: 16 BRPM | OXYGEN SATURATION: 94 %

## 2019-09-30 DIAGNOSIS — N32.0 BLADDER NECK CONTRACTURE: ICD-10-CM

## 2019-09-30 DIAGNOSIS — N21.0 BLADDER STONE: ICD-10-CM

## 2019-09-30 DIAGNOSIS — N32.0 BLADDER NECK CONTRACTURE: Primary | ICD-10-CM

## 2019-09-30 DIAGNOSIS — R31.29 MICROSCOPIC HEMATURIA: Primary | ICD-10-CM

## 2019-09-30 LAB — COPATH REPORT: NORMAL

## 2019-09-30 PROCEDURE — 52000 CYSTOURETHROSCOPY: CPT | Performed by: UROLOGY

## 2019-09-30 RX ORDER — CEFAZOLIN SODIUM 1 G
1 VIAL (EA) INJECTION SEE ADMIN INSTRUCTIONS
Status: CANCELLED | OUTPATIENT
Start: 2019-09-30

## 2019-09-30 NOTE — PATIENT INSTRUCTIONS
Please call our office if you have questions or need to report any information, [phone].      Surgery Preparation    You will receive a phone call from the [facility] within 1-2 days. If you do not receive a call within 2 days, contact [phone] to schedule the procedure. You can write the location/date/time of surgery in the space provided below for your records.    Location of Surgery:                                          Date of Surgery:                                                 Time of Arrival:                                                   Time of Surgery:                                                   Please arrange an appointment with a primary care provider for a pre-op physical. This is a mandatory appointment that needs to be completed within the two weeks prior to your surgery date. This gives clearance for you to receive anesthesia during your procedure. If you have had a pre-op physical within 30 days of your surgery date, you may not need another one. Check with your provider.    If you are having a Same Day Surgery, you must have a  to and from the procedure. Someone needs to stay with you post-operatively for 12 hours.     Do not eat or drink any solids, milk products, or pulpy juices after midnight the night before your surgery. You may have clear liquids up to 8 hours prior to the surgery. This would include water, soda, coffee (without cream), tea, broth, and jello.    Please stop all over the counter blood thinners such as Aspirin, Advil, Aleve, Ibuprofen, Motrin, and Excedrin one week prior to surgery. Please discontinue prescription blood thinners 5-7 days prior to surgery, per your primary physician's orders.     Please check with your insurance company to confirm that your procedure is covered, and that the facility is in-network.     Call the Urology Department @ [phone] if you have questions about your surgery, or if you need to reschedule  your procedure.     Patient Education     Understanding Bladder Stones   Bladder stones are small deposits of minerals in the bladder. They may form when a small amount of urine stays in your bladder after urinating. Urine contains minerals such as calcium oxalate and uric acid. These minerals can build up and harden, forming stones.   Bladder stones can vary in size and shape. They are more likely to occur in men.   What causes bladder stones?   An enlarged prostate in men is the most common cause of bladder stones. The prostate is a small gland located near the urethra. The urethra is the tube that empties urine from the body. As men age, the prostate tends to become larger and it pushes against the urethra. That may cause the bladder to not empty completely.   Other possible causes of bladder stones are:    Diet problems, such as a lack of water (dehydration)    Urinary tract infection    Pelvic organ prolapse in women    Injury to the spinal cord that affects how you urinate    Bladder surgery    Foreign object in the bladder, such as sutures from a medical procedure or a catheter   Symptoms of bladder stones   Some bladder stones cause no problems. But if they do, you may have these symptoms:    Sudden urge to urinate    Need to urinate more often    Unable to hold your urine (incontinence)    Pain while urinating    Blood in your urine    Stomach pain   Treatment for bladder stones   In some cases, you may not need any treatment for a bladder stone. It may eventually pass out of your body when you use the toilet. For larger stones, treatment may include:     Changes in diet. Drinking more water each day may help the stone leave your body. You may feel some pain when the stone passes. You may also need to cut back on salt and fat to stop more stones from forming.    Surgery. Several different procedures are available to treat bladder stones. Some stones may be removed through the urethra with a small tube with  a camera on it (cystoscope). A healthcare provider may use the scope to break up the stone with lasers or shock waves. Large stones may be removed through a cut near the pelvis.  Possible complications of bladder stones    More bladder stones    Urinary tract infection    Damage to the bladder or urethra  When to call your healthcare provider   Call your healthcare provider right away if you have any of these:    Fever of 100.4 F (38 C) or higher, or as directed by your healthcare provider    Redness, swelling, or fluid leaking from your incision that gets worse    Pain that gets worse    Blood in the urine    Can t urinate    Symptoms that don t get better, or get worse    New symptoms      Date Last Reviewed: 12/1/2017 2000-2018 [employer]. [address] [place] [address], [place] [address]. All rights reserved. This information is not intended as a substitute for professional medical care. Always follow your healthcare professional's instructions.

## 2019-09-30 NOTE — TELEPHONE ENCOUNTER
Notes recorded by Lilly Carvajal CMA on 9/30/2019 at 4:14 PM CDT  Called patient and informed him of his results.  He didn't have any additional questions.    Lilly Carvajal CMA    ------    Notes recorded by Berta Monge MD on 9/30/2019 at 2:51 PM CDT  All 3 are precancers, recheck wtihin 3-6 months        SPECIMEN(S):   A: Skin, right lateral hand   B: Skin, right lateral wrist   C: Skin, right forearm     FINAL DIAGNOSIS:   A. Skin, right lateral hand:   - Most suggestive of hypertrophic actinic keratosis - (see comment)     B. Skin, right lateral wrist:   - Lichenoid actinic keratosis - (see description)     C. Skin, right forearm:   - Actinic keratosis - (see description)

## 2019-09-30 NOTE — PROGRESS NOTES
S: Andre Tyler is a 74 year old male returns for hematuria.    Patient is draped and prepped.  Flexible cystoscopy placed under direct vision.      The anterior urethra showed stricture   The prostate is missing.             He has tight bladder neck contracture    CT ABDOMEN PELVIS WITHOUT AND WITH CONTRAST 9/9/2019 11:17 AM     TECHNIQUE: Images from diaphragm to pubic symphysis without and with  IV contrast 100 mL Isovue-370  Radiation dose for this scan was reduced using automated exposure  control, adjustment of the mA and/or kV according to patient size, or  iterative reconstruction technique.     HISTORY: Microscopic hematuria, unknown cause     COMPARISON: None.     FINDINGS:   Urinary tract: Large bladder calculus in the midline posterior bladder  1.8 cm transverse by 1.1 cm AP by 1.7 cm craniocaudal dimension. On  sagittal series 9 image 47, on postcontrast bladder image there is  focal rounded 1 cm filling defect along the posterior bladder.  Question of mild inferior left sided bladder wall thickening and very  mild thickening of the left ureterovesical junction (series 8 image  26).      No evidence for hydronephrosis or additional urinary tract calculi.  Prominent right peripelvic renal cyst which which extends through the  renal cortex approximately 5.3 cm in size. There is prominent left  renal pelvis with nondilated left ureter consistent with mild left  ureteropelvic junction obstruction.     There is subtle decreased enhancement of the lower pole right kidney  suspicious for mild pyelonephritis, there is no perinephric stranding  and otherwise symmetric nephrogram enhancement. Please correlate  clinically for right pyelonephritis.     Remainder the abdomen and pelvis :  Minimal linear opacities right middle lobe and lower lung consistent  with discoid atelectasis or fibrosis. Diffuse fatty infiltration of  the liver. Normal-appearing gallbladder, spleen, pancreas and adrenal  glands. No  periaortic or pelvic adenopathy. No free fluid.  Uncomplicated mild colonic diverticulosis. Postop changes involving  right lower quadrant ileum. Multiple prominent duodenal diverticulum  near the pancreatic head. No aggressive bone lesions.                                                                      IMPRESSION:   1. Subtle decreased enhancement lower right kidney. Correlate  clinically for mild right pyelonephritis and follow-up postcontrast CT  scan with delayed imaging to ensure resolution and exclude a very  subtle hypodense mass or infiltrative process.  2. 1 cm focal filling defect posterior bladder seen on postcontrast  sagittal images as well as question of mild inferior left bladder wall  and distal left ureterovesical junction thickening. Cannot exclude  bladder neoplasm. Consider correlation with cystoscopy.  3. Large bladder calculus.  4. Mild left ureteropelvic junction obstruction.  5. Fatty infiltration of the liver.     FAWN CANDELARIA MD    Assessment/Plan:  (R31.29) Microscopic hematuria  (primary encounter diagnosis)  Comment:    Plan: CYSTOURETHROSCOPY (29226)           Bladder neck contracture    (N21.0) Bladder stone  Comment:    Plan: schedule for litholapaxy    (N32.0) Bladder neck contracture  Comment:    Plan: schedule for bladder neck contracture resection.

## 2019-10-03 ENCOUNTER — TELEPHONE (OUTPATIENT)
Dept: UROLOGY | Facility: CLINIC | Age: 74
End: 2019-10-03

## 2019-10-03 PROBLEM — N32.0 BLADDER NECK CONTRACTURE: Status: ACTIVE | Noted: 2019-10-03

## 2019-10-03 PROBLEM — N21.0 BLADDER STONE: Status: ACTIVE | Noted: 2019-10-03

## 2019-10-03 NOTE — TELEPHONE ENCOUNTER
Type of surgery: transurethral resection of bladder neck contracture litholapaxy(needs holmium laser)for bladder stone CPT 93451  Bladder neck contracture [N32.0]  - Primary       Bladder stone [N21.0]       Location of surgery: [facility]  Date and time of surgery: 10-21-19  TBD  Surgeon: Dr Vale  Pre-Op Appt Date: 10-14-19  Post-Op Appt Date: 11-18-19   Packet sent out: Yes  Pre-cert/Authorization completed:  No prior auth required per [employer] medicare grid.   Date: 10/03/2019    Insurance is valid. 10/04/2019

## 2019-10-10 NOTE — PROGRESS NOTES
[facility]  [address] [place] [id number]  [phone]  Dept: [phone]    PRE-OP EVALUATION:  Today's date: 10/14/2019    Andre Tyler (: 1945) presents for pre-operative evaluation assessment as requested by Dr. Vale.  He requires evaluation and anesthesia risk assessment prior to undergoing surgery/procedure for treatment of bladder stone .    Proposed Surgery/ Procedure: transurethral resection of bladder neck contracture litholapaxy (needs holmium laser) for bladder stone  Date of Surgery/ Procedure: 10/21/2019  Time of Surgery/ Procedure: 1:00pm  Hospital/Surgical Facility: [facility]  Fax number for surgical facility:   Primary Physician: Arnulfo Joaquin  Type of Anesthesia Anticipated: to be determined    Patient has a Health Care Directive or Living Will:  NO    1. NO - Do you have a history of heart attack, stroke, stent, bypass or surgery on an artery in the head, neck, heart or legs?  2. NO - Do you ever have any pain or discomfort in your chest?  3. NO - Do you have a history of  Heart Failure?  4. NO - Are you troubled by shortness of breath when: walking on the level, up a slight hill or at night?  5. NO - Do you currently have a cold, bronchitis or other respiratory infection?  6. NO - Do you have a cough, shortness of breath or wheezing?  7. NO - Do you sometimes get pains in the calves of your legs when you walk?  8. NO - Do you or anyone in your family have previous history of blood clots?  9. NO - Do you or does anyone in your family have a serious bleeding problem such as prolonged bleeding following surgeries or cuts?  10. NO - Have you ever had problems with anemia or been told to take iron pills?  11. NO - Have you had any abnormal blood loss such as black, tarry or bloody stools, or abnormal vaginal bleeding?  12. NO - Have you ever had a blood transfusion?  13. NO - Have you or any of your relatives ever had problems with  anesthesia?  14. NO - Do you have sleep apnea, excessive snoring or daytime drowsiness?  15. NO - Do you have any prosthetic heart valves?  16. NO - Do you have prosthetic joints?  17. NO - Is there any chance that you may be pregnant?      HPI:     HPI related to upcoming procedure: Patient presents for pre op eval in anticipation for     DIABETES - Patient has a longstanding history of DiabetesType Type II . Patient is being treated with diet and oral agents and denies significant side effects. Control has been good. Complicating factors include but are not limited to: hypertension. Most recent A1C was 6.9    HYPERTENSION - Patient has longstanding history of HTN , currently denies any symptoms referable to elevated blood pressure. Specifically denies chest pain, palpitations, dyspnea, orthopnea, PND or peripheral edema. Blood pressure readings have been in normal range. Current medication regimen is as listed below. Patient denies any side effects of medication.     Patient is wondering if hydrochlorothiazide is absolutely necessary, he's unsure why it was started.  Has been on it for around 10 years or so.  Urinates frequently.  bp not being checked at home.    BP Readings from Last 6 Encounters:   10/14/19 116/72   09/06/19 129/89   08/24/19 131/86   08/22/19 (!) 156/94   06/26/18 139/90   06/24/18 141/87     MEDICAL HISTORY:     Patient Active Problem List    Diagnosis Date Noted     Type 2 diabetes mellitus without complication (H) 10/14/2019     Priority: Medium     Bladder neck contracture 10/03/2019     Priority: Medium     Added automatically from request for surgery [id number]       Bladder stone 10/03/2019     Priority: Medium     Added automatically from request for surgery [id number]       Sensorineural hearing loss, bilateral 02/14/2014     Priority: Medium     CARDIOVASCULAR SCREENING; LDL GOAL LESS THAN 130 02/25/2013     Priority: Medium     Advanced directives, counseling/discussion 01/10/2013      Priority: Medium     Discussed advance care planning with patient; information given to patient to review. 1/10/2013          Hypertension goal BP (blood pressure) < 140/90      Priority: Medium     Squamous cell skin cancer      Priority: Medium     Prostate cancer - surgically treated      Priority: Medium     Health Care Home 09/26/2012     Priority: Medium     Catherine Bogdan, RN-PHN  [facility] / [facility]   [phone]         DX V65.8 REPLACED WITH [id number] [facility]ALTH CARE HOME (04/08/2013)        Past Medical History:   Diagnosis Date     Diabetes (H)      Hypertension goal BP (blood pressure) < 140/90      Prostate cancer (H)      Squamous cell skin cancer      Past Surgical History:   Procedure Laterality Date     APPENDECTOMY       HC MOHS TRUNK/ARM/LEG 1ST STAGE UP T0 5 BLOCKS       PROSTATE SURGERY  8/28/12     Current Outpatient Medications   Medication Sig Dispense Refill     Glucosamine HCl (GLUCOSAMINE PO)        ketoconazole (NIZORAL) 2 % external cream Apply topically daily Apply twice daily for 2 months to affected areas. 60 g 0     lisinopril-hydrochlorothiazide (PRINZIDE,ZESTORETIC) 10-12.5 MG per tablet Take 1 tablet by mouth daily 90 tablet 3     metFORMIN (GLUCOPHAGE) 500 MG tablet 2 times daily       OTC products: None, except as noted above    No Known Allergies   Latex Allergy: NO    Social History     Tobacco Use     Smoking status: Never Smoker     Smokeless tobacco: Never Used   Substance Use Topics     Alcohol use: No     History   Drug Use No       REVIEW OF SYSTEMS:   Constitutional, neuro, ENT, endocrine, pulmonary, cardiac, gastrointestinal, genitourinary, musculoskeletal, integument and psychiatric systems are negative, except as otherwise noted.    EXAM:   /72   Pulse 88   Temp 98.6  F (37  C) (Oral)   Resp 14   Ht 1.829 m (6')   Wt 93.1 kg (205 lb 3.2 oz)   SpO2 97%   BMI 27.83 kg/m      GENERAL APPEARANCE: healthy, alert and no distress     EYES: EOMI,   PERRL     HENT: ear canals and TM's normal and nose and mouth without ulcers or lesions     NECK: no adenopathy, no asymmetry, masses, or scars and thyroid normal to palpation     RESP: lungs clear to auscultation - no rales, rhonchi or wheezes     CV: regular rates and rhythm, normal S1 S2, no S3 or S4 and no murmur, click or rub     ABDOMEN:  soft, nontender, no HSM or masses and bowel sounds normal     MS: extremities normal- no gross deformities noted, no evidence of inflammation in joints, FROM in all extremities.     SKIN: no suspicious lesions or rashes     NEURO: Normal strength and tone, sensory exam grossly normal, mentation intact and speech normal     PSYCH: mentation appears normal. and affect normal/bright     LYMPHATICS: No cervical adenopathy    DIAGNOSTICS:   EKG: Normal Sinus Rhythm, normal axis, normal intervals, no acute ST/T changes c/w ischemia, no LVH by voltage criteria, unchanged from previous tracings    Recent Labs   Lab Test 09/22/15 11/06/13  1712 02/11/13  1654    138 135   POTASSIUM 4.6 4.0 4.1   CR 1.18 1.06 1.09   A1C  --   --  6.2*     IMPRESSION:   Reason for surgery/procedure: Removal of bladder stone  Diagnosis/reason for consult: pre op evaluation    The proposed surgical procedure is considered LOW risk.    REVISED CARDIAC RISK INDEX  The patient has the following serious cardiovascular risks for perioperative complications such as (MI, PE, VFib and 3  AV Block):  No serious cardiac risks  INTERPRETATION: 0 risks: Class I (very low risk - 0.4% complication rate)    The patient has the following additional risks for perioperative complications:  No identified additional risks      ICD-10-CM    1. Preop general physical exam Z01.818 EKG 12-lead complete w/read - Clinics   2. Hypertension goal BP (blood pressure) < 140/90 I10    3. Bladder stone N21.0    4. Prostate cancer (H) C61    5. Bladder neck contracture N32.0    6. Type 2 diabetes mellitus without complication,  without long-term current use of insulin (H) E11.9      HTN - plan to separate lisinopril and hydrochlorothiazide in the future to potentially do trial off of hydrochlorothiazide with close monitoring    Diabetes well controlled [facility]    RECOMMENDATIONS:     --Consult hospital rounder / IM to assist post-op medical management    --Patient is to take all scheduled medications on the day of surgery EXCEPT for modifications listed below.    APPROVAL GIVEN to proceed with proposed procedure, without further diagnostic evaluation       Signed Electronically by: Arnulfo Aguirre MD    Copy of this evaluation report is provided to requesting physician.    [facility] Preop Guidelines    Revised Cardiac Risk Index

## 2019-10-14 ENCOUNTER — OFFICE VISIT (OUTPATIENT)
Dept: FAMILY MEDICINE | Facility: CLINIC | Age: 74
End: 2019-10-14
Payer: COMMERCIAL

## 2019-10-14 VITALS
HEIGHT: 72 IN | OXYGEN SATURATION: 97 % | HEART RATE: 88 BPM | TEMPERATURE: 98.6 F | RESPIRATION RATE: 14 BRPM | SYSTOLIC BLOOD PRESSURE: 116 MMHG | DIASTOLIC BLOOD PRESSURE: 72 MMHG | WEIGHT: 205.2 LBS | BODY MASS INDEX: 27.79 KG/M2

## 2019-10-14 DIAGNOSIS — N21.0 BLADDER STONE: ICD-10-CM

## 2019-10-14 DIAGNOSIS — Z23 NEED FOR PROPHYLACTIC VACCINATION AND INOCULATION AGAINST INFLUENZA: ICD-10-CM

## 2019-10-14 DIAGNOSIS — E11.9 TYPE 2 DIABETES MELLITUS WITHOUT COMPLICATION, WITHOUT LONG-TERM CURRENT USE OF INSULIN (H): ICD-10-CM

## 2019-10-14 DIAGNOSIS — C61 PROSTATE CANCER (H): ICD-10-CM

## 2019-10-14 DIAGNOSIS — N32.0 BLADDER NECK CONTRACTURE: ICD-10-CM

## 2019-10-14 DIAGNOSIS — Z01.818 PREOP GENERAL PHYSICAL EXAM: Primary | ICD-10-CM

## 2019-10-14 DIAGNOSIS — I10 HYPERTENSION GOAL BP (BLOOD PRESSURE) < 140/90: ICD-10-CM

## 2019-10-14 DIAGNOSIS — Z23 NEED FOR VACCINATION: ICD-10-CM

## 2019-10-14 PROCEDURE — 90471 IMMUNIZATION ADMIN: CPT | Performed by: FAMILY MEDICINE

## 2019-10-14 PROCEDURE — 93000 ELECTROCARDIOGRAM COMPLETE: CPT | Performed by: FAMILY MEDICINE

## 2019-10-14 PROCEDURE — 90714 TD VACC NO PRESV 7 YRS+ IM: CPT | Performed by: FAMILY MEDICINE

## 2019-10-14 PROCEDURE — 99214 OFFICE O/P EST MOD 30 MIN: CPT | Mod: 25 | Performed by: FAMILY MEDICINE

## 2019-10-14 PROCEDURE — 90662 IIV NO PRSV INCREASED AG IM: CPT | Performed by: FAMILY MEDICINE

## 2019-10-14 PROCEDURE — G0008 ADMIN INFLUENZA VIRUS VAC: HCPCS | Mod: 59 | Performed by: FAMILY MEDICINE

## 2019-10-14 PROCEDURE — 99207 C PAF COMPLETED  NO CHARGE: CPT | Performed by: FAMILY MEDICINE

## 2019-10-14 ASSESSMENT — MIFFLIN-ST. JEOR: SCORE: 1718.78

## 2019-10-14 NOTE — NURSING NOTE
Prior to immunization administration, verified patients identity using patient s name and date of birth. Please see Immunization Activity for additional information.     Screening Questionnaire for Adult Immunization    Are you sick today?   No   Do you have allergies to medications, food, a vaccine component or latex?   No   Have you ever had a serious reaction after receiving a vaccination?   No   Do you have a long-term health problem with heart disease, lung disease, asthma, kidney disease, metabolic disease (e.g. diabetes), anemia, or other blood disorder?   No   Do you have cancer, leukemia, HIV/AIDS, or any other immune system problem?   No   In the past 3 months, have you taken medications that affect  your immune system, such as prednisone, other steroids, or anticancer drugs; drugs for the treatment of rheumatoid arthritis, Crohn s disease, or psoriasis; or have you had radiation treatments?   No   Have you had a seizure, or a brain or other nervous system problem?   No   During the past year, have you received a transfusion of blood or blood     products, or been given immune (gamma) globulin or antiviral drug?   No   For women: Are you pregnant or is there a chance you could become        pregnant during the next month?   No   Have you received any vaccinations in the past 4 weeks?   No     Immunization questionnaire answers were all negative.        Per orders of Dr. Joaquin, injection of TD given by Ysabel Vasquez CMA. Patient instructed to remain in clinic for 15 minutes afterwards, and to report any adverse reaction to me immediately.       Screening performed by Ysabel Vasquez CMA on 10/14/2019 at 3:42 PM.

## 2019-10-18 ENCOUNTER — ANESTHESIA EVENT (OUTPATIENT)
Dept: SURGERY | Facility: AMBULATORY SURGERY CENTER | Age: 74
End: 2019-10-18

## 2019-10-21 ENCOUNTER — ANESTHESIA (OUTPATIENT)
Dept: SURGERY | Facility: AMBULATORY SURGERY CENTER | Age: 74
End: 2019-10-21

## 2019-10-21 ENCOUNTER — ANCILLARY PROCEDURE (OUTPATIENT)
Dept: GENERAL RADIOLOGY | Facility: CLINIC | Age: 74
End: 2019-10-21
Attending: UROLOGY
Payer: COMMERCIAL

## 2019-10-21 ENCOUNTER — HOSPITAL ENCOUNTER (OUTPATIENT)
Facility: AMBULATORY SURGERY CENTER | Age: 74
Discharge: HOME | End: 2019-10-21
Attending: UROLOGY | Admitting: UROLOGY
Payer: COMMERCIAL

## 2019-10-21 VITALS
OXYGEN SATURATION: 96 % | TEMPERATURE: 97.5 F | DIASTOLIC BLOOD PRESSURE: 115 MMHG | HEART RATE: 69 BPM | SYSTOLIC BLOOD PRESSURE: 189 MMHG | RESPIRATION RATE: 12 BRPM

## 2019-10-21 DIAGNOSIS — N32.0 BLADDER NECK CONTRACTURE: ICD-10-CM

## 2019-10-21 DIAGNOSIS — N21.0 BLADDER STONE: ICD-10-CM

## 2019-10-21 LAB — GLUCOSE BLDC GLUCOMTR-MCNC: 131 MG/DL (ref 70–99)

## 2019-10-21 PROCEDURE — 52317 REMOVE BLADDER STONE: CPT

## 2019-10-21 PROCEDURE — G8907 PT DOC NO EVENTS ON DISCHARG: HCPCS

## 2019-10-21 PROCEDURE — 52276 CYSTOSCOPY AND TREATMENT: CPT | Mod: 59 | Performed by: UROLOGY

## 2019-10-21 PROCEDURE — 52317 REMOVE BLADDER STONE: CPT | Performed by: UROLOGY

## 2019-10-21 PROCEDURE — G8916 PT W IV AB GIVEN ON TIME: HCPCS

## 2019-10-21 PROCEDURE — 52276 CYSTOSCOPY AND TREATMENT: CPT | Mod: 59

## 2019-10-21 RX ORDER — OXYCODONE HYDROCHLORIDE 5 MG/1
5-10 TABLET ORAL EVERY 4 HOURS PRN
Status: DISCONTINUED | OUTPATIENT
Start: 2019-10-21 | End: 2019-10-22 | Stop reason: HOSPADM

## 2019-10-21 RX ORDER — ONDANSETRON 4 MG/1
4 TABLET, ORALLY DISINTEGRATING ORAL EVERY 30 MIN PRN
Status: DISCONTINUED | OUTPATIENT
Start: 2019-10-21 | End: 2019-10-22 | Stop reason: HOSPADM

## 2019-10-21 RX ORDER — HYDROCODONE BITARTRATE AND ACETAMINOPHEN 5; 325 MG/1; MG/1
1-2 TABLET ORAL EVERY 4 HOURS PRN
Qty: 10 TABLET | Refills: 0 | Status: SHIPPED | OUTPATIENT
Start: 2019-10-21 | End: 2019-11-18

## 2019-10-21 RX ORDER — SODIUM CHLORIDE, SODIUM LACTATE, POTASSIUM CHLORIDE, CALCIUM CHLORIDE 600; 310; 30; 20 MG/100ML; MG/100ML; MG/100ML; MG/100ML
INJECTION, SOLUTION INTRAVENOUS CONTINUOUS
Status: DISCONTINUED | OUTPATIENT
Start: 2019-10-21 | End: 2019-10-22 | Stop reason: HOSPADM

## 2019-10-21 RX ORDER — HYDROMORPHONE HYDROCHLORIDE 1 MG/ML
.3-.5 INJECTION, SOLUTION INTRAMUSCULAR; INTRAVENOUS; SUBCUTANEOUS EVERY 10 MIN PRN
Status: DISCONTINUED | OUTPATIENT
Start: 2019-10-21 | End: 2019-10-22 | Stop reason: HOSPADM

## 2019-10-21 RX ORDER — FENTANYL CITRATE 50 UG/ML
25-50 INJECTION, SOLUTION INTRAMUSCULAR; INTRAVENOUS
Status: DISCONTINUED | OUTPATIENT
Start: 2019-10-21 | End: 2019-10-22 | Stop reason: HOSPADM

## 2019-10-21 RX ORDER — PROPOFOL 10 MG/ML
INJECTION, EMULSION INTRAVENOUS PRN
Status: DISCONTINUED | OUTPATIENT
Start: 2019-10-21 | End: 2019-10-21

## 2019-10-21 RX ORDER — MEPERIDINE HYDROCHLORIDE 25 MG/ML
12.5 INJECTION INTRAMUSCULAR; INTRAVENOUS; SUBCUTANEOUS
Status: DISCONTINUED | OUTPATIENT
Start: 2019-10-21 | End: 2019-10-22 | Stop reason: HOSPADM

## 2019-10-21 RX ORDER — ONDANSETRON 2 MG/ML
4 INJECTION INTRAMUSCULAR; INTRAVENOUS EVERY 30 MIN PRN
Status: DISCONTINUED | OUTPATIENT
Start: 2019-10-21 | End: 2019-10-22 | Stop reason: HOSPADM

## 2019-10-21 RX ORDER — LIDOCAINE HYDROCHLORIDE 20 MG/ML
INJECTION, SOLUTION INFILTRATION; PERINEURAL PRN
Status: DISCONTINUED | OUTPATIENT
Start: 2019-10-21 | End: 2019-10-21

## 2019-10-21 RX ORDER — CIPROFLOXACIN 500 MG/1
500 TABLET, FILM COATED ORAL 2 TIMES DAILY
Qty: 6 TABLET | Refills: 0 | Status: SHIPPED | OUTPATIENT
Start: 2019-10-21 | End: 2019-11-18

## 2019-10-21 RX ORDER — ALBUTEROL SULFATE 0.83 MG/ML
2.5 SOLUTION RESPIRATORY (INHALATION) EVERY 4 HOURS PRN
Status: DISCONTINUED | OUTPATIENT
Start: 2019-10-21 | End: 2019-10-22 | Stop reason: HOSPADM

## 2019-10-21 RX ORDER — DEXAMETHASONE SODIUM PHOSPHATE 4 MG/ML
INJECTION, SOLUTION INTRA-ARTICULAR; INTRALESIONAL; INTRAMUSCULAR; INTRAVENOUS; SOFT TISSUE PRN
Status: DISCONTINUED | OUTPATIENT
Start: 2019-10-21 | End: 2019-10-21

## 2019-10-21 RX ORDER — NALOXONE HYDROCHLORIDE 0.4 MG/ML
.1-.4 INJECTION, SOLUTION INTRAMUSCULAR; INTRAVENOUS; SUBCUTANEOUS
Status: DISCONTINUED | OUTPATIENT
Start: 2019-10-21 | End: 2019-10-22 | Stop reason: HOSPADM

## 2019-10-21 RX ORDER — ACETAMINOPHEN 325 MG/1
975 TABLET ORAL ONCE
Status: DISCONTINUED | OUTPATIENT
Start: 2019-10-21 | End: 2019-10-22 | Stop reason: HOSPADM

## 2019-10-21 RX ORDER — CEFAZOLIN SODIUM 1 G/3ML
1 INJECTION, POWDER, FOR SOLUTION INTRAMUSCULAR; INTRAVENOUS SEE ADMIN INSTRUCTIONS
Status: DISCONTINUED | OUTPATIENT
Start: 2019-10-21 | End: 2019-10-22 | Stop reason: HOSPADM

## 2019-10-21 RX ORDER — LIDOCAINE 40 MG/G
CREAM TOPICAL
Status: DISCONTINUED | OUTPATIENT
Start: 2019-10-21 | End: 2019-10-22 | Stop reason: HOSPADM

## 2019-10-21 RX ORDER — CEFAZOLIN SODIUM 2 G/100ML
2 INJECTION, SOLUTION INTRAVENOUS
Status: COMPLETED | OUTPATIENT
Start: 2019-10-21 | End: 2019-10-21

## 2019-10-21 RX ORDER — FENTANYL CITRATE 50 UG/ML
INJECTION, SOLUTION INTRAMUSCULAR; INTRAVENOUS PRN
Status: DISCONTINUED | OUTPATIENT
Start: 2019-10-21 | End: 2019-10-21

## 2019-10-21 RX ORDER — DEXAMETHASONE SODIUM PHOSPHATE 4 MG/ML
4 INJECTION, SOLUTION INTRA-ARTICULAR; INTRALESIONAL; INTRAMUSCULAR; INTRAVENOUS; SOFT TISSUE EVERY 10 MIN PRN
Status: DISCONTINUED | OUTPATIENT
Start: 2019-10-21 | End: 2019-10-22 | Stop reason: HOSPADM

## 2019-10-21 RX ADMIN — DEXAMETHASONE SODIUM PHOSPHATE 4 MG: 4 INJECTION, SOLUTION INTRA-ARTICULAR; INTRALESIONAL; INTRAMUSCULAR; INTRAVENOUS; SOFT TISSUE at 12:40

## 2019-10-21 RX ADMIN — SODIUM CHLORIDE, SODIUM LACTATE, POTASSIUM CHLORIDE, CALCIUM CHLORIDE: 600; 310; 30; 20 INJECTION, SOLUTION INTRAVENOUS at 12:04

## 2019-10-21 RX ADMIN — FENTANYL CITRATE 50 MCG: 50 INJECTION, SOLUTION INTRAMUSCULAR; INTRAVENOUS at 12:39

## 2019-10-21 RX ADMIN — PROPOFOL 200 MG: 10 INJECTION, EMULSION INTRAVENOUS at 12:39

## 2019-10-21 RX ADMIN — SODIUM CHLORIDE, SODIUM LACTATE, POTASSIUM CHLORIDE, CALCIUM CHLORIDE: 600; 310; 30; 20 INJECTION, SOLUTION INTRAVENOUS at 13:30

## 2019-10-21 RX ADMIN — FENTANYL CITRATE 50 MCG: 50 INJECTION, SOLUTION INTRAMUSCULAR; INTRAVENOUS at 13:14

## 2019-10-21 RX ADMIN — FENTANYL CITRATE 25 MCG: 50 INJECTION, SOLUTION INTRAMUSCULAR; INTRAVENOUS at 12:58

## 2019-10-21 RX ADMIN — FENTANYL CITRATE 25 MCG: 50 INJECTION, SOLUTION INTRAMUSCULAR; INTRAVENOUS at 13:02

## 2019-10-21 RX ADMIN — SODIUM CHLORIDE, SODIUM LACTATE, POTASSIUM CHLORIDE, CALCIUM CHLORIDE: 600; 310; 30; 20 INJECTION, SOLUTION INTRAVENOUS at 12:36

## 2019-10-21 RX ADMIN — CEFAZOLIN SODIUM 2 G: 2 INJECTION, SOLUTION INTRAVENOUS at 12:41

## 2019-10-21 RX ADMIN — LIDOCAINE HYDROCHLORIDE 60 MG: 20 INJECTION, SOLUTION INFILTRATION; PERINEURAL at 12:39

## 2019-10-21 NOTE — ANESTHESIA POSTPROCEDURE EVALUATION
Anesthesia POST Procedure Evaluation    Patient: Andre Tyler   MRN:     [id number] Gender:   male   Age:    74 year old :      1945        Preoperative Diagnosis: Bladder neck contracture [N32.0]  Bladder stone [N21.0]   Procedure(s):  Urethral dilation and transurethral resection of bladder neck contracture litholapaxy (needs holmium laser) for bladder stone   Postop Comments: No value filed.       Anesthesia Type:  Not documented  General    Reportable Event: NO     PAIN: Uncomplicated   Sign Out status: Comfortable, Well controlled pain     PONV: No PONV   Sign Out status:  No Nausea or Vomiting     Neuro/Psych: Uneventful perioperative course   Sign Out Status: Preoperative baseline; Age appropriate mentation     Airway/Resp.: Uneventful perioperative course   Sign Out Status: Non labored breathing, age appropriate RR; Resp. Status within EXPECTED Parameters     CV: Uneventful perioperative course   Sign Out status: Appropriate BP and perfusion indices; Appropriate HR/Rhythm     Disposition:   Sign Out in:  PACU  Disposition:  Phase II; Home  Recovery Course: Uneventful  Follow-Up: Not required           Last Anesthesia Record Vitals:  CRNA VITALS  10/21/2019 1321 - 10/21/2019 1421      10/21/2019             Pulse:  67    SpO2:  95 %          Last PACU Vitals:  Vitals Value Taken Time   /109 10/21/2019  2:00 PM   Temp     Pulse 70 10/21/2019  2:00 PM   Resp 13 10/21/2019  2:00 PM   SpO2 96 % 10/21/2019  2:00 PM   Temp src     NIBP     Pulse     SpO2     Resp     Temp     Ht Rate     Temp 2           Electronically Signed By: Florencio Santiago MD, 2019

## 2019-10-21 NOTE — PROVIDER NOTIFICATION
Pt HTN post- op. Ok to discharge per Dr. Santiago. Pt to take PO BP meds when home. Pt denies any discomfort.

## 2019-10-21 NOTE — OP NOTE
Preop diagnosis: #1 bladder neck contracture #2 bladder stone    Postop diagnosis: #1 bladder neck contracture #2 bladder stone #3 urethral stricture    Procedure done: #1 direct vision internal urethrotomy                               #2 bladder neck incision and dilation                              #3 litholapaxy greater than 2 cm stone    Patient brought the operative room and placed in a dorsal lithotomy position after general anesthesia has been induced. He was draped and prepped in the usual surgical fashion.  Preop antibiotics given.  A cystoscope was placed into the urethra.  Patient was found to have a tight urethral stricture.  Using a cold knife I incised the stricture.  I then placed the scope into the bladder neck and again identify tight contracture.  I placed a sensor wire into the bladder first.  I then used the resectoscope with a cold knife and incised the bladder neck contracture at 6 o'clock position.  I then used a 30 [place] by 8 cm balloon and dilate the stricture open under fluoroscopy.  The area around the contracture is quite tight and quite dense.  There is very low elasticity.  Proximal is identified.  The thousand micron laser the stone was broken down into multiple small pieces but this was then retrieved with an evacuator.  A 22 [place] cath was then placed.  Patient tolerated procedure well.  No complications identified during the procedure but there was minimal bleeding during the operation.  However, given the rigidity around bladder neck contracture there is a high likelihood that the bladder neck contracture and also urethral stricture can recur.

## 2019-10-21 NOTE — DISCHARGE INSTRUCTIONS
Finch Catheter Care    A Finch catheter is a rubber tube that is placed through the urethra (opening where urine comes out) and into the bladder. This helps drain urine from the bladder. There is a small balloon on the end of the tube that is inflated after insertion. This keeps the catheter from sliding out of the bladder.  A Finch catheter is used to treat urinary retention (unable to pass urine). It is also used when there is incontinence (loss of bladder control).  Home care    Finish taking any prescribed antibiotic even if you are feeling better before then.    It is important to keep bacteria from getting into the collection bag. Do not disconnect the catheter from the collection bag.    Use a leg band to secure the drainage tube, so it does not pull on the catheter. Drain the collection bag when it becomes full using the drain spout at the bottom of the bag.    Do not try to pull or remove your catheter. This will injure your urethra. It must be removed by your healthcare provider or nurse.  Follow-up care  Follow up with your healthcare provider as advised for repeat urine testing and catheter removal or replacement.  When to seek medical advice  Call your healthcare provider right away if any of these occur:    Fever of 100.4 F (38 C) or higher, or as directed by your healthcare provider    Bladder pain or fullness    Abdominal swelling, nausea or vomiting, or back pain    Blood or urine leakage around the catheter    Bloody urine coming from the catheter (if a new symptom)    Catheter falls out    Catheter stops draining for 6 hours    Weakness, dizziness, or fainting  Date Last Reviewed: 10/1/2016    4074-1072 [employer]. [address] [place] [address], [place] [address]. All rights reserved. This information is not intended as a substitute for professional medical care. Always follow your healthcare professional's instructions.        Finch Catheter Removal     The syringe is put into the  balloon port to allow the balloon to empty. Once the balloon is empty, the catheter can be removed.   Your healthcare provider has instructed you to remove your Finch catheter. This is a thin, flexible tube that allows urine to drain out of your bladder and into a bag. It s important to properly remove your catheter to help prevent infection and other complications. If you have any questions about removing the Finch catheter, ask your healthcare provider before trying to remove it. Otherwise, follow the instructions on this sheet.  Finch catheter  The Finch catheter is held in place by a small balloon that s filled with water. To remove the catheter, you must first drain the water from the balloon. This is done using a syringe and the balloon port. This is the opening in the catheter that isn t attached to the bag. It allows you to get to the balloon.  Instructions for removing the catheter  Follow the directions closely. Note: If the catheter doesn t come out with gentle pulling, stop and call your healthcare provider right away.    Empty the bag of urine if needed.    Wash your hands with soap and warm water. Dry them well.    Gather your supplies. This includes a syringe, wastebasket, a towel, and a syringe that was given to you by your healthcare provider.    Put the syringe into the balloon port on the catheter. The syringe fits tightly into the port with a firm push and twist motion.    Wait as the water from the balloon empties into the syringe. Depending on how large the balloon is, you may need to repeat this process several times until all of the water is out of the balloon.    Once the balloon is emptied, gently pull out the catheter.    Put the used catheter in the wastebasket. Also throw away the syringe.    Use the towel to wipe up any spilled water or urine if needed.    Wash your hands again.  When to call your healthcare provider  Call the healthcare provider right away if:    You have a fever  of 100.4 F (38 C) or higher, or as directed by your healthcare provider.    You have questions about removing the catheter.    The catheter doesn t come out with gentle pulling.    You can t urinate within 8 hours after removing the catheter.    Your belly (abdomen) is painful or bloated.    You have burning pain with urination that lasts for 24 hours.    You see a lot of blood in the urine. Light bleeding for 24 hours is normal.    It feels like the bladder is not emptying.   Date Last Reviewed: 2016-2018 [employer]. [address] [place] [id number]. All rights reserved. This information is not intended as a substitute for professional medical care. Always follow your healthcare professional's instructions.        [facility]  Same-Day Surgery   Adult Discharge Orders & Instructions   For 24 hours after surgery  1. Get plenty of rest.  A responsible adult must stay with you for at least 24 hours after you leave the hospital.   2. Do not drive or use heavy equipment.  If you have weakness or tingling, don't drive or use heavy equipment until this feeling goes away.  3. Do not drink alcohol.  4. Avoid strenuous or risky activities.  Ask for help when climbing stairs.   5. You may feel lightheaded.  IF so, sit for a few minutes before standing.  Have someone help you get up.   6. If you have nausea (feel sick to your stomach): Drink only clear liquids such as apple juice, ginger ale, broth or 7-Up.  Rest may also help.  Be sure to drink enough fluids.  Move to a regular diet as you feel able.  7. You may have a slight fever. Call the doctor if your fever is over 100 F (37.7 C) (taken under the tongue) or lasts longer than 24 hours.  8. You may have a dry mouth, a sore throat, muscle aches or trouble sleeping.  These should go away after 24 hours.  9. Do not make important or legal decisions.   Call your doctor for any of the followin.  Signs of  infection (fever, growing tenderness at the surgery site, a large amount of drainage or bleeding, severe pain, foul-smelling drainage, redness, swelling).    2. It has been over 8 to 10 hours since surgery and you are still not able to urinate (pass water).    3.  Headache for over 24 hours.

## 2019-10-21 NOTE — ANESTHESIA PREPROCEDURE EVALUATION
Anesthesia Pre-Procedure Evaluation    Patient: Andre Tyler   MRN:     [id number] Gender:   male   Age:    74 year old :      1945        Preoperative Diagnosis: Bladder neck contracture [N32.0]  Bladder stone [N21.0]   Procedure(s):  transurethral resection of bladder neck contracture litholapaxy (needs holmium laser) for bladder stone     Past Medical History:   Diagnosis Date     Diabetes (H)      Hypertension goal BP (blood pressure) < 140/90      Prostate cancer (H)      Squamous cell skin cancer       Past Surgical History:   Procedure Laterality Date     APPENDECTOMY       HC MOHS TRUNK/ARM/LEG 1ST STAGE UP T0 5 BLOCKS       PROSTATE SURGERY  12                   PHYSICAL EXAM:   Mental Status/Neuro: A/A/O   Airway: Facies: Feasible  Mallampati: I  Mouth/Opening: Full  TM distance: > 6 cm  Neck ROM: Full   Respiratory: Auscultation: CTAB     Resp. Rate: Normal     Resp. Effort: Normal      CV: Rhythm: Regular  Rate: Age appropriate  Heart: Normal Sounds  Edema: None   Comments:      Dental: Normal Dentition                LABS:  CBC: No results found for: WBC, HGB, HCT, PLT  BMP:   Lab Results   Component Value Date     2015     2013    POTASSIUM 4.6 2015    POTASSIUM 4.0 2013    CHLORIDE 101 2015    CHLORIDE 101 2013    CO2 28 2013    CO2 29 2013    BUN 23 2015    BUN 18 2013    CR 1.18 2015    CR 1.06 2013     (A) 2015    GLC 94 2013     COAGS: No results found for: PTT, INR, FIBR  POC: No results found for: BGM, HCG, HCGS  OTHER:   Lab Results   Component Value Date    A1C 6.2 (H) 2013    STEVENSON 9.9 2015    ALBUMIN 4.4 2015    PROTTOTAL 7.4 2015    ALT 44 2015    AST 51 2015    ALKPHOS 93 2015    BILITOTAL 0.9 2015        Preop Vitals    BP Readings from Last 3 Encounters:   10/14/19 116/72   19 129/89   19 131/86    Pulse Readings  from Last 3 Encounters:   10/14/19 88   09/30/19 86   09/06/19 82      Resp Readings from Last 3 Encounters:   10/21/19 16   10/14/19 14   09/30/19 16    SpO2 Readings from Last 3 Encounters:   10/21/19 94%   10/14/19 97%   09/30/19 94%      Temp Readings from Last 1 Encounters:   10/21/19 98.4  F (36.9  C) (Temporal)    Ht Readings from Last 1 Encounters:   10/14/19 1.829 m (6')      Wt Readings from Last 1 Encounters:   10/14/19 93.1 kg (205 lb 3.2 oz)    Estimated body mass index is 27.83 kg/m  as calculated from the following:    Height as of 10/14/19: 1.829 m (6').    Weight as of 10/14/19: 93.1 kg (205 lb 3.2 oz).     LDA:        Assessment:   ASA SCORE: 2    H&P: History and physical reviewed and following examination; no interval change.    NPO Status: NPO Appropriate     Plan:   Anes. Type:  General   Pre-Medication: None   Induction:  IV (Standard)   Airway: LMA   Access/Monitoring: PIV   Maintenance: Balanced     Postop Plan:   Postop Pain: Opioids  Postop Sedation/Airway: Not planned  Disposition: Outpatient     PONV Management:   Adult Risk Factors:, Postop Opioids   Prevention: Ondansetron, Dexamethasone     CONSENT: Direct conversation   Plan and risks discussed with: Patient                      Florencio Santiago MD     ANESTHESIA PREOP EVALUATION    PROCEDURE: Procedure(s):  transurethral resection of bladder neck contracture litholapaxy (needs holmium laser) for bladder stone    HPI: Andre Tyler is a 74 year old male who presents for above procedure 2/2 bladder stone.    PAST MEDICAL HISTORY:    Past Medical History:   Diagnosis Date     Diabetes (H)      Hypertension goal BP (blood pressure) < 140/90      Prostate cancer (H)      Squamous cell skin cancer        PAST SURGICAL HISTORY:    Past Surgical History:   Procedure Laterality Date     APPENDECTOMY       HC MOHS TRUNK/ARM/LEG 1ST STAGE UP T0 5 BLOCKS       PROSTATE SURGERY  8/28/12       SOCIAL HISTORY:       Social History     Tobacco Use      Smoking status: Never Smoker     Smokeless tobacco: Never Used   Substance Use Topics     Alcohol use: No       ALLERGIES:     No Known Allergies    MEDICATIONS:     (Not in a hospital admission)      Current Outpatient Medications   Medication Sig Dispense Refill     Glucosamine HCl (GLUCOSAMINE PO)        ketoconazole (NIZORAL) 2 % external cream Apply topically daily Apply twice daily for 2 months to affected areas. 60 g 0     lisinopril-hydrochlorothiazide (PRINZIDE,ZESTORETIC) 10-12.5 MG per tablet Take 1 tablet by mouth daily 90 tablet 3     metFORMIN (GLUCOPHAGE) 500 MG tablet 2 times daily         Current Outpatient Medications Ordered in Epic   Medication Sig Dispense Refill     Glucosamine HCl (GLUCOSAMINE PO)        ketoconazole (NIZORAL) 2 % external cream Apply topically daily Apply twice daily for 2 months to affected areas. 60 g 0     lisinopril-hydrochlorothiazide (PRINZIDE,ZESTORETIC) 10-12.5 MG per tablet Take 1 tablet by mouth daily 90 tablet 3     metFORMIN (GLUCOPHAGE) 500 MG tablet 2 times daily       Current Facility-Administered Medications Ordered in Epic   Medication Dose Route Frequency Provider Last Rate Last Dose     acetaminophen (TYLENOL) tablet 975 mg  975 mg Oral Once Salvador Leal MD         ceFAZolin (ANCEF) 1 g vial to attach to  ml bag for ADULT or 50 ml bag for PEDS  1 g Intravenous See Admin Instructions Arnulfo Vale MD         ceFAZolin (ANCEF) intermittent infusion 2 g in 100 mL dextrose PRE-MIX  2 g Intravenous Pre-Op/Pre-procedure x 1 dose Arnulfo Vale MD         lactated ringers infusion   Intravenous Continuous Florencio Santiago MD         lidocaine (LMX4) kit   Topical Q1H PRN Florencio Santiago MD         lidocaine 1 % 0.1-1 mL  0.1-1 mL Other Q1H PRN Florencio Santiago MD         sodium chloride (PF) 0.9% PF flush 3 mL  3 mL Intracatheter q1 min prn Florencio Santiago MD         sodium chloride (PF) 0.9% PF flush 3 mL  3 mL Intracatheter Q8H Pamela  MD Florencio           PHYSICAL EXAM:    Vitals: T 98.4, P Data Unavailable, BP Data Unavailable, R 16, SpO2 94%, Weight   Wt Readings from Last 2 Encounters:   10/14/19 93.1 kg (205 lb 3.2 oz)   08/24/19 91.2 kg (201 lb)       See doc flowsheet    NPO STATUS: see doc flowsheet    LABS:    BMP:  Recent Labs   Lab Test 09/22/15 11/06/13  1712    138   POTASSIUM 4.6 4.0   CHLORIDE 101 101   CO2  --  28   BUN 23 18   CR 1.18 1.06   * 94   STEVENSON 9.9 9.3       LFTs:   Recent Labs   Lab Test 09/22/15   PROTTOTAL 7.4   ALBUMIN 4.4   BILITOTAL 0.9   ALKPHOS 93   AST 51   ALT 44       CBC:   No results for input(s): WBC, RBC, HGB, HCT, MCV, MCH, MCHC, RDW, PLT in the last 52620 hours.    Coags:  No results for input(s): INR, PTT, FIBR in the last 91237 hours.    Imaging:  No orders to display       Florencio Santiago MD  Anesthesiology Staff  Pager [phone]    10/21/2019  11:58 AM

## 2019-10-21 NOTE — ANESTHESIA CARE TRANSFER NOTE
Patient: Andre Tyler    Procedure(s):  Urethral dilation and transurethral resection of bladder neck contracture litholapaxy (needs holmium laser) for bladder stone    Diagnosis: Bladder neck contracture [N32.0]  Bladder stone [N21.0]  Diagnosis Additional Information: No value filed.    Anesthesia Type:   General     Note:  Airway :Nasal Cannula  Patient transferred to:PACU  Comments: Awake, comfortable, sats 99% Report to RN.Handoff Report: Identifed the Patient, Identified the Reponsible Provider, Reviewed the pertinent medical history, Discussed the surgical course, Reviewed Intra-OP anesthesia mangement and issues during anesthesia, Set expectations for post-procedure period and Allowed opportunity for questions and acknowledgement of understanding      Vitals: (Last set prior to Anesthesia Care Transfer)    CRNA VITALS  10/21/2019 1321 - 10/21/2019 1358      10/21/2019             Pulse:  67    SpO2:  95 %                Electronically Signed By: AMADOR Rodriguez CRNA  October 21, 2019  1:58 PM

## 2019-10-24 ENCOUNTER — TELEPHONE (OUTPATIENT)
Dept: FAMILY MEDICINE | Facility: CLINIC | Age: 74
End: 2019-10-24

## 2019-10-24 NOTE — TELEPHONE ENCOUNTER
Panel Management Review      Patient has the following on his problem list:     Diabetes    ASA: Failed    Last A1C  Lab Results   Component Value Date    A1C 6.2 02/11/2013     A1C tested: FAILED    Last LDL:    Lab Results   Component Value Date    CHOL 192 09/22/2015     Lab Results   Component Value Date    HDL 46 09/22/2015     Lab Results   Component Value Date     09/22/2015     Lab Results   Component Value Date    TRIG 114 09/22/2015     Lab Results   Component Value Date    CHOLHDLRATIO 4.17 09/22/2015     No results found for: NHDL    Is the patient on a Statin? NO             Is the patient on Aspirin? NO        Last three blood pressure readings:  BP Readings from Last 3 Encounters:   10/21/19 (!) 189/115   10/14/19 116/72   09/06/19 129/89       Date of last diabetes office visit:      Tobacco History:     History   Smoking Status     Never Smoker   Smokeless Tobacco     Never Used         Hypertension   Last three blood pressure readings:  BP Readings from Last 3 Encounters:   10/21/19 (!) 189/115   10/14/19 116/72   09/06/19 129/89     Blood pressure: MONITOR    HTN Guidelines:  Less than 140/90      Composite cancer screening  Chart review shows that this patient is due/due soon for the following Fecal Colorectal (FIT)  Summary:    Patient is due/failing the following:   A1C, ASA, BP CHECK, FIT and PHYSICAL    Action needed:   Patient needs office visit for recheck.    Type of outreach:    none, patient suppose to return in 2 weeks for recheck. Will postpone for 2 weeks and see if patient scheduled OV    Questions for provider review:    Should patient be on statin or aspirin?                                                                                                                                    Ysabel Vasquez MA       Chart routed to Provider then when routed back will postpone for 2 weeks.

## 2019-11-18 ENCOUNTER — OFFICE VISIT (OUTPATIENT)
Dept: UROLOGY | Facility: CLINIC | Age: 74
End: 2019-11-18
Payer: COMMERCIAL

## 2019-11-18 VITALS — HEART RATE: 82 BPM | SYSTOLIC BLOOD PRESSURE: 130 MMHG | OXYGEN SATURATION: 96 % | DIASTOLIC BLOOD PRESSURE: 87 MMHG

## 2019-11-18 DIAGNOSIS — N32.0 BLADDER NECK CONTRACTURE: Primary | ICD-10-CM

## 2019-11-18 DIAGNOSIS — N21.0 BLADDER STONE: ICD-10-CM

## 2019-11-18 DIAGNOSIS — N35.919 STRICTURE OF MALE URETHRA, UNSPECIFIED STRICTURE TYPE: ICD-10-CM

## 2019-11-18 PROCEDURE — 51798 US URINE CAPACITY MEASURE: CPT | Performed by: UROLOGY

## 2019-11-18 PROCEDURE — 99213 OFFICE O/P EST LOW 20 MIN: CPT | Mod: 25 | Performed by: UROLOGY

## 2019-11-18 NOTE — PROGRESS NOTES
Chief Complaint   Patient presents with     RECHECK     post op        Andre Tyler is a 74 year old male who presents today for follow up of   Chief Complaint   Patient presents with     RECHECK     post op    Patient is here for follow-up after recent urethral dilation, bladder neck contracture dilation and bladder stone removal.  He is doing much better since surgery.  His urinary flow has improved.  He is quite happy with the results.  Current Outpatient Medications   Medication Sig Dispense Refill     Catheters MISC 1 catheter 2 times daily 30 each 11     Glucosamine HCl (GLUCOSAMINE PO)        ketoconazole (NIZORAL) 2 % external cream Apply topically daily Apply twice daily for 2 months to affected areas. 60 g 0     lisinopril-hydrochlorothiazide (PRINZIDE,ZESTORETIC) 10-12.5 MG per tablet Take 1 tablet by mouth daily 90 tablet 3     metFORMIN (GLUCOPHAGE) 500 MG tablet 2 times daily       No Known Allergies   Past Medical History:   Diagnosis Date     Diabetes (H)      Hypertension goal BP (blood pressure) < 140/90      Prostate cancer (H)      Squamous cell skin cancer      Past Surgical History:   Procedure Laterality Date     APPENDECTOMY       HC MOHS TRUNK/ARM/LEG 1ST STAGE UP T0 5 BLOCKS       LASER HOLMIUM LITHOTRIPSY URETER(S), INSERT STENT, COMBINED N/A 10/21/2019    Procedure: Urethral dilation and transurethral resection of bladder neck contracture litholapaxy (needs holmium laser) for bladder stone;  Surgeon: Arnulfo Vale MD;  Location: [facility] OR     PROSTATE SURGERY  8/28/12     Family History   Problem Relation Age of Onset     Hypertension Father      Diabetes Father      Prostate Cancer Father      Alcohol/Drug Sister      C.A.D. No family hx of      Cerebrovascular Disease No family hx of      Social History     Socioeconomic History     Marital status:      Spouse name: None     Number of children: 3     Years of education: None     Highest education level: None   Occupational  History     Employer: RETIRED   Social Needs     Financial resource strain: None     Food insecurity:     Worry: None     Inability: None     Transportation needs:     Medical: None     Non-medical: None   Tobacco Use     Smoking status: Never Smoker     Smokeless tobacco: Never Used   Substance and Sexual Activity     Alcohol use: No     Drug use: No     Sexual activity: Yes     Partners: Female   Lifestyle     Physical activity:     Days per week: None     Minutes per session: None     Stress: None   Relationships     Social connections:     Talks on phone: None     Gets together: None     Attends Sabianist service: None     Active member of club or organization: None     Attends meetings of clubs or organizations: None     Relationship status: None     Intimate partner violence:     Fear of current or ex partner: None     Emotionally abused: None     Physically abused: None     Forced sexual activity: None   Other Topics Concern     Parent/sibling w/ CABG, MI or angioplasty before 65F 55M? Not Asked   Social History Narrative     None       REVIEW OF SYSTEMS  =================  C: NEGATIVE for fever, chills, change in weight  I: NEGATIVE for worrisome rashes, moles or lesions  E/M: NEGATIVE for ear, mouth and throat problems  R: NEGATIVE for significant cough or SHORTNESS OF BREATH  CV:  NEGATIVE for chest pain, palpitations or peripheral edema  GI: NEGATIVE for nausea, abdominal pain, heartburn, or change in bowel habits  NEURO: NEGATIVE numbness/weakness  : see HPI  PSYCH: NEGATIVE depression/anxiety  LYmph: no new enlarged lymph nodes  Ortho: no new trauma/movements    Physical Exam:  /87 (BP Location: Right arm, Patient Position: Chair, Cuff Size: Adult Large)   Pulse 82   SpO2 96%    Patient is pleasant, in no acute distress, good general condition.  Lung: no evidence of respiratory distress    Abdomen: Soft, nondistended, non tender. No masses. No rebound or guarding.   Exam: Bladder scan with  minimal residual urine.  Skin: Warm and dry.  No redness.  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  Plan: Patient was instructed to perform self intermittent catheterization at least 2 times a day for the next 3 months.  Return to clinic see me in 6 months from now for reexamination.    (N21.0) Bladder stone  Comment: Removed  Plan: As needed

## 2019-11-26 ENCOUNTER — OFFICE VISIT (OUTPATIENT)
Dept: DERMATOLOGY | Facility: CLINIC | Age: 74
End: 2019-11-26
Payer: COMMERCIAL

## 2019-11-26 DIAGNOSIS — L57.0 ACTINIC KERATOSIS: ICD-10-CM

## 2019-11-26 DIAGNOSIS — Z86.19 HISTORY OF COLD SORES: ICD-10-CM

## 2019-11-26 DIAGNOSIS — L30.9 DERMATITIS: Primary | ICD-10-CM

## 2019-11-26 PROCEDURE — 17000 DESTRUCT PREMALG LESION: CPT | Performed by: DERMATOLOGY

## 2019-11-26 PROCEDURE — 99213 OFFICE O/P EST LOW 20 MIN: CPT | Mod: 25 | Performed by: DERMATOLOGY

## 2019-11-26 RX ORDER — CLOBETASOL PROPIONATE 0.5 MG/G
CREAM TOPICAL
Qty: 15 G | Refills: 3 | Status: SHIPPED | OUTPATIENT
Start: 2019-11-26 | End: 2022-01-28

## 2019-11-26 RX ORDER — VALACYCLOVIR HYDROCHLORIDE 500 MG/1
500 TABLET, FILM COATED ORAL 2 TIMES DAILY
Qty: 6 TABLET | Refills: 0 | Status: SHIPPED | OUTPATIENT
Start: 2019-11-26 | End: 2022-08-29

## 2019-11-26 ASSESSMENT — PAIN SCALES - GENERAL: PAINLEVEL: NO PAIN (0)

## 2019-11-26 NOTE — LETTER
11/26/2019         RE: Andre Tyler  [address] [facility] [address]        Dear Colleague,    Thank you for referring your patient, Andre Tyler, to the [facility]. Please see a copy of my visit note below.    [facility] Dermatology Note      Dermatology Problem List:  1. 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 Arnulfo Keyes, 2011. s/p excision in 2005, recurrent  - SCCIS, left dorsal hand, s/p via [facility] transferred records, Dr. Arnulfo Keyes, 2011  2. Actinic keratosis  - s/p cryotherapy  3. Compound melanocytic nevus, left lower back   - s/p biopsy 4/9/2015  4. Benign lichenoid keratosis, right lower leg  - s/p biopsy 4/9/2015    Encounter Date: Nov 26, 2019    CC:  Chief Complaint   Patient presents with     Follow Up     foot fungus and cryo upper lip, didn't do last time due to cold sore.         History of Present Illness:  Mr. Andre Tyler is a 74 year old male with a history of NMSC and AKs who presents as follow-up for rash on the foot and AK. Last seen by Dr. Monge in 9/19 when multiple biopsies were performed c/w AKs. He also had an AK on the upper lip that was not treated with cryotherapy due to a cold sore on his lip. He also had a rash on his foot that he had been treating with topical steroids. Dr. Monge was concerned about tinea pedis and we started on ketoconazole cream. He states the rash on his foot is improved. He has finished all  topicals. He does get occasionally red, itchy spot on his dorsal feet. He reports a small gritty spot on his upper lip near vermillion border. Health otherwise stable. No other skin concerns.     Past Medical History:   Patient Active Problem List   Diagnosis     Health Care Home     Hypertension goal BP (blood pressure) < 140/90     Squamous cell skin cancer     Prostate cancer - surgically treated     Advanced directives, counseling/discussion     CARDIOVASCULAR SCREENING; LDL GOAL LESS THAN 130     Sensorineural hearing loss, bilateral     Bladder neck contracture     Bladder stone     Type 2 diabetes mellitus without complication (H)     Past Medical History:   Diagnosis Date     Diabetes (H)      Hypertension goal BP (blood pressure) < 140/90      Prostate cancer (H)      Squamous cell skin cancer      Past Surgical History:   Procedure Laterality Date     APPENDECTOMY       HC MOHS TRUNK/ARM/LEG 1ST STAGE UP T0 5 BLOCKS       LASER HOLMIUM LITHOTRIPSY URETER(S), INSERT STENT, COMBINED N/A 10/21/2019    Procedure: Urethral dilation and transurethral resection of bladder neck contracture litholapaxy (needs holmium laser) for bladder stone;  Surgeon: Arnulfo Vale MD;  Location: [facility] OR     PROSTATE SURGERY  8/28/12       Social History:  The patient works as a  and is retired from working as a . [employer] . Plays hockey. He has 7 grandchildren, including triplets.  Reviewed   Family History:  There is a family history of skin cancer in the patient's mother and sister (possible SCC).  Kept in chart for convenience.       Medications:  Current Outpatient Medications   Medication Sig Dispense Refill     Catheters MISC 1 catheter 2 times daily 30 each 11     Glucosamine HCl (GLUCOSAMINE PO)        ketoconazole (NIZORAL) 2 % external cream Apply topically daily Apply twice daily for 2 months to affected areas. 60 g 0     lisinopril-hydrochlorothiazide (PRINZIDE,ZESTORETIC)  10-12.5 MG per tablet Take 1 tablet by mouth daily 90 tablet 3     metFORMIN (GLUCOPHAGE) 500 MG tablet 2 times daily            No Known Allergies      Review of Systems:   - Skin: As above in HPI. No additional skin concerns.  - Const: Patient is in a normal state of health.     Physical exam:  GEN: This is a well developed, well-nourished male in no acute distress, in a pleasant mood.    SKIN: Focused examination of the face, neck, right foot.   - Pink gritty papule on the left medial upper lip near vermillion border.   - Few pink papules on the right dorsal foot. No erythema or scale between toes.   -No other lesions of concern on areas examined.     Impression/Plan:    1. Actinic keratosis.   Cryotherapy procedure note: After verbal consent and discussion of risks and benefits including but no limited to dyspigmentation/scar, blister, and pain, 1 lesion was treated with 1-2mm freeze border for 2 cycles with liquid nitrogen. Post cryotherapy instructions were provided.     Start valtrex 500 mg PO BID x 3 days given history of cold sores    3.  Papular rash, right dorsal foot. Ddx papular eczema versus arthropod. Appears resolving on exam today. Patient can use clobetasol 0.05% creamt BID PRN if itchy.     Follow-up in 6 months for FBSE.     Staff Involved:  Staff only    Michele Mike MD    Department of Dermatology  [facility]: Phone: [phone], Fax:[phone]  [facility]: Phone: [phone], Fax: [phone]            Again, thank you for allowing me to participate in the care of your patient.        Sincerely,        Michele Mike MD

## 2019-11-26 NOTE — PROGRESS NOTES
[facility] Dermatology Note      Dermatology Problem List:  1. 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 Arnulfo Keyes, 2011. s/p excision in 2005, recurrent  - SCCIS, left dorsal hand, s/p via [facility] transferred records, Dr. Arnulfo Keyes, 2011  2. Actinic keratosis  - s/p cryotherapy  3. Compound melanocytic nevus, left lower back   - s/p biopsy 4/9/2015  4. Benign lichenoid keratosis, right lower leg  - s/p biopsy 4/9/2015    Encounter Date: Nov 26, 2019    CC:  Chief Complaint   Patient presents with     Follow Up     foot fungus and cryo upper lip, didn't do last time due to cold sore.         History of Present Illness:  Mr. Andre Tyler is a 74 year old male with a history of NMSC and AKs who presents as follow-up for rash on the foot and AK. Last seen by Dr. Monge in 9/19 when multiple biopsies were performed c/w AKs. He also had an AK on the upper lip that was not treated with cryotherapy due to a cold sore on his lip. He also had a rash on his foot that he had been treating with topical steroids. Dr. Monge was concerned about tinea pedis and we started on ketoconazole cream. He states the rash on his foot is improved. He has finished all topicals. He does get occasionally red, itchy spot on his dorsal feet. He reports a small gritty spot on his upper lip near vermillion border. Health otherwise stable. No other skin concerns.     Past Medical History:   Patient Active Problem List    Diagnosis     Health Care Home     Hypertension goal BP (blood pressure) < 140/90     Squamous cell skin cancer     Prostate cancer - surgically treated     Advanced directives, counseling/discussion     CARDIOVASCULAR SCREENING; LDL GOAL LESS THAN 130     Sensorineural hearing loss, bilateral     Bladder neck contracture     Bladder stone     Type 2 diabetes mellitus without complication (H)     Past Medical History:   Diagnosis Date     Diabetes (H)      Hypertension goal BP (blood pressure) < 140/90      Prostate cancer (H)      Squamous cell skin cancer      Past Surgical History:   Procedure Laterality Date     APPENDECTOMY       HC MOHS TRUNK/ARM/LEG 1ST STAGE UP T0 5 BLOCKS       LASER HOLMIUM LITHOTRIPSY URETER(S), INSERT STENT, COMBINED N/A 10/21/2019    Procedure: Urethral dilation and transurethral resection of bladder neck contracture litholapaxy (needs holmium laser) for bladder stone;  Surgeon: Arnulfo Vale MD;  Location: [facility] OR     PROSTATE SURGERY  8/28/12       Social History:  The patient works as a  and is retired from working as a . [employer] . Plays hockey. He has 7 grandchildren, including triplets.  Reviewed   Family History:  There is a family history of skin cancer in the patient's mother and sister (possible SCC).  Kept in chart for convenience.       Medications:  Current Outpatient Medications   Medication Sig Dispense Refill     Catheters MISC 1 catheter 2 times daily 30 each 11     Glucosamine HCl (GLUCOSAMINE PO)        ketoconazole (NIZORAL) 2 % external cream Apply topically daily Apply twice daily for 2 months to affected areas. 60 g 0     lisinopril-hydrochlorothiazide (PRINZIDE,ZESTORETIC) 10-12.5 MG per tablet Take 1 tablet by mouth daily 90 tablet 3     metFORMIN (GLUCOPHAGE) 500 MG tablet 2 times daily            No Known Allergies      Review of Systems:   - Skin: As above in HPI. No additional skin concerns.  - Const: Patient is  in a normal state of health.     Physical exam:  GEN: This is a well developed, well-nourished male in no acute distress, in a pleasant mood.    SKIN: Focused examination of the face, neck, right foot.   - Pink gritty papule on the left medial upper lip near vermillion border.   - Few pink papules on the right dorsal foot. No erythema or scale between toes.   -No other lesions of concern on areas examined.     Impression/Plan:    1. Actinic keratosis.   Cryotherapy procedure note: After verbal consent and discussion of risks and benefits including but no limited to dyspigmentation/scar, blister, and pain, 1 lesion was treated with 1-2mm freeze border for 2 cycles with liquid nitrogen. Post cryotherapy instructions were provided.     Start valtrex 500 mg PO BID x 3 days given history of cold sores    3.  Papular rash, right dorsal foot. Ddx papular eczema versus arthropod. Appears resolving on exam today. Patient can use clobetasol 0.05% creamt BID PRN if itchy.     Follow-up in 6 months for FBSE.     Staff Involved:  Staff only    Michele Mike MD    Department of Dermatology  [facility]: Phone: [phone], Fax:[phone]  [facility]: Phone: [phone], Fax: [phone]

## 2019-11-26 NOTE — PATIENT INSTRUCTIONS
Cryotherapy    What is it?    Use of a very cold liquid, such as liquid nitrogen, to freeze and destroy abnormal skin cells that need to be removed    What should I expect?    Tenderness and redness    A small blister that might grow and fill with dark purple blood. There may be crusting.    More than one treatment may be needed if the lesions do not go away.    How do I care for the treated area?    Gently wash the area with your hands when bathing.    Use a thin layer of Vaseline to help with healing. You may use a Band-Aid.     The area should heal within 7-10 days and may leave behind a pink or lighter color.     Do not use an antibiotic or Neosporin ointment.     You may take acetaminophen (Tylenol) for pain.     Call your Doctor if you have:    Severe pain    Signs of infection (warmth, redness, cloudy yellow drainage, and or a bad smell)    Questions or concerns    Who should I call with questions?       [facility]: [phone]       [facility]: [phone]       For urgent needs outside of business hours call the [facility] at [phone]        and ask for the dermatology resident on call

## 2019-11-26 NOTE — NURSING NOTE
Andre Tyler's goals for this visit include:   Chief Complaint   Patient presents with     Follow Up     foot fungus and cryo upper lip, didn't do last time due to cold sore.       He requests these members of his care team be copied on today's visit information: Yes    PCP: Arnulfo Joaquin    Referring Provider:  No referring provider defined for this encounter.    There were no vitals taken for this visit.    Do you need any medication refills at today's visit? No    Kathe Chery LPN

## 2019-11-29 NOTE — TELEPHONE ENCOUNTER
Please abstract the following data from this visit with this patient into the appropriate field in Epic:    Tests that can be patient reported without a hard copy:      Other Tests found in the patient's chart through Chart Review/Care Everywhere:    Eye exam with ophthalmology on this date: 4/30/19-please add negative for retinopathy and A1c [facility] on this date: 2/5/19.  Records in care everywhere under documents.    Note to [employer]st[employer]: If this section is blank, no results were found via Chart Review/Care Everywhere.

## 2020-11-10 ENCOUNTER — OFFICE VISIT (OUTPATIENT)
Dept: DERMATOLOGY | Facility: CLINIC | Age: 75
End: 2020-11-10
Payer: COMMERCIAL

## 2020-11-10 DIAGNOSIS — D48.5 NEOPLASM OF UNCERTAIN BEHAVIOR OF SKIN: ICD-10-CM

## 2020-11-10 DIAGNOSIS — L57.0 AK (ACTINIC KERATOSIS): ICD-10-CM

## 2020-11-10 DIAGNOSIS — Z85.828 HISTORY OF NONMELANOMA SKIN CANCER: Primary | ICD-10-CM

## 2020-11-10 PROCEDURE — 88305 TISSUE EXAM BY PATHOLOGIST: CPT | Performed by: DERMATOLOGY

## 2020-11-10 PROCEDURE — 11102 TANGNTL BX SKIN SINGLE LES: CPT | Performed by: DERMATOLOGY

## 2020-11-10 PROCEDURE — 99213 OFFICE O/P EST LOW 20 MIN: CPT | Mod: 25 | Performed by: DERMATOLOGY

## 2020-11-10 PROCEDURE — 17003 DESTRUCT PREMALG LES 2-14: CPT | Performed by: DERMATOLOGY

## 2020-11-10 PROCEDURE — 17000 DESTRUCT PREMALG LESION: CPT | Mod: 59 | Performed by: DERMATOLOGY

## 2020-11-10 PROCEDURE — 11103 TANGNTL BX SKIN EA SEP/ADDL: CPT | Performed by: DERMATOLOGY

## 2020-11-10 RX ORDER — FLUOROURACIL 50 MG/G
CREAM TOPICAL
Qty: 80 G | Refills: 0 | Status: SHIPPED | OUTPATIENT
Start: 2020-11-10 | End: 2022-01-28

## 2020-11-10 ASSESSMENT — PAIN SCALES - GENERAL: PAINLEVEL: NO PAIN (0)

## 2020-11-10 NOTE — PATIENT INSTRUCTIONS
[facility] Dermatology Visit    Thank you for allowing us to participate in your care. Your findings, instructions and follow-up plan are as follows:      When should I call my doctor?    If you are worsening or not improving, please, contact us or seek urgent care as noted below.     Who should I call with questions (adults)?    [facility] (adult and pediatric): [phone]     [facility] (adult): [phone]    For urgent needs outside of business hours call the [facility] at [phone] and ask for the dermatology resident on call    If this is a medical emergency and you are unable to reach an ER, Call 911      Who should I call with questions (pediatric)?  [facility]- Pediatric Dermatology  Dr. Lucille Terrazas, Dr. Vianca Steele, Dr. Cherie Kinsey, Madelin Rosario, PA  Dr. An Lee, Dr. Jaleesa Hardin & Dr. Edson Alvarez  Non Urgent  Nurse Triage Line; [phone]- Liliya and Dang RN Care Coordinators   Le[place] (/Complex ) [phone]    If you need a prescription refill, please contact your pharmacy. Refills are approved or denied by our Physicians during normal business hours, Monday through Fridays  Per office policy, refills will not be granted if you have not been seen within the past year (or sooner depending on your child's condition)    Scheduling Information:  Pediatric Appointment Scheduling and Call Center [phone]  Radiology Scheduling- [phone]  Sedation Unit Scheduling- [phone]  [place] Scheduling- General [phone]; Pediatric Dermatology [phone]  Main  Services: [phone]  Tajik: [phone]  Emirati: [phone]  Hmong/Amharic/Uzbek: [phone]  Preadmission Nursing Department Fax Number: [phone] (Fax all pre-operative paperwork to this number)    For urgent matters arising during evenings,  weekends, or holidays that cannot wait for normal business hours please call [phone] and ask for the Dermatology Resident On-Call to be paged.      Wound Care After a Biopsy    What is a skin biopsy?  A skin biopsy allows the doctor to examine a very small piece of tissue under the microscope to determine the diagnosis and the best treatment for the skin condition. A local anesthetic (numbing medicine)  is injected with a very small needle into the skin area to be tested. A small piece of skin is taken from the area. Sometimes a suture (stitch) is used.     What are the risks of a skin biopsy?  I will experience scar, bleeding, swelling, pain, crusting and redness. I may experience incomplete removal or recurrence. Risks of this procedure are excessive bleeding, bruising, infection, nerve damage, numbness, thick (hypertrophic or keloidal) scar and non-diagnostic biopsy.    How should I care for my wound for the first 24 hours?    Keep the wound dry and covered for 24 hours    If it bleeds, hold direct pressure on the area for 15 minutes. If bleeding does not stop then go to the emergency room    Avoid strenuous exercise the first 1-2 days or as your doctor instructs you    How should I care for the wound after 24 hours?    After 24 hours, remove the bandage    You may bathe or shower as normal    If you had a scalp biopsy, you can shampoo as usual and can use shower water to clean the biopsy site daily    Clean the wound twice a day with gentle soap and water    Do not scrub, be gentle    Apply white petroleum/Vaseline after cleaning the wound with a cotton swab or a clean finger, and keep the site covered with a Bandaid /bandage. Bandages are not necessary with a scalp biopsy    If you are unable to cover the site with a Bandaid /bandage, re-apply ointment 2-3 times a day to keep the site moist. Moisture will help with healing    Avoid strenuous activity for first 1-2 days    Avoid lakes, rivers, pools, and  oceans until the stitches are removed or the site is healed    How do I clean my wound?    Wash hands thoroughly with soap or use hand  before all wound care    Clean the wound with gentle soap and water    Apply white petroleum/Vaseline  to wound after it is clean    Replace the Bandaid /bandage to keep the wound covered for the first few days or as instructed by your doctor    If you had a scalp biopsy, warm shower water to the area on a daily basis should suffice    What should I use to clean my wound?     Cotton-tipped applicators (Qtips )    White petroleum jelly (Vaseline ). Use a clean new container and use Q-tips to apply.    Bandaids   as needed    Gentle soap     How should I care for my wound long term?    Do not get your wound dirty    Keep up with wound care for one week or until the area is healed.    A small scab will form and fall off by itself when the area is completely healed. The area will be red and will become pink in color as it heals. Sun protection is very important for how your scar will turn out. Sunscreen with an SPF 30 or greater is recommended once the area is healed.    You should have some soreness but it should be mild and slowly go away over several days. Talk to your doctor about using tylenol for pain,    When should I call my doctor?  If you have increased:     Pain or swelling    Pus or drainage (clear or slightly yellow drainage is ok)    Temperature over 100F    Spreading redness or warmth around wound    When will I hear about my results?  The biopsy results can take 2-3 weeks to come back. The clinic will call you with the results, send you a [employer] message, or have you schedule a follow-up clinic or phone time to discuss the results. Contact our clinics if you do not hear from us in 3 weeks.     Who should I call with questions?    [facility]: [phone]     [facility]: [phone]    For urgent  needs outside of business hours call the [facility] at [phone] and ask for the dermatology resident on call    Cryotherapy    What is it?    Use of a very cold liquid, such as liquid nitrogen, to freeze and destroy abnormal skin cells that need to be removed    What should I expect?    Tenderness and redness    A small blister that might grow and fill with dark purple blood. There may be crusting.    More than one treatment may be needed if the lesions do not go away.    How do I care for the treated area?    Gently wash the area with your hands when bathing.    Use a thin layer of Vaseline to help with healing. You may use a Band-Aid.     The area should heal within 7-10 days and may leave behind a pink or lighter color.     Do not use an antibiotic or Neosporin ointment.     You may take acetaminophen (Tylenol) for pain.     Call your Doctor if you have:    Severe pain    Signs of infection (warmth, redness, cloudy yellow drainage, and or a bad smell)    Questions or concerns    Who should I call with questions?       [facility]: [phone]       [facility]: [phone]       For urgent needs outside of business hours call the [facility] at [phone]        and ask for the dermatology resident on call    Efudex Treatment    Today, you are being prescribed Fluorouracil (Efudex) a topical cream used for the treatment of Actinic Keratosis (AK's).  The medication is working to eliminate the unhealthy cells. Even though this treatment may be unattractive and somewhat uncomfortable.    AFTER HEALED Your treatment will last twice daily for 2-3 weeks or until the onset of irritation on the hands, forearms and ears.  You may experience some mild discomfort while being treated.    You will want to stop any other creams such as glycolic acid products, retin A, Tazorac, etc. to the area. You may use bland makeup/cover-up as long as it  doesn't sting or cause you discomfort.    Apply the cream at night as your physician recommends. Use a cotton-tipped applicator, or use gloves if applying it with your fingertips. If applied with unprotected fingertips, it is important to wash your hands well after you apply this medicine.     Keep this medication away from pets.    We recommend avoiding excessive sun exposure to the treated area    You may use moisturizing creams over bothersome areas such as Vanicream or Cetaphil cream if the reaction becomes too bothersome. Please, call the clinic if this occurs.   Potential Side Effects    Your treated areas may be unsightly during therapy.  This will improve slowly following the discontinuation of therapy.     During the first week of application, mild inflammation may occur.     During the following weeks, redness, and swelling may occur with some crusting and burning.     Lesions resolve as the skin exfoliates.     Over 1 to 2 weeks, new skin grows into the treatment area.    Keep this medication away from pets  Specific side effects that usually do not require medical attention (report to your doctor or health care professional if they continue or are bothersome) include: Red or dark-colored skin     Mild erosion (loss of upper layer of skin)     Mild eye irritation including burning, itching, sensitivity, stinging, or watering     Increased sensitivity of the skin to sun and ultraviolet light     Pain and burning of the affected area     Dryness, scaling or swelling of the affected area     Skin rash, itching of the affected area     Tenderness     If you have severe pain, please, call the clinic immediately and indicate that you have pain. Ask for a call from the RN.   Who should I call with questions?     [facility]: [phone]     [facility]: [phone]     For urgent needs outside of business hours call the [facility] at  [phone]  and ask for the dermatology resident on call

## 2020-11-10 NOTE — NURSING NOTE
[employer]rug [employer] Record    Drug Name: Lidocaine with Epi  Dose: see MD note   Route administered: SQ  NDC #: 4154-2127-00       LOT #: -[id number]  SITE: see MD note   : [employer]  EXPIRATION DATE: 02/2021    JANEY, MEDICAL ASSISTANT

## 2020-11-10 NOTE — PROGRESS NOTES
[facility] Dermatology Note      Dermatology Problem List:  1. 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 Arnulfo Keyes, 2011. s/p excision in 2005, recurrent  - SCCIS, left dorsal hand, s/p via [facility] transferred records, Dr. Arnulfo Keyes, 2011  2. Actinic keratosis  - s/p cryotherapy  3. Compound melanocytic nevus, left lower back   - s/p biopsy 4/9/2015  4. Benign lichenoid keratosis, right lower leg  - s/p biopsy 4/9/2015    Encounter Date: Nov 10, 2020    CC:  Chief Complaint   Patient presents with     Skin Check     area of concern right shoulder hx NMSC         History of Present Illness:  Mr. Andre Tyler is a 75 year old male with a hx of NMSC who presents as a follow-up for skin check. The patient was last seen in clinic when 11/26/19.     Patient reported concerns:   1. Right shoulder wants removed  2. Left leg, new lesion wants removed      Past Medical History:   Patient Active Problem List   Diagnosis     Health Care Home     Hypertension goal BP (blood pressure) < 140/90     Squamous cell skin cancer     Prostate cancer - surgically treated     Advanced directives, counseling/discussion     CARDIOVASCULAR SCREENING; LDL GOAL LESS THAN 130     Sensorineural hearing loss, bilateral     Bladder neck contracture     Bladder stone     Type 2 diabetes mellitus without complication (H)     Past Medical History:   Diagnosis Date     Diabetes (H)      Hypertension goal BP (blood pressure) <  140/90      Prostate cancer (H)      Squamous cell skin cancer      Past Surgical History:   Procedure Laterality Date     APPENDECTOMY       HC MOHS TRUNK/ARM/LEG 1ST STAGE UP T0 5 BLOCKS       LASER HOLMIUM LITHOTRIPSY URETER(S), INSERT STENT, COMBINED N/A 10/21/2019    Procedure: Urethral dilation and transurethral resection of bladder neck contracture litholapaxy (needs holmium laser) for bladder stone;  Surgeon: Arnulfo Vale MD;  Location: [facility] OR     PROSTATE SURGERY  8/28/12       Social History:  Patient reports that he has never smoked. He has never used smokeless tobacco. He reports that he does not drink alcohol or use drugs.    Family History:  Family History   Problem Relation Age of Onset     Hypertension Father      Diabetes Father      Prostate Cancer Father      Alcohol/Drug Sister      C.A.D. No family hx of      Cerebrovascular Disease No family hx of        Medications:  Current Outpatient Medications   Medication Sig Dispense Refill     Catheters MISC 1 catheter 2 times daily 30 each 11     fluorouracil (EFUDEX) 5 % external cream Apply twice daily for 2-3 weeks or until the onset of irritation on the ears, hands and forearms 80 g 0     Glucosamine HCl (GLUCOSAMINE PO)        lisinopril-hydrochlorothiazide (PRINZIDE,ZESTORETIC) 10-12.5 MG per tablet Take 1 tablet by mouth daily 90 tablet 3     metFORMIN (GLUCOPHAGE) 500 MG tablet 2 times daily       clobetasol (TEMOVATE) 0.05 % external cream Apply twice daily as needed for rash on foot. (Patient not taking: Reported on 11/10/2020) 15 g 3     ketoconazole (NIZORAL) 2 % external cream Apply topically daily Apply twice daily for 2 months to affected areas. (Patient not taking: Reported on 11/10/2020) 60 g 0     valACYclovir (VALTREX) 500 MG tablet Take 1 tablet (500 mg) by mouth 2 times daily for 3 days 6 tablet 0       No Known Allergies    Review of Systems:     -Constitutional: Otherwise feeling well today, in usual state of  health.  -Skin: As above in HPI. No additional skin concerns.    Physical exam:  Vitals: There were no vitals taken for this visit.  GEN: This is a well developed, well-nourished male in no acute distress, in a pleasant mood.    SKIN: Total skin excluding the undergarment areas was performed. The exam included the head/face, neck, both arms, chest, back, abdomen, both legs, digits and/or nails.   -Varner skin type: II  -Plaque, on the right lateral clavicle   -There is an erythematous macule with overyling adherent scale on the forehead, right temple, right tragus, right helix, right preauricular, left temple, left helix, left frontal scalp and scatttered on the hands.   -There is a well healed surgical scar without erythema, nodularity or telangiectasias on the sites of prior skin cancer.   -Left dorsal wrist erythematous papule with hemorraghic crust, 5mm   -Left upper back 1cm pearly patch  -Left lateral thigh 1.2cm verrucous plaque  -Right lateral clavicle 8mm keratotic papule  -No other lesions of concern on areas examined.     Labs:  NA    Impression/Plan:  1. History of NMSC   -Recommend sunscreens SPF #30 or greater, protective clothing and avoidance of tanning beds      2. Actinic keratosis. forehead, right temple, right tragus, right helix, right preauricular, left temple, left helix, left frontal scalp  Cryotherapy procedure note: After verbal consent and discussion of risks and benefits including but no limited to dyspigmentation/scar, blister, and pain, 8 was(were) treated with 1-2mm freeze border for 2 cycles with liquid nitrogen. Post cryotherapy instructions were provided.   After healed, for dorsal hands, forearms and ears, After healed, start Efudex twice daily for 2-3 weeks or until the onset of irritation. Discussed that we would expect irritation form this medications. Recommend the patient wash hands after use or use gloves to apply. Keep medication away from pets. Avoid sun exposure to  treated area. Do not occlude treated area with bandages. Follow up 4 weeks after the last application.          3.  Papular rash, right dorsal foot   -resolved    5. NUB - Right lateral clavicle 8mm keratotic papule- scc or sk  Shave biopsy:  After discussion of benefits and risks including but not limited to bleeding/bruising, pain/swelling, infection, scar, incomplete removal, nerve damage/numbness, recurrence, and non-diagnostic biopsy, written consent, verbal consent and photographs were obtained. Time-out was performed. The area was cleaned with isopropyl alcohol. 0.5mL of 1% lidocaine with epinephrine was injected to obtain adequate anesthesia of the lesion. A shave biopsy was performed. Hemostasis was achieved with aluminium chloride. Vaseline and a sterile dressing were applied. The patient tolerated the procedure and no complications were noted. The patient was provided with verbal and written post care instructions.     6. NUB - Left lateral thigh 1.2cm verrucous plaque - verruca or sk or scc  Shave biopsy:  After discussion of benefits and risks including but not limited to bleeding/bruising, pain/swelling, infection, scar, incomplete removal, nerve damage/numbness, recurrence, and non-diagnostic biopsy, written consent, verbal consent and photographs were obtained. Time-out was performed. The area was cleaned with isopropyl alcohol. 0.5mL of 1% lidocaine with epinephrine was injected to obtain adequate anesthesia of the lesion. A shave biopsy was performed. Hemostasis was achieved with aluminium chloride. Vaseline and a sterile dressing were applied. The patient tolerated the procedure and no complications were noted. The patient was provided with verbal and written post care instructions.     7. NUB - Left upper back 1cm pearly patch, most likely BCC   Shave biopsy:  After discussion of benefits and risks including but not limited to bleeding/bruising, pain/swelling, infection, scar, incomplete removal,  nerve damage/numbness, recurrence, and non-diagnostic biopsy, written consent, verbal consent and photographs were obtained. Time-out was performed. The area was cleaned with isopropyl alcohol. 0.5mL of 1% lidocaine with epinephrine was injected to obtain adequate anesthesia of the lesion. A shave biopsy was performed. Hemostasis was achieved with aluminium chloride. Vaseline and a sterile dressing were applied. The patient tolerated the procedure and no complications were noted. The patient was provided with verbal and written post care instructions.     8. Left dorsal wrist erythematous papule with hemorraghic crust, 5mm SCC or other   Shave biopsy:  After discussion of benefits and risks including but not limited to bleeding/bruising, pain/swelling, infection, scar, incomplete removal, nerve damage/numbness, recurrence, and non-diagnostic biopsy, written consent, verbal consent and photographs were obtained. Time-out was performed. The area was cleaned with isopropyl alcohol. 0.5mL of 1% lidocaine with epinephrine was injected to obtain adequate anesthesia of the lesion. A shave biopsy was performed. Hemostasis was achieved with aluminium chloride. Vaseline and a sterile dressing were applied. The patient tolerated the procedure and no complications were noted. The patient was provided with verbal and written post care instructions.     9. Plan for starting tretinoin via phone  CC No referring provider defined for this encounter. on close of this encounter.  Follow-up 4 months photos and phone for hands, forearms and ears, consider tretinoin follow up      Staff Involved:  Scribe/Staff      Scribe Disclosure:   Joshua PARRY am serving as a scribe to document services personally performed by Dr. Berta Monge, based on data collection and the provider's statements to me.   LXIONG3, MEDICAL ASSISTANT       Scribe Disclosure:   Christi PARRY, joellen serving as a scribe to document services personally performed by   Saleem, based on data collection and the provider's statements to me.

## 2020-11-10 NOTE — LETTER
11/10/2020         RE: Andre Tyler  [address] [address]        Dear Colleague,    Thank you for referring your patient, Andre Tyler, to the [facility]. Please see a copy of my visit note below.    [facility] Dermatology Note      Dermatology Problem List:  1. 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 Arnulfo Keyes, 2011. s/p excision in 2005, recurrent  - SCCIS, left dorsal hand, s/p via [facility] transferred records, Dr. Arnulfo Keyes, 2011  2. Actinic keratosis  - s/p cryotherapy  3. Compound melanocytic nevus, left lower back   - s/p biopsy 4/9/2015  4. Benign lichenoid keratosis, right lower leg  - s/p biopsy 4/9/2015    Encounter Date: Nov 10, 2020    CC:  Chief Complaint   Patient presents with     Skin Check     area of concern right shoulder hx NMSC         History of Present Illness:  Mr. Andre Tyler is a 75 year old male with a hx of NMSC who presents as a follow-up for skin check. The patient was last seen in clinic when 11/26/19.     Patient reported concerns:   1. Right shoulder wants removed  2. Left leg, new lesion wants removed      Past Medical History:   Patient Active Problem List   Diagnosis     Health Care Home     Hypertension goal BP (blood pressure) < 140/90     Squamous cell skin cancer     Prostate cancer - surgically treated     Advanced directives, counseling/discussion     CARDIOVASCULAR SCREENING; LDL  GOAL LESS THAN 130     Sensorineural hearing loss, bilateral     Bladder neck contracture     Bladder stone     Type 2 diabetes mellitus without complication (H)     Past Medical History:   Diagnosis Date     Diabetes (H)      Hypertension goal BP (blood pressure) < 140/90      Prostate cancer (H)      Squamous cell skin cancer      Past Surgical History:   Procedure Laterality Date     APPENDECTOMY       HC MOHS TRUNK/ARM/LEG 1ST STAGE UP T0 5 BLOCKS       LASER HOLMIUM LITHOTRIPSY URETER(S), INSERT STENT, COMBINED N/A 10/21/2019    Procedure: Urethral dilation and transurethral resection of bladder neck contracture litholapaxy (needs holmium laser) for bladder stone;  Surgeon: Arnulfo Vale MD;  Location: [facility] OR     PROSTATE SURGERY  8/28/12       Social History:  Patient reports that he has never smoked. He has never used smokeless tobacco. He reports that he does not drink alcohol or use drugs.    Family History:  Family History   Problem Relation Age of Onset     Hypertension Father      Diabetes Father      Prostate Cancer Father      Alcohol/Drug Sister      C.A.D. No family hx of      Cerebrovascular Disease No family hx of        Medications:  Current Outpatient Medications   Medication Sig Dispense Refill     Catheters MISC 1 catheter 2 times daily 30 each 11     fluorouracil (EFUDEX) 5 % external cream Apply twice daily for 2-3 weeks or until the onset of irritation on the ears, hands and forearms 80 g 0     Glucosamine HCl (GLUCOSAMINE PO)        lisinopril-hydrochlorothiazide (PRINZIDE,ZESTORETIC) 10-12.5 MG per tablet Take 1 tablet by mouth daily 90 tablet 3     metFORMIN (GLUCOPHAGE) 500 MG tablet 2 times daily       clobetasol (TEMOVATE) 0.05 % external cream Apply twice daily as needed for rash on foot. (Patient not taking: Reported on 11/10/2020) 15 g 3     ketoconazole (NIZORAL) 2 % external cream Apply topically daily Apply twice daily for 2 months to affected areas. (Patient not taking:  Reported on 11/10/2020) 60 g 0     valACYclovir (VALTREX) 500 MG tablet Take 1 tablet (500 mg) by mouth 2 times daily for 3 days 6 tablet 0       No Known Allergies    Review of Systems:     -Constitutional: Otherwise feeling well today, in usual state of health.  -Skin: As above in HPI. No additional skin concerns.    Physical exam:  Vitals: There were no vitals taken for this visit.  GEN: This is a well developed, well-nourished male in no acute distress, in a pleasant mood.    SKIN: Total skin excluding the undergarment areas was performed. The exam included the head/face, neck, both arms, chest, back, abdomen, both legs, digits and/or nails.   -Varner skin type: II  -Plaque, on the right lateral clavicle   -There is an erythematous macule with overyling adherent scale on the forehead, right temple, right tragus, right helix, right preauricular, left temple, left helix, left frontal scalp and scatttered on the hands.   -There is a well healed surgical scar without erythema, nodularity or telangiectasias on the sites of prior skin cancer.   -Left dorsal wrist erythematous papule with hemorraghic crust, 5mm   -Left upper back 1cm pearly patch  -Left lateral thigh 1.2cm verrucous plaque  -Right lateral clavicle 8mm keratotic papule  -No other lesions of concern on areas examined.     Labs:  NA    Impression/Plan:  1. History of NMSC   -Recommend sunscreens SPF #30 or greater, protective clothing and avoidance of tanning beds      2. Actinic keratosis. forehead, right temple, right tragus, right helix, right preauricular, left temple, left helix, left frontal scalp  Cryotherapy procedure note: After verbal consent and discussion of risks and benefits including but no limited to dyspigmentation/scar, blister, and pain, 8 was(were) treated with 1-2mm freeze border for 2 cycles with liquid nitrogen. Post cryotherapy instructions were provided.   After healed, for dorsal hands, forearms and ears, After healed, start  Efudex twice daily for 2-3 weeks or until the onset of irritation. Discussed that we would expect irritation form this medications. Recommend the patient wash hands after use or use gloves to apply. Keep medication away from pets. Avoid sun exposure to treated area. Do not occlude treated area with bandages. Follow up 4 weeks after the last application.          3.  Papular rash, right dorsal foot   -resolved    5. NUB - Right lateral clavicle 8mm keratotic papule- scc or sk  Shave biopsy:  After discussion of benefits and risks including but not limited to bleeding/bruising, pain/swelling, infection, scar, incomplete removal, nerve damage/numbness, recurrence, and non-diagnostic biopsy, written consent, verbal consent and photographs were obtained. Time-out was performed. The area was cleaned with isopropyl alcohol. 0.5mL of 1% lidocaine with epinephrine was injected to obtain adequate anesthesia of the lesion. A shave biopsy was performed. Hemostasis was achieved with aluminium chloride. Vaseline and a sterile dressing were applied. The patient tolerated the procedure and no complications were noted. The patient was provided with verbal and written post care instructions.     6. NUB - Left lateral thigh 1.2cm verrucous plaque - verruca or sk or scc  Shave biopsy:  After discussion of benefits and risks including but not limited to bleeding/bruising, pain/swelling, infection, scar, incomplete removal, nerve damage/numbness, recurrence, and non-diagnostic biopsy, written consent, verbal consent and photographs were obtained. Time-out was performed. The area was cleaned with isopropyl alcohol. 0.5mL of 1% lidocaine with epinephrine was injected to obtain adequate anesthesia of the lesion. A shave biopsy was performed. Hemostasis was achieved with aluminium chloride. Vaseline and a sterile dressing were applied. The patient tolerated the procedure and no complications were noted. The patient was provided with verbal  and written post care instructions.     7. NUB - Left upper back 1cm pearly patch, most likely BCC   Shave biopsy:  After discussion of benefits and risks including but not limited to bleeding/bruising, pain/swelling, infection, scar, incomplete removal, nerve damage/numbness, recurrence, and non-diagnostic biopsy, written consent, verbal consent and photographs were obtained. Time-out was performed. The area was cleaned with isopropyl alcohol. 0.5mL of 1% lidocaine with epinephrine was injected to obtain adequate anesthesia of the lesion. A shave biopsy was performed. Hemostasis was achieved with aluminium chloride. Vaseline and a sterile dressing were applied. The patient tolerated the procedure and no complications were noted. The patient was provided with verbal and written post care instructions.     8. Left dorsal wrist erythematous papule with hemorraghic crust, 5mm SCC or other   Shave biopsy:  After discussion of benefits and risks including but not limited to bleeding/bruising, pain/swelling, infection, scar, incomplete removal, nerve damage/numbness, recurrence, and non-diagnostic biopsy, written consent, verbal consent and photographs were obtained. Time-out was performed. The area was cleaned with isopropyl alcohol. 0.5mL of 1% lidocaine with epinephrine was injected to obtain adequate anesthesia of the lesion. A shave biopsy was performed. Hemostasis was achieved with aluminium chloride. Vaseline and a sterile dressing were applied. The patient tolerated the procedure and no complications were noted. The patient was provided with verbal and written post care instructions.     9. Plan for starting tretinoin via phone  CC No referring provider defined for this encounter. on close of this encounter.  Follow-up 4 months photos and phone for hands, forearms and ears, consider tretinoin follow up      Staff Involved:  Scribe/Staff      Scribe Disclosure:   Joshua PARRY, am serving as a scribe to document services  personally performed by Dr. Berta Monge, based on data collection and the provider's statements to me.   LXIONG3, MEDICAL ASSISTANT       Scribe Disclosure:   I, Christi Rahman, am serving as a scribe to document services personally performed by Dr. Monge, based on data collection and the provider's statements to me.         Again, thank you for allowing me to participate in the care of your patient.        Sincerely,        Berta Monge MD

## 2020-11-10 NOTE — NURSING NOTE
Andre Tyler's goals for this visit include:   Chief Complaint   Patient presents with     Skin Check     area of concern right shoulder hx NMSC       He requests these members of his care team be copied on today's visit information:     PCP: Arnulfo Joaquin    Referring Provider:  No referring provider defined for this encounter.    There were no vitals taken for this visit.    Do you need any medication refills at today's visit? Florence Rahman LPN

## 2020-11-13 ENCOUNTER — TELEPHONE (OUTPATIENT)
Dept: DERMATOLOGY | Facility: CLINIC | Age: 75
End: 2020-11-13

## 2020-11-13 LAB — COPATH REPORT: NORMAL

## 2020-11-13 NOTE — TELEPHONE ENCOUNTER
Excision Intake                                                    There were no vitals taken for this visit.    Do you take the following medications:  Coumadin, Eliquis, Pradaxa, Xarelto:   NO  -If on Coumadin, INR should be checked within 7 days of surgery.  Range is 3.5 or less or within therapeutic range.  Antibiotic Prophylaxis:  NO    Do you have an iodine allergy:  NO    Past Medical History                                                    Do you currently or have you previously had any of the following conditions:       HIV/AIDS:  NO    Hepatitis:  NO    Suppressed Immune System:  NO    Autoimmune disorder (eg RA, Lupus):  NO    Fever blisters/herpes, cold sores:  NO    Kidney disease:  NO  Creatinine   Date Value Ref Range Status   09/22/2015 1.18 mg/dL Final   ]    Pacemaker or Defibrillator:  NO.      History of artificial or heart valve replacement:  NO.      Endocarditis: NO     Organ transplant: NO.      Joint replacement within past 2 years: NO    Previous prosthetic joint infection: NO    Diabetes: YES    Pregnant:: NO    Tobacco use:  NO.    History   Smoking Status     Never Smoker   Smokeless Tobacco     Never Used     Reviewed by:  Manuela Alvares LPN    Result Communications      Result Notes     Manuela Alvares LPN   11/13/2020  3:40 PM      I spoke to Andre Tyler and gave results. Patient understood and had no further questions or concerns.     Patient scheduled for excision x1 with Dr. Nazario on Dec 17th at 2:00 pm.     MARILU Harper Ronda, MD   11/13/2020 12:47 PM      Left upper back, excision with myself or  within 3 months. I would very much like to do if it works in my schedule.     Part D is precancer. He can treat with efudex just llike we reviewed in clinic. Wait a couple weeks before starting on this area so it is healed     The other sites on the thigh and clavicle are harmless        Manuela Alvares LPN

## 2020-12-17 ENCOUNTER — OFFICE VISIT (OUTPATIENT)
Dept: DERMATOLOGY | Facility: CLINIC | Age: 75
End: 2020-12-17
Payer: COMMERCIAL

## 2020-12-17 VITALS — HEART RATE: 82 BPM | DIASTOLIC BLOOD PRESSURE: 88 MMHG | SYSTOLIC BLOOD PRESSURE: 128 MMHG

## 2020-12-17 DIAGNOSIS — C44.519 BASAL CELL CARCINOMA (BCC) OF BACK: Primary | ICD-10-CM

## 2020-12-17 PROCEDURE — 11602 EXC TR-EXT MAL+MARG 1.1-2 CM: CPT | Mod: GC | Performed by: DERMATOLOGY

## 2020-12-17 PROCEDURE — 12032 INTMD RPR S/A/T/EXT 2.6-7.5: CPT | Mod: GC | Performed by: DERMATOLOGY

## 2020-12-17 PROCEDURE — 88305 TISSUE EXAM BY PATHOLOGIST: CPT | Performed by: DERMATOLOGY

## 2020-12-17 ASSESSMENT — PAIN SCALES - GENERAL: PAINLEVEL: NO PAIN (0)

## 2020-12-17 NOTE — NURSING NOTE
The following medication was given:     MEDICATION:  Lidocaine with epinephrine 1% [id number]  ROUTE: SQ  SITE: see procedure note  DOSE: 9cc  LOT #: -[id number]  : Hosp[employer]  EXPIRATION DATE: 02/2021  NDC#: 3881-8378-46   Was there drug waste? 0cc  Multi-dose vial: Yes    Maunela Alvares LPN  December 17, 2020    Vaseline and pressure dressing applied to excision site on left upper back.  Wound care instructions reviewed with patient and AVS provided.  Patient verbalized understanding. No further questions or concerns at this time.

## 2020-12-17 NOTE — PROGRESS NOTES
DERMATOLOGY EXCISION PROCEDURE NOTE    Dermatology Problem List:  1. 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 Arnulfo Keyes, 2011. s/p excision in 2005, recurrent  - SCCIS, left dorsal hand, s/p via [facility] transferred records, Dr. Arnulfo Keyes, 2011  2. Actinic keratosis  - s/p cryotherapy  3. Compound melanocytic nevus, left lower back   - s/p biopsy 4/9/2015  4. Benign lichenoid keratosis, right lower leg  - s/p biopsy 4/9/2015    NAME OF PROCEDURE: Excision intermediate layered linear closure  Staff surgeon: Dr. Nazario  Fellow: Dr. Kellogg  Scrub Nurse: Manuela Alvares LPN      PRE-OPERATIVE DIAGNOSIS:  Basal Cell Carcinoma  POST-OPERATIVE DIAGNOSIS: Same   LOCATION: Left upper back  FINAL EXCISION SIZE(DEFECT SIZE): 1.7 x 1.6 cm  MARGIN: 0.4 cm  FINAL REPAIR LENGTH:  5.0 cm   ANESTHESIA: 9cc 1% lidocaine with 1:100,000 epinephrine           INDICATIONS: This patient presented with a 0.9x0.8cm Basal Cell Carcinoma. Excision was indicated. We discussed the principles of treatment and most likely complications including scarring, bleeding, infection, incomplete excision, wound dehiscence, pain, nerve damage, and recurrence. Informed consent was obtained and the patient underwent the procedure as follows:    PROCEDURE: The patient was taken to the operative suite. Time-out was performed.  The treatment area was anesthetized with 1% lidocaine with epinephrine. The area was prepped with Chlorhexidine and rinsed with sterile  saline and draped with sterile towels. The lesion was delineated and excised down to subcutaneous fat in a elliptical manner. Hemostasis was obtained by electrocoagulation.     REPAIR: An intermediate layered linear closure was selected as the procedure which would maximally preserve both function and cosmesis.    After the excision of the tumor, the area was carefully undermined. Hemostasis was obtained with bipolar electrocoagulation.  Closure was oriented so that the wound was in the patient's natural skin tension lines. The subcutaneous and dermal layers were then closed with 4-0 vicryl sutures. The epidermis was then carefully approximated along the length of the wound using 4-0 monocryl running subcuticular sutures.     Estimated blood loss was less than 10 ml for all surgical sites. A sterile pressure dressing was applied and wound care instructions, with a written handout, were given. The [facility] alert and ambulatory.     The patient elected for pathology results to automatically release and understands that the clinical staff will contact them as soon as possible to notify them of the results.     Patient offered virtual follow up for wound, he would prefer to contact our office with any questions or concerns.     Follow-up in 6 months for skin exam    Dr. Nazario was immediately available for the entire surgery and was physicially present for the key portions of the procedure.    Anatomic Pathology Results: pending    Clinical Follow-Up: 6 months    Staff Involved:  Fellow/Staff     Ramsey Kellogg MD  PGY-6    Micrographic Surgery and Dermatologic Oncology Fellow  December 17, 2020    Staff Physician Comments:   I saw and evaluated the patient with the Mohs Surgery Fellow (Dr. Ramsey Kellogg) and I agree with the assessment and plan and the above description of the procedure. I was present for the key portions of the procedure and entire exam.    Boby  DO Aravind    Department of Dermatology  [facility]: Phone: [phone], Fax:[phone]  [facility]: Phone: [phone], Fax: [phone]

## 2020-12-17 NOTE — PATIENT INSTRUCTIONS
Excision Wound Care Instructions  I will experience scar, altered skin color, bleeding, swelling, pain, crusting and redness. I may experience altered sensation. Risks are excessive bleeding, infection, muscle weakness, thick (hypertrophic or keloidal) scar, and recurrence,. A second procedure may be recommended to obtain the best cosmetic or functional result.  Possible complications of any surgical procedure are bleeding, infection, scarring, alteration in skin color and sensation, muscle weakness in the area, wound dehiscence or seperation, or recurrence of the lesion or disease. On occasion, after healing, a secondary procedure or revision may be recommended in order to obtain the best cosmetic or functional result.   After your surgery, a pressure bandage will be placed over the area that has sutures. This will help prevent bleeding. Please follow these instructions as they will help you to prevent complications as your wound heals.  For the First 48 hours After Surgery:  1. Leave the pressure bandage on and keep it dry. If it should come loose, you may retape it, but do not take it off.  2. Relax and take it easy. Do not do any vigorous exercise, heavy lifting, or bending forward. This could cause the wound to bleed.  3. Post-operative pain is usually mild. You may take plain or extra strength Tylenol every 4 hours as needed (do not take more than 4,000mg in one day). Do not take any medicine that contains aspirin, ibuprofen or motrin unless you have been recommended these by a doctor.  Avoid alcohol and vitamin E as these may increase your tendency to bleed.  4. You may put an ice pack around the bandaged area for 20 minutes every 2-3 hours. This may help reduce swelling, bruising, and pain. Make sure the ice pack is waterproof so that the pressure bandage does not get wet.   5. You may see a small amount of drainage or blood on your pressure bandage. This is normal. However, if drainage or bleeding continues  "or saturates the bandage, you will need to apply firm pressure over the bandage with a washcloth for 15 minutes. If bleeding continues after applying pressure for 15 minutes then go to the nearest emergency room.  48 Hours After Surgery  Carefully remove the white bandage (pressure bandage) but leave the clear \"saran wrap\" bandage in place. You can leave this clear bandage on until it falls off, up to 2 weeks. This bandage can get wet. If this clear bandage falls off before 2 weeks, please start daily wound care and dressing changes. Wash wound with a mild soap and water.  Use caution when washing the wound. Be gentle and do not let the forceful shower stream hit the wound directly.  PAT dry.  Daily Wound Care:  1. Wash wound with a mild soap and water.  Use caution when washing the wound, be gentle and do not let the forceful shower stream hit the wound directly.  2. PAT DRY.  3. Apply Vaseline (from a new container or tube) over the suture line with a Q-tip. It is very important to keep the wound continuously moist, as wounds heal best in a moist environment.  4.  Keep the site covered until suture line has healed over with new pink skin, you can cover it with a Telfa (non-stick) dressing and tape or a band-aid.    5. If you are unable to keep wound covered, you must apply Vaseline every 2 - 3 hours (while awake) to ensure it is being kept moist for optimal healing. A dressing overnight is recommended to keep the area moist.   Call Us If:  1. You have pain that is not controlled with Tylenol.  2. You have signs or symptoms of an infection, such as: fever over 100 degrees F, redness, warmth, or foul-smelling or yellow/creamy drainage from the wound.  Who should I call with questions?    [facility]: [phone]     [facility]: [phone]    For urgent needs outside of business hours call the [facility] at [phone] and ask for the " dermatology resident on call

## 2020-12-17 NOTE — LETTER
12/17/2020         RE: Andre Tyler  [address] [address]        Dear Colleague,    Thank you for referring your patient, Andre Tyler, to the [facility]. Please see a copy of my visit note below.    DERMATOLOGY EXCISION PROCEDURE NOTE    Dermatology Problem List:  1. History of NMSC:  - SCC, base of right thumb, s/p excision 6/26/18  - SCCIS, central chest s/p ED & C 6/11/18  - BCC, vertex scalp, s/p MMS 11/21/2016  - SCCis, R lateral thigh, s/p ED&C 11/21/2016  - SCCIS, right medial ankle, s/p Mohs 4/21/2016  - SCCIS, right ventral forearm, s/p ED&C 4/21/2016  - BCC, left preauricular, s/p Mohs 11/10/2015  - BCC, superficial type, left upper chest, s/p biopsy 4/9/2015, s/p ED&C 10/5/2015  - SCCIS, posterior right lower earlobe, records via [facility] [employer] transferred records, Dr. Arnulfo Keyes, 2011. s/p excision in 2005, recurrent  - SCCIS, left dorsal hand, s/p via [facility] [employer] transferred records, Dr. Arnulfo Keyes, 2011  2. Actinic keratosis  - s/p cryotherapy  3. Compound melanocytic nevus, left lower back   - s/p biopsy 4/9/2015  4. Benign lichenoid keratosis, right lower leg  - s/p biopsy 4/9/2015    NAME OF PROCEDURE: Excision intermediate layered linear closure  Staff surgeon: Dr. Nazario  Fellow: Dr. Kellogg  Scrub Nurse: Manuela Alvares LPN      PRE-OPERATIVE DIAGNOSIS:  Basal Cell Carcinoma  POST-OPERATIVE DIAGNOSIS: Same   LOCATION: Left upper back  FINAL EXCISION SIZE(DEFECT SIZE): 1.7 x 1.6 cm  MARGIN: 0.4 cm  FINAL REPAIR LENGTH:  5.0 cm   ANESTHESIA: 9cc 1% lidocaine with 1:100,000 epinephrine           INDICATIONS: This patient presented with a 0.9x0.8cm Basal Cell Carcinoma. Excision was indicated. We discussed the principles of treatment and most likely complications including scarring, bleeding, infection, incomplete excision, wound dehiscence, pain, nerve damage, and recurrence. Informed consent was obtained and  the patient underwent the procedure as follows:    PROCEDURE: The patient was taken to the operative suite. Time-out was performed.  The treatment area was anesthetized with 1% lidocaine with epinephrine. The area was prepped with Chlorhexidine and rinsed with sterile saline and draped with sterile towels. The lesion was delineated and excised down to subcutaneous fat in a elliptical manner. Hemostasis was obtained by electrocoagulation.     REPAIR: An intermediate layered linear closure was selected as the procedure which would maximally preserve both function and cosmesis.    After the excision of the tumor, the area was carefully undermined. Hemostasis was obtained with bipolar electrocoagulation.  Closure was oriented so that the wound was in the patient's natural skin tension lines. The subcutaneous and dermal layers were then closed with 4-0 vicryl sutures. The epidermis was then carefully approximated along the length of the wound using 4-0 monocryl running subcuticular sutures.     Estimated blood loss was less than 10 ml for all surgical sites. A sterile pressure dressing was applied and wound care instructions, with a written handout, were given. The patient was discharged from the [facility] alert and ambulatory.     The patient elected for pathology results to automatically release and understands that the clinical staff will contact them as soon as possible to notify them of the results.     Patient offered virtual follow up for wound, he would prefer to contact our office with any questions or concerns.     Follow-up in 6 months for skin exam    Dr. Nazario was immediately available for the entire surgery and was physicially present for the key portions of the procedure.    Anatomic Pathology Results: pending    Clinical Follow-Up: 6 months    Staff Involved:  Fellow/Staff     Ramsey Kellogg MD  PGY-6    Micrographic Surgery and Dermatologic Oncology Fellow  December 17, 2020    Staff  Physician Comments:   I saw and evaluated the patient with the Mohs Surgery Fellow (Dr. Ramsey Kellogg) and I agree with the assessment and plan and the above description of the procedure. I was present for the key portions of the procedure and entire exam.    Boby Nazario DO    Department of Dermatology  [facility]: Phone: [phone], Fax:[phone]  [facility]: Phone: [phone], Fax: [phone]          Again, thank you for allowing me to participate in the care of your patient.        Sincerely,        Boby Nazario MD

## 2020-12-17 NOTE — NURSING NOTE
Andre Tyler's goals for this visit include:   Chief Complaint   Patient presents with     Derm Problem     Hilario is here today for excision on his left uppper back due to BCC       He requests these members of his care team be copied on today's visit information:     PCP: Arnulfo Joaquin    Referring Provider:  No referring provider defined for this encounter.    /88   Pulse 82     Do you need any medication refills at today's visit? No    Manuela Alvares LPN

## 2020-12-21 LAB — COPATH REPORT: NORMAL

## 2020-12-22 ENCOUNTER — TELEPHONE (OUTPATIENT)
Dept: DERMATOLOGY | Facility: CLINIC | Age: 75
End: 2020-12-22

## 2020-12-22 NOTE — TELEPHONE ENCOUNTER
Manuela Alvares LPN   12/22/2020  8:51 AM CST      I spoke to Andre Sheffieldndell and gave results. Patient understood and had no further questions or concerns.     MARILU Harper Stephanie, LPN   12/22/2020  8:48 AM CST      Left voicemail for patient to discuss pathology results. Provided direct clinic phone number to return call.     MARILU Turcios Daniel, MD   12/22/2020  8:46 AM CST      Hello Team - since the patient is not in [facility], could we please call and let him know that the entire basal cell carcinoma from his left upper back was removed with the excision last week. No further treatment is needed. I hope he is healing well!     Thank you!     Mitch Alvares LPN

## 2020-12-22 NOTE — RESULT ENCOUNTER NOTE
Hello Team - since the patient is not in MyChart, could we please call and let him know that the entire basal cell carcinoma from his left upper back was removed with the excision last week. No further treatment is needed. I hope he is healing well!    Thank you!    Mitch

## 2021-06-18 ENCOUNTER — OFFICE VISIT (OUTPATIENT)
Dept: DERMATOLOGY | Facility: CLINIC | Age: 76
End: 2021-06-18
Payer: COMMERCIAL

## 2021-06-18 DIAGNOSIS — L57.0 ACTINIC KERATOSIS: Primary | ICD-10-CM

## 2021-06-18 DIAGNOSIS — D48.5 NEOPLASM OF UNCERTAIN BEHAVIOR OF SKIN: ICD-10-CM

## 2021-06-18 PROCEDURE — 88305 TISSUE EXAM BY PATHOLOGIST: CPT | Performed by: PATHOLOGY

## 2021-06-18 PROCEDURE — 11102 TANGNTL BX SKIN SINGLE LES: CPT | Performed by: DERMATOLOGY

## 2021-06-18 PROCEDURE — 17003 DESTRUCT PREMALG LES 2-14: CPT | Performed by: DERMATOLOGY

## 2021-06-18 PROCEDURE — 11103 TANGNTL BX SKIN EA SEP/ADDL: CPT | Performed by: DERMATOLOGY

## 2021-06-18 PROCEDURE — 17000 DESTRUCT PREMALG LESION: CPT | Mod: XS | Performed by: DERMATOLOGY

## 2021-06-18 PROCEDURE — 99212 OFFICE O/P EST SF 10 MIN: CPT | Mod: 25 | Performed by: DERMATOLOGY

## 2021-06-18 ASSESSMENT — PAIN SCALES - GENERAL: PAINLEVEL: NO PAIN (0)

## 2021-06-18 NOTE — LETTER
6/18/2021         RE: Andre Tyler  [address] [address]        Dear Colleague,    Thank you for referring your patient, Andre Tyler, to the [facility]. Please see a copy of my visit note below.    [facility] Dermatology Note  Encounter Date: Jun 18, 2021  Office Visit     Dermatology Problem List:  0. NUB x 4,   - left dorsal wrist, bx: 6/18/21  - upper mid back, bx: 6/18/21  - left clavicle, bx: 6/18/21  - left post auricular scalp, bx: 6/18/21  1. History of NMSC:  - BCC, left upper back, s/p excision 12/17/2020  - SCC, base of right thumb, s/p excision 6/26/18  - SCCIS, central chest s/p ED & C 6/11/18  - BCC, vertex scalp, s/p MMS 11/21/2016  - SCCis, R lateral thigh, s/p ED&C 11/21/2016  - SCCIS, right medial ankle, s/p Mohs 4/21/2016  - SCCIS, right ventral forearm, s/p ED&C 4/21/2016  - BCC, left preauricular, s/p Mohs 11/10/2015  - BCC, superficial type, left upper chest, s/p biopsy 4/9/2015, s/p ED&C 10/5/2015  - SCCIS, posterior right lower earlobe, records via [facility] transferred records, Dr. Arnulfo Keyes, 2011. s/p excision in 2005, recurrent  - SCCIS, left dorsal hand, s/p via [facility] transferred records, Dr. Arnulfo Keyes, 2011  2. Actinic keratosis  - HAK, left dorsal wrist, s/p bx: 11/10/2020  - s/p cryotherapy  3. Compound melanocytic nevus, left lower back   - s/p biopsy 4/9/2015  4. Benign lichenoid keratosis, right lower leg  - s/p biopsy 4/9/2015    ____________________________________________    Assessment & Plan:    # History of NMSC. No evidence of recurrence.   - Recommend sunscreens SPF #30 or greater, protective clothing and avoidance of tanning beds.   - Recommended 6 month skin exams.    # Neoplasm of uncertain behavior on the left dorsal wrist. The differential diagnosis includes BCC vs AK vs other.   - Previously biopsied and came back as HAK. Biopsy taken again  today. See procedure note.    # Actinic keratosis. - left cheek, right dorsal hand, left dorsal hand.  - Discussed precancerous nature of these lesions. Recommended treatment to prevent progression to a squamous cell carcinoma. Advised patient to call for follow up if not resolved in 1 month.   - See cryo procedure note     # Neoplasm of uncertain behavior on the upper mid back. The differential diagnosis includes BCC vs other.  - See procedure note.    # Neoplasm of uncertain behavior on the left clavicle. The differential diagnosis includes BCC vs SCC vs other.  - See procedure note.     # Neoplasm of uncertain behavior on the left post auricular scalp. The differential diagnosis includes bcc or telangiectasias   .  - See procedure note.    # Onychomacotroma - left index finger  - No further intervention needed.    Procedures Performed:   - Shave biopsy procedure note, locations: left dorsal wrist, upper mid back, left clavicle, left post auricular scalp. After discussion of benefits and risks including but not limited to bleeding, infection, scar, incomplete removal, recurrence, and non-diagnostic biopsy, written consent and photographs were obtained. The area was cleaned with isopropyl alcohol. 0.5mL of 1% lidocaine with epinephrine was injected to obtain adequate anesthesia of lesions. Shave biopsy at sites performed. Hemostasis was achieved with aluminium chloride. Petrolatum ointment and a sterile dressing were applied. The patient tolerated the procedure and no complications were noted. The patient was provided with verbal and written post care instructions.     - Cryotherapy procedure note, locations: left cheek, right dorsal hand, left dorsal hand. After verbal consent and discussion of risks and benefits including, but not limited to, dyspigmentation/scar, blister, and pain, 12 AKs were treated with 1-2 mm freeze border for 1-2 cycles with liquid nitrogen. Post cryotherapy instructions were  provided.    Follow-up: pending path results, 6 months for skin check.    Staff and Scribe:     Scribe Disclosure:   I, Nancy Aden, am serving as a scribe to document services personally performed by this physician, Dr. Berta Monge, based on data collection and the provider's statements to me.     Provider Disclosure:   The documentation recorded by the scribe accurately reflects the services I personally performed and the decisions made by me.    Berta Monge MD    Department of Dermatology  [facility]: Phone: [phone], Fax:[phone]  [facility]: Phone: [phone], Fax: [phone]      ____________________________________________    CC: Skin Check (full skin check- no area of concerns. Hx of BCC and SCC)    HPI:  Mr. Andre Tyler is a(n) 75 year old male who presents today as a return patient for skin check.    Last skin check was 11/10/2020. At that time, cryo was performed on numerous AKs. Biopsies also determined the followin. Left lateral thigh - iSK  2. Right lateral clavicle - iSK  3. Left upper back - BCC  4. Left dorsal wrist - HAK    Since then, the BCC of the left upper back has been excised.    Today, patient notes no areas of concern.    Patient is otherwise feeling well, without additional skin concerns.    Labs Reviewed:  Derm path from 11/10/2020 reviewed.    Physical Exam:  Vitals: There were no vitals taken for this visit.  SKIN: Total skin excluding the undergarment areas was performed. The exam included the head/face, neck, both arms, chest, back, abdomen, buttocks, both legs, digits and/or nails. Buttock external normal  - Left dorsal wrist: 5mm keratotic papule  - There are erythematous macules with overyling adherent scale on the left cheek x2, right dorsal hand x5, left dorsal hand x5.   - Upper mid back: 1cm shiny patch  - Left clavicle:  1.1cm patch adjacent to a scar  - Left post auricular scalp: pearly 3mm papule  - Left index finger: linear skin colored hyperkeratotic nail change. Verticle.  - No other lesions of concern on areas examined.     Medications:  Current Outpatient Medications   Medication     Glucosamine HCl (GLUCOSAMINE PO)     lisinopril-hydrochlorothiazide (PRINZIDE,ZESTORETIC) 10-12.5 MG per tablet     metFORMIN (GLUCOPHAGE) 500 MG tablet     Catheters MISC     clobetasol (TEMOVATE) 0.05 % external cream     fluorouracil (EFUDEX) 5 % external cream     ketoconazole (NIZORAL) 2 % external cream     valACYclovir (VALTREX) 500 MG tablet     No current facility-administered medications for this visit.       Past Medical History:   Patient Active Problem List   Diagnosis     Health Care Home     Hypertension goal BP (blood pressure) < 140/90     Squamous cell skin cancer     Prostate cancer - surgically treated     Advanced directives, counseling/discussion     CARDIOVASCULAR SCREENING; LDL GOAL LESS THAN 130     Sensorineural hearing loss, bilateral     Bladder neck contracture     Bladder stone     Type 2 diabetes mellitus without complication (H)     Past Medical History:   Diagnosis Date     Diabetes (H)      Hypertension goal BP (blood pressure) < 140/90      Prostate cancer (H)      Squamous cell skin cancer         CC Referred Self, MD  No address on file on close of this encounter.      Again, thank you for allowing me to participate in the care of your patient.        Sincerely,        Berta Monge MD

## 2021-06-18 NOTE — PROGRESS NOTES
[facility]atology Note  Encounter Date: Jun 18, 2021  Office Visit     Dermatology Problem List:  0. NUB x 4,   - left dorsal wrist, bx: 6/18/21  - upper mid back, bx: 6/18/21  - left clavicle, bx: 6/18/21  - left post auricular scalp, bx: 6/18/21  1. 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 Arnulfo Keyes, 2011. s/p excision in 2005, recurrent  - SCCIS, left dorsal hand, s/p via [facility] transferred records, Dr. Arnulfo Keyes, 2011  2. Actinic keratosis  - HAK, left dorsal wrist, s/p bx: 11/10/2020  - s/p cryotherapy  3. Compound melanocytic nevus, left lower back   - s/p biopsy 4/9/2015  4. Benign lichenoid keratosis, right lower leg  - s/p biopsy 4/9/2015    ____________________________________________    Assessment & Plan:    # History of NMSC. No evidence of recurrence.   - Recommend sunscreens SPF #30 or greater, protective clothing and avoidance of tanning beds.   - Recommended 6 month skin exams.    # Neoplasm of uncertain behavior on the left dorsal wrist. The differential diagnosis includes BCC vs AK vs other.   - Previously biopsied and came back as HAK. Biopsy taken again today. See procedure note.    # Actinic keratosis. - left cheek, right dorsal hand, left dorsal hand.  - Discussed precancerous nature of these lesions. Recommended treatment to prevent progression to a squamous cell carcinoma. Advised patient to call for follow up if not  resolved in 1 month.   - See cryo procedure note     # Neoplasm of uncertain behavior on the upper mid back. The differential diagnosis includes BCC vs other.  - See procedure note.    # Neoplasm of uncertain behavior on the left clavicle. The differential diagnosis includes BCC vs SCC vs other.  - See procedure note.     # Neoplasm of uncertain behavior on the left post auricular scalp. The differential diagnosis includes bcc or telangiectasias   .  - See procedure note.    # Onychomacotroma - left index finger  - No further intervention needed.    Procedures Performed:   - Shave biopsy procedure note, locations: left dorsal wrist, upper mid back, left clavicle, left post auricular scalp. After discussion of benefits and risks including but not limited to bleeding, infection, scar, incomplete removal, recurrence, and non-diagnostic biopsy, written consent and photographs were obtained. The area was cleaned with isopropyl alcohol. 0.5mL of 1% lidocaine with epinephrine was injected to obtain adequate anesthesia of lesions. Shave biopsy at sites performed. Hemostasis was achieved with aluminium chloride. Petrolatum ointment and a sterile dressing were applied. The patient tolerated the procedure and no complications were noted. The patient was provided with verbal and written post care instructions.     - Cryotherapy procedure note, locations: left cheek, right dorsal hand, left dorsal hand. After verbal consent and discussion of risks and benefits including, but not limited to, dyspigmentation/scar, blister, and pain, 12 AKs were treated with 1-2 mm freeze border for 1-2 cycles with liquid nitrogen. Post cryotherapy instructions were provided.    Follow-up: pending path results, 6 months for skin check.    Staff and Scribe:     Scribe Disclosure:   Nancy PARRY, am serving as a scribe to document services personally performed by this physician, Dr. Berta Monge, based on data collection and the provider's  statements to me.     Provider Disclosure:   The documentation recorded by the scribe accurately reflects the services I personally performed and the decisions made by me.    Berta Monge MD    Department of Dermatology  [facility]: Phone: [phone], Fax:[phone]  [facility]: Phone: [phone], Fax: [phone]      ____________________________________________    CC: Skin Check (full skin check- no area of concerns. Hx of BCC and SCC)    HPI:  Mr. Andre Tyler is a(n) 75 year old male who presents today as a return patient for skin check.    Last skin check was 11/10/2020. At that time, cryo was performed on numerous AKs. Biopsies also determined the followin. Left lateral thigh - iSK  2. Right lateral clavicle - iSK  3. Left upper back - BCC  4. Left dorsal wrist - HAK    Since then, the BCC of the left upper back has been excised.    Today, patient notes no areas of concern.    Patient is otherwise feeling well, without additional skin concerns.    Labs Reviewed:  Derm path from 11/10/2020 reviewed.    Physical Exam:  Vitals: There were no vitals taken for this visit.  SKIN: Total skin excluding the undergarment areas was performed. The exam included the head/face, neck, both arms, chest, back, abdomen, buttocks, both legs, digits and/or nails. Buttock external normal  - Left dorsal wrist: 5mm keratotic papule  - There are erythematous macules with overyling adherent scale on the left cheek x2, right dorsal hand x5, left dorsal hand x5.   - Upper mid back: 1cm shiny patch  - Left clavicle: 1.1cm patch adjacent to a scar  - Left post auricular scalp: pearly 3mm papule  - Left index finger: linear skin colored hyperkeratotic nail change. Verticle.  - No other lesions of concern on areas examined.     Medications:  Current Outpatient Medications   Medication      Glucosamine HCl (GLUCOSAMINE PO)     lisinopril-hydrochlorothiazide (PRINZIDE,ZESTORETIC) 10-12.5 MG per tablet     metFORMIN (GLUCOPHAGE) 500 MG tablet     Catheters MISC     clobetasol (TEMOVATE) 0.05 % external cream     fluorouracil (EFUDEX) 5 % external cream     ketoconazole (NIZORAL) 2 % external cream     valACYclovir (VALTREX) 500 MG tablet     No current facility-administered medications for this visit.       Past Medical History:   Patient Active Problem List   Diagnosis     Health Care Home     Hypertension goal BP (blood pressure) < 140/90     Squamous cell skin cancer     Prostate cancer - surgically treated     Advanced directives, counseling/discussion     CARDIOVASCULAR SCREENING; LDL GOAL LESS THAN 130     Sensorineural hearing loss, bilateral     Bladder neck contracture     Bladder stone     Type 2 diabetes mellitus without complication (H)     Past Medical History:   Diagnosis Date     Diabetes (H)      Hypertension goal BP (blood pressure) < 140/90      Prostate cancer (H)      Squamous cell skin cancer         CC Referred Self, MD  No address on file on close of this encounter.

## 2021-06-18 NOTE — NURSING NOTE
Andre Tyler's goals for this visit include:   Chief Complaint   Patient presents with     Skin Check     full skin check- no area of concerns. Hx of BCC and SCC       He requests these members of his care team be copied on today's visit information:     PCP: Arnulfo Joaquin    Referring Provider:  Referred Self, MD  No address on file    There were no vitals taken for this visit.    Do you need any medication refills at today's visit? Florence Louise on 6/18/2021 at 8:39 AM'

## 2021-06-18 NOTE — PATIENT INSTRUCTIONS
Wound Care After a Biopsy    What is a skin biopsy?  A skin biopsy allows the doctor to examine a very small piece of tissue under the microscope to determine the diagnosis and the best treatment for the skin condition. A local anesthetic (numbing medicine)  is injected with a very small needle into the skin area to be tested. A small piece of skin is taken from the area. Sometimes a suture (stitch) is used.     What are the risks of a skin biopsy?  I will experience scar, bleeding, swelling, pain, crusting and redness. I may experience incomplete removal or recurrence. Risks of this procedure are excessive bleeding, bruising, infection, nerve damage, numbness, thick (hypertrophic or keloidal) scar and non-diagnostic biopsy.    How should I care for my wound for the first 24 hours?    Keep the wound dry and covered for 24 hours    If it bleeds, hold direct pressure on the area for 15 minutes. If bleeding does not stop then go to the emergency room    Avoid strenuous exercise the first 1-2 days or as your doctor instructs you    How should I care for the wound after 24 hours?    After 24 hours, remove the bandage    You may bathe or shower as normal    If you had a scalp biopsy, you can shampoo as usual and can use shower water to clean the biopsy site daily    Clean the wound twice a day with gentle soap and water    Do not scrub, be gentle    Apply white petroleum/Vaseline after cleaning the wound with a cotton swab or a clean finger, and keep the site covered with a Bandaid /bandage. Bandages are not necessary with a scalp biopsy    If you are unable to cover the site with a Bandaid /bandage, re-apply ointment 2-3 times a day to keep the site moist. Moisture will help with healing    Avoid strenuous activity for first 1-2 days    Avoid lakes, rivers, pools, and oceans until the stitches are removed or the site is healed    How do I clean my wound?    Wash hands thoroughly with soap or use hand  before all  wound care    Clean the wound with gentle soap and water    Apply white petroleum/Vaseline  to wound after it is clean    Replace the Bandaid /bandage to keep the wound covered for the first few days or as instructed by your doctor    If you had a scalp biopsy, warm shower water to the area on a daily basis should suffice    What should I use to clean my wound?     Cotton-tipped applicators (Qtips )    White petroleum jelly (Vaseline ). Use a clean new container and use Q-tips to apply.    Bandaids   as needed    Gentle soap     How should I care for my wound long term?    Do not get your wound dirty    Keep up with wound care for one week or until the area is healed.    A small scab will form and fall off by itself when the area is completely healed. The area will be red and will become pink in color as it heals. Sun protection is very important for how your scar will turn out. Sunscreen with an SPF 30 or greater is recommended once the area is healed.    You should have some soreness but it should be mild and slowly go away over several days. Talk to your doctor about using tylenol for pain,    When should I call my doctor?  If you have increased:     Pain or swelling    Pus or drainage (clear or slightly yellow drainage is ok)    Temperature over 100F    Spreading redness or warmth around wound    When will I hear about my results?  The biopsy results can take 2-3 weeks to come back. The clinic will call you with the results, send you a Node[employer] message, or have you schedule a follow-up clinic or phone time to discuss the results. Contact our clinics if you do not hear from us in 3 weeks.     Who should I call with questions?    [facility]: [phone]     [facility]: [phone]    For urgent needs outside of business hours call the [facility] at [phone] and ask for the dermatology resident on call    Cryotherapy    What is it?    Use  of a very cold liquid, such as liquid nitrogen, to freeze and destroy abnormal skin cells that need to be removed    What should I expect?    Tenderness and redness    A small blister that might grow and fill with dark purple blood. There may be crusting.    More than one treatment may be needed if the lesions do not go away.    How do I care for the treated area?    Gently wash the area with your hands when bathing.    Use a thin layer of Vaseline to help with healing. You may use a Band-Aid.     The area should heal within 7-10 days and may leave behind a pink or lighter color.     Do not use an antibiotic or Neosporin ointment.     You may take acetaminophen (Tylenol) for pain.     Call your Doctor if you have:    Severe pain    Signs of infection (warmth, redness, cloudy yellow drainage, and or a bad smell)    Questions or concerns    Who should I call with questions?       [facility]: [phone]       [facility]: [phone]       For urgent needs outside of business hours call the [facility] at [phone]        and ask for the dermatology resident on call

## 2021-06-18 NOTE — NURSING NOTE
The following medication was given:     MEDICATION:  Lidocaine with epinephrine 1% [id number]  ROUTE: SQ  SITE: see procedure note  DOSE: -EV  LOT #: [id number]  : Jerrica  EXPIRATION DATE: 02/22  NDC#: 9797-7475-02  Was there drug waste? 0cc  Multi-dose vial: Yes    Manuela Alvares LPN  June 18, 2021

## 2021-06-22 LAB — COPATH REPORT: NORMAL

## 2021-07-01 ENCOUNTER — TELEPHONE (OUTPATIENT)
Dept: DERMATOLOGY | Facility: CLINIC | Age: 76
End: 2021-07-01

## 2021-07-01 NOTE — TELEPHONE ENCOUNTER
[facility] [place] Pre-Surgery Screening Note     Pre-screening Information:  Hx of Skin Cancer: Yes  Hx of Mohs Surgery: Yes  Transplant: No  Immunocompromised: No  Current Anticoagulant(s): None  Bleeding Disorder(s): No  Stent: No  Pacemaker: No  Defibrillator: No  Brain/Nerve Stimulator: No  Endocarditis/Rheumatic Fever Hx: No  Vascular graft: No  Prophylactic Antibiotic Needed: No  Congenital heart defect: No  Prosthetic Heart Valve: No      Medications/Allergies  Medications and allergies review with patient: Yes, No     Current Outpatient Medications   Medication Sig Dispense Refill     Catheters MISC 1 catheter 2 times daily 30 each 11     clobetasol (TEMOVATE) 0.05 % external cream Apply twice daily as needed for rash on foot. (Patient not taking: Reported on 11/10/2020) 15 g 3     fluorouracil (EFUDEX) 5 % external cream Apply twice daily for 2-3 weeks or until the onset of irritation on the ears, hands and forearms (Patient not taking: Reported on 6/18/2021) 80 g 0     Glucosamine HCl (GLUCOSAMINE PO)        ketoconazole (NIZORAL) 2 % external cream Apply topically daily Apply twice daily for 2 months to affected areas. (Patient not taking: Reported on 11/10/2020) 60 g 0     lisinopril-hydrochlorothiazide (PRINZIDE,ZESTORETIC) 10-12.5 MG per tablet Take 1 tablet by mouth daily 90 tablet 3     metFORMIN (GLUCOPHAGE) 500 MG tablet 2 times daily       valACYclovir (VALTREX) 500 MG tablet Take 1 tablet (500 mg) by mouth 2 times daily for 3 days 6 tablet 0     No Known Allergies    Pertinent Labs:  Last Creatine:   Creatinine   Date Value Ref Range Status   09/22/2015 1.18 mg/dL Final     Last INR: No results found for: INR    Other Questions:    Reminded patient to take any order prophylactic antibiotics 1 hour prior to appointment: Yes, No    If on a prescribed anticoagulant, advised patient to continue or check with prescribing provider: Yes, No    If on an OTC  anticoagulant/supplement, advised patient to hold these medications 10-14 days prior to surgery and resume 48 hrs after surgery: Yes, No     Please be aware that this can be an all day procedure. Please bring your daily medications and food/cash. Encourage patient to eat prior to procedure(s). After care instructions were reviewed with patient.    If any positives, send to RN to initiate antibiotic prophylaxis protocol and/or anticoagulation management protocol    Reviewed by:    MARINA Fields Colleen E, RN   7/1/2021  3:49 PM CDT      Pt returned the call and notified of results and 's recommendation. Pt scheduled for Mohs and Excisions on 7/21/21 at 8am. Previsit completed and letter sent..Christi Marroquin RN, LPN   7/1/2021  1:46 PM CDT      Left message for patient to call [facility] in [place] back at [phone]     MARILU Cabrera Adam R, MD   6/30/2021  3:57 PM CDT      My treatment recommendation is Mohs for the left wrist, excision for the upper mid back and left clavicle.   Thank you.    Jessica Reyna RN   6/30/2021  9:45 AM CDT      Dr. Nazario - please review and advise on treatment options for A, B and C.  Hx of MMS, excision and ED&C.  Photos in chart.  MARINA Hughes Ronda, MD   6/29/2021  5:49 PM CDT      Call patietn  PArt A skin cancer wrist, recommend mohs  Part B- PArt D skin cancer, Ed and C or excision  Part C, skin cancer, Ed and C or excision  Part D harmless mole       Boby Nazario MD  P [facility] [place]             My treatment recommendation is Mohs for the left wrist, excision for the upper mid back and left clavicle.   Thank you.    Associated Results    Dermatological path order and indications  Order: [id number]  Status:  Final result   Visible to patient:  No (inaccessible in MyChart) Dx:  Neoplasm of uncertain behavior of skin  Component 13d ago   Copath Report Patient  Name: LUI SOTO   MR#: [id number]   Specimen #: [id number]   Collected: 6/18/2021   Received: 6/21/2021   Reported: 6/22/2021 14:29   Ordering Phy(s): KULWINDER MATHEW     For improved result formatting, select 'View Enhanced Report Format' under    Linked Documents section.     SPECIMEN(S):   A: Skin, left dorsal wrist, shave   B: Skin, upper mid back, shave   C: Skin, left clavicle, shave   D: Skin, left postauricular scalp, shave     FINAL DIAGNOSIS:   A. Skin, left dorsal wrist, shave:   - Superficially invasive squamous cell carcinoma  (see description)     B. Skin, upper mid back, shave:   - Basal cell carcinoma, superficial type, extending to the lateral and   deep margins - (see description)     C. Skin, left clavicle, shave:   - Basal cell carcinoma, superficial type, extending to the lateral and   deep margins - (see description)     D. Skin, left postauricular scalp, shave:   - Intradermal melanocytic nevus - (see description

## 2021-07-01 NOTE — LETTER
"    Mr.Lawrence VETO Tyler  [address] [facility] [address]        July 1, 2021    Dear ,    You have an upcoming appointment with Dr. Nazario for Mohs surgery. It is scheduled for 7/21/21 at 8:00 AM. Please check-in 15 minutes prior to your appointment at check-in desk \"D\" on the 2nd floor. Our address is [address] [place] [address].  Please review these important reminders:    1) Expect to be here the majority of the morning.  The Mohs surgical procedure can be an extensive surgery requiring multiple stages. Each stage may take 1-2 hours.     2) You may eat breakfast. You may bring snacks or beverages with you.  3) Take all normal medications morning of surgery -- unless otherwise indicated by your physician   If you have an artificial heart valve we will prescribe an antibiotic. Please take one hour prior to surgery.     4) You will need a  to be with you if your surgical site is close to your eyes. You can get swelling/bruising immediately after surgery and will go home with a big bulky bandage that could obstruct your view of driving safely.     5) Wear comfortable clothing -- preferably a button down shirt or a loose fitted shirt (to avoid pulling over pressure bandage off when you get home). If your surgery site is on your leg, try wearing loose fitted pants. If your surgery site is on your arm, try wearing a short-sleeve shirt.     6) Bring reading material or other items to help pass time. We do have internet access available if you own a laptop, iPad, etc.    7) Women - do NOT wear make-up. We will be washing it off anyone needing surgery on the face     8) Do NOT stop blood thinning medication unless instructed to do so by your surgeon. This will include: Warfarin, Coumadin, Eliquis, Jantoven, Aspirin, Plavix and Pradaxa. If you are taking Warfarin or Coumadin, please have your INR blood test results sent to our office no more than 7 days prior to your surgery.     9) " Tylenol is a good alternative to take for headaches and pain, and can be taken throughout your surgery and postoperative recovery.    If you need to reschedule or have any questions or concerns, please call me at [phone].    Sincerely,  Thi GRAY

## 2021-07-21 ENCOUNTER — OFFICE VISIT (OUTPATIENT)
Dept: DERMATOLOGY | Facility: CLINIC | Age: 76
End: 2021-07-21
Payer: COMMERCIAL

## 2021-07-21 VITALS — SYSTOLIC BLOOD PRESSURE: 126 MMHG | DIASTOLIC BLOOD PRESSURE: 85 MMHG

## 2021-07-21 DIAGNOSIS — C44.519 BASAL CELL CARCINOMA OF CLAVICULAR AREA: ICD-10-CM

## 2021-07-21 DIAGNOSIS — C44.629: Primary | ICD-10-CM

## 2021-07-21 DIAGNOSIS — C44.519 BASAL CELL CARCINOMA OF UPPER BACK: ICD-10-CM

## 2021-07-21 PROCEDURE — 11602 EXC TR-EXT MAL+MARG 1.1-2 CM: CPT | Mod: XS | Performed by: DERMATOLOGY

## 2021-07-21 PROCEDURE — 12035 INTMD RPR S/A/T/EXT 12.6-20: CPT | Performed by: DERMATOLOGY

## 2021-07-21 PROCEDURE — 88305 TISSUE EXAM BY PATHOLOGIST: CPT | Mod: 59 | Performed by: DERMATOLOGY

## 2021-07-21 PROCEDURE — 17311 MOHS 1 STAGE H/N/HF/G: CPT | Performed by: DERMATOLOGY

## 2021-07-21 ASSESSMENT — PAIN SCALES - GENERAL: PAINLEVEL: NO PAIN (0)

## 2021-07-21 NOTE — NURSING NOTE
Andre Tyler's goals for this visit include:   Chief Complaint   Patient presents with     Procedure     MOHS left wrist and Excision upper mid back and left clavicle       He requests these members of his care team be copied on today's visit information:     PCP: Arnulfo Joaquin    Referring Provider:  No referring provider defined for this encounter.    /85     Do you need any medication refills at today's visit? Florence Rahman LPN

## 2021-07-21 NOTE — PATIENT INSTRUCTIONS
MOHS/ Excision Wound Care Instructions with Steri strips      I will experience scar, altered skin color, bleeding, swelling, pain, crusting and redness. I may experience altered sensation. Risks are excessive bleeding, infection, muscle weakness, thick (hypertrophic or keloidal) scar, and recurrence. A second procedure may be recommended to obtain the best cosmetic or functional result.    Possible complications of any surgical procedure are bleeding, infection, scarring, alteration in skin color and sensation, muscle weakness in the area, wound dehiscence or seperation, or recurrence of the lesion or disease. On occasion, after healing, a secondary procedure or revision may be recommended in order to obtain the best cosmetic or functional result.       After your surgery, a pressure bandage will be placed over the area that has sutures. This will help prevent bleeding. Please, follow these instructions as they will help you to prevent complications as your wound heals.        For the First 48 hours After Surgery:    1. Leave the pressure bandage on and keep it dry. If it should come loose, you may retape it, but do not take it off.    2. Relax and take it easy. Do not do any vigorous exercise, heavy lifting, or bending forward. This could cause the wound to bleed.    3. Post-operative pain is usually mild. You may take plain or extra strength Tylenol every 4 hours as needed (do not take more than 4,000mg in one day) if you have no liver problems and your doctor says it is okay. Do not take any medicine that contains aspirin, ibuprofen or motrin unless you have been recommended these by a doctor.  Avoid alcohol and vitamin E as these may increase your tendency to bleed.    4. You may put an ice pack around the bandaged area for 20 minutes every 2-3 hours. This may help reduce swelling, bruising, and pain. Make sure the ice pack is waterproof so that the pressure bandage does not get wet.     5. You may see a small  amount of drainage or blood on your pressure bandage. This is normal. However, if drainage or bleeding continues or saturates the bandage, you will need to apply firm pressure over the bandage with a washcloth for 15 minutes. If bleeding continues after applying pressure for 15 minutes then go to the nearest emergency room.      48 hours after surgery remove outer white pressure dressing down to clear plastic tegaderm dressing. Leave this tegaderm dressing in place up to 2 weeks. If dressing falls off prior to 2 weeks, follow wound care instructions as below to keep covered for full 2 weeks post surgery.       Daily Wound Care:    1. Once the steri strips fall off wash wound with a mild soap and water.  Use caution when washing the wound, be gentle and do not let the forceful shower stream hit the wound directly. DO NOT WASH WITH HYDROGEN PEROXIDE FOR THIS MIGHT CAUSE THE SUTURES TO DISSOLVE FASTER THAN WHAT WE WANT.     2. PAT DRY.    3. Once steri strips fall off apply Vaseline (from a new container or tube) over the suture line with a Q-tip until it is completely healed. It is very important to keep the wound continuously moist, as wounds heal best in a moist environment.    4. Keep the site covered until site is healed, you can cover it with a Telfa (non-stick) dressing and tape or a band-aid. While your steri strips are on you can use them as your bandage.    5. Once steri strips fall off if you are unable to keep wound covered, you must apply Vaseline every 2 - 3 hours (while awake) to ensure it is being kept moist for optimal healing until completely healed. A dressing overnight is recommended to keep the area moist.        Call Us If:    1. You have pain that is not controlled with Tylenol.    2. You have signs or symptoms of an infection, such as: fever over 100 degrees F, redness, warmth, or foul-smelling or yellow/creamy drainage from the wound.        Who should I call with questions?  [facility]  [facility]: [phone]   [facility]: [phone]  For urgent needs outside of business hours call the [facility] at [phone] and ask for the dermatology resident on call

## 2021-07-21 NOTE — NURSING NOTE
The following medication was given:     MEDICATION:  Lidocaine with epinephrine 1% [id number]  ROUTE: SQ  SITE: see procedure note  DOSE: 21cc  LOT #: [id number]  : [employer]  EXPIRATION DATE: 2/2022  NDC#: 8736-2252-85  Was there drug waste? 0cc  Multi-dose vial: Yes    Christi Rahman LPN  July 21, 2021    Steri strips and pressure dressing applied to Mohs and excision sites on left dorsal wrist, upper mid back and left clavicle.  Wound care instructions reviewed with patient and AVS provided.  Patient verbalized understanding.  No further questions or concerns at this time.      Christi Rahman LPN

## 2021-07-21 NOTE — LETTER
7/21/2021         RE: Andre Tyler  [address] [address]        Dear Colleague,    Thank you for referring your patient, Andre Tyler, to the [facility]. Please see a copy of my visit note below.    DERMATOLOGY EXCISION PROCEDURE NOTE    Dermatology Problem List:  1. 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  -BCC, upper mid back, s/p excision 7/21/21  -BCC, Left clavicle, s/p excision 7/21/21  - BCC, left upper back, s/p excision 12/17/20  - SCC, base of right thumb, s/p excision 6/26/18  - SCCIS, central chest s/p ED & C 6/11/18  - BCC, vertex scalp, s/p MMS 11/21/2016  - SCCis, R lateral thigh, s/p ED&C 11/21/2016  - SCCIS, right medial ankle, s/p Mohs 4/21/2016  - SCCIS, right ventral forearm, s/p ED&C 4/21/2016  - BCC, left preauricular, s/p Mohs 11/10/2015  - BCC, superficial type, left upper chest, s/p biopsy 4/9/2015, s/p ED&C 10/5/2015  - SCCIS, posterior right lower earlobe, records via [facility] [employer] transferred records, Dr. Arnulfo Keyes, 2011. s/p excision in 2005, recurrent  - SCCIS, left dorsal hand, s/p via [facility] [employer] transferred records, Dr. Arnulfo Keyes, 2011  2. Actinic keratosis  - HAK, left dorsal wrist, s/p bx: 11/10/20  - s/p cryotherapy  3. Compound melanocytic nevus, left lower back   - s/p biopsy 4/9/2015  4. Benign lichenoid keratosis, right lower leg  - s/p biopsy 4/9/2015      [facility] [place] Procedure Note    Date of Service:  Jul 21, 2021  Surgery: Mohs micrographic surgery    Case 1  Repair Type: intermediate  Repair Size: 3.4 cm  Suture Material: monocryl 5-0  Tumor Type: SCC - Squamous cell carcinoma  Location: left dorsal wrist  Derm-Path Accession #: [id number]  PreOp Size: 0.7X0.7 cm  PostOp Size: 1.4X1.1 cm  Mohs Accession #: [id number]  Level of Defect: fat    Procedure:  We discussed the principles of treatment and most likely  complications including scarring, bleeding, infection, swelling, pain, crusting, nerve damage, large wound,  incomplete excision, wound dehiscence,  nerve damage, recurrence, and a second procedure may be recommended to obtain the best cosmetic or functional result.    Informed consent was obtained and the patient underwent the procedure as follows:  The patient was placed supine on the operating table.  The cancer was identified, outlined with a marker, and verified by the patient.  The entire surgical field was prepped with Hibiclens.  The surgical site was anesthetized using Lidocaine 1% with epi 1:100,000.      The area of clinically apparent tumor was debulked. The layer of tissue was then surgically excised using a #15 blade and was then transferred onto a specimen sheet maintaining the orientation of the specimen. Hemostasis was obtained using bipolar electrocoagulation. The wound site was then covered with a dressing while the tissue samples were processed for examination.    The excised tissue was transported to the Mohs histology laboratory maintaining the tissue orientation.  The tissue specimen was relaxed so that the entire surgical margin was in a a single horizontal plane for sectioning and inked for precise mapping.  A precise reference map was drawn to reflect the sectioning of the specimen, colored inking of the margins, and orientation on the patient. The tissue was processed using horizontal sectioning of the base and continuous peripheral margins.  The histopathologic sections were reviewed in conjunction with the reference map.    Total blocks: 1    Total slides:  1    There were no cancer cells visualized on examination, therefore Mohs surgery was complete.    REPAIR: An intermediate layered linear closure was selected as the procedure which would maximally preserve both function and cosmesis.    After the excision of the tumor, the area was undermined. Hemostasis was obtained with bipolar  electrocoagulation.  Closure was oriented so that the wound was in the patient's natural skin tension lines. The subcutaneous and dermal layers were then closed with 4-0 monocryl buried vertical mattress sutures. The epidermis was then carefully approximated along the length of the wound using 5-0 Fast Absorbing Gut simple running sutures.     Estimated blood loss was less than 10 ml for all surgical sites. A sterile pressure dressing was applied and wound care instructions, with a written handout, were given. The patient was discharged from the [facility] alert and ambulatory.    Follow-up in PRN        NAME OF PROCEDURE: Excision intermediate layered linear closure  Staff surgeon: Dr. Nazario  Resident: none  Scrub Nurse: Christi Rahman LPN    PRE-OPERATIVE DIAGNOSIS:  Basal Cell Carcinoma  POST-OPERATIVE DIAGNOSIS: Same   LOCATION: upper mid back  FINAL EXCISION SIZE(DEFECT SIZE): 1.9X1.7 cm  MARGIN: 0.4 cm  FINAL REPAIR LENGTH: 5.2 cm   ANESTHESIA: 9cc 1% lidocaine with 1:100,000 epinephrine       INDICATIONS: This patient presented with a 1.1X0.9cm Basal Cell Carcinoma. Excision was indicated. We discussed the principles of treatment and most likely complications including scarring, bleeding, infection, incomplete excision, wound dehiscence, pain, nerve damage, and recurrence. Informed consent was obtained and the patient underwent the procedure as follows:    PROCEDURE: The patient was taken to the operative suite. Time-out was performed.  The treatment area was anesthetized with 1% lidocaine with epinephrine. The area was prepped with Chlorhexidine and rinsed with sterile saline and draped with sterile towels. The lesion was delineated and excised down to subcutaneous fat in a elliptical manner. Hemostasis was obtained by electrocoagulation.     REPAIR: An intermediate layered linear closure was selected as the procedure which would maximally preserve both function and cosmesis.    After the  excision of the tumor, the area was carefully undermined. Hemostasis was obtained with bipolar electrocoagulation.  Closure was oriented so that the wound was in the patient's natural skin tension lines. The subcutaneous and dermal layers were then closed with 4-0 Monocryl sutures. The epidermis was then carefully approximated along the length of the wound using 5-0 Monocryl running subcuticular sutures.     Estimated blood loss was less than 10 ml for all surgical sites. A sterile pressure dressing was applied and wound care instructions, with a written handout, were given. The patient was discharged from the [facility] alert and ambulatory.    The patient elected for pathology results to automatically release and understands that the clinical staff will contact them as soon as possible to notify them of the results.     Dr. Nazario was immediately available for the entire surgery and was physicially present for the key portions of the procedure.    Anatomic Pathology Results: pending      DERMATOLOGY EXCISION PROCEDURE NOTE    NAME OF PROCEDURE: Excision intermediate layered linear closure  Staff surgeon: Dr. Nazario  Resident:   Scrub Nurse: Christi Rahman LPN    PRE-OPERATIVE DIAGNOSIS:  Basal Cell Carcinoma  POST-OPERATIVE DIAGNOSIS: Same   LOCATION: left clavicle  FINAL EXCISION SIZE(DEFECT SIZE): 1.6X1.4 cm  MARGIN: 0.4 cm  FINAL REPAIR LENGTH: 4.2 cm   ANESTHESIA: 7cc 1% lidocaine with 1:100,000 epinephrine      INDICATIONS: This patient presented with a 0.8X0.6cm Basal Cell Carcinoma. Excision was indicated. We discussed the principles of treatment and most likely complications including scarring, bleeding, infection, incomplete excision, wound dehiscence, pain, nerve damage, and recurrence. Informed consent was obtained and the patient underwent the procedure as follows:    PROCEDURE: The patient was taken to the operative suite. Time-out was performed.  The treatment area was anesthetized  with 1% lidocaine with epinephrine. The area was prepped with Chlorhexidine and rinsed with sterile saline and draped with sterile towels. The lesion was delineated and excised down to subcutaneous fat in a elliptical manner. Hemostasis was obtained by electrocoagulation.     REPAIR: An intermediate layered linear closure was selected as the procedure which would maximally preserve both function and cosmesis.    After the excision of the tumor, the area was carefully undermined. Hemostasis was obtained with bipolar electrocoagulation.  Closure was oriented so that the wound was in the patient's natural skin tension lines. The subcutaneous and dermal layers were then closed with 4-0 Monocryl sutures. The epidermis was then carefully approximated along the length of the wound using 4-0 Monocryl running subcuticular sutures.     Estimated blood loss was less than 10 ml for all surgical sites. A sterile pressure dressing was applied and wound care instructions, with a written handout, were given. The patient was discharged from the [facility] alert and ambulatory.    The patient elected for pathology results to automatically release and understands that the clinical staff will contact them as soon as possible to notify them of the results.     Dr. Nazario was immediately available for the entire surgery and was physicially present for the key portions of the procedure.    Anatomic Pathology Results: pending    Clinical Follow-Up: 6 months [facility]    Staff Involved:  Staff Only      Scribe Disclosure:   I, Christi Rahman, am serving as a scribe to document services personally performed by Dr. Nazario, based on data collection and the provider's statements to me.     Provider Disclosure:   The documentation recorded by the scribe accurately reflects the services I personally performed and the decisions made by me.  I personally performed the procedures today.    Boby Nazario, DO  Assistant  Professor  Department of Dermatology  [facility]: Phone: [phone], Fax:[phone]  [facility]: Phone: [phone], Fax: [phone]    Care and Laboratory Testing Performed at:  [facility]   Dermatology Clinic  [address] [place] [address]      Again, thank you for allowing me to participate in the care of your patient.        Sincerely,        Boby Nazario MD

## 2021-07-21 NOTE — PROGRESS NOTES
DERMATOLOGY EXCISION PROCEDURE NOTE    Dermatology Problem List:  1. 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  -BCC, upper mid back, s/p excision 7/21/21  -BCC, Left clavicle, s/p excision 7/21/21  - BCC, left upper back, s/p excision 12/17/20  - SCC, base of right thumb, s/p excision 6/26/18  - SCCIS, central chest s/p ED & C 6/11/18  - BCC, vertex scalp, s/p MMS 11/21/2016  - SCCis, R lateral thigh, s/p ED&C 11/21/2016  - SCCIS, right medial ankle, s/p Mohs 4/21/2016  - SCCIS, right ventral forearm, s/p ED&C 4/21/2016  - BCC, left preauricular, s/p Mohs 11/10/2015  - BCC, superficial type, left upper chest, s/p biopsy 4/9/2015, s/p ED&C 10/5/2015  - SCCIS, posterior right lower earlobe, records via [facility] transferred records, Dr. Arnulfo Keyes, 2011. s/p excision in 2005, recurrent  - SCCIS, left dorsal hand, s/p via [facility] transferred records, Dr. Arnulfo Keyes, 2011  2. Actinic keratosis  - HAK, left dorsal wrist, s/p bx: 11/10/20  - s/p cryotherapy  3. Compound melanocytic nevus, left lower back   - s/p biopsy 4/9/2015  4. Benign lichenoid keratosis, right lower leg  - s/p biopsy 4/9/2015      [facility] [place] Procedure Note    Date of Service:  Jul 21, 2021  Surgery: Mohs micrographic surgery    Case 1  Repair Type: intermediate  Repair Size: 3.4 cm  Suture Material: monocryl 5-0  Tumor Type: SCC - Squamous cell carcinoma  Location: left dorsal wrist  Derm-Path Accession #: [id number]  PreOp Size: 0.7X0.7 cm  PostOp Size: 1.4X1.1 cm  Mohs Accession #: [id number]  Level of Defect: fat    Procedure:  We discussed the principles of treatment and most likely complications including scarring, bleeding, infection, swelling, pain, crusting, nerve damage, large wound,  incomplete excision, wound dehiscence,  nerve damage, recurrence, and a second procedure may be recommended to obtain the best cosmetic or functional  result.    Informed consent was obtained and the patient underwent the procedure as follows:  The patient was placed supine on the operating table.  The cancer was identified, outlined with a marker, and verified by the patient.  The entire surgical field was prepped with Hibiclens.  The surgical site was anesthetized using Lidocaine 1% with epi 1:100,000.      The area of clinically apparent tumor was debulked. The layer of tissue was then surgically excised using a #15 blade and was then transferred onto a specimen sheet maintaining the orientation of the specimen. Hemostasis was obtained using bipolar electrocoagulation. The wound site was then covered with a dressing while the tissue samples were processed for examination.    The excised tissue was transported to the Mohs histology laboratory maintaining the tissue orientation.  The tissue specimen was relaxed so that the entire surgical margin was in a a single horizontal plane for sectioning and inked for precise mapping.  A precise reference map was drawn to reflect the sectioning of the specimen, colored inking of the margins, and orientation on the patient. The tissue was processed using horizontal sectioning of the base and continuous peripheral margins.  The histopathologic sections were reviewed in conjunction with the reference map.    Total blocks: 1    Total slides:  1    There were no cancer cells visualized on examination, therefore Mohs surgery was complete.    REPAIR: An intermediate layered linear closure was selected as the procedure which would maximally preserve both function and cosmesis.    After the excision of the tumor, the area was undermined. Hemostasis was obtained with bipolar electrocoagulation.  Closure was oriented so that the wound was in the patient's natural skin tension lines. The subcutaneous and dermal layers were then closed with 4-0 monocryl buried vertical mattress sutures. The epidermis was then carefully approximated  along the length of the wound using 5-0 Fast Absorbing Gut simple running sutures.     Estimated blood loss was less than 10 ml for all surgical sites. A sterile pressure dressing was applied and wound care instructions, with a written handout, were given. The patient was discharged from the [facility] alert and ambulatory.    Follow-up in PRN        NAME OF PROCEDURE: Excision intermediate layered linear closure  Staff surgeon: Dr. Nazario  Resident: none  Scrub Nurse: Christi Rahman LPN    PRE-OPERATIVE DIAGNOSIS:  Basal Cell Carcinoma  POST-OPERATIVE DIAGNOSIS: Same   LOCATION: upper mid back  FINAL EXCISION SIZE(DEFECT SIZE): 1.9X1.7 cm  MARGIN: 0.4 cm  FINAL REPAIR LENGTH: 5.2 cm   ANESTHESIA: 9cc 1% lidocaine with 1:100,000 epinephrine       INDICATIONS: This patient presented with a 1.1X0.9cm Basal Cell Carcinoma. Excision was indicated. We discussed the principles of treatment and most likely complications including scarring, bleeding, infection, incomplete excision, wound dehiscence, pain, nerve damage, and recurrence. Informed consent was obtained and the patient underwent the procedure as follows:    PROCEDURE: The patient was taken to the operative suite. Time-out was performed.  The treatment area was anesthetized with 1% lidocaine with epinephrine. The area was prepped with Chlorhexidine and rinsed with sterile saline and draped with sterile towels. The lesion was delineated and excised down to subcutaneous fat in a elliptical manner. Hemostasis was obtained by electrocoagulation.     REPAIR: An intermediate layered linear closure was selected as the procedure which would maximally preserve both function and cosmesis.    After the excision of the tumor, the area was carefully undermined. Hemostasis was obtained with bipolar electrocoagulation.  Closure was oriented so that the wound was in the patient's natural skin tension lines. The subcutaneous and dermal layers were then closed  with 4-0 Monocryl sutures. The epidermis was then carefully approximated along the length of the wound using 5-0 Monocryl running subcuticular sutures.     Estimated blood loss was less than 10 ml for all surgical sites. A sterile pressure dressing was applied and wound care instructions, with a written handout, were given. The patient was discharged from the [facility] Center alert and ambulatory.    The patient elected for pathology results to automatically release and understands that the clinical staff will contact them as soon as possible to notify them of the results.     Dr. Nazario was immediately available for the entire surgery and was physicially present for the key portions of the procedure.    Anatomic Pathology Results: pending      DERMATOLOGY EXCISION PROCEDURE NOTE    NAME OF PROCEDURE: Excision intermediate layered linear closure  Staff surgeon: Dr. Nazario  Resident:   Scrub Nurse: Christi Rahman LPN    PRE-OPERATIVE DIAGNOSIS:  Basal Cell Carcinoma  POST-OPERATIVE DIAGNOSIS: Same   LOCATION: left clavicle  FINAL EXCISION SIZE(DEFECT SIZE): 1.6X1.4 cm  MARGIN: 0.4 cm  FINAL REPAIR LENGTH: 4.2 cm   ANESTHESIA: 7cc 1% lidocaine with 1:100,000 epinephrine      INDICATIONS: This patient presented with a 0.8X0.6cm Basal Cell Carcinoma. Excision was indicated. We discussed the principles of treatment and most likely complications including scarring, bleeding, infection, incomplete excision, wound dehiscence, pain, nerve damage, and recurrence. Informed consent was obtained and the patient underwent the procedure as follows:    PROCEDURE: The patient was taken to the operative suite. Time-out was performed.  The treatment area was anesthetized with 1% lidocaine with epinephrine. The area was prepped with Chlorhexidine and rinsed with sterile saline and draped with sterile towels. The lesion was delineated and excised down to subcutaneous fat in a elliptical manner. Hemostasis was obtained by  electrocoagulation.     REPAIR: An intermediate layered linear closure was selected as the procedure which would maximally preserve both function and cosmesis.    After the excision of the tumor, the area was carefully undermined. Hemostasis was obtained with bipolar electrocoagulation.  Closure was oriented so that the wound was in the patient's natural skin tension lines. The subcutaneous and dermal layers were then closed with 4-0 Monocryl sutures. The epidermis was then carefully approximated along the length of the wound using 4-0 Monocryl running subcuticular sutures.     Estimated blood loss was less than 10 ml for all surgical sites. A sterile pressure dressing was applied and wound care instructions, with a written handout, were given. The patient was discharged from the [facility] Surgery Center alert and ambulatory.    The patient elected for pathology results to automatically release and understands that the clinical staff will contact them as soon as possible to notify them of the results.     Dr. Nazario was immediately available for the entire surgery and was physicially present for the key portions of the procedure.    Anatomic Pathology Results: pending    Clinical Follow-Up: 6 months [facility]    Staff Involved:  Staff Only      Scribe Disclosure:   I, Christi Rahman, am serving as a scribe to document services personally performed by Dr. Nazario, based on data collection and the provider's statements to me.     Provider Disclosure:   The documentation recorded by the scribe accurately reflects the services I personally performed and the decisions made by me.  I personally performed the procedures today.    Boby Nazario DO    Department of Dermatology  [facility]: Phone: [phone], Fax:[phone]  [facility]: Phone: [phone], Fax: [phone]    Care and Laboratory  Testing Performed at:  [facility]   Dermatology Clinic  [address] [place] [address]

## 2021-07-26 LAB
PATH REPORT.COMMENTS IMP SPEC: NORMAL
PATH REPORT.COMMENTS IMP SPEC: NORMAL
PATH REPORT.FINAL DX SPEC: NORMAL
PATH REPORT.GROSS SPEC: NORMAL
PATH REPORT.MICROSCOPIC SPEC OTHER STN: NORMAL
PATH REPORT.RELEVANT HX SPEC: NORMAL

## 2021-12-30 ENCOUNTER — OFFICE VISIT (OUTPATIENT)
Dept: FAMILY MEDICINE | Facility: CLINIC | Age: 76
End: 2021-12-30
Payer: COMMERCIAL

## 2021-12-30 VITALS
TEMPERATURE: 97.7 F | BODY MASS INDEX: 27.85 KG/M2 | HEART RATE: 85 BPM | SYSTOLIC BLOOD PRESSURE: 133 MMHG | DIASTOLIC BLOOD PRESSURE: 87 MMHG | WEIGHT: 205.31 LBS

## 2021-12-30 DIAGNOSIS — Z00.00 HEALTH CARE MAINTENANCE: ICD-10-CM

## 2021-12-30 DIAGNOSIS — H10.13 ALLERGIC CONJUNCTIVITIS, BILATERAL: Primary | ICD-10-CM

## 2021-12-30 DIAGNOSIS — H04.123 DRY EYES: ICD-10-CM

## 2021-12-30 PROCEDURE — 99213 OFFICE O/P EST LOW 20 MIN: CPT | Performed by: FAMILY MEDICINE

## 2021-12-30 ASSESSMENT — PAIN SCALES - GENERAL: PAINLEVEL: NO PAIN (0)

## 2021-12-30 NOTE — PROGRESS NOTES
Gui Rich is a 76 year old who presents for the following health issues     HPI     Eye problems for the past few months. He has been using eye drops for dry eyes but in the last couple weeks his eyes have had discharged and matter        Review of Systems     Worse recently    Itches/ irritated    Both eyes    Right more than left    Vision fine    No glasses or contacts    No fever/ chills/ cough etc    Using systane 2-3 x during the day, helps           Objective    /87 (BP Location: Right arm, Patient Position: Chair, Cuff Size: Adult Regular)   Pulse 85   Temp 97.7  F (36.5  C) (Temporal)   Wt 93.1 kg (205 lb 5 oz)   BMI 27.85 kg/m    Body mass index is 27.85 kg/m .  Physical Exam    Full physical not done     Mentation and affect are fine    No tremor of speech or extremity    Tympanic membranes and canals fine bilat    Nasal and oral mucosa okay    Patient has mild irritation of both upper and lower lids both eyes    No focal stye    Some mild crusty discharge on lower lid area    Slight irritation of the conjuctivae bilat           ASSESSMENT / PLAN:  (H10.13) Allergic conjunctivitis, bilateral  (primary encounter diagnosis)  Comment: this has been going on for 6 months so not likely infectious.  Eyes do have allergy type appearance.  Add this ketotifen drop.  Printed prescription as may be cheaper over the counter.  He will check with pharmacy.   Plan: ketotifen (ZADITOR) 0.025 % ophthalmic solution             (H04.123) Dry eyes  Comment: continue rewetting drops several times daily  Plan: as above     (Z00.00) Health care maintenance  Comment: has not had eye doctor visit in a couple years   Plan: Adult Eye Referral        Patient to schedule       I reviewed the patient's medications, allergies, medical history, family history, and social history.    Andre Mendez MD

## 2021-12-30 NOTE — PATIENT INSTRUCTIONS
Continue rewetting drops several times daily    Add the allergy eye drop 2x daily    Advise eye doctor visit in the next few months    Call/ be seen sooner if needed

## 2022-01-11 ENCOUNTER — TRANSFERRED RECORDS (OUTPATIENT)
Dept: FAMILY MEDICINE | Facility: CLINIC | Age: 77
End: 2022-01-11

## 2022-01-11 LAB
ALT SERPL-CCNC: 35 U/L (ref 13–61)
AST SERPL-CCNC: 61 U/L (ref 15–37)
CHOLESTEROL (EXTERNAL): 184 MG/DL (ref 0–200)
CREATININE (EXTERNAL): 1.1 MG/DL (ref 0.7–1.2)
GFR ESTIMATED (EXTERNAL): >60 ML/MIN/1.73M^2
GLUCOSE (EXTERNAL): 234 MG/DL (ref 74–106)
HBA1C MFR BLD: 7.9 % (ref 4–6)
HDLC SERPL-MCNC: 37 MG/DL
LDL CHOLESTEROL CALCULATED (EXTERNAL): 87 MG/DL
NON HDL CHOLESTEROL (EXTERNAL): 147 MG/DL
POTASSIUM (EXTERNAL): 4.2 MMOL/L (ref 3.5–5)
TRIGLYCERIDES (EXTERNAL): 300 MG/DL
TSH SERPL-ACNC: 1.51 ULU/ML (ref 0.35–4.94)

## 2022-01-18 ENCOUNTER — OFFICE VISIT (OUTPATIENT)
Dept: DERMATOLOGY | Facility: CLINIC | Age: 77
End: 2022-01-18
Payer: COMMERCIAL

## 2022-01-18 DIAGNOSIS — Z85.828 HISTORY OF NONMELANOMA SKIN CANCER: Primary | ICD-10-CM

## 2022-01-18 DIAGNOSIS — Z86.018 HISTORY OF DYSPLASTIC NEVUS: ICD-10-CM

## 2022-01-18 DIAGNOSIS — L82.1 SK (SEBORRHEIC KERATOSIS): ICD-10-CM

## 2022-01-18 DIAGNOSIS — L57.0 AK (ACTINIC KERATOSIS): ICD-10-CM

## 2022-01-18 DIAGNOSIS — L30.9 DERMATITIS: ICD-10-CM

## 2022-01-18 DIAGNOSIS — L82.0 INFLAMED SEBORRHEIC KERATOSIS: ICD-10-CM

## 2022-01-18 PROCEDURE — 17004 DESTROY PREMAL LESIONS 15/>: CPT | Performed by: DERMATOLOGY

## 2022-01-18 PROCEDURE — 17110 DESTRUCTION B9 LES UP TO 14: CPT | Mod: XS | Performed by: DERMATOLOGY

## 2022-01-18 PROCEDURE — 99214 OFFICE O/P EST MOD 30 MIN: CPT | Mod: 25 | Performed by: DERMATOLOGY

## 2022-01-18 RX ORDER — TRIAMCINOLONE ACETONIDE 1 MG/G
CREAM TOPICAL
Qty: 454 G | Refills: 0 | Status: SHIPPED | OUTPATIENT
Start: 2022-01-18 | End: 2022-08-29

## 2022-01-18 ASSESSMENT — PAIN SCALES - GENERAL: PAINLEVEL: NO PAIN (0)

## 2022-01-18 NOTE — PROGRESS NOTES
[facility] Dermatology Note  Encounter Date: Jan 18, 2022  Office Visit     Dermatology Problem List:  Last skin check 1/18/22  0. NUB x 2  - left ventral forearm --> will return for biopsy  - left dorsal thigh --> will return for biopsy  1. 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  - BCC, upper mid back, s/p excision 7/21/21  - BCC, left clavicle, s/p excision 7/21/21  - BCC, left upper back, s/p excision 12/17/2020  - SCC, base of right thumb, s/p excision 6/26/18  - SCCIS, central chest s/p ED & C 6/11/18  - BCC, vertex scalp, s/p MMS 11/21/2016  - SCCis, R lateral thigh, s/p ED&C 11/21/2016  - SCCIS, right medial ankle, s/p Mohs 4/21/2016  - SCCIS, right ventral forearm, s/p ED&C 4/21/2016  - BCC, left preauricular, s/p Mohs 11/10/2015  - BCC, superficial type, left upper chest, s/p biopsy 4/9/2015, s/p ED&C 10/5/2015  - SCCIS, posterior right lower earlobe, records via [facility] transferred records, Dr. Arnulfo Keyes, 2011. s/p excision in 2005, recurrent  - SCCIS, left dorsal hand, s/p via [facility] transferred records, Dr. Arnulfo Keyes, 2011  2. Actinic keratosis  - HAK, left dorsal wrist, s/p bx: 11/10/2020  - s/p cryotherapy  3. History of benign biopsy  - Intradermal melanocytic nevus, left postauricular scalp, s/p bx 6/18/21  - Compound melanocytic nevus, left lower back, s/p bx 4/9/15  4. Benign lichenoid keratosis, right lower leg  - s/p biopsy 4/9/2015  ____________________________________________    Assessment & Plan:    # History of NMSC. No evidence of recurrence.   - Recommend sunscreens SPF #30 or greater, protective clothing and avoidance of tanning beds.   - Recommended next skin exam in 6 months.    # Faint erythematous macules, lower back and thighs. Consistent with dermatitis or possible grovers.  - Start triamcinolone 0.1% cream daily x 2 weeks at a time.    # Actinic keratoses - right temple, right preauricular, right cheek,  left cheek, right helix, left frontal scalp, and crown of scalp, right postauricular, left postauricular, and left tricep x 15.  - See cryotherapy procedure note below.    # Inflamed SK, right superior clavicle x 1.  - See cryotherapy procedure note below.  - Confirm resolution at follow up.    # Neoplasm of uncertain behavior on the left ventral forearm. The differential diagnosis includes bcc vs other.   - Patient is scheduled to go to [place]. Will plan on biopsy at follow up.    # Neoplasm of uncertain behavior on the left dorsal thigh. The differential diagnosis includes BCC vs other.   - Patient is scheduled to go to [place]. Will plan on biopsy at follow up.    # Symptomatic nevus, left tricep.  - Patient requesting removal.  - Patient is scheduled to go to [place]. Will plan on shave removal at follow up.    Procedures Performed:   - Cryotherapy procedure note, locations: right temple, right preauricular, right cheek, left cheek, right helix, right clavicle,  left frontal scalp, crown of scalp, right postauricular, left postauricular and left tricep. After verbal consent and discussion of risks and benefits including, but not limited to, dyspigmentation/scar, blister, and pain, 15 AKs were treated with 1-2 mm freeze border for 1-2 cycles with liquid nitrogen. Post cryotherapy instructions were provided.    Follow-up: within 1 month for biopsy x 2 (left ventral forearm and left dorsal thigh) and shave removal (symptomatic nevus on the left tricep), 6 months in-person for skin check, or earlier for new or changing lesions.    Staff and Scribe:     Scribe Disclosure:   I, Nancy Aden, am serving as a scribe to document services personally performed by this physician, Dr. Berta Monge, based on data collection and the provider's statements to me.    Provider Disclosure:   The documentation recorded by the scribe accurately reflects the services I personally performed and the decisions made by me.    Berta  MD Saleem    Department of Dermatology  [facility]: Phone: [phone], Fax:[phone]  [facility]: Phone: [phone], Fax: [phone]      ____________________________________________    CC: Skin Check (areas of itching on flank hx NMSC and DN)    HPI:  Mr. Andre Tyler is a(n) 76 year old male who presents today as a return patient for skin check.    Last skin check was 6/18/21. Biopsies determined an SCC on the left dorsal wrist, BCCs on the upper mid back and left clavicle, and and intradermal melanocytic nevus on the left postauricular scalp. Cryotherapy was performed on 12 AKs on the left cheek, right dorsal hand, and left dorsal hand.    Seen by Dr. Nazario on 7/21/21 for MMS of the SCC and excisions of the BCCs.    Today, Hilario notes some itching on the flank.    Patient is otherwise feeling well, without additional skin concerns.    Labs Reviewed:  N/A    Physical Exam:  Vitals: There were no vitals taken for this visit.  SKIN: Total skin excluding the undergarment areas was performed. The exam included the head/face, neck, both arms, chest, back, abdomen, both legs, digits and/or nails.  - Well healed scars in areas of previous NMSC.  - Faint erythematous macules scattered on the lower back and thighs.  - There are erythematous macules with overyling adherent scale on the right temple, right preauricular, right cheek, left cheek, right helix, left frontal scalp, and crown of scalp, right postauricular, left postauricular, and left tricep x 15.  - There is a waxy stuck on tan to brown papule with surrounding erythema on the left superior clavicle x 1.  - Left ventral forearm: 7-8 mm irregular shaped pigmented macule  - Left dorsal thigh: 5mm red macule  - Left tricep: fleshy papule  - No other lesions of concern on areas examined.     Medications:  Current Outpatient  Medications   Medication     Catheters MISC     Glucosamine HCl (GLUCOSAMINE PO)     ketoconazole (NIZORAL) 2 % external cream     ketotifen (ZADITOR) 0.025 % ophthalmic solution     lisinopril-hydrochlorothiazide (PRINZIDE,ZESTORETIC) 10-12.5 MG per tablet     metFORMIN (GLUCOPHAGE) 500 MG tablet     clobetasol (TEMOVATE) 0.05 % external cream     fluorouracil (EFUDEX) 5 % external cream     valACYclovir (VALTREX) 500 MG tablet     No current facility-administered medications for this visit.      Past Medical History:   Patient Active Problem List   Diagnosis     Health Care Home     Hypertension goal BP (blood pressure) < 140/90     Squamous cell skin cancer     Prostate cancer - surgically treated     Advanced directives, counseling/discussion     CARDIOVASCULAR SCREENING; LDL GOAL LESS THAN 130     Sensorineural hearing loss, bilateral     Bladder neck contracture     Bladder stone     Type 2 diabetes mellitus without complication (H)     Past Medical History:   Diagnosis Date     Diabetes (H)      Hypertension goal BP (blood pressure) < 140/90      Prostate cancer (H)      Squamous cell skin cancer         CC No referring provider defined for this encounter. on close of this encounter.

## 2022-01-18 NOTE — NURSING NOTE
Andre Tyler's goals for this visit include:   Chief Complaint   Patient presents with     Skin Check     areas of itching on flank hx NMSC and DN       He requests these members of his care team be copied on today's visit information:     PCP: Arnulfo Joaquin    Referring Provider:  No referring provider defined for this encounter.    There were no vitals taken for this visit.    Do you need any medication refills at today's visit? Florence Rahman LPN

## 2022-01-18 NOTE — LETTER
1/18/2022         RE: Andre Tyler  [address] [facility] [address]        Dear Colleague,    Thank you for referring your patient, Andre Tyler, to the [facility]. Please see a copy of my visit note below.    [facility] Dermatology Note  Encounter Date: Jan 18, 2022  Office Visit     Dermatology Problem List:  Last skin check 1/18/22  0. NUB x 2  - left ventral forearm --> will return for biopsy  - left dorsal thigh --> will return for biopsy  1. 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 Arnulfo Keyes, 2011. s/p excision in 2005, recurrent  - SCCIS, left dorsal hand, s/p via [facility] transferred records, Dr. Arnulfo Keyes, 2011  2. Actinic keratosis  - HAK, left dorsal wrist, s/p bx: 11/10/2020  - s/p cryotherapy  3. History of benign biopsy  - Intradermal melanocytic nevus, left postauricular scalp, s/p bx 6/18/21  - Compound melanocytic nevus, left lower back, s/p bx 4/9/15  4. Benign lichenoid keratosis, right lower leg  - s/p biopsy 4/9/2015  ____________________________________________    Assessment & Plan:    # History of NMSC. No evidence of recurrence.   - Recommend sunscreens SPF #30 or greater, protective clothing and avoidance of tanning beds.   - Recommended  next skin exam in 6 months.    # Faint erythematous macules, lower back and thighs. Consistent with dermatitis or possible grovers.  - Start triamcinolone 0.1% cream daily x 2 weeks at a time.    # Actinic keratoses - right temple, right preauricular, right cheek, left cheek, right helix, left frontal scalp, and crown of scalp, right postauricular, left postauricular, and left tricep x 15.  - See cryotherapy procedure note below.    # Inflamed SK, right superior clavicle x 1.  - See cryotherapy procedure note below.  - Confirm resolution at follow up.    # Neoplasm of uncertain behavior on the left ventral forearm. The differential diagnosis includes bcc vs other.   - Patient is scheduled to go to [place]. Will plan on biopsy at follow up.    # Neoplasm of uncertain behavior on the left dorsal thigh. The differential diagnosis includes BCC vs other.   - Patient is scheduled to go to [place]. Will plan on biopsy at follow up.    # Symptomatic nevus, left tricep.  - Patient requesting removal.  - Patient is scheduled to go to [place]. Will plan on shave removal at follow up.    Procedures Performed:   - Cryotherapy procedure note, locations: right temple, right preauricular, right cheek, left cheek, right helix, left frontal scalp, crown of scalp, right postauricular, left postauricular and left tricep. After verbal consent and discussion of risks and benefits including, but not limited to, dyspigmentation/scar, blister, and pain, 15 AKs were treated with 1-2 mm freeze border for 1-2 cycles with liquid nitrogen. Post cryotherapy instructions were provided.    Follow-up: within 1 month for biopsy x 2 (left ventral forearm and left dorsal thigh) and shave removal (symptomatic nevus on the left tricep), 6 months in-person for skin check, or earlier for new or changing lesions.    Staff and Scribe:     Scribe Disclosure:   Nancy PARRY, am serving as a scribe to document services personally performed by this  physician, Dr. Berta Monge, based on data collection and the provider's statements to me.    Provider Disclosure:   The documentation recorded by the scribe accurately reflects the services I personally performed and the decisions made by me.    Berta Monge MD    Department of Dermatology  [facility]: Phone: [phone], Fax:[phone]  [facility]: Phone: [phone], Fax: [phone]      ____________________________________________    CC: Skin Check (areas of itching on flank hx NMSC and DN)    HPI:  Mr. Andre Tyler is a(n) 76 year old male who presents today as a return patient for skin check.    Last skin check was 6/18/21. Biopsies determined an SCC on the left dorsal wrist, BCCs on the upper mid back and left clavicle, and and intradermal melanocytic nevus on the left postauricular scalp. Cryotherapy was performed on 12 AKs on the left cheek, right dorsal hand, and left dorsal hand.    Seen by Dr. Nazario on 7/21/21 for MMS of the SCC and excisions of the BCCs.    Today, Hilario notes some itching on the flank.    Patient is otherwise feeling well, without additional skin concerns.    Labs Reviewed:  N/A    Physical Exam:  Vitals: There were no vitals taken for this visit.  SKIN: Total skin excluding the undergarment areas was performed. The exam included the head/face, neck, both arms, chest, back, abdomen, both legs, digits and/or nails.  - Well healed scars in areas of previous NMSC.  - Faint erythematous macules scattered on the lower back and thighs.  - There are erythematous macules with overyling adherent scale on the right temple, right preauricular, right cheek, left cheek, right helix, left frontal scalp, and crown of scalp, right postauricular, left postauricular, and left tricep x 15.  - There is a waxy stuck on tan to brown papule with surrounding erythema on the  left superior clavicle x 1.  - Left ventral forearm: 7-8 mm irregular shaped pigmented macule  - Left dorsal thigh: 5mm red macule  - Left tricep: fleshy papule  - No other lesions of concern on areas examined.     Medications:  Current Outpatient Medications   Medication     Catheters MISC     Glucosamine HCl (GLUCOSAMINE PO)     ketoconazole (NIZORAL) 2 % external cream     ketotifen (ZADITOR) 0.025 % ophthalmic solution     lisinopril-hydrochlorothiazide (PRINZIDE,ZESTORETIC) 10-12.5 MG per tablet     metFORMIN (GLUCOPHAGE) 500 MG tablet     clobetasol (TEMOVATE) 0.05 % external cream     fluorouracil (EFUDEX) 5 % external cream     valACYclovir (VALTREX) 500 MG tablet     No current facility-administered medications for this visit.      Past Medical History:   Patient Active Problem List   Diagnosis     Health Care Home     Hypertension goal BP (blood pressure) < 140/90     Squamous cell skin cancer     Prostate cancer - surgically treated     Advanced directives, counseling/discussion     CARDIOVASCULAR SCREENING; LDL GOAL LESS THAN 130     Sensorineural hearing loss, bilateral     Bladder neck contracture     Bladder stone     Type 2 diabetes mellitus without complication (H)     Past Medical History:   Diagnosis Date     Diabetes (H)      Hypertension goal BP (blood pressure) < 140/90      Prostate cancer (H)      Squamous cell skin cancer         CC No referring provider defined for this encounter. on close of this encounter.      Again, thank you for allowing me to participate in the care of your patient.        Sincerely,        Berta Monge MD

## 2022-01-18 NOTE — PATIENT INSTRUCTIONS
Cryotherapy    What is it?    Use of a very cold liquid, such as liquid nitrogen, to freeze and destroy abnormal skin cells that need to be removed    What should I expect?    Tenderness and redness    A small blister that might grow and fill with dark purple blood. There may be crusting.    More than one treatment may be needed if the lesions do not go away.    How do I care for the treated area?    Gently wash the area with your hands when bathing.    Use a thin layer of Vaseline to help with healing. You may use a Band-Aid.     The area should heal within 7-10 days and may leave behind a pink or lighter color.     Do not use an antibiotic or Neosporin ointment.     You may take acetaminophen (Tylenol) for pain.     Call your doctor if you have:    Severe pain    Signs of infection (warmth, redness, cloudy yellow drainage, and or a bad smell)    Questions or concerns    Who should I call with questions?       [facility]: [phone]       [facility]: [phone]       For urgent needs outside of business hours call the [facility] at [phone] and ask for the dermatology resident on call

## 2022-01-28 ENCOUNTER — OFFICE VISIT (OUTPATIENT)
Dept: DERMATOLOGY | Facility: CLINIC | Age: 77
End: 2022-01-28
Payer: COMMERCIAL

## 2022-01-28 DIAGNOSIS — D48.5 NEOPLASM OF UNCERTAIN BEHAVIOR OF SKIN: Primary | ICD-10-CM

## 2022-01-28 PROCEDURE — 11103 TANGNTL BX SKIN EA SEP/ADDL: CPT | Mod: 79 | Performed by: DERMATOLOGY

## 2022-01-28 PROCEDURE — 88305 TISSUE EXAM BY PATHOLOGIST: CPT | Performed by: PATHOLOGY

## 2022-01-28 PROCEDURE — 88341 IMHCHEM/IMCYTCHM EA ADD ANTB: CPT | Performed by: PATHOLOGY

## 2022-01-28 PROCEDURE — 88342 IMHCHEM/IMCYTCHM 1ST ANTB: CPT | Performed by: PATHOLOGY

## 2022-01-28 PROCEDURE — 11102 TANGNTL BX SKIN SINGLE LES: CPT | Mod: 79 | Performed by: DERMATOLOGY

## 2022-01-28 ASSESSMENT — PAIN SCALES - GENERAL: PAINLEVEL: NO PAIN (0)

## 2022-01-28 NOTE — PROGRESS NOTES
[facility] Dermatology Note  Encounter Date: Jan 28, 2022  Office Visit     Dermatology Problem List:  Last skin check 1/18/22  0. NUB x 2  - left ventral forearm, bx 1/28/22  - left dorsal thigh, bx 1/28/22  1. 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  - BCC, upper mid back, s/p excision 7/21/21  - BCC, left clavicle, s/p excision 7/21/21  - BCC, left upper back, s/p excision 12/17/2020  - SCC, base of right thumb, s/p excision 6/26/18  - SCCIS, central chest s/p ED & C 6/11/18  - BCC, vertex scalp, s/p MMS 11/21/2016  - SCCis, R lateral thigh, s/p ED&C 11/21/2016  - SCCIS, right medial ankle, s/p Mohs 4/21/2016  - SCCIS, right ventral forearm, s/p ED&C 4/21/2016  - BCC, left preauricular, s/p Mohs 11/10/2015  - BCC, superficial type, left upper chest, s/p biopsy 4/9/2015, s/p ED&C 10/5/2015  - SCCIS, posterior right lower earlobe, records via [facility] transferred records, Dr. Arnulfo Keyes, 2011. s/p excision in 2005, recurrent  - SCCIS, left dorsal hand, s/p via [facility] transferred records, Dr. Arnulfo Keyes, 2011  2. Actinic keratosis  - HAK, left dorsal wrist, s/p bx 11/10/2020  - s/p cryotherapy  3. History of benign biopsy  - Intradermal melanocytic nevus, left postauricular scalp, s/p bx 6/18/21  - Compound melanocytic nevus, left lower back, s/p bx 4/9/15  - BLK, right lower leg, s/p biopsy 4/9/2015  ____________________________________________    Assessment & Plan:    # History of NMSC. No evidence of recurrence.  - Due for next skin exam in July 2022.    # Previously noted faint erythematous macules, lower back and thighs. Consistent with dermatitis or possible grovers.  - He has not yet started triamcinolone    # Neoplasm of uncertain behavior on the left ventral forearm. The differential diagnosis includes BCC vs other.   - See shave biopsy procedure note below.    # Neoplasm of uncertain behavior on the left dorsal thig- self  resolved    # Symptomatic nevus, left tricep. Patient requesting removal.  - See shave removal procedure note below.    # scar, left distal thigh versus early EIC, monitor, no evidence of skin cnacer    # Sk s/p cryo left clavicle, 9 days ago and still inflamed  -recheck at follow up    Procedures Performed:   - Shave biopsy procedure note, locations: left ventral forearm and left dorsal thigh. After discussion of benefits and risks including but not limited to bleeding, infection, scar, incomplete removal, recurrence, and non-diagnostic biopsy, written consent and photographs were obtained. The area was cleaned with isopropyl alcohol. 0.5mL of 1% lidocaine with epinephrine was injected to obtain adequate anesthesia of lesions. Shave biopsy at sites performed. Hemostasis was achieved with aluminium chloride. Petrolatum ointment and a sterile dressing were applied. The patient tolerated the procedure and no complications were noted. The patient was provided with verbal and written post care instructions.     - Shave biopsy  procedure note, location: left tricep. After discussion of benefits and risks including but not limited to bleeding, infection, scar, incomplete removal, recurrence, and non-diagnostic biopsy, written consent and photographs were obtained. The area was cleaned with isopropyl alcohol. 0.5mL of 1% lidocaine with epinephrine was injected to obtain adequate anesthesia of lesion. Shave   at site performed. Hemostasis was achieved with aluminium chloride. Petrolatum ointment and a sterile dressing were applied. The patient tolerated the procedure and no complications were noted. The patient was provided with verbal and written post care instructions.     Follow-up: pending path results, July 2022 for skin check, sooner for concerns.    Staff and Scribe:     Scribe Disclosure:   Nancy PARRY, am serving as a scribe to document services personally performed by this physician, Dr. Berta Monge, based  on data collection and the provider's statements to me.    Provider Disclosure:   The documentation recorded by the scribe accurately reflects the services I personally performed and the decisions made by me.    Berta Monge MD    Department of Dermatology  [facility]: Phone: [phone], Fax:[phone]  [facility]: Phone: [phone], Fax: [phone]      ____________________________________________    CC: Biopsy (left forearm, and l thigh left tricep) and Derm Problem (started triacminolone yesterday on lower back and thighs)    HPI:  Mr. Andre Tyler is a(n) 76 year old male who presents today as a return patient for biopsies.    Last seen 1/18/22. Cryo was performed on 15 AKs and 1 iSK. Plan was to have patient return after [place] trip for biopsies on the left ventral forearm and left dorsal thigh, as well as a shave removal of a symptomatic nevus on the left tricep.    Today, Hilario notes  Areas are doing well. Has not started cream    Patient is otherwise feeling well, without additional skin concerns.    Labs Reviewed:  N/A    Physical Exam:  Vitals: There were no vitals taken for this visit.  SKIN: Focused examination of the left arm and left thigh was performed.  - Left ventral forearm: 7-8 mm irregular shaped pigmented macule  - Left dital thigh: skin colored macule with central pore  - Left tricep:  1cm plaque  - No other lesions of concern on areas examined.     Medications:  Current Outpatient Medications   Medication     Catheters MISC     clobetasol (TEMOVATE) 0.05 % external cream     fluorouracil (EFUDEX) 5 % external cream     Glucosamine HCl (GLUCOSAMINE PO)     ketoconazole (NIZORAL) 2 % external cream     ketotifen (ZADITOR) 0.025 % ophthalmic solution     lisinopril-hydrochlorothiazide (PRINZIDE,ZESTORETIC) 10-12.5 MG per tablet     metFORMIN (GLUCOPHAGE) 500  MG tablet     triamcinolone (KENALOG) 0.1 % external cream     valACYclovir (VALTREX) 500 MG tablet     No current facility-administered medications for this visit.      Past Medical History:   Patient Active Problem List   Diagnosis     Health Care Home     Hypertension goal BP (blood pressure) < 140/90     Squamous cell skin cancer     Prostate cancer - surgically treated     Advanced directives, counseling/discussion     CARDIOVASCULAR SCREENING; LDL GOAL LESS THAN 130     Sensorineural hearing loss, bilateral     Bladder neck contracture     Bladder stone     Type 2 diabetes mellitus without complication (H)     Past Medical History:   Diagnosis Date     Diabetes (H)      Hypertension goal BP (blood pressure) < 140/90      Prostate cancer (H)      Squamous cell skin cancer         CC No referring provider defined for this encounter. on close of this encounter.

## 2022-01-28 NOTE — NURSING NOTE
Andre Tyler's goals for this visit include:   Chief Complaint   Patient presents with     Biopsy     left forearm, and l thigh left tricep     Derm Problem     started triacminolone yesterday on lower back and thighs       He requests these members of his care team be copied on today's visit information:     PCP: Arnulfo Joaquin    Referring Provider:  No referring provider defined for this encounter.    There were no vitals taken for this visit.    Do you need any medication refills at today's visit? No    Christi Rahman, CODYN    ]

## 2022-01-28 NOTE — LETTER
1/28/2022         RE: Andre Tyler  [address] [facility] [address]        Dear Colleague,    Thank you for referring your patient, Andre Tyler, to the [facility]. Please see a copy of my visit note below.    [facility] Dermatology Note  Encounter Date: Jan 28, 2022  Office Visit     Dermatology Problem List:  Last skin check 1/18/22  0. NUB x 2  - left ventral forearm, bx 1/28/22  - left dorsal thigh, bx 1/28/22  1. 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  - BCC, upper mid back, s/p excision 7/21/21  - BCC, left clavicle, s/p excision 7/21/21  - BCC, left upper back, s/p excision 12/17/2020  - SCC, base of right thumb, s/p excision 6/26/18  - SCCIS, central chest s/p ED & C 6/11/18  - BCC, vertex scalp, s/p MMS 11/21/2016  - SCCis, R lateral thigh, s/p ED&C 11/21/2016  - SCCIS, right medial ankle, s/p Mohs 4/21/2016  - SCCIS, right ventral forearm, s/p ED&C 4/21/2016  - BCC, left preauricular, s/p Mohs 11/10/2015  - BCC, superficial type, left upper chest, s/p biopsy 4/9/2015, s/p ED&C 10/5/2015  - SCCIS, posterior right lower earlobe, records via [facility] transferred records, Dr. Arnulfo Keyes, 2011. s/p excision in 2005, recurrent  - SCCIS, left dorsal hand, s/p via [facility] transferred records, Dr. Arnulfo Keyes, 2011  2. Actinic keratosis  - HAK, left dorsal wrist, s/p bx 11/10/2020  - s/p cryotherapy  3. History of benign biopsy  - Intradermal melanocytic nevus, left postauricular scalp, s/p bx 6/18/21  - Compound melanocytic nevus, left lower back, s/p bx 4/9/15  - BLK, right lower leg, s/p biopsy 4/9/2015  ____________________________________________    Assessment & Plan:    # History of NMSC. No evidence of recurrence.  - Due for next skin exam in July 2022.    # Previously noted faint erythematous macules, lower back and thighs. Consistent with dermatitis or possible grovers.  - He  has not yet started triamcinolone    # Neoplasm of uncertain behavior on the left ventral forearm. The differential diagnosis includes BCC vs other.   - See shave biopsy procedure note below.    # Neoplasm of uncertain behavior on the left dorsal thig- self resolved    # Symptomatic nevus, left tricep. Patient requesting removal.  - See shave removal procedure note below.    # scar, left distal thigh versus early EIC, monitor, no evidence of skin cnacer    # Sk s/p cryo left clavicle, 9 days ago and still inflamed  -recheck at follow up    Procedures Performed:   - Shave biopsy procedure note, locations: left ventral forearm and left dorsal thigh. After discussion of benefits and risks including but not limited to bleeding, infection, scar, incomplete removal, recurrence, and non-diagnostic biopsy, written consent and photographs were obtained. The area was cleaned with isopropyl alcohol. 0.5mL of 1% lidocaine with epinephrine was injected to obtain adequate anesthesia of lesions. Shave biopsy at sites performed. Hemostasis was achieved with aluminium chloride. Petrolatum ointment and a sterile dressing were applied. The patient tolerated the procedure and no complications were noted. The patient was provided with verbal and written post care instructions.     - Shave biopsy  procedure note, location: left tricep. After discussion of benefits and risks including but not limited to bleeding, infection, scar, incomplete removal, recurrence, and non-diagnostic biopsy, written consent and photographs were obtained. The area was cleaned with isopropyl alcohol. 0.5mL of 1% lidocaine with epinephrine was injected to obtain adequate anesthesia of lesion. Shave   at site performed. Hemostasis was achieved with aluminium chloride. Petrolatum ointment and a sterile dressing were applied. The patient tolerated the procedure and no complications were noted. The patient was provided with verbal and written post care instructions.      Follow-up: pending path results, July 2022 for skin check, sooner for concerns.    Staff and Scribe:     Scribe Disclosure:   I, Nancy Aden, am serving as a scribe to document services personally performed by this physician, Dr. Berta Monge, based on data collection and the provider's statements to me.    Provider Disclosure:   The documentation recorded by the scribe accurately reflects the services I personally performed and the decisions made by me.    Berta Monge MD    Department of Dermatology  [facility]: Phone: [phone], Fax:[phone]  [facility]: Phone: [phone], Fax: [phone]      ____________________________________________    CC: Biopsy (left forearm, and l thigh left tricep) and Derm Problem (started triacminolone yesterday on lower back and thighs)    HPI:  Mr. Andre Tyler is a(n) 76 year old male who presents today as a return patient for biopsies.    Last seen 1/18/22. Cryo was performed on 15 AKs and 1 iSK. Plan was to have patient return after [place] trip for biopsies on the left ventral forearm and left dorsal thigh, as well as a shave removal of a symptomatic nevus on the left tricep.    Today, Hilario notes  Areas are doing well. Has not started cream    Patient is otherwise feeling well, without additional skin concerns.    Labs Reviewed:  N/A    Physical Exam:  Vitals: There were no vitals taken for this visit.  SKIN: Focused examination of the left arm and left thigh was performed.  - Left ventral forearm: 7-8 mm irregular shaped pigmented macule  - Left dital thigh: skin colored macule with central pore  - Left tricep:  1cm plaque  - No other lesions of concern on areas examined.     Medications:  Current Outpatient Medications   Medication     Catheters MISC     clobetasol (TEMOVATE) 0.05 % external cream     fluorouracil (EFUDEX) 5 %  external cream     Glucosamine HCl (GLUCOSAMINE PO)     ketoconazole (NIZORAL) 2 % external cream     ketotifen (ZADITOR) 0.025 % ophthalmic solution     lisinopril-hydrochlorothiazide (PRINZIDE,ZESTORETIC) 10-12.5 MG per tablet     metFORMIN (GLUCOPHAGE) 500 MG tablet     triamcinolone (KENALOG) 0.1 % external cream     valACYclovir (VALTREX) 500 MG tablet     No current facility-administered medications for this visit.      Past Medical History:   Patient Active Problem List   Diagnosis     Health Care Home     Hypertension goal BP (blood pressure) < 140/90     Squamous cell skin cancer     Prostate cancer - surgically treated     Advanced directives, counseling/discussion     CARDIOVASCULAR SCREENING; LDL GOAL LESS THAN 130     Sensorineural hearing loss, bilateral     Bladder neck contracture     Bladder stone     Type 2 diabetes mellitus without complication (H)     Past Medical History:   Diagnosis Date     Diabetes (H)      Hypertension goal BP (blood pressure) < 140/90      Prostate cancer (H)      Squamous cell skin cancer         CC No referring provider defined for this encounter. on close of this encounter.      Again, thank you for allowing me to participate in the care of your patient.        Sincerely,        Berta Monge MD

## 2022-01-28 NOTE — PATIENT INSTRUCTIONS

## 2022-01-28 NOTE — NURSING NOTE
The following medication was given:     MEDICATION:  Lidocaine with epinephrine 1% [id number]  ROUTE: SQ  SITE: see procedure note  DOSE: 1cc  LOT #: [id number]  : [employer]  EXPIRATION DATE: 6/2022  NDC#: 7448-7062-20  Was there drug waste? 1cc  Multi-dose vial: Yes    Christi Rahman LPN  January 28, 2022

## 2022-02-07 ENCOUNTER — OFFICE VISIT (OUTPATIENT)
Dept: OPTOMETRY | Facility: CLINIC | Age: 77
End: 2022-02-07
Payer: COMMERCIAL

## 2022-02-07 DIAGNOSIS — H02.889 MEIBOMIAN GLAND DISEASE, UNSPECIFIED LATERALITY: ICD-10-CM

## 2022-02-07 DIAGNOSIS — E11.9 TYPE 2 DIABETES MELLITUS WITHOUT COMPLICATION, WITHOUT LONG-TERM CURRENT USE OF INSULIN (H): Primary | ICD-10-CM

## 2022-02-07 DIAGNOSIS — H52.223 REGULAR ASTIGMATISM OF BOTH EYES: ICD-10-CM

## 2022-02-07 DIAGNOSIS — Z96.1 PSEUDOPHAKIA: ICD-10-CM

## 2022-02-07 PROCEDURE — 92004 COMPRE OPH EXAM NEW PT 1/>: CPT | Performed by: OPTOMETRIST

## 2022-02-07 PROCEDURE — 92015 DETERMINE REFRACTIVE STATE: CPT | Performed by: OPTOMETRIST

## 2022-02-07 ASSESSMENT — REFRACTION_MANIFEST
OS_ADD: +2.50
OS_AXIS: 153
OS_SPHERE: -0.50
OD_AXIS: 127
OS_CYLINDER: +0.50
OD_CYLINDER: SPHERE
OS_AXIS: 150
OS_SPHERE: -2.00
OD_SPHERE: -0.25
OD_CYLINDER: +0.50
OD_ADD: +2.50
OD_SPHERE: -1.25
OS_CYLINDER: +1.25

## 2022-02-07 ASSESSMENT — VISUAL ACUITY
METHOD: SNELLEN - LINEAR
OS_SC+: -1
OD_SC: 20/20
OD_SC+: -1
OS_SC: 20/60
OS_SC: 20/30
OD_SC: 20/60

## 2022-02-07 ASSESSMENT — TONOMETRY
OD_IOP_MMHG: 15
OS_IOP_MMHG: 17
IOP_METHOD: APPLANATION

## 2022-02-07 ASSESSMENT — EXTERNAL EXAM - LEFT EYE: OS_EXAM: NORMAL

## 2022-02-07 ASSESSMENT — SLIT LAMP EXAM - LIDS
COMMENTS: 1+ MEIBOMIAN GLAND DYSFUNCTION
COMMENTS: 1+ MEIBOMIAN GLAND DYSFUNCTION

## 2022-02-07 ASSESSMENT — CONF VISUAL FIELD
OS_NORMAL: 1
OD_NORMAL: 1

## 2022-02-07 ASSESSMENT — CUP TO DISC RATIO
OS_RATIO: 0.3
OD_RATIO: 0.3

## 2022-02-07 ASSESSMENT — EXTERNAL EXAM - RIGHT EYE: OD_EXAM: NORMAL

## 2022-02-07 NOTE — PATIENT INSTRUCTIONS
Patient Education   Diabetes weakens the blood vessels all over the body, including the eyes. Damage to the blood vessels in the eyes can cause swelling or bleeding into part of the eye (called the retina). This is called diabetic retinopathy (JOAN-tin-AH-puh-thee). If not treated, this disease can cause vision loss or blindness.   Symptoms may include blurred or distorted vision, but many people have no symptoms. It's important to see your eye doctor regularly to check for problems.   Early treatment and good control can help protect your vision. Here are the things you can do to help prevent vision loss:      1. Keep your blood sugar levels under tight control.      2. Bring high blood pressure under control.      3. No smoking.      4. Have yearly dilated eye exams.     Astigmatism results from curvature differential in the cornea and crystalline lens which can cause a distorted image, as light rays are prevented from meeting at a common focus.    Eyeglass prescription given.    The affects of the dilating drops last for 4- 6 hours.  You will be more sensitive to light and vision will be blurry up close.  Do not drive if you do not feel comfortable.  Mydriatic sunglasses were given if needed.      ANNUAL EYE EXAMINATIONS RECOMMENDED    Christina Lamar O.D.  [facility]   [address] [place] [address] [phone]

## 2022-02-07 NOTE — PROGRESS NOTES
Chief Complaint   Patient presents with     Annual Eye Exam     Diabetic Eye Exam        Chief Complaint(s) and History of Present Illness(es)     Annual Eye Exam     Laterality: both eyes              Diabetic Eye Exam     Vision: is stable    Diabetes Type: Type 2    Duration: 2 years               Hemoglobin A1C POCT   Date Value Ref Range Status   08/06/2019 6.9 (A) 4.0 - 6.0 % Final   02/11/2013 6.2 (H) 4.3 - 6.0 % Final            [facility]  Dilated Previously: Yes, side effects of dilation explained today    What are you currently using to see?  does not use glasses or contacts    Distance Vision Acuity: Satisfied with vision    Near Vision Acuity: Satisfied with vision while reading  with readers 2.50+    Eye Comfort: dry both eyes but more trouble with Right eye  Do you use eye drops? : Yes: systane  Occupation or Hobbies: Retired    [unfilled]     Medical, surgical and family histories reviewed and updated 2/7/2022.       OBJECTIVE: See Ophthalmology exam    ASSESSMENT:    ICD-10-CM    1. Type 2 diabetes mellitus without complication, without long-term current use of insulin (H) - Both Eyes  E11.9     NO DIABETIC RETINOPATHY   2. Pseudophakia  Z96.1    3. Regular astigmatism of both eyes  H52.223    4. Meibomian gland disease, unspecified laterality  H02.889       PLAN:    Andre Tyler aware  eye exam results will be sent to Arnulfo Joaquin.  Patient Instructions   Patient Education   Diabetes weakens the blood vessels all over the body, including the eyes. Damage to the blood vessels in the eyes can cause swelling or bleeding into part of the eye (called the retina). This is called diabetic retinopathy (JOAN-tin--puh-thee). If not treated, this disease can cause vision loss or blindness.   Symptoms may include blurred or distorted vision, but many people have no symptoms. It's important to see your eye doctor regularly to check for problems.   Early  treatment and good control can help protect your vision. Here are the things you can do to help prevent vision loss:      1. Keep your blood sugar levels under tight control.      2. Bring high blood pressure under control.      3. No smoking.      4. Have yearly dilated eye exams.     Astigmatism results from curvature differential in the cornea and crystalline lens which can cause a distorted image, as light rays are prevented from meeting at a common focus.    Eyeglass prescription given.    The affects of the dilating drops last for 4- 6 hours.  You will be more sensitive to light and vision will be blurry up close.  Do not drive if you do not feel comfortable.  Mydriatic sunglasses were given if needed.      ANNUAL EYE EXAMINATIONS RECOMMENDED    Christina Lamar O.D.  [facility]   [address] [place] [address] [phone]

## 2022-02-07 NOTE — LETTER
2/7/2022         RE: Andre Baileyell  [address] [facility] [address]        Dear Colleague,    Thank you for referring your patient, Andre Tyler, to the [facility]. Please see a copy of my visit note below.    Chief Complaint   Patient presents with     Annual Eye Exam     Diabetic Eye Exam        Chief Complaint(s) and History of Present Illness(es)     Annual Eye Exam     Laterality: both eyes              Diabetic Eye Exam     Vision: is stable    Diabetes Type: Type 2    Duration: 2 years               Hemoglobin A1C POCT   Date Value Ref Range Status   08/06/2019 6.9 (A) 4.0 - 6.0 % Final   02/11/2013 6.2 (H) 4.3 - 6.0 % Final            [facility]  Dilated Previously: Yes, side effects of dilation explained today    What are you currently using to see?  does not use glasses or contacts    Distance Vision Acuity: Satisfied with vision    Near Vision Acuity: Satisfied with vision while reading  with readers 2.50+    Eye Comfort: dry both eyes but more trouble with Right eye  Do you use eye drops? : Yes: systane  Occupation or Hobbies: Retired    [unfilled]     Medical, surgical and family histories reviewed and updated 2/7/2022.       OBJECTIVE: See Ophthalmology exam    ASSESSMENT:    ICD-10-CM    1. Type 2 diabetes mellitus without complication, without long-term current use of insulin (H) - Both Eyes  E11.9     NO DIABETIC RETINOPATHY   2. Pseudophakia  Z96.1    3. Regular astigmatism of both eyes  H52.223    4. Meibomian gland disease, unspecified laterality  H02.889       PLAN:    Andre Tyler aware  eye exam results will be sent to Arnulfo Joaquin.  Patient Instructions   Patient Education   Diabetes weakens the blood vessels all over the body, including the eyes. Damage to the blood vessels in the eyes can cause swelling or bleeding into part of the eye (called the retina). This is called diabetic  retinopathy (JOAN-tin-AH-puh-thee). If not treated, this disease can cause vision loss or blindness.   Symptoms may include blurred or distorted vision, but many people have no symptoms. It's important to see your eye doctor regularly to check for problems.   Early treatment and good control can help protect your vision. Here are the things you can do to help prevent vision loss:      1. Keep your blood sugar levels under tight control.      2. Bring high blood pressure under control.      3. No smoking.      4. Have yearly dilated eye exams.     Astigmatism results from curvature differential in the cornea and crystalline lens which can cause a distorted image, as light rays are prevented from meeting at a common focus.    Eyeglass prescription given.    The affects of the dilating drops last for 4- 6 hours.  You will be more sensitive to light and vision will be blurry up close.  Do not drive if you do not feel comfortable.  Mydriatic sunglasses were given if needed.      ANNUAL EYE EXAMINATIONS RECOMMENDED    Christina Lamar O.D.  [address] [id number] [phone]                 Again, thank you for allowing me to participate in the care of your patient.        Sincerely,        Christina Lamar OD

## 2022-02-08 LAB
PATH REPORT.COMMENTS IMP SPEC: ABNORMAL
PATH REPORT.COMMENTS IMP SPEC: YES
PATH REPORT.FINAL DX SPEC: ABNORMAL
PATH REPORT.GROSS SPEC: ABNORMAL
PATH REPORT.MICROSCOPIC SPEC OTHER STN: ABNORMAL
PATH REPORT.RELEVANT HX SPEC: ABNORMAL

## 2022-02-08 NOTE — RESULT ENCOUNTER NOTE
Derm nursing please schedule 2 melanoma exicsions . See discussion before.     This is a patient Dr. Monge needs to call back.     I reviewed the case with pathology (Dr. Pitts) and Dr. Nazario. I reviewed the order. I reviewed the photos. Based on this review it appears the lesions may have been switched.    I reviewed the diagnosis of melanoma with the patient. I reviewed the high likelihood of switch with the patient.  I recommended the following options  1. The left forearm be excised and the left tricep be checked that day for any residual SK to biopsy and prove the switch.     2. The left forearm be excised and the left tricep be excised. I reviewed the scar on the left tricep would be about 4-5 inches. Risks of 2 excisions would be increased with infection and 2 scars. Pathology has offered a melan A on a second sample.    The patient was okay with either option. He requested that we schedule him for 2 excisions and he review the option in person with Dr. Nazario that day.     I called Dr. Nazario and he is okay with blocking the time for 2 excisions    Schedule repeat skin examination for melanoma in 3 months.     The patient did not have further concerns. He was thankful for the identification of the melanoma and comfortable with the treatment options.     Berta Monge MD    Department of Dermatology  [facility]: Phone: [phone], Fax:[phone]  [facility]: Phone: [phone], Fax: [phone]

## 2022-02-09 ENCOUNTER — TELEPHONE (OUTPATIENT)
Dept: DERMATOLOGY | Facility: CLINIC | Age: 77
End: 2022-02-09
Payer: COMMERCIAL

## 2022-02-09 NOTE — TELEPHONE ENCOUNTER
Excision Intake  There were no vitals taken for this visit.    artificial heart valve: no    artificial joint within the last 3 years:no    heart stent in the last 3 months: no    pacemaker: no    defibrillator: no    nerve/brain stimulator: no    bleeding disorder: no    coumadin use: no    heart valve disease: no    site location lower limb or groin: no    hepatitis B/C or HIV:no    smoker: no    Iodine allergy: no    Drea Prater LPN on 2/9/2022 at 10:53 AM

## 2022-02-09 NOTE — TELEPHONE ENCOUNTER
Associated Results   Contains abnormal data Dermatological Path Order and Indications: [id number]  Order: [id number]   Status: Final result     Visible to patient: No (inaccessible in MyChart)     3 Result Notes     Ref Range & Units  Resulting Agency   Case Report   Surgical Pathology Report                         Case: [id number]                                   Authorizing Provider:  Berta Monge MD           Collected:           01/28/2022 11:45 AM           Ordering Location:     [facility]   Received:            01/28/2022 01:06 PM                                  [place]                                                                   Pathologist:           Leonid Pitts MD                                                       Specimens:   A) - Forearm, Left, Left ventral forearm                                                             B) - Arm, Left, Left tricep                                                                Final Diagnosis   A(1). Skin, Left ventral forearm, shave:  - Seborrheic keratosis - (see comment)      B(2). Skin, Left tricep, shave:  - Malignant melanoma - (see comment)      Electronically signed by Leonid Pitts MD on 2/8/2022 at  4:24 PM   Comment   UUMAYO   Clinical pictures of both lesions from the patient's chart were reviewed. It is noted that the clinical lesion on the left tricep (corresponding to the label of part B) is elevated (and only faintly pigmented) and the one on the left ventral forearm (corresponding to the label of part A) is more flat (and more darkly pigmented). However, on histologic sections, part A appears more elevated and non pigmented, while part B appears more flat and pigmented. Given these observations, a switch in samples is suspected. The tissue blocks and additional deeper sections were reviewed and appear consistent with the original slides.  The biopsying clinician has been contacted to report this potential  "discrepancy.     B.  Melanoma:  Subtype: Superficial spreading  Breslow depth: 0.3 millimeters  Anatomic (Vijay's) level: III  Margins: In situ melanoma extends to the lateral margin; invasive melanoma appears narrowly excised.  Mitoses: None identified  Ulceration: absent  Lymphovascular invasion: absent  Perineural invasion: absent  Microsatellitosis: absent  Tumor infiltrating lymphocytes: present, non-brisk  Regression: absent  Precursor lesion: not identified  AJCC microstage: pT1a      This case was reviewed at the dermatopathology consensus conference and discussed with the biopsying clinician.         Clinical Information   UUMAYO   The patient is a 76-year-old male   Gross Description   UUMAYO   A(1). Forearm, Left, Left ventral forearm:  The specimen is received in formalin with proper patient identification, labeled \"left ventral forearm\".  The specimen consists of a 1.2 x 0.9 cm skin shave specimen containing a 1.0 x 0.9 x 0.2 cm tan-white, raised, nodular lesion.  The resection margin is inked blue and the specimen is sectioned and submitted in A1.               B(2). Arm, Left, Left tricep:  The specimen is received in formalin with proper patient identification, labeled \"left tricep\".  The specimen consists of a skin shave specimen received in 2 pieces, 0.6 x 0.2 cm and 1.0 x 0.6 cm.  The larger skin shave contains a 0.8 x 0.7 cm tan-brown, flat lesion.  The resection margins are inked blue, the larger skin shave is sectioned, and the specimen is entirely submitted in B1.             Microscopic Description   UUMAYO   A. The specimen exhibits compact orthokeratosis, papillomatous epidermal hyperplasia with horn cyst formation and increased keratinocyte pigmentation.      B. The specimen exhibits a severely atypical compound melanocytic proliferation which is both nested and single celled at the dermoepidermal junction, with cells in pagetoid array (highlighted by Melan-A immunostain), as well as other " features of architectural disorder including asymmetry of the size, shape and distribution of junctional nests, above haphazard papillary dermal fibrosis, clusters and single hyperchromatic melanocytes with epithelioid morphology without notable maturation with descent in the papillary dermis, scattered melanophages and a perivascular and interstitial lymphocytic infiltrate. Prame stain is positive through the lesion. The lesion extends to the lateral margin.             Disclaimer   UUMAYO   Analyte Specific Reagents (ASRs) are used in many laboratory tests necessary for standard medical care and generally do not require FDA approval. This test was developed and its performance characteristics determined by [facility]. It has not been cleared or approved by the U.S. Food and Drug Administration.  [facility] are certified for the performance of high-complexity clinical testing under the Clinical Laboratory Improvement Amendments of 1988 (CLIA), and in keeping with the certification requirements, the laboratory has verified this test's accuracy, precision and/or validity of the method.      MCRS N/A Yes Abnormal   UUMAYO   Performing Labs   [facility]   The technical component of this testing was completed at [facility]              Specimen Collected: 01/28/22 11:45 AM Last Resulted: 02/08/22  4:24 PM        Order Details      View Encounter      Lab and Collection Details      Routing      Result History             Scans on Order [id number]    Document on 2/8/2022  4:24 PM by Leonid Pitts MD         Result Care Coordination        Result Notes     Drea Prater LPN   2/9/2022 10:51 AM CST Back to Top        Called pt and helped schedule for tomorrow morning at 0930. OK per surgery nurses. Address and check in information provided. See telephone encounter for excision pre visit  information.     Drea Prater LPN on 2/9/2022 at 10:50 AM    Berta Monge MD   2/8/2022  5:37 PM CST         To clarify, please call patient and schedule 2 melanoma excisions. I did review that below message with him already in detail.     Berta Monge MD   2/8/2022  5:15 PM CST         Derm nursing please schedule 2 melanoma exicsions . See discussion before.      This is a patient Dr. Monge needs to call back.      I reviewed the case with pathology (Dr. Pitts) and Dr. Nazario. I reviewed the order. I reviewed the photos. Based on this review it appears the lesions may have been switched.     I reviewed the diagnosis of melanoma with the patient. I reviewed the high likelihood of switch with the patient.  I recommended the following options  1. The left forearm be excised and the left tricep be checked that day for any residual SK to biopsy and prove the switch.      2. The left forearm be excised and the left tricep be excised. I reviewed the scar on the left tricep would be about 4-5 inches. Risks of 2 excisions would be increased with infection and 2 scars. Pathology has offered a melan A on a second sample.     The patient was okay with either option. He requested that we schedule him for 2 excisions and he review the option in person with Dr. Nazario that day.      I called Dr. Nazario and he is okay with blocking the time for 2 excisions     Schedule repeat skin examination for melanoma in 3 months.      The patient did not have further concerns. He was thankful for the identification of the melanoma and comfortable with the treatment options.      Berta Monge MD    Department of Dermatology  [facility]: Phone: [phone], Fax:[phone]  [facility]: Phone: [phone], Fax: [phone]

## 2022-02-10 ENCOUNTER — OFFICE VISIT (OUTPATIENT)
Dept: DERMATOLOGY | Facility: CLINIC | Age: 77
End: 2022-02-10
Payer: COMMERCIAL

## 2022-02-10 VITALS — SYSTOLIC BLOOD PRESSURE: 134 MMHG | DIASTOLIC BLOOD PRESSURE: 88 MMHG | HEART RATE: 89 BPM

## 2022-02-10 DIAGNOSIS — C43.62 MALIGNANT MELANOMA OF ARM, LEFT (H): Primary | ICD-10-CM

## 2022-02-10 DIAGNOSIS — D48.5 NEOPLASM OF UNCERTAIN BEHAVIOR OF SKIN: ICD-10-CM

## 2022-02-10 PROCEDURE — 11102 TANGNTL BX SKIN SINGLE LES: CPT | Mod: XS | Performed by: DERMATOLOGY

## 2022-02-10 PROCEDURE — 88305 TISSUE EXAM BY PATHOLOGIST: CPT | Performed by: DERMATOLOGY

## 2022-02-10 PROCEDURE — 11604 EXC TR-EXT MAL+MARG 3.1-4 CM: CPT | Performed by: DERMATOLOGY

## 2022-02-10 PROCEDURE — 12032 INTMD RPR S/A/T/EXT 2.6-7.5: CPT | Mod: XS | Performed by: DERMATOLOGY

## 2022-02-10 ASSESSMENT — PAIN SCALES - GENERAL: PAINLEVEL: NO PAIN (0)

## 2022-02-10 NOTE — NURSING NOTE
The following medication was given:     MEDICATION:  Lidocaine with epinephrine 1% 1:[id number]  ROUTE: SQ  SITE: see procedure note  DOSE: 18cc  LOT #: -[id number]  : Hospira  EXPIRATION DATE: 08/22  NDC#: 3468-0155-14  Was there drug waste? 0cc  Multi-dose vial: Yes    Manuela Alvares LPN  February 10, 2022    Vaseline and pressure dressing applied to excision site on left ventral forearm.  Wound care instructions reviewed with patient and AVS provided.  Patient verbalized understanding. No further questions or concerns at this time.

## 2022-02-10 NOTE — PROGRESS NOTES
DERMATOLOGY EXCISION PROCEDURE NOTE    Dermatology Problem List:  Last skin check 1/18/22    Rebiopsied left tricep 2/10/222    1. Hx of Melanoma  - Malignant Melanoma 0.3 breslow depth, left ventral forearm, s/p excision 2/10/22  2. 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 Arnulfo Keyes, 2011. s/p excision in 2005, recurrent  - SCCIS, left dorsal hand, s/p via [facility] [employer] transferred records, Dr. Arnulfo Keyes, 2011  3. Actinic keratosis  - HAK, left dorsal wrist, s/p bx 11/10/2020  - s/p cryotherapy  4. History of benign biopsy  - Intradermal melanocytic nevus, left postauricular scalp, s/p bx 6/18/21  - Compound melanocytic nevus, left lower back, s/p bx 4/9/15  - BLK, right lower leg, s/p biopsy 4/9/2015      NAME OF PROCEDURE: Excision intermediate layered linear closure  Staff surgeon: Dr. Nazario  Scrub Nurse: Manuela Alvares LPN    PRE-OPERATIVE DIAGNOSIS:  Melanoma  POST-OPERATIVE DIAGNOSIS: Same   LOCATION: Left ventral forearm  FINAL EXCISION SIZE(DEFECT SIZE): 3.2x3.2 cm  MARGIN: 1.0 cm  FINAL REPAIR LENGTH: 7.2 cm   ANESTHESIA: 15cc 1% lidocaine with 1:100,000 epinephrine    INDICATIONS: This patient presented with a 1.2x1.2cm Melanoma. Excision was indicated. We discussed the principles of treatment and most likely complications including  scarring, bleeding, infection, incomplete excision, wound dehiscence, pain, nerve damage, and recurrence. Informed consent was obtained and the patient underwent the procedure as follows:    PROCEDURE: The patient was taken to the operative suite. Time-out was performed.  The treatment area was anesthetized with 1% lidocaine with epinephrine. The area was prepped with Chlorhexidine and rinsed with sterile saline and draped with sterile towels. The lesion was delineated and excised down to subcutaneous fat in a fusiform manner. Hemostasis was obtained by electrocoagulation.       REPAIR: An intermediate layered linear closure was selected as the procedure which would maximally preserve both function and cosmesis.    After the excision of the tumor, the area was carefully undermined. Hemostasis was obtained with bipolar electrocoagulation.  Closure was oriented so that the wound was in the patient's natural skin tension lines. The subcutaneous and dermal layers were then closed with 4-0 monocryl sutures. The epidermis was then carefully approximated along the length of the wound using 4-0 prolene simple running sutures.     Estimated blood loss was less than 10 ml for all surgical sites. A sterile pressure dressing was applied and wound care instructions, with a written handout, were given. The [facility] alert and ambulatory.     The patient elected for a 7 day mychart hold on pathology results to allow the clinical staff to contact them with the result.     Follow-up in 2 weeks for suture removal.       Additional Procedure    1. NUB, Left tricep rough plaque with hemorrhagic crust.   Prior to the recent biopsy this lesion was clinically consistent with seborrheic keratosis. Morphology was waxy stuck on appearing papule. However, the path report noted melanoma, but the gross description in the report indicated a labeling error occurred. This repeat biopsy was  performed to confirm the presents of SK and absence of residual malignancy. s/p shave biopsy on 2/10/22:    Procedure Note: Biopsy by shave technique  The risks and benefits of the procedure were described to the patient. These include but are not limited to bleeding, infection, scar, incomplete removal, and non-diagnostic biopsy. Written informed consent was obtained. The left tricep was cleansed with an alcohol pad and injected with 1% lidocaine with epinephrine buffered with sodium bicarbonate. Once anesthesia was obtained, a biopsy was performed with Gilette blade. The tissue was placed in a labeled container with formalin and sent to pathology. Hemostasis was achieved with electrocautery. Vaseline and a bandage were applied to the wound. The patient tolerated the procedure well and was given post biopsy care instructions.    Dr. Nazario was immediately available for the entire surgery and was physicially present for the key portions of the procedure.    Anatomic Pathology Results: pending    Clinical Follow-Up: Primary Dermatologist    Staff Involved:  Fellow/Staff       Provider Disclosure:   The documentation recorded by the scribe accurately reflects the services I personally performed and the decisions made by me.  I personally performed the procedures today.    Boby Nazario DO    Department of Dermatology  [facility]: Phone: [phone], Fax:[phone]  [facility]: Phone: [phone], Fax: [phone]

## 2022-02-10 NOTE — PATIENT INSTRUCTIONS
Excision/Mohs Wound Care Instructions  I will experience scar, altered skin color, bleeding, swelling, pain, crusting and redness. I may experience altered sensation. Risks are excessive bleeding, infection, muscle weakness, thick (hypertrophic or keloidal) scar, and recurrence. A second procedure may be recommended to obtain the best cosmetic or functional result.  Possible complications of any surgical procedure are bleeding, infection, scarring, alteration in skin color and sensation, muscle weakness in the area, wound dehiscence or seperation, or recurrence of the lesion or disease. On occasion, after healing, a secondary procedure or revision may be recommended in order to obtain the best cosmetic or functional result.   After your surgery, a pressure bandage will be placed over the area that has sutures. This will help prevent bleeding. Please follow these instructions until you come back to clinic for suture removal in two weeks, as they will help you to prevent complications as your wound heals.  For the First 48 hours After Surgery:  1. Leave the pressure bandage on and keep it dry. If it should come loose, you may retape it, but do not take it off.  2. Relax and take it easy. Do not do any vigorous exercise, heavy lifting, or bending forward. This could cause the wound to bleed.  3. Post-operative pain is usually mild. You may take plain or extra strength Tylenol every 4 hours as needed (do not take more than 4,000mg in one day). Do not take any medicine that contains aspirin, ibuprofen or motrin unless you have been recommended these by a doctor.  Avoid alcohol and vitamin E as these may increase your tendency to bleed.  4. You may put an ice pack around the bandaged area for 20 minutes every 2-3 hours. This may help reduce swelling, bruising, and pain. Make sure the ice pack is waterproof so that the pressure bandage does not get wet.   5. You may see a small amount of drainage or blood on your pressure  bandage. This is normal. However, if drainage or bleeding continues or saturates the bandage, you will need to apply firm pressure over the bandage with a washcloth for 15 minutes. If bleeding continues after applying pressure for 15 minutes then go to the nearest emergency room.  48 Hours After Surgery  Carefully remove the bandage and start daily wound care and dressing changes. You may also now shower and get the wound wet.  Daily Wound Care:  1. Wash wound with a mild soap and water.  Use caution when washing the wound, be gentle and do not let the forceful shower stream hit the wound directly.  2. Pat dry.  3. Apply Vaseline (from a new container or tube) over the suture line with a Q-tip. It is very important to keep the wound continuously moist, as wounds heal best in a moist environment.  4. Keep the site covered until sutures are removed, you can cover it with a Telfa (non-stick) dressing and tape or a band-aid.    5. If you are unable to keep wound covered, you must apply Vaseline every 2-3 hours (while awake) to ensure it is being kept moist for optimal healing. A dressing overnight is recommended to keep the area moist.  Call Us If:  1. You have pain that is not controlled with Tylenol.  2. You have signs or symptoms of an infection, such as: fever over 100 degrees F, redness, warmth, or foul-smelling or yellow/creamy drainage from the wound.  Who should I call with questions?    [facility]: [phone]     [facility]: [phone]    For urgent needs outside of business hours call the [facility] at [phone] and ask to speak with the dermatology resident on call

## 2022-02-10 NOTE — LETTER
2/10/2022         RE: Andre Tyler  [address] [address]        Dear Colleague,    Thank you for referring your patient, Andre Tyler, to the [facility]. Please see a copy of my visit note below.    DERMATOLOGY EXCISION PROCEDURE NOTE    Dermatology Problem List:  Last skin check 1/18/22    Rebiopsied left tricep 2/10/222    1. Hx of Melanoma  - Malignant Melanoma 0.3 breslow depth, left ventral forearm, s/p excision 2/10/22  2. 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  - BCC, upper mid back, s/p excision 7/21/21  - BCC, left clavicle, s/p excision 7/21/21  - BCC, left upper back, s/p excision 12/17/2020  - SCC, base of right thumb, s/p excision 6/26/18  - SCCIS, central chest s/p ED & C 6/11/18  - BCC, vertex scalp, s/p MMS 11/21/2016  - SCCis, R lateral thigh, s/p ED&C 11/21/2016  - SCCIS, right medial ankle, s/p Mohs 4/21/2016  - SCCIS, right ventral forearm, s/p ED&C 4/21/2016  - BCC, left preauricular, s/p Mohs 11/10/2015  - BCC, superficial type, left upper chest, s/p biopsy 4/9/2015, s/p ED&C 10/5/2015  - SCCIS, posterior right lower earlobe, records via [facility] transferred records, Dr. Arnulfo Keyes, 2011. s/p excision in 2005, recurrent  - SCCIS, left dorsal hand, s/p via [facility] transferred records, Dr. Arnulfo Keyes, 2011  3. Actinic keratosis  - HAK, left dorsal wrist, s/p bx 11/10/2020  - s/p cryotherapy  4. History of benign biopsy  - Intradermal melanocytic nevus, left postauricular scalp, s/p bx 6/18/21  - Compound melanocytic nevus, left lower back, s/p bx 4/9/15  - BLK, right lower leg, s/p biopsy 4/9/2015      NAME OF PROCEDURE: Excision intermediate layered linear closure  Staff surgeon: Dr. Nazario  Scrub Nurse: Manuela Alvares LPN    PRE-OPERATIVE DIAGNOSIS:  Melanoma  POST-OPERATIVE DIAGNOSIS: Same   LOCATION: Left ventral forearm  FINAL EXCISION SIZE(DEFECT SIZE): 3.2x3.2  cm  MARGIN: 1.0 cm  FINAL REPAIR LENGTH: 7.2 cm   ANESTHESIA: 15cc 1% lidocaine with 1:100,000 epinephrine    INDICATIONS: This patient presented with a 1.2x1.2cm Melanoma. Excision was indicated. We discussed the principles of treatment and most likely complications including scarring, bleeding, infection, incomplete excision, wound dehiscence, pain, nerve damage, and recurrence. Informed consent was obtained and the patient underwent the procedure as follows:    PROCEDURE: The patient was taken to the operative suite. Time-out was performed.  The treatment area was anesthetized with 1% lidocaine with epinephrine. The area was prepped with Chlorhexidine and rinsed with sterile saline and draped with sterile towels. The lesion was delineated and excised down to subcutaneous fat in a fusiform manner. Hemostasis was obtained by electrocoagulation.       REPAIR: An intermediate layered linear closure was selected as the procedure which would maximally preserve both function and cosmesis.    After the excision of the tumor, the area was carefully undermined. Hemostasis was obtained with bipolar electrocoagulation.  Closure was oriented so that the wound was in the patient's natural skin tension lines. The subcutaneous and dermal layers were then closed with 4-0 monocryl sutures. The epidermis was then carefully approximated along the length of the wound using 4-0 prolene simple running sutures.     Estimated blood loss was less than 10 ml for all surgical sites. A sterile pressure dressing was applied and wound care instructions, with a written handout, were given. The [facility] and ambulatory.     The patient elected for a 7 day mychart hold on pathology results to allow the clinical staff to contact them with the result.     Follow-up in 2 weeks for suture removal.       Additional Procedure    1. NUB, Left tricep rough plaque with hemorrhagic crust.   Prior to  the recent biopsy this lesion was clinically consistent with seborrheic keratosis. Morphology was waxy stuck on appearing papule. However, the path report noted melanoma, but the gross description in the report indicated a labeling error occurred. This repeat biopsy was performed to confirm the presents of SK and absence of residual malignancy. s/p shave biopsy on 2/10/22:    Procedure Note: Biopsy by shave technique  The risks and benefits of the procedure were described to the patient. These include but are not limited to bleeding, infection, scar, incomplete removal, and non-diagnostic biopsy. Written informed consent was obtained. The left tricep was cleansed with an alcohol pad and injected with 1% lidocaine with epinephrine buffered with sodium bicarbonate. Once anesthesia was obtained, a biopsy was performed with Gilette blade. The tissue was placed in a labeled container with formalin and sent to pathology. Hemostasis was achieved with electrocautery. Vaseline and a bandage were applied to the wound. The patient tolerated the procedure well and was given post biopsy care instructions.    Dr. Nazario was immediately available for the entire surgery and was physicially present for the key portions of the procedure.    Anatomic Pathology Results: pending    Clinical Follow-Up: Primary Dermatologist    Staff Involved:  Fellow/Staff       Provider Disclosure:   The documentation recorded by the scribe accurately reflects the services I personally performed and the decisions made by me.  I personally performed the procedures today.    Boby Nazario DO    Department of Dermatology  [facility]: Phone: [phone], Fax:[phone]  [facility]: Phone: [phone], Fax: [phone]        Again, thank you for allowing me to participate in the care of your patient.        Sincerely,        Boby  YURIY Nazario MD

## 2022-02-10 NOTE — NURSING NOTE
Andre Tyler's goals for this visit include:   Chief Complaint   Patient presents with     Derm Problem     Hilario is here today for excision on left ventral forearm and would like the biopsy redone on his left tricep       He requests these members of his care team be copied on today's visit information:     PCP: Arnulfo Joaquin    Referring Provider:  No referring provider defined for this encounter.    /88 (BP Location: Right arm, Patient Position: Sitting, Cuff Size: Adult Regular)   Pulse 89     Do you need any medication refills at today's visit? No    Manuela Alvares LPN

## 2022-02-15 NOTE — RESULT ENCOUNTER NOTE
Called pt and made aware of results Pt verbalized understanding and had no questions or concerns.     Drea Prater LPN on 2/15/2022 at 8:20 AM

## 2022-02-24 ENCOUNTER — ALLIED HEALTH/NURSE VISIT (OUTPATIENT)
Dept: DERMATOLOGY | Facility: CLINIC | Age: 77
End: 2022-02-24
Payer: COMMERCIAL

## 2022-02-24 DIAGNOSIS — Z48.02 VISIT FOR SUTURE REMOVAL: Primary | ICD-10-CM

## 2022-02-24 PROCEDURE — 99207 PR NO CHARGE NURSE ONLY: CPT | Performed by: DERMATOLOGY

## 2022-02-24 NOTE — NURSING NOTE
Dermatology Suture Removal Record  S: Andre presents to the clinic today for removal of sutures.  The patient has had the sutures in place for 14 days.    There has been no history of infection or drainage.    O: Sutures are seen located on the left forearm.  The wound is healing well with no signs of infection.      A: Suture removal.    P:  All sutures were easily removed today.  Routine wound care discussed.  The patient will follow up as needed.    Tata Davey LPN    Andre Tyler comes into clinic today at the request of Dr. Nazario Ordering Provider for suture removal.        This service provided today was under the supervising provider of the day Dr. Nazario, who was available if needed.    Tata Davey LPN

## 2022-03-25 ENCOUNTER — OFFICE VISIT (OUTPATIENT)
Dept: OTHER | Facility: CLINIC | Age: 77
End: 2022-03-25
Payer: COMMERCIAL

## 2022-03-25 VITALS
SYSTOLIC BLOOD PRESSURE: 110 MMHG | OXYGEN SATURATION: 98 % | HEART RATE: 82 BPM | TEMPERATURE: 97.7 F | HEIGHT: 73 IN | BODY MASS INDEX: 26.11 KG/M2 | RESPIRATION RATE: 16 BRPM | DIASTOLIC BLOOD PRESSURE: 72 MMHG | WEIGHT: 197 LBS

## 2022-03-25 DIAGNOSIS — Z00.00 ENCOUNTER FOR MEDICARE ANNUAL WELLNESS EXAM: Primary | ICD-10-CM

## 2022-03-25 PROCEDURE — G0438 PPPS, INITIAL VISIT: HCPCS | Performed by: FAMILY MEDICINE

## 2022-03-25 RX ORDER — MULTIVIT-MIN/IRON/FOLIC ACID/K 18-600-40
CAPSULE ORAL
COMMUNITY
End: 2023-02-24

## 2022-03-25 RX ORDER — MULTIVIT WITH MINERALS/LUTEIN
1000 TABLET ORAL 2 TIMES DAILY
COMMUNITY

## 2022-03-25 ASSESSMENT — ACTIVITIES OF DAILY LIVING (ADL): CURRENT_FUNCTION: NO ASSISTANCE NEEDED

## 2022-03-25 ASSESSMENT — PAIN SCALES - GENERAL: PAINLEVEL: NO PAIN (0)

## 2022-03-25 NOTE — PATIENT INSTRUCTIONS
Patient Education   Personalized Prevention Plan  You are due for the preventive services outlined below.  Your care team is available to assist you in scheduling these services.  If you have already completed any of these items, please share that information with your care team to update in your medical record.  Health Maintenance Due   Topic Date Due     Kidney Microalbumin Urine Test  Never done     Diabetic Foot Exam  Never done     ANNUAL REVIEW OF HM ORDERS  Never done     Hepatitis C Screening  Never done     Colorectal Cancer Screening  06/12/2014     Basic Metabolic Panel  03/22/2016     Cholesterol Lab  09/22/2016     Pneumococcal Vaccine (2 of 2 - PPSV23) 09/19/2017     A1C Lab  11/06/2019     FALL RISK ASSESSMENT  11/13/2019     COVID-19 Vaccine (3 - Booster for Pfizer series) 08/27/2021     Flu Vaccine (1) 09/01/2021

## 2022-03-25 NOTE — PROGRESS NOTES
"Assessment & Plan     ICD-10-CM    1. Encounter for Medicare annual wellness exam  Z00.00        Follow Up/Next Steps    Return in about 53 weeks (around 3/31/2023) for Annual Wellness Visit.  Overdue for microalbumin, BMP, Lipid and A1C lab tests, pheumococcal vaccine, Hepatitis C, and colorectal cancer  screening.  [facility] Recommended to transfer all the lab works and sreening to the [place] doctor.  Continue to follow up with PCP for chronic conditions.     Counseling and Education  Reviewed preventive health counseling, as reflected in patient instructions      BMI:   Estimated body mass index is 25.99 kg/m  as calculated from the following:    Height as of this encounter: 1.854 m (6' 1\").    Weight as of this encounter: 89.4 kg (197 lb).   Weight management plan: Discussed healthy diet and exercise guidelines      Appropriate preventive services were discussed with this patient, including applicable screening as appropriate for cardiovascular disease, diabetes, osteopenia/osteoporosis, and glaucoma.  As appropriate for age/gender, discussed screening for colorectal cancer, prostate cancer, breast cancer, and cervical cancer. Checklist reviewing preventive services available has been given to the patient.    Reviewed patients plan of care and provided an AVS. The Basic Care Plan (routine screening as documented in Health Maintenance) for Andre meets the Care Plan requirement. This Care Plan has been established and reviewed with the Patient.    Visit Provider: Deloris Sandhu RN  Supervising Provider: Carin Baron MD, MD  [facility]       Gui Rich is a 76 year old who is being seen for an Annual Wellness Visit  accompanied by his none.    Healthy Habits:     In general, how would you rate your overall health?  Good    Frequency of exercise:  4-5 days/week    Duration of " "exercise:  30-45 minutes    Do you usually eat at least 4 servings of fruit and vegetables a day, include whole grains    & fiber and avoid regularly eating high fat or \"junk\" foods?  Yes    Taking medications regularly:  Yes    Medication side effects:  None    Ability to successfully perform activities of daily living:  No assistance needed    Home Safety:  Lack of grab bars in the bathroom    Hearing Impairment:  Difficulty following a conversation in a noisy restaurant or crowded room, feel that people are mumbling or not speaking clearly, difficult to understand a speaker at a public meeting or Gn[employer]ist service, need to ask people to speak up or repeat themselves, find that men's voices are easier to understand than woman's and difficulty understanding soft or whispered speech    In the past 6 months, have you been bothered by leaking of urine?  No    In general, how would you rate your overall mental or emotional health?  Good      PHQ-2 Total Score: 0    Additional concerns today:  No    Do you feel safe in your environment? Yes    Have you ever done Advance Care Planning? (For example, a Health Directive, POLST, or a discussion with a medical provider or your loved ones about your wishes): No, advance care planning information given to patient to review.  Advanced care planning was discussed at today's visit.    Fall risk  Fallen 2 or more times in the past year?: No  Any fall with injury in the past year?: No  Cognitive Screening   1) Repeat 3 items (Leader, Season, Table)    2) Clock draw: NORMAL  3) 3 item recall: Recalls 2 objects   Results: NORMAL clock, 1-2 items recalled: COGNITIVE IMPAIRMENT LESS LIKELY    Mini-CogTM Copyright LOUIS [employer]. Licensed by the author for use in [facility]; reprinted with permission (zia@.[facility]). All rights reserved.      Do you have sleep apnea, excessive snoring or daytime drowsiness?: no    Reviewed and updated as needed this visit by clinical staff    " "Allergies  Meds  Problems             Social History     Tobacco Use     Smoking status: Never Smoker     Smokeless tobacco: Never Used   Substance Use Topics     Alcohol use: No       Current providers sharing in care for this patient include:   Patient Care Team:  Arnulfo Joaquin MD as PCP - General (Family Practice)  Andre Mendez MD as Assigned PCP  Berta Monge MD as Assigned Surgical Provider    The following health maintenance items are reviewed in Epic and correct as of today:  Health Maintenance Due   Topic Date Due     MICROALBUMIN  Never done     DIABETIC FOOT EXAM  Never done     ANNUAL REVIEW OF  ORDERS  Never done     HEPATITIS C SCREENING  Never done     COLORECTAL CANCER SCREENING  06/12/2014     BMP  03/22/2016     LIPID  09/22/2016     Pneumococcal Vaccine: 65+ Years (2 of 2 - PPSV23) 09/19/2017     A1C  11/06/2019     FALL RISK ASSESSMENT  11/13/2019     COVID-19 Vaccine (3 - Booster for Pfizer series) 08/27/2021     INFLUENZA VACCINE (1) 09/01/2021               Objective    /72 (BP Location: Right arm, Patient Position: Sitting, Cuff Size: Adult Regular)   Pulse 82   Temp 97.7  F (36.5  C) (Temporal)   Resp 16   Ht 1.854 m (6' 1\")   Wt 89.4 kg (197 lb)   SpO2 98%   BMI 25.99 kg/m   Estimated body mass index is 25.99 kg/m  as calculated from the following:    Height as of this encounter: 1.854 m (6' 1\").    Weight as of this encounter: 89.4 kg (197 lb).  Physical Exam  Patient appears comfortable and in no acute distress.        Identified Health Risks:  "

## 2022-05-27 ENCOUNTER — OFFICE VISIT (OUTPATIENT)
Dept: DERMATOLOGY | Facility: CLINIC | Age: 77
End: 2022-05-27
Payer: COMMERCIAL

## 2022-05-27 DIAGNOSIS — Z85.820 HISTORY OF MELANOMA: ICD-10-CM

## 2022-05-27 DIAGNOSIS — L82.0 SEBORRHEIC KERATOSES, INFLAMED: Primary | ICD-10-CM

## 2022-05-27 PROCEDURE — 17110 DESTRUCTION B9 LES UP TO 14: CPT | Performed by: DERMATOLOGY

## 2022-05-27 ASSESSMENT — PAIN SCALES - GENERAL: PAINLEVEL: NO PAIN (0)

## 2022-05-27 NOTE — LETTER
5/27/2022         RE: Andre Tyler  [address] [place] [facility] [address]        Dear Colleague,    Thank you for referring your patient, Andre Tyler, to the [facility]. Please see a copy of my visit note below.    [facility] Dermatology Note  Encounter Date: May 27, 2022  Office Visit     Dermatology Problem List:  1. Hx of Melanoma  - Malignant Melanoma 0.3 breslow depth, left ventral forearm, s/p excision 2/10/22  2. 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  - BCC, upper mid back, s/p excision 7/21/21  - BCC, left clavicle, s/p excision 7/21/21  - BCC, left upper back, s/p excision 12/17/2020  - SCC, base of right thumb, s/p excision 6/26/18  - SCCIS, central chest s/p ED & C 6/11/18  - BCC, vertex scalp, s/p MMS 11/21/2016  - SCCis, R lateral thigh, s/p ED&C 11/21/2016  - SCCIS, right medial ankle, s/p Mohs 4/21/2016  - SCCIS, right ventral forearm, s/p ED&C 4/21/2016  - BCC, left preauricular, s/p Mohs 11/10/2015  - BCC, superficial type, left upper chest, s/p biopsy 4/9/2015, s/p ED&C 10/5/2015  - SCCIS, posterior right lower earlobe, records via [facility] [facility] transferred records, Dr. Arnulfo Keyes, 2011. s/p excision in 2005, recurrent  - SCCIS, left dorsal hand, s/p via [facility] transferred records, Dr. Arnulfo Keyes, 2011  3. Actinic keratosis  - HAK, left dorsal wrist, s/p bx 11/10/2020  - s/p cryotherapy  4. History of benign biopsy  - Intradermal melanocytic nevus, left postauricular scalp, s/p bx 6/18/21  - Compound melanocytic nevus, left lower back, s/p bx 4/9/15  - BLK, right lower leg, s/p biopsy 4/9/2015    ____________________________________________    Assessment & Plan:    # Malignant Melanoma 0.3 breslow depth, left ventral forearm, s/p excision 2/10/22. Appears to be healing appropriately.    # Sk s/p cryo left clavicle, 9 days ago and still inflamed. Will treat with cryo  today.   - See cryo procedure note.    # Left tricep - HAK, biopsy proven. Appears resolved   - No further intervention needed.    # scar, left distal thigh versus early EIC, has resolved, no evidence of skin cnacer   - NO further intervention needed      Procedures Performed:   - Cryotherapy procedure note, location(s): left clavicle. After verbal consent and discussion of risks and benefits including, but not limited to, dyspigmentation/scar, blister, and pain, 1 lesion(s) was(were) treated with 1-2 mm freeze border for 1-2 cycles with liquid nitrogen. Post cryotherapy instructions were provided.    Follow-up: August 2022 for a skin check, earlier for new or changing lesions    Staff and Scribe:     Scribe Disclosure:   I, Eric Ramos, am serving as a scribe to document services personally performed by this physician, Dr. Berta Monge, based on data collection and the provider's statements to me.     Provider Disclosure:   The documentation recorded by the scribe accurately reflects the services I personally performed and the decisions made by me.    Berta Monge MD    Department of Dermatology  [facility]: Phone: [phone], Fax:[phone]  [facility]: Phone: [phone], Fax: [phone]      ____________________________________________    CC: Derm Problem (Recheck left arm and spot on left neck)    HPI:  Mr. Andre Tyler is a(n) 76 year old male who presents today as a return patient for a spot check.    Last seen 1/28/22 for a skin check. The left tricep was biopsied and left clavicle was noted to monitor. Since then, patient was seen by Dr. Nazario on 2/10/22 for the excision of malignant melanoma on the left ventral forearm. Left tricep was also rebiopsied, which showed SK and HAK.    Today, he has an area of concern on the left arm and left neck.    Patient is  otherwise feeling well, without additional skin concerns.    Labs Reviewed:  N/A    Physical Exam:  Vitals: There were no vitals taken for this visit.  SKIN: Focused examination of left clavicle, left tricep was performed.  - Well healing scar at site of MIS  - No evidence of AK on the left tricep  - There is a waxy stuck on tan to brown papule on the left clavicle.   - Left thigh is clear   - No other lesions of concern on areas examined.     Medications:  Current Outpatient Medications   Medication     Catheters MISC     Glucosamine HCl (GLUCOSAMINE PO)     ketoconazole (NIZORAL) 2 % external cream     ketotifen (ZADITOR) 0.025 % ophthalmic solution     lisinopril-hydrochlorothiazide (PRINZIDE,ZESTORETIC) 10-12.5 MG per tablet     metFORMIN (GLUCOPHAGE) 500 MG tablet     triamcinolone (KENALOG) 0.1 % external cream     valACYclovir (VALTREX) 500 MG tablet     vitamin C (ASCORBIC ACID) 1000 MG TABS     Vitamin D, Cholecalciferol, 25 MCG (1000 UT) TABS     No current facility-administered medications for this visit.      Past Medical History:   Patient Active Problem List   Diagnosis     Health Care Home     Hypertension goal BP (blood pressure) < 140/90     Squamous cell skin cancer     Prostate cancer - surgically treated     Advanced directives, counseling/discussion     CARDIOVASCULAR SCREENING; LDL GOAL LESS THAN 130     Sensorineural hearing loss, bilateral     Bladder neck contracture     Bladder stone     Type 2 diabetes mellitus without complication (H)     Past Medical History:   Diagnosis Date     Diabetes (H)      History of nonmelanoma skin cancer      Hypertension goal BP (blood pressure) < 140/90      Prostate cancer (H)      Squamous cell skin cancer         CC No referring provider defined for this encounter. on close of this encounter.      Again, thank you for allowing me to participate in the care of your patient.        Sincerely,        Berta Monge MD

## 2022-05-27 NOTE — PROGRESS NOTES
[facility] Dermatology Note  Encounter Date: May 27, 2022  Office Visit     Dermatology Problem List:  1. Hx of Melanoma  - Malignant Melanoma 0.3 breslow depth, left ventral forearm, s/p excision 2/10/22  2. 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 Arnulfo Keyes, 2011. s/p excision in 2005, recurrent  - SCCIS, left dorsal hand, s/p via [facility] [employer] transferred records, Dr. Arnulfo Keyes, 2011  3. Actinic keratosis  - HAK, left dorsal wrist, s/p bx 11/10/2020  - s/p cryotherapy  4. History of benign biopsy  - Intradermal melanocytic nevus, left postauricular scalp, s/p bx 6/18/21  - Compound melanocytic nevus, left lower back, s/p bx 4/9/15  - BLK, right lower leg, s/p biopsy 4/9/2015    ____________________________________________    Assessment & Plan:    # Malignant Melanoma 0.3 breslow depth, left ventral forearm, s/p excision 2/10/22. Appears to be healing appropriately.    # Sk s/p cryo left clavicle, 9 days ago and still inflamed. Will treat with cryo today.   - See cryo procedure note.    # Left tricep - HAK, biopsy proven. Appears resolved   - No further intervention needed.    # scar, left distal thigh versus early EIC, has resolved, no evidence of skin cnacer   - NO further intervention needed      Procedures Performed:    - Cryotherapy procedure note, location(s): left clavicle. After verbal consent and discussion of risks and benefits including, but not limited to, dyspigmentation/scar, blister, and pain, 1 lesion(s) was(were) treated with 1-2 mm freeze border for 1-2 cycles with liquid nitrogen. Post cryotherapy instructions were provided.    Follow-up: August 2022 for a skin check, earlier for new or changing lesions    Staff and Scribe:     Scribe Disclosure:   I, Eric Ramos, am serving as a scribe to document services personally performed by this physician, Dr. Berta oMnge, based on data collection and the provider's statements to me.     Provider Disclosure:   The documentation recorded by the scribe accurately reflects the services I personally performed and the decisions made by me.    Berta Monge MD    Department of Dermatology  [facility]: Phone: [phone], Fax:[phone]  [facility]: Phone: [phone], Fax: [phone]      ____________________________________________    CC: Derm Problem (Recheck left arm and spot on left neck)    HPI:  Mr. Andre Tyler is a(n) 76 year old male who presents today as a return patient for a spot check.    Last seen 1/28/22 for a skin check. The left tricep was biopsied and left clavicle was noted to monitor. Since then, patient was seen by Dr. Nazario on 2/10/22 for the excision of malignant melanoma on the left ventral forearm. Left tricep was also rebiopsied, which showed SK and HAK.    Today, he has an area of concern on the left arm and left neck.    Patient is otherwise feeling well, without additional skin concerns.    Labs Reviewed:  N/A    Physical Exam:  Vitals: There were no vitals taken for this visit.  SKIN: Focused examination of left clavicle, left tricep was performed.  - Well healing scar at site of MIS  - No evidence of AK on  the left tricep  - There is a waxy stuck on tan to brown papule on the left clavicle.   - Left thigh is clear   - No other lesions of concern on areas examined.     Medications:  Current Outpatient Medications   Medication     Catheters MISC     Glucosamine HCl (GLUCOSAMINE PO)     ketoconazole (NIZORAL) 2 % external cream     ketotifen (ZADITOR) 0.025 % ophthalmic solution     lisinopril-hydrochlorothiazide (PRINZIDE,ZESTORETIC) 10-12.5 MG per tablet     metFORMIN (GLUCOPHAGE) 500 MG tablet     triamcinolone (KENALOG) 0.1 % external cream     valACYclovir (VALTREX) 500 MG tablet     vitamin C (ASCORBIC ACID) 1000 MG TABS     Vitamin D, Cholecalciferol, 25 MCG (1000 UT) TABS     No current facility-administered medications for this visit.      Past Medical History:   Patient Active Problem List   Diagnosis     Health Care Home     Hypertension goal BP (blood pressure) < 140/90     Squamous cell skin cancer     Prostate cancer - surgically treated     Advanced directives, counseling/discussion     CARDIOVASCULAR SCREENING; LDL GOAL LESS THAN 130     Sensorineural hearing loss, bilateral     Bladder neck contracture     Bladder stone     Type 2 diabetes mellitus without complication (H)     Past Medical History:   Diagnosis Date     Diabetes (H)      History of nonmelanoma skin cancer      Hypertension goal BP (blood pressure) < 140/90      Prostate cancer (H)      Squamous cell skin cancer         CC No referring provider defined for this encounter. on close of this encounter.

## 2022-05-27 NOTE — PATIENT INSTRUCTIONS
[facility] Dermatology Visit    Thank you for allowing us to participate in your care. Your findings, instructions and follow-up plan are as follows:   Make sure all first degree relatives have skin exam  Eye exam  Dental exam  Cryotherapy    What is it?  Use of a very cold liquid, such as liquid nitrogen, to freeze and destroy abnormal skin cells that need to be removed    What should I expect?  Tenderness and redness  A small blister that might grow and fill with dark purple blood. There may be crusting.  More than one treatment may be needed if the lesions do not go away.    How do I care for the treated area?  Gently wash the area with your hands when bathing.  Use a thin layer of Vaseline to help with healing. You may use a Band-Aid.   The area should heal within 7-10 days and may leave behind a pink or lighter color.   Do not use an antibiotic or Neosporin ointment.   You may take acetaminophen (Tylenol) for pain.     Call your doctor if you have:  Severe pain  Signs of infection (warmth, redness, cloudy yellow drainage, and or a bad smell)  Questions or concerns    Who should I call with questions?      [facility]: [phone]      [facility]: [phone]      For urgent needs outside of business hours call the [facility] at [phone] and ask for the dermatology resident on call     When should I call my doctor?  If you are worsening or not improving, please, contact us or seek urgent care as noted below.     Who should I call with questions (adults)?  [facility] (adult and pediatric): [phone]  [facility] (adult): [phone]  For urgent needs outside of business hours call the [facility] at [phone] and ask for the dermatology resident on call  If this is a medical emergency and you are unable to reach an ER, Call [phone]    Who should I  call with questions (pediatric)?  [facility]- Pediatric Dermatology  Dr. Lucille Terrazas, Dr. Vianca Steele, Dr. Cherie Kinsey, BR[address] Dr. Jaleesa Hardin & Dr. Edson Alvarez  Non Urgent  Nurse Triage Line; [phone]- Liliya and Dang RN Care Coordinators   Lea (/Complex ) [phone]    If you need a prescription refill, please contact your pharmacy. Refills are approved or denied by our physicians during normal business hours, Monday through Fridays  Per office policy, refills will not be granted if you have not been seen within the past year (or sooner depending on your child's condition).    Scheduling Information:  Pediatric Appointment Scheduling and Call Center [phone]  Radiology Scheduling- [phone]  Sedation Unit Scheduling- [phone]  [place] Scheduling- General [phone]; Pediatric Dermatology [phone]  Main  Services: [phone]  North Korean: [phone]  Syrian: [phone]  Hmong/Lester/Kittitian: [phone]  Preadmission Nursing Department Fax Number: [phone] (fax all pre-operative paperwork to this number)    For urgent matters arising during evenings, weekends, or holidays that cannot wait for normal business hours please call [phone] and ask for the dermatology resident on call to be paged.

## 2022-07-01 ENCOUNTER — NURSE TRIAGE (OUTPATIENT)
Dept: NURSING | Facility: CLINIC | Age: 77
End: 2022-07-01

## 2022-07-01 ENCOUNTER — OFFICE VISIT (OUTPATIENT)
Dept: URGENT CARE | Facility: URGENT CARE | Age: 77
End: 2022-07-01
Payer: COMMERCIAL

## 2022-07-01 VITALS
OXYGEN SATURATION: 95 % | HEART RATE: 76 BPM | DIASTOLIC BLOOD PRESSURE: 76 MMHG | SYSTOLIC BLOOD PRESSURE: 109 MMHG | TEMPERATURE: 99.8 F

## 2022-07-01 DIAGNOSIS — R05.3 PERSISTENT COUGH FOR 3 WEEKS OR LONGER: ICD-10-CM

## 2022-07-01 DIAGNOSIS — E11.9 TYPE 2 DIABETES MELLITUS WITHOUT COMPLICATION, WITHOUT LONG-TERM CURRENT USE OF INSULIN (H): ICD-10-CM

## 2022-07-01 DIAGNOSIS — J20.9 ACUTE BRONCHITIS WITH SYMPTOMS > 10 DAYS: Primary | ICD-10-CM

## 2022-07-01 DIAGNOSIS — I10 HYPERTENSION GOAL BP (BLOOD PRESSURE) < 140/90: ICD-10-CM

## 2022-07-01 PROCEDURE — 99214 OFFICE O/P EST MOD 30 MIN: CPT | Performed by: PHYSICIAN ASSISTANT

## 2022-07-01 RX ORDER — DOXYCYCLINE HYCLATE 100 MG
100 TABLET ORAL 2 TIMES DAILY
Qty: 20 TABLET | Refills: 0 | Status: SHIPPED | OUTPATIENT
Start: 2022-07-01 | End: 2022-07-11

## 2022-07-01 ASSESSMENT — ENCOUNTER SYMPTOMS
SORE THROAT: 0
DIARRHEA: 0
FEVER: 0
FREQUENCY: 0
DYSURIA: 0
MYALGIAS: 0
VOMITING: 0
FATIGUE: 1
APPETITE CHANGE: 1
SHORTNESS OF BREATH: 0
HEADACHES: 0
WHEEZING: 0
CHEST TIGHTNESS: 0
CHILLS: 1
HEMATURIA: 0
MUSCULOSKELETAL NEGATIVE: 1
NEUROLOGICAL NEGATIVE: 1
COUGH: 1
NAUSEA: 1
ABDOMINAL PAIN: 0
ALLERGIC/IMMUNOLOGIC NEGATIVE: 1
CARDIOVASCULAR NEGATIVE: 1
PALPITATIONS: 0

## 2022-07-01 NOTE — PROGRESS NOTES
Chief Complaint:     Chief Complaint   Patient presents with     Urgent Care     Cough     Had this cough (chronic cough for over a yr), congestion in chest-Exposure to covid last Sunday, took a home test yesterday and negative        Results for orders placed or performed in visit on 07/01/22   XR Chest 2 Views     Status: None    Narrative    XR CHEST 2 VW 7/1/2022 1:33 PM    HISTORY: ongoing cough; Persistent cough for 3 weeks or longer    COMPARISON: None.      Impression    IMPRESSION: No focal infiltrate, pleural effusion or pneumothorax. The  cardiac and mediastinal silhouettes are normal.    ALEKSANDR YARBROUGH MD         SYSTEM ID:  [id number]       Medical Decision Making:    Vital signs reviewed by Matias Miguel PA-C  /76 (BP Location: Right arm, Patient Position: Sitting, Cuff Size: Adult Regular)   Pulse 76   Temp 99.8  F (37.7  C) (Tympanic)   SpO2 95%     Differential Diagnosis:  URI Adult/Peds:  Bronchitis, Viral upper respiratory illness, COVID, COPD        ASSESSMENT    1. Acute bronchitis with symptoms > 10 days    2. Persistent cough for 3 weeks or longer    3. Type 2 diabetes mellitus without complication, without long-term current use of insulin (H)    4. Hypertension goal BP (blood pressure) < 140/90        PLAN    Patient is in no acute distress.    Temp is 99.8 in clinic today, lung sounds were clear, and O2 sats at 95% on RA.    CXR was negative for any acute infiltrates or consolidations per my read.    With worsening symptoms, Rx for Doxycycline sent in.  With length of cough, stop Lisinopril.    Rest, Push fluids, vaporizer, elevation of head of bed.  Ibuprofen and or Tylenol for any fever or body aches.  Over the counter cough suppressant- PRN- as discussed.   Patient instructed to monitor blood sugars closely with illness and follow up with PCP for any DM medication changes if needed.  If symptoms worsen, recheck immediately otherwise follow up with your PCP in 1 week if symptoms  are not improving.  Worrisome symptoms discussed with instructions to go to the ED.  Patient verbalized understanding and agreed with this plan.    Labs:    Results for orders placed or performed in visit on 07/01/22   XR Chest 2 Views     Status: None    Narrative    XR CHEST 2 VW 7/1/2022 1:33 PM    HISTORY: ongoing cough; Persistent cough for 3 weeks or longer    COMPARISON: None.      Impression    IMPRESSION: No focal infiltrate, pleural effusion or pneumothorax. The  cardiac and mediastinal silhouettes are normal.    ALEKSANDR YARBROUGH MD         SYSTEM ID:  [id number]        Vital signs reviewed by Matias Miguel PA-C  /76 (BP Location: Right arm, Patient Position: Sitting, Cuff Size: Adult Regular)   Pulse 76   Temp 99.8  F (37.7  C) (Tympanic)   SpO2 95%     Current Meds      Current Outpatient Medications:      doxycycline hyclate (VIBRA-TABS) 100 MG tablet, Take 1 tablet (100 mg) by mouth 2 times daily for 10 days, Disp: 20 tablet, Rfl: 0     Catheters MISC, 1 catheter 2 times daily, Disp: 30 each, Rfl: 11     Glucosamine HCl (GLUCOSAMINE PO), , Disp: , Rfl:      ketoconazole (NIZORAL) 2 % external cream, Apply topically daily Apply twice daily for 2 months to affected areas., Disp: 60 g, Rfl: 0     ketotifen (ZADITOR) 0.025 % ophthalmic solution, Place 1 drop into both eyes 2 times daily, Disp: 10 mL, Rfl: 3     lisinopril-hydrochlorothiazide (PRINZIDE,ZESTORETIC) 10-12.5 MG per tablet, Take 1 tablet by mouth daily, Disp: 90 tablet, Rfl: 3     metFORMIN (GLUCOPHAGE) 500 MG tablet, 2 times daily, Disp: , Rfl:      triamcinolone (KENALOG) 0.1 % external cream, Apply twice daily for two weeks to rash on legs and abdomen, Disp: 454 g, Rfl: 0     valACYclovir (VALTREX) 500 MG tablet, Take 1 tablet (500 mg) by mouth 2 times daily for 3 days, Disp: 6 tablet, Rfl: 0     vitamin C (ASCORBIC ACID) 1000 MG TABS, Take 1,000 mg by mouth daily, Disp: , Rfl:      Vitamin D, Cholecalciferol, 25 MCG (1000 UT) TABS,  ", Disp: , Rfl:       Respiratory History    occasional episodes of bronchitis      SUBJECTIVE    HPI: Andre Tyler is an 76 year old male who presents with a worsening cough that first started 2 years ago. Has been progressively worsening over last 6 months with drastic change in the last few days. He notes his cough is becoming more frequent and \"it feels like I have chest congestion but can't cough anything up.\" He does note he was exposed to COVID on Sunday and tested negative yesterday. On Wednesday, he developed chills, and nausea. His wife also notes the patient is more fatigued and has a lack of appetite. He has never been evaluated for his cough and has only recently tried Vitamin C. He denies any fevers, sore throat, hemoptysis, chest pain, vomiting or changes to bowel or bladder. He has never smoked tobacco.         ROS:     Review of Systems   Constitutional: Positive for appetite change, chills and fatigue. Negative for fever.   HENT: Positive for congestion. Negative for sore throat.    Respiratory: Positive for cough. Negative for chest tightness, shortness of breath and wheezing.    Cardiovascular: Negative.  Negative for chest pain and palpitations.   Gastrointestinal: Positive for nausea. Negative for abdominal pain, diarrhea and vomiting.   Genitourinary: Negative for dysuria, frequency, hematuria and urgency.   Musculoskeletal: Negative.  Negative for myalgias.   Skin: Negative for rash.   Allergic/Immunologic: Negative.  Negative for immunocompromised state.   Neurological: Negative.  Negative for headaches.         Family History   Family History   Problem Relation Age of Onset     Hypertension Father      Diabetes Father      Prostate Cancer Father      Alcohol/Drug Sister      C.A.D. No family hx of      Cerebrovascular Disease No family hx of         Problem history  Patient Active Problem List   Diagnosis     Health Care Home     Hypertension goal BP (blood pressure) < 140/90     " Squamous cell skin cancer     Prostate cancer - surgically treated     Advanced directives, counseling/discussion     CARDIOVASCULAR SCREENING; LDL GOAL LESS THAN 130     Sensorineural hearing loss, bilateral     Bladder neck contracture     Bladder stone     Type 2 diabetes mellitus without complication (H)        Allergies  No Known Allergies     Social History  Social History     Socioeconomic History     Marital status:      Spouse name: Not on file     Number of children: 3     Years of education: Not on file     Highest education level: Not on file   Occupational History     Employer: RETIRED   Tobacco Use     Smoking status: Never Smoker     Smokeless tobacco: Never Used   Substance and Sexual Activity     Alcohol use: No     Drug use: No     Sexual activity: Yes     Partners: Female   Other Topics Concern     Parent/sibling w/ CABG, MI or angioplasty before 65F 55M? Not Asked   Social History Narrative     Not on file     Social Determinants of Health     Financial Resource Strain: Not on file   Food Insecurity: Not on file   Transportation Needs: Not on file   Physical Activity: Not on file   Stress: Not on file   Social Connections: Not on file   Intimate Partner Violence: Not on file   Housing Stability: Not on file        OBJECTIVE     Vital signs reviewed by Matias Miguel PA-C  /76 (BP Location: Right arm, Patient Position: Sitting, Cuff Size: Adult Regular)   Pulse 76   Temp 99.8  F (37.7  C) (Tympanic)   SpO2 95%      Physical Exam  Vitals reviewed.   Constitutional:       General: He is not in acute distress.     Appearance: He is well-developed. He is not ill-appearing, toxic-appearing or diaphoretic.   HENT:      Head: Normocephalic and atraumatic.      Right Ear: Hearing, tympanic membrane, ear canal and external ear normal. No drainage, swelling or tenderness. Tympanic membrane is not perforated, erythematous, retracted or bulging.      Left Ear: Hearing, tympanic membrane, ear  canal and external ear normal. No drainage, swelling or tenderness. Tympanic membrane is not perforated, erythematous, retracted or bulging.      Nose: Congestion present. No nasal tenderness, mucosal edema or rhinorrhea.      Right Turbinates: Not enlarged or swollen.      Left Turbinates: Not enlarged or swollen.      Right Sinus: No maxillary sinus tenderness or frontal sinus tenderness.      Left Sinus: No maxillary sinus tenderness or frontal sinus tenderness.      Mouth/Throat:      Pharynx: No pharyngeal swelling, oropharyngeal exudate, posterior oropharyngeal erythema or uvula swelling.      Tonsils: No tonsillar exudate. 0 on the right. 0 on the left.   Eyes:      General: Lids are normal.         Right eye: No discharge.         Left eye: No discharge.      Conjunctiva/sclera: Conjunctivae normal.      Right eye: Right conjunctiva is not injected. No exudate.     Left eye: Left conjunctiva is not injected. No exudate.     Pupils: Pupils are equal, round, and reactive to light.   Cardiovascular:      Rate and Rhythm: Normal rate and regular rhythm.      Heart sounds: Normal heart sounds. No murmur heard.    No friction rub. No gallop.   Pulmonary:      Effort: Pulmonary effort is normal. No accessory muscle usage, respiratory distress or retractions.      Breath sounds: Normal breath sounds and air entry. No stridor, decreased air movement or transmitted upper airway sounds. No decreased breath sounds, wheezing, rhonchi or rales.      Comments: Frequent cough throughout exam  Chest:      Chest wall: No tenderness.   Abdominal:      General: Bowel sounds are normal. There is no distension.      Palpations: Abdomen is soft. Abdomen is not rigid. There is no mass.      Tenderness: There is no abdominal tenderness. There is no guarding or rebound.   Musculoskeletal:         General: Normal range of motion.      Cervical back: Normal range of motion and neck supple.   Lymphadenopathy:      Head:      Right side  of head: No submental, submandibular, tonsillar, preauricular or posterior auricular adenopathy.      Left side of head: No submental, submandibular, tonsillar, preauricular or posterior auricular adenopathy.      Cervical:      Right cervical: No superficial or posterior cervical adenopathy.     Left cervical: No superficial or posterior cervical adenopathy.   Skin:     General: Skin is warm.      Capillary Refill: Capillary refill takes less than 2 seconds.   Neurological:      Mental Status: He is alert and oriented to person, place, and time.      Cranial Nerves: No cranial nerve deficit.      Sensory: No sensory deficit.      Motor: No abnormal muscle tone.      Coordination: Coordination normal.      Deep Tendon Reflexes: Reflexes normal.   Psychiatric:         Behavior: Behavior normal. Behavior is cooperative.         Thought Content: Thought content normal.         Judgment: Judgment normal.           Matias Miguel PA-C  7/1/2022, 12:54 PM

## 2022-07-02 NOTE — TELEPHONE ENCOUNTER
"Triage Call:    Patient's wife called to report patient tested positive to Covid at home.  Verbal consent to communicate given by patient.  Patient was treated in Urgent Care today for bronchitis and prescribed antibiotics.  He reports he has a \"major chest cold\".  He reports feeling short of breath at rest.      According to the protocol, patient should go to ED now.  Care advice given. Patient verbalizes understanding and agrees with plan of care. Patient plans to go to [place] ED.    Selam Fernandez RN  07/01/22 8:48 PM  [facility] Nurse Advisor    COVID 19 Nurse Triage Plan/Patient Instructions    Please be aware that novel coronavirus (COVID-19) may be circulating in the community. If you develop symptoms such as fever, cough, or SOB or if you have concerns about the presence of another infection including coronavirus (COVID-19), please contact your health care provider or visit [email or web address].     Disposition/Instructions    ED Visit recommended. Follow protocol based instructions.     Bring Your Own Device:  Please also bring your smart device(s) (smart phones, tablets, laptops) and their charging cables for your personal use and to communicate with your care team during your visit.    Thank you for taking steps to prevent the spread of this virus.  o Limit your contact with others.  o Wear a simple mask to cover your cough.  o Wash your hands well and often.    Resources    [facility]: About COVID-19: [email or web address]    CDC: What to Do If You're Sick: www.cdc.gov/coronavirus/2019-ncov/about/steps-when-sick.html    CDC: Ending Home Isolation: www.cdc.gov/coronavirus/2019-ncov/hcp/disposition-in-home-patients.html     CDC: Caring for Someone: www.cdc.gov/coronavirus/2019-ncov/if-you-are-sick/care-for-someone.html     [facility]: Interim Guidance for Hospital Discharge to Home: www.health.[facility].[email or web address]/diseases/coronavirus/hcp/hospdischarge.pdf    [facility]" [place] clinical trials (COVID-19 research studies): [email or web address]-[email or web address]-trials     Below are the COVID-19 hotlines at the [place] Department of Health ([facility]). Interpreters are available.   o For health questions: Call 107-732-0005 or 1-443.739.7095 (7 a.m. to 7 p.m.)  o For questions about schools and childcare: Call 514-053-5966 or 1-163.530.7576 (7 a.m. to 7 p.m.)       Reason for Disposition    MODERATE difficulty breathing (e.g., speaks in phrases, SOB even at rest, pulse 100-120)    Additional Information    Negative: SEVERE difficulty breathing (e.g., struggling for each breath, speaks in single words)    Negative: Difficult to awaken or acting confused (e.g., disoriented, slurred speech)    Negative: Bluish (or gray) lips or face now    Negative: Shock suspected (e.g., cold/pale/clammy skin, too weak to stand, low BP, rapid pulse)    Negative: Sounds like a life-threatening emergency to the triager    Negative: [1] Diagnosed or suspected COVID-19 AND [2] symptoms lasting 3 or more weeks    Negative: [1] COVID-19 exposure AND [2] no symptoms    Negative: COVID-19 vaccine reaction suspected (e.g., fever, headache, muscle aches) occurring 1 to 3 days after getting vaccine    Negative: COVID-19 vaccine, questions about    Negative: [1] Lives with someone known to have influenza (flu test positive) AND [2] flu-like symptoms (e.g., cough, runny nose, sore throat, SOB; with or without fever)    Negative: [1] Adult with possible COVID-19 symptoms AND [2] triager concerned about severity of symptoms or other causes    Negative: COVID-19 and breastfeeding, questions about    Negative: SEVERE or constant chest pain or pressure  (Exception: Mild central chest pain, present only when coughing.)    Protocols used: CORONAVIRUS (COVID-19) DIAGNOSED OR [id number] 1.18.2022

## 2022-07-11 ENCOUNTER — OFFICE VISIT (OUTPATIENT)
Dept: FAMILY MEDICINE | Facility: CLINIC | Age: 77
End: 2022-07-11
Payer: COMMERCIAL

## 2022-07-11 VITALS
BODY MASS INDEX: 26.2 KG/M2 | SYSTOLIC BLOOD PRESSURE: 134 MMHG | HEART RATE: 86 BPM | WEIGHT: 198.6 LBS | TEMPERATURE: 98 F | OXYGEN SATURATION: 96 % | DIASTOLIC BLOOD PRESSURE: 86 MMHG

## 2022-07-11 DIAGNOSIS — I10 HYPERTENSION GOAL BP (BLOOD PRESSURE) < 140/90: Primary | ICD-10-CM

## 2022-07-11 PROCEDURE — 99214 OFFICE O/P EST MOD 30 MIN: CPT | Performed by: FAMILY MEDICINE

## 2022-07-11 NOTE — PROGRESS NOTES
Assessment & Plan       ICD-10-CM    1. Hypertension goal BP (blood pressure) < 140/90  I10 Home Blood Pressure Monitor Order         Review of external notes as documented elsewhere in note  A total of 30 minutes spent face-to-face with greater than 50% of the time spent in counseling and coordinating cares of the issues above        Patient Instructions   Hilario    It was a pleasure seeing you in clinic today.  Here's the plan:    1. Hypertension - check bp twice daily for at least 2 weeks.  If consistently normal, then no need to blood pressure medication.  If you decide to stop lisinopril, and your cough persists for another few months (outside of COVID window), we should consider other causes for your cough.    Let me know if you have questions.    Arnulfo Aguirre MD        No follow-ups on file.    Arnulfo Aguirre MD  [facility]    Gui Rich is a 76 year old, presenting for the following health issues:  Hospital F/U      History of Present Illness       Reason for visit:  Follow up on meds    He eats 2-3 servings of fruits and vegetables daily.He consumes 0 sweetened beverage(s) daily.He exercises with enough effort to increase his heart rate 10 to 19 minutes per day.  He exercises with enough effort to increase his heart rate 3 or less days per week.   He is taking medications regularly.       ED/UC Followup:    Facility:  [facility]  Date of visit: 07/01/2022  Reason for visit: Cough  Current Status: Was diagnosed with Covid and took Paxlovid. Was also told to stop taking Lisinopril due to cough. Would like to discuss further today.    Patient presents with chronic dry cough in the setting of lisinopril use.  In addition he was recently diagnosed with COVID, during which his cough didn't get significantly worse.  He did have a sore throat, fatigue, muscle aches, which have resolved.  He was told to stop his lisinopril to treat his chronic cough and he's wondering if  this is the right thing to do.  He feels his bp has been ok since stopping his lisinopril        Review of Systems   Constitutional, HEENT, cardiovascular, pulmonary, gi and gu systems are negative, except as otherwise noted.      Objective    /86   Pulse 86   Temp 98  F (36.7  C) (Oral)   Wt 90.1 kg (198 lb 9.6 oz)   SpO2 96%   BMI 26.20 kg/m    Body mass index is 26.2 kg/m .  Physical Exam                       .  ..  DME (Durable Medical Equipment) Orders and Documentation  Orders Placed This Encounter   Procedures     Home Blood Pressure Monitor Order      The patient was assessed and it was determined the patient is in need of the following listed DME Supplies/Equipment. Please complete supporting documentation below to demonstrate medical necessity.      DME All Other Item(s) Documentation    List reason for need and supporting documentation for medical necessity below for each DME item.     1. Bp cuff

## 2022-07-11 NOTE — PATIENT INSTRUCTIONS
Hilario    It was a pleasure seeing you in clinic today.  Here's the plan:    Hypertension - check bp twice daily for at least 2 weeks.  If consistently normal, then no need to blood pressure medication.  If you decide to stop lisinopril, and your cough persists for another few months (outside of COVID window), we should consider other causes for your cough.    Let me know if you have questions.    Arnulfo Aguirre MD

## 2022-07-26 NOTE — PROGRESS NOTES
[facility] Dermatology Note  Encounter Date: Aug 2, 2022  Office Visit     Dermatology Problem List:  NUB - left paraspinal lower back, left shin, right tricep, and right dorsal hand s/p bx 8/2/22  1. Hx of Melanoma  - Malignant Melanoma 0.3 breslow depth, left ventral forearm, s/p excision 2/10/22  2. 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 Arnulfo Keyes, 2011. s/p excision in 2005, recurrent  - SCCIS, left dorsal hand, s/p via [facility] [employer] transferred records, Dr. Arnulfo Keyes, 2011  3. Actinic keratosis  - HAK, left dorsal wrist, s/p bx 11/10/2020  - s/p cryotherapy  4. History of benign biopsy  - Intradermal melanocytic nevus, left postauricular scalp, s/p bx 6/18/21  - Compound melanocytic nevus, left lower back, s/p bx 4/9/15  - BLK, right lower leg, s/p biopsy 4/9/2015       Family hx of melanoma is negative.  Sister with unknown skin issues  ____________________________________________     Assessment & Plan:     # Malignant Melanoma 0.3 breslow depth, left ventral forearm, s/p excision 2/10/22. No clinical evidence of recurrence.  - Recommend sunscreens SPF #30 or greater, protective clothing and avoidance of tanning beds.  - ABCD's of melanoma were reviewed with patient and handout provided.   - Recommended yearly  dental,   and ophthalmology examinations.  - Recommended first degree relatives get yearly skin exams as well.     # Neoplasm of uncertain behavior on the left paraspinal lower back The differential diagnosis includes BCC vs other. .  - See procedure note.     # Neoplasm of uncertain behavior on the left shin. The differential diagnosis includes SCC vs other. .  - See procedure note.     # Neoplasm of uncertain behavior on the right tricep. The differential diagnosis includes HAK vs other. .  - See procedure note.     # Neoplasm of uncertain behavior on the right dorsal hand. The differential diagnosis includes HAK vs other. .  - See procedure note.     # Left tricep - HAK, biopsy proven. Resolved.    - No further intervention needed.     # Actinic keratosis - right temple, right helix, left cheek, and left temple, left forehead right hand and forearms and scalp  - See cryo note.     Procedures Performed:   - Cryotherapy procedure note. After verbal consent and discussion of risks and benefits including, but not limited to, dyspigmentation/scar, blister, and pain, 12  AKs was(were) treated with 1-2 mm freeze border for 1-2 cycles with liquid nitrogen. Post cryotherapy instructions were provided.    - Shave biopsy procedure note, location(s): left paraspinal lower back, left shin, right tricep, and right dorsal hand. After discussion of benefits and risks including but not limited to bleeding, infection, scar, incomplete removal, recurrence, and non-diagnostic biopsy, written consent and photographs were obtained. The area was cleaned with isopropyl alcohol. 0.5mL of 1% lidocaine with epinephrine was injected to obtain adequate anesthesia of lesion(s). Shave biopsy at site(s) performed. Hemostasis was achieved with aluminium chloride. Petrolatum ointment and a sterile dressing were applied. The patient tolerated the procedure and no complications were noted. The patient was provided with verbal and written post care  instructions.     Follow-up: 3 months skin check.     Staff and Scribe:     Scribe Disclosure:   I, Sriram Richmond, am serving as a scribe to document services personally performed by this physician, Dr. Berta Monge, based on data collection and the provider's statements to me.     Provider Disclosure:   The documentation recorded by the scribe accurately reflects the services I personally performed and the decisions made by me.    Berta Monge MD    Department of Dermatology  [facility]: Phone: [phone], Fax:[phone]  [facility]: Phone: [phone], Fax: [phone]      ____________________________________________    CC: Skin Check (No areas of concern hx Melanoma and NMSC)    HPI:  Mr. Andre Tyler is a(n) 76 year old male who presents today for follow-up  for a skin check.     Last seen on 5/27/22 for a skin check. At that time, ISK on the left clavicle was treated with cryo.     Today, patient notes nothing bleeding, crusting, or changing.     Patient is otherwise feeling well, without additional skin concerns.    Labs Reviewed:  N/A    Physical Exam:  Vitals: There were no vitals taken for this visit.  SKIN: Total skin excluding the undergarment areas was performed. The exam included the head/face, neck, both arms, chest, back, abdomen, both legs, digits and/or nails.  Declines genital exam. Consented to buttucks exam. Manuela dang LPN present  - Well healed scar on previous site of melanoma.   - There are erythematous macules with overyling adherent scale on the right temple, right helix, left cheek, and left temple, left forehead .   - Left paraspinal lower back: 5 mm red macule   - Right dorsal hand: 1 cm erythematous patch with scale   - Right tricep: red macule with scale HAK  - Left shin: 1.4 cm patch scc vs other  - No other lesions of concern on areas  examined.  LYMPH NODES: No submandibular, cervical, supraclavicular, axillary palpable on examination.     Medications:  Current Outpatient Medications   Medication     Catheters MISC     Glucosamine HCl (GLUCOSAMINE PO)     ketoconazole (NIZORAL) 2 % external cream     ketotifen (ZADITOR) 0.025 % ophthalmic solution     metFORMIN (GLUCOPHAGE) 500 MG tablet     PAXLOVID therapy pack     triamcinolone (KENALOG) 0.1 % external cream     valACYclovir (VALTREX) 500 MG tablet     vitamin C (ASCORBIC ACID) 1000 MG TABS     Vitamin D, Cholecalciferol, 25 MCG (1000 UT) TABS     No current facility-administered medications for this visit.      Past Medical History:   Patient Active Problem List   Diagnosis     Health Care Home     Hypertension goal BP (blood pressure) < 140/90     Squamous cell skin cancer     Prostate cancer - surgically treated     Advanced directives, counseling/discussion     CARDIOVASCULAR SCREENING; LDL GOAL LESS THAN 130     Sensorineural hearing loss, bilateral     Bladder neck contracture     Bladder stone     Type 2 diabetes mellitus without complication (H)     Past Medical History:   Diagnosis Date     Diabetes (H)      History of nonmelanoma skin cancer      Hypertension goal BP (blood pressure) < 140/90      Prostate cancer (H)      Squamous cell skin cancer         CC No referring provider defined for this encounter. on close of this encounter.

## 2022-08-02 ENCOUNTER — OFFICE VISIT (OUTPATIENT)
Dept: DERMATOLOGY | Facility: CLINIC | Age: 77
End: 2022-08-02
Payer: COMMERCIAL

## 2022-08-02 DIAGNOSIS — Z85.828 HISTORY OF NONMELANOMA SKIN CANCER: ICD-10-CM

## 2022-08-02 DIAGNOSIS — Z85.820 HISTORY OF MELANOMA: Primary | ICD-10-CM

## 2022-08-02 DIAGNOSIS — D48.5 NEOPLASM OF UNCERTAIN BEHAVIOR OF SKIN: ICD-10-CM

## 2022-08-02 DIAGNOSIS — L57.0 AK (ACTINIC KERATOSIS): ICD-10-CM

## 2022-08-02 PROCEDURE — 11103 TANGNTL BX SKIN EA SEP/ADDL: CPT | Performed by: DERMATOLOGY

## 2022-08-02 PROCEDURE — 17003 DESTRUCT PREMALG LES 2-14: CPT | Performed by: DERMATOLOGY

## 2022-08-02 PROCEDURE — 17000 DESTRUCT PREMALG LESION: CPT | Mod: XS | Performed by: DERMATOLOGY

## 2022-08-02 PROCEDURE — 88305 TISSUE EXAM BY PATHOLOGIST: CPT | Performed by: DERMATOLOGY

## 2022-08-02 PROCEDURE — 11102 TANGNTL BX SKIN SINGLE LES: CPT | Performed by: DERMATOLOGY

## 2022-08-02 ASSESSMENT — PAIN SCALES - GENERAL: PAINLEVEL: NO PAIN (0)

## 2022-08-02 NOTE — NURSING NOTE
Andre Tyler's goals for this visit include:   Chief Complaint   Patient presents with     Skin Check     No areas of concern hx Melanoma and NMSC       He requests these members of his care team be copied on today's visit information:     PCP: Arnulfo Joaquin    Referring Provider:  No referring provider defined for this encounter.    There were no vitals taken for this visit.    Do you need any medication refills at today's visit? Florence Rahman LPN

## 2022-08-02 NOTE — PATIENT INSTRUCTIONS
Wound Care After a Biopsy    What is a skin biopsy?  A skin biopsy allows the doctor to examine a very small piece of tissue under the microscope to determine the diagnosis and the best treatment for the skin condition. A local anesthetic (numbing medicine)  is injected with a very small needle into the skin area to be tested. A small piece of skin is taken from the area. Sometimes a suture (stitch) is used.     What are the risks of a skin biopsy?  I will experience scar, bleeding, swelling, pain, crusting and redness. I may experience incomplete removal or recurrence. Risks of this procedure are excessive bleeding, bruising, infection, nerve damage, numbness, thick (hypertrophic or keloidal) scar and non-diagnostic biopsy.    How should I care for my wound for the first 24 hours?  Keep the wound dry and covered for 24 hours  If it bleeds, hold direct pressure on the area for 15 minutes. If bleeding does not stop then go to the emergency room  Avoid strenuous exercise the first 1-2 days or as your doctor instructs you    How should I care for the wound after 24 hours?  After 24 hours, remove the bandage  You may bathe or shower as normal  If you had a scalp biopsy, you can shampoo as usual and can use shower water to clean the biopsy site daily  Clean the wound twice a day with gentle soap and water  Do not scrub, be gentle  Apply white petroleum/Vaseline after cleaning the wound with a cotton swab or a clean finger, and keep the site covered with a Bandaid /bandage. Bandages are not necessary with a scalp biopsy  If you are unable to cover the site with a Bandaid /bandage, re-apply ointment 2-3 times a day to keep the site moist. Moisture will help with healing  Avoid strenuous activity for first 1-2 days  Avoid lakes, rivers, pools, and oceans until the stitches are removed or the site is healed    How do I clean my wound?  Wash hands thoroughly with soap or use hand  before all wound care  Clean the  wound with gentle soap and water  Apply white petroleum/Vaseline  to wound after it is clean  Replace the Bandaid /bandage to keep the wound covered for the first few days or as instructed by your doctor  If you had a scalp biopsy, warm shower water to the area on a daily basis should suffice    What should I use to clean my wound?   Cotton-tipped applicators (Qtips )  White petroleum jelly (Vaseline ). Use a clean new container and use Q-tips to apply.  Bandaids   as needed  Gentle soap     How should I care for my wound long term?  Do not get your wound dirty  Keep up with wound care for one week or until the area is healed.  A small scab will form and fall off by itself when the area is completely healed. The area will be red and will become pink in color as it heals. Sun protection is very important for how your scar will turn out. Sunscreen with an SPF 30 or greater is recommended once the area is healed.    You should have some soreness but it should be mild and slowly go away over several days. Talk to your doctor about using tylenol for pain,    When should I call my doctor?  If you have increased:   Pain or swelling  Pus or drainage (clear or slightly yellow drainage is ok)  Temperature over 100F  Spreading redness or warmth around wound    When will I hear about my results?  The biopsy results can take 2 weeks to come back.  Your results will automatically release to [facility] before your provider has even reviewed them.  The clinic will call you with the results, send you a [employer] message, or have you schedule a follow-up clinic or phone time to discuss the results.  Contact our clinics if you do not hear from us in 2 weeks.    Who should I call with questions?  [facility]: [phone]  [facility]: [phone]  For urgent needs outside of business hours call the [facility] at [phone] and ask for the dermatology resident on call      Cryotherapy    What is it?  Use of a very cold liquid, such as liquid nitrogen, to freeze and destroy abnormal skin cells that need to be removed    What should I expect?  Tenderness and redness  A small blister that might grow and fill with dark purple blood. There may be crusting.  More than one treatment may be needed if the lesions do not go away.    How do I care for the treated area?  Gently wash the area with your hands when bathing.  Use a thin layer of Vaseline to help with healing. You may use a Band-Aid.   The area should heal within 7-10 days and may leave behind a pink or lighter color.   Do not use an antibiotic or Neosporin ointment.   You may take acetaminophen (Tylenol) for pain.     Call your doctor if you have:  Severe pain  Signs of infection (warmth, redness, cloudy yellow drainage, and or a bad smell)  Questions or concerns    Who should I call with questions?      [facility]: [phone]      [facility]: [phone]      For urgent needs outside of business hours call the [facility] at [phone] and ask for the dermatology resident on call      Patient Education     Checking for Skin Cancer  You can find cancer early by checking your skin each month. There are 3 kinds of skin cancer. They are melanoma, basal cell carcinoma, and squamous cell carcinoma. Doing monthly skin checks is the best way to find new marks or skin changes. Follow the instructions below for checking your skin.   The ABCDEs of checking moles for melanoma   Check your moles or growths for signs of melanoma using ABCDE:   Asymmetry: the sides of the mole or growth don t match  Border: the edges are ragged, notched, or blurred  Color: the color within the mole or growth varies  Diameter: the mole or growth is larger than 6 mm (size of a pencil eraser)  Evolving: the size, shape, or color of the mole or growth is changing (evolving is not shown in  the images below)    Checking for other types of skin cancer  Basal cell carcinoma or squamous cell carcinoma have symptoms such as:     A spot or mole that looks different from all other marks on your skin  Changes in how an area feels, such as itching, tenderness, or pain  Changes in the skin's surface, such as oozing, bleeding, or scaliness  A sore that does not heal  New swelling or redness beyond the border of a mole    Who s at risk?  Anyone can get skin cancer. But you are at greater risk if you have:   Fair skin, light-colored hair, or light-colored eyes  Many moles or abnormal moles on your skin  A history of sunburns from sunlight or tanning beds  A family history of skin cancer  A history of exposure to radiation or chemicals  A weakened immune system  If you have had skin cancer in the past, you are at risk for recurring skin cancer.   How to check your skin  Do your monthly skin checkups in front of a full-length mirror. Check all parts of your body, including your:   Head (ears, face, neck, and scalp)  Torso (front, back, and sides)  Arms (tops, undersides, upper, and lower armpits)  Hands (palms, backs, and fingers, including under the nails)  Buttocks and genitals  Legs (front, back, and sides)  Feet (tops, soles, toes, including under the nails, and between toes)  If you have a lot of moles, take digital photos of them each month. Make sure to take photos both up close and from a distance. These can help you see if any moles change over time.   Most skin changes are not cancer. But if you see any changes in your skin, call your doctor right away. Only he or she can diagnose a problem. If you have skin cancer, seeing your doctor can be the first step toward getting the treatment that could save your life.   [facility] last reviewed this educational content on 4/1/2019 2000-2020 [employer]. [address] [place] [address], [place] [address]. All rights reserved. This information is not intended  as a substitute for professional medical care. Always follow your healthcare professional's instructions.       When should I call my doctor?  If you are worsening or not improving, please, contact us or seek urgent care as noted below.     Who should I call with questions (adults)?  [facility] (adult and pediatric): [phone]  [facility] (adult): [phone]  For urgent needs outside of business hours call the [facility] at [phone] and ask for the dermatology resident on call to be paged  If this is a medical emergency and you are unable to reach an ER, Call 911    Who should I call with questions (pediatric)?  [facility]- Pediatric Dermatology  Dr. Lucille Terrazas, Dr. Vianca Steele, Dr. Cherie Kinsey, BRIT Vincent, Dr. An Lee, Dr. Jaleesa Hardin & Dr. Edson Alvarez  Non-urgent nurse triage line; [phone]- Liliya and Dang GRAY Care Coordinators   Lea (/Complex ) [phone]    If you need a prescription refill, please contact your pharmacy. Refills are approved or denied by our Physicians during normal business hours, Monday through Fridays  Per office policy, refills will not be granted if you have not been seen within the past year (or sooner depending on your child's condition)    Scheduling Information:  Pediatric Appointment Scheduling and Call Center [phone]  Radiology Scheduling- [phone]  Sedation Unit Scheduling- [phone]  [place] Scheduling- General [phone]; Pediatric Dermatology [phone]  Main  Services: [phone]  Maltese: [phone]  Marshallese: [phone]  Hmong/Albanian/Iraqi: [phone]  Preadmission Nursing Department Fax Number: [phone] (Fax all pre-operative paperwork to this number)    For urgent matters arising during evenings, weekends, or holidays that cannot wait for normal business hours  please call [phone] and ask for the dermatology resident on call to be paged.

## 2022-08-02 NOTE — LETTER
8/2/2022         RE: Andre Tyler  [address] [facility] [address]        Dear Colleague,    Thank you for referring your patient, Andre Tyler, to the [facility]. Please see a copy of my visit note below.    [facility] Dermatology Note  Encounter Date: Aug 2, 2022  Office Visit     Dermatology Problem List:  NUB - left paraspinal lower back, left shin, right tricep, and right dorsal hand s/p bx 8/2/22  1. Hx of Melanoma  - Malignant Melanoma 0.3 breslow depth, left ventral forearm, s/p excision 2/10/22  2. 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 Arnulfo Keyes, 2011. s/p excision in 2005, recurrent  - SCCIS, left dorsal hand, s/p via [employer] transferred records, Dr. Arnulfo Keyes, 2011  3. Actinic keratosis  - HAK, left dorsal wrist, s/p bx 11/10/2020  - s/p cryotherapy  4. History of benign biopsy  - Intradermal melanocytic nevus, left postauricular scalp, s/p bx 6/18/21  - Compound melanocytic nevus, left lower back, s/p bx 4/9/15  - BLK, right lower leg, s/p biopsy 4/9/2015       Family hx of melanoma is negative.  Sister with unknown skin issues  ____________________________________________     Assessment & Plan:     # Malignant Melanoma 0.3 breslow depth,  left ventral forearm, s/p excision 2/10/22. No clinical evidence of recurrence.  - Recommend sunscreens SPF #30 or greater, protective clothing and avoidance of tanning beds.  - ABCD's of melanoma were reviewed with patient and handout provided.   - Recommended yearly dental,   and ophthalmology examinations.  - Recommended first degree relatives get yearly skin exams as well.     # Neoplasm of uncertain behavior on the left paraspinal lower back The differential diagnosis includes BCC vs other. .  - See procedure note.     # Neoplasm of uncertain behavior on the left shin. The differential diagnosis includes SCC vs other. .  - See procedure note.     # Neoplasm of uncertain behavior on the right tricep. The differential diagnosis includes HAK vs other. .  - See procedure note.     # Neoplasm of uncertain behavior on the right dorsal hand. The differential diagnosis includes HAK vs other. .  - See procedure note.     # Left tricep - HAK, biopsy proven. Resolved.    - No further intervention needed.     # Actinic keratosis - right temple, right helix, left cheek, and left temple, left forehead right hand and forearms and scalp  - See cryo note.     Procedures Performed:   - Cryotherapy procedure note. After verbal consent and discussion of risks and benefits including, but not limited to, dyspigmentation/scar, blister, and pain, 12  AKs was(were) treated with 1-2 mm freeze border for 1-2 cycles with liquid nitrogen. Post cryotherapy instructions were provided.    - Shave biopsy procedure note, location(s): left paraspinal lower back, left shin, right tricep, and right dorsal hand. After discussion of benefits and risks including but not limited to bleeding, infection, scar, incomplete removal, recurrence, and non-diagnostic biopsy, written consent and photographs were obtained. The area was cleaned with isopropyl alcohol. 0.5mL of 1% lidocaine with epinephrine was injected to obtain adequate anesthesia of lesion(s).  Shave biopsy at site(s) performed. Hemostasis was achieved with aluminium chloride. Petrolatum ointment and a sterile dressing were applied. The patient tolerated the procedure and no complications were noted. The patient was provided with verbal and written post care instructions.     Follow-up: 3 months skin check.     Staff and Scribe:     Scribe Disclosure:   I, Sriram Richmond, am serving as a scribe to document services personally performed by this physician, Dr. Berta Monge, based on data collection and the provider's statements to me.     Provider Disclosure:   The documentation recorded by the scribe accurately reflects the services I personally performed and the decisions made by me.    Berta Monge MD    Department of Dermatology  [facility]: Phone: [phone], Fax:[phone]  [facility]: Phone: [phone], Fax: [phone]      ____________________________________________    CC: Skin Check (No areas of concern hx Melanoma and NMSC)    HPI:  Mr. Andre Tyler is a(n) 76 year old male who presents today for follow-up  for a skin check.     Last seen on 5/27/22 for a skin check. At that time, ISK on the left clavicle was treated with cryo.     Today, patient notes nothing bleeding, crusting, or changing.     Patient is otherwise feeling well, without additional skin concerns.    Labs Reviewed:  N/A    Physical Exam:  Vitals: There were no vitals taken for this visit.  SKIN: Total skin excluding the undergarment areas was performed. The exam included the head/face, neck, both arms, chest, back, abdomen, both legs, digits and/or nails.  Declines genital exam. Consented to buttucks exam. Manuela dang LPN present  - Well healed scar on previous site of melanoma.   - There are erythematous macules with overyling adherent scale on the right temple, right helix, left cheek, and  left temple, left forehead .   - Left paraspinal lower back: 5 mm red macule   - Right dorsal hand: 1 cm erythematous patch with scale   - Right tricep: red macule with scale HAK  - Left shin: 1.4 cm patch scc vs other  - No other lesions of concern on areas examined.  LYMPH NODES: No submandibular, cervical, supraclavicular, axillary palpable on examination.     Medications:  Current Outpatient Medications   Medication     Catheters MISC     Glucosamine HCl (GLUCOSAMINE PO)     ketoconazole (NIZORAL) 2 % external cream     ketotifen (ZADITOR) 0.025 % ophthalmic solution     metFORMIN (GLUCOPHAGE) 500 MG tablet     PAXLOVID therapy pack     triamcinolone (KENALOG) 0.1 % external cream     valACYclovir (VALTREX) 500 MG tablet     vitamin C (ASCORBIC ACID) 1000 MG TABS     Vitamin D, Cholecalciferol, 25 MCG (1000 UT) TABS     No current facility-administered medications for this visit.      Past Medical History:   Patient Active Problem List   Diagnosis     Health Care Home     Hypertension goal BP (blood pressure) < 140/90     Squamous cell skin cancer     Prostate cancer - surgically treated     Advanced directives, counseling/discussion     CARDIOVASCULAR SCREENING; LDL GOAL LESS THAN 130     Sensorineural hearing loss, bilateral     Bladder neck contracture     Bladder stone     Type 2 diabetes mellitus without complication (H)     Past Medical History:   Diagnosis Date     Diabetes (H)      History of nonmelanoma skin cancer      Hypertension goal BP (blood pressure) < 140/90      Prostate cancer (H)      Squamous cell skin cancer         CC No referring provider defined for this encounter. on close of this encounter.      Again, thank you for allowing me to participate in the care of your patient.        Sincerely,        Berta Monge MD

## 2022-08-04 LAB
PATH REPORT.COMMENTS IMP SPEC: ABNORMAL
PATH REPORT.COMMENTS IMP SPEC: ABNORMAL
PATH REPORT.COMMENTS IMP SPEC: YES
PATH REPORT.FINAL DX SPEC: ABNORMAL
PATH REPORT.GROSS SPEC: ABNORMAL
PATH REPORT.MICROSCOPIC SPEC OTHER STN: ABNORMAL
PATH REPORT.RELEVANT HX SPEC: ABNORMAL

## 2022-08-08 ENCOUNTER — VIRTUAL VISIT (OUTPATIENT)
Dept: DERMATOLOGY | Facility: CLINIC | Age: 77
End: 2022-08-08
Payer: COMMERCIAL

## 2022-08-08 ENCOUNTER — TELEPHONE (OUTPATIENT)
Dept: DERMATOLOGY | Facility: CLINIC | Age: 77
End: 2022-08-08

## 2022-08-08 DIAGNOSIS — D04.72 SQUAMOUS CELL CARCINOMA IN SITU (SCCIS) OF SKIN OF LEFT LOWER LEG: ICD-10-CM

## 2022-08-08 DIAGNOSIS — D04.61 SQUAMOUS CELL CARCINOMA IN SITU (SCCIS) OF SKIN OF RIGHT UPPER ARM: ICD-10-CM

## 2022-08-08 DIAGNOSIS — C44.519 BASAL CELL CARCINOMA (BCC) OF LOWER BACK: Primary | ICD-10-CM

## 2022-08-08 DIAGNOSIS — Z79.2 NEED FOR PROPHYLACTIC ANTIBIOTIC: Primary | ICD-10-CM

## 2022-08-08 DIAGNOSIS — D04.61 SQUAMOUS CELL CARCINOMA IN SITU (SCCIS) OF DORSUM OF RIGHT HAND: ICD-10-CM

## 2022-08-08 PROCEDURE — 99213 OFFICE O/P EST LOW 20 MIN: CPT | Mod: 95 | Performed by: DERMATOLOGY

## 2022-08-08 RX ORDER — CEPHALEXIN 500 MG/1
CAPSULE ORAL
Qty: 4 CAPSULE | Refills: 0 | Status: SHIPPED | OUTPATIENT
Start: 2022-08-08 | End: 2023-02-21

## 2022-08-08 ASSESSMENT — PAIN SCALES - GENERAL: PAINLEVEL: NO PAIN (0)

## 2022-08-08 NOTE — TELEPHONE ENCOUNTER
[facility] [place] Pre-Surgery Screening Note     Pre-screening Information:  Hx of Skin Cancer: Yes  Hx of Mohs Surgery: Yes  Transplant: No  Immunocompromised: No  Current Anticoagulant(s): None  Bleeding Disorder(s): No  Stent: No  Pacemaker: No  Defibrillator: No  Brain/Nerve Stimulator: No  Endocarditis/Rheumatic Fever Hx: No  Vascular graft: No  Prophylactic Antibiotic Needed: No  Congenital heart defect: No  Prosthetic Heart Valve: No  Lesion on Leg/Groin: No  Prosthetic Joint : No  Diabetic: Yes  HIV/AIDS: No  Hepatitis: No  Pregnant: No  Patient wears CPAP mask: No  Current Tobacco Use: No  Current Alcohol Use: No  Extended Care Facility: No  Occupation: Retired  Do you have mobility issues?: No  Do you use any assistive devices/DME?: No  Do you have any issues with lying for long periods of time?: No      Medications/Allergies  Medications and allergies review with patient: Yes     Current Outpatient Medications   Medication Sig Dispense Refill     Catheters MISC 1 catheter 2 times daily 30 each 11     Glucosamine HCl (GLUCOSAMINE PO)        ketoconazole (NIZORAL) 2 % external cream Apply topically daily Apply twice daily for 2 months to affected areas. 60 g 0     ketotifen (ZADITOR) 0.025 % ophthalmic solution Place 1 drop into both eyes 2 times daily 10 mL 3     metFORMIN (GLUCOPHAGE) 500 MG tablet 2 times daily       PAXLOVID therapy pack TAKE 2 PINK TABLETS (NIRMATRELVIR) AND 1 WHITE TABLET (ROTINAVIR) BY MOUTH TWICE A DAY PER PACKAGE INSTRUCTIONS FOR 5 DAYS       triamcinolone (KENALOG) 0.1 % external cream Apply twice daily for two weeks to rash on legs and abdomen 454 g 0     valACYclovir (VALTREX) 500 MG tablet Take 1 tablet (500 mg) by mouth 2 times daily for 3 days 6 tablet 0     vitamin C (ASCORBIC ACID) 1000 MG TABS Take 1,000 mg by mouth daily       Vitamin D, Cholecalciferol, 25 MCG (1000 UT) TABS        No Known Allergies    Pertinent Labs:  Last INR:  No results found for: INR    Other Reminders:    Reminded patient to take any order prophylactic antibiotics 1 hour prior to appointment: Yes     Please be aware that this can be an all day procedure. Please bring your daily medications and food/cash. Encourage patient to eat prior to procedure(s). After care instructions were reviewed with patient.    If any positives, send to RN to initiate antibiotic prophylaxis protocol and/or anticoagulation management protocol      Manuela Alvares LPN    Result Notes     Manuela Alvares LPN   8/8/2022  2:09 PM CDT Back to [facility]        I spoke to Andre Tyler and gave results. Patient understood and had no further questions or concerns.     Patient scheduled to do all 4 sites on 8/29/22 at 8:30 am. Ok'd by Dr. Nazario. Patient had no further questions or concerns. Call back number given if patient needs.     MARILU Harper LPN   8/8/2022 10:43 AM CDT         Called and informed patient of results. Patient scheduled for telephone consult today with Dr. Nazario to discuss treatment options     MARILU Cabrera MD   8/5/2022  6:03 PM CDT         All skin cancers and I am concerned ED and C for all will cause too many open wounds  Please schedule phone consult with mario to plan  Berta Monge MD    Department of Dermatology  [facility]: Phone: [phone], Fax:[phone]  [facility]: Phone: [phone], Fax: [phone]

## 2022-08-08 NOTE — NURSING NOTE
Teledermatology Nurse Call Patients:     Are you in the [facility] at the time of the encounter? yes    Today's visit will be billed to you and your insurance.    A teledermatology visit is not as thorough as an in-person visit and the quality of the photograph sent may not be of the same quality as that taken by the dermatology clinic.      Christi Rahman LPN

## 2022-08-08 NOTE — PROGRESS NOTES
[facility] Dermatology Note  Encounter Date: Aug 8, 2022  [employer] and Telephone ([phone]). Location of teledermatologist: [facility].  Start time: 1139. End time: 1146.    Dermatology Problem List:  1. Hx of Melanoma  - Malignant Melanoma 0.3 breslow depth, left ventral forearm, s/p excision 2/10/22  2. 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  - BCC, upper mid back, s/p excision 7/21/21  - BCC, left clavicle, s/p excision 7/21/21  - BCC, left upper back, s/p excision 12/17/2020  - SCC, base of right thumb, s/p excision 6/26/18  - SCCIS, central chest s/p ED & C 6/11/18  - BCC, vertex scalp, s/p MMS 11/21/2016  - SCCis, R lateral thigh, s/p ED&C 11/21/2016  - SCCIS, right medial ankle, s/p Mohs 4/21/2016  - SCCIS, right ventral forearm, s/p ED&C 4/21/2016  - BCC, left preauricular, s/p Mohs 11/10/2015  - BCC, superficial type, left upper chest, s/p biopsy 4/9/2015, s/p ED&C 10/5/2015  - SCCIS, posterior right lower earlobe, records via [employer] transferred records, Dr. Arnulfo Keyes, 2011. s/p excision in 2005, recurrent  - SCCIS, left dorsal hand, s/p via [employer] transferred records, Dr. Arnulfo Keyes, 2011  3. Actinic keratosis  - HAK, left dorsal wrist, s/p bx 11/10/2020  - s/p cryotherapy  4. History of benign biopsy  - Intradermal melanocytic nevus, left postauricular scalp, s/p bx 6/18/21  - Compound melanocytic nevus, left lower back, s/p bx 4/9/15  - BLK, right lower leg, s/p biopsy  4/9/2015        Family hx of melanoma is negative.  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     Staff and Scribe:     Scribe Disclosure:   I, Sriram Richmond, am serving as a scribe to document services personally performed by this physician, Dr. Boby Nazario, based on data collection and the provider's statements to me.     Provider Disclosure:   The documentation recorded by the scribe accurately reflects the services I personally performed and the decisions made by me.    Boby Nazario DO    Department of Dermatology  [facility]: Phone: [phone], Fax:[phone]  [facility]: Phone: [phone], Fax: [phone]    ____________________________________________    CC: Consult For (X4 SKIN CANCER treatment option discussion per Dr. Monge )    HPI:  Mr. Andre Tyler is a(n) 76 year old male who presents today as a return patient for a consult.     Last seen on 8/2/22 with Dr. Monge for a skin check. At that time, a bx was performed on the  left paraspinal lower back, left shin, right tricep, and right dorsal hand.     Today, patient presents for a consult.     Patient is otherwise feeling well, without additional skin concerns.    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  SKIN: Teledermatology photos were reviewed; image quality and interpretability: acceptable. Image date: 8/2/22.  - right dorsal hand with scaly skin colored papule~6mm  - right distal tricep, pink scaly papule  - L lower back, pink scaly papule.   - L mid shin, pik scaly papule ~1.5cm  - No other lesions of concern on areas examined.     Medications:  Current Outpatient Medications   Medication     Catheters MISC     Glucosamine HCl (GLUCOSAMINE PO)     ketoconazole (NIZORAL) 2 % external cream     ketotifen (ZADITOR) 0.025 % ophthalmic solution     metFORMIN (GLUCOPHAGE) 500 MG tablet     PAXLOVID therapy pack     triamcinolone (KENALOG) 0.1 % external cream     vitamin C (ASCORBIC ACID) 1000 MG TABS     Vitamin D, Cholecalciferol, 25 MCG (1000 UT) TABS     valACYclovir (VALTREX) 500 MG tablet     No current facility-administered medications for this visit.      Past Medical/Surgical History:   Patient Active Problem List   Diagnosis     Health Care Home     Hypertension goal BP (blood pressure) < 140/90     Squamous cell skin cancer     Prostate cancer - surgically treated     Advanced directives, counseling/discussion     CARDIOVASCULAR SCREENING; LDL GOAL LESS THAN 130     Sensorineural hearing loss, bilateral     Bladder neck contracture     Bladder stone     Type 2 diabetes mellitus without complication (H)     History of melanoma     History of nonmelanoma skin cancer     Past Medical History:   Diagnosis Date     Diabetes (H)      History of melanoma     2022 left forearm     History of nonmelanoma skin cancer     NUMEROUS     Hypertension  goal BP (blood pressure) < 140/90      Prostate cancer (H)      Squamous cell skin cancer        CC No referring provider defined for this encounter. on close of this encounter.

## 2022-08-08 NOTE — TELEPHONE ENCOUNTER
Medications, allergies, medical history and surgical history reviewed.  Per Antibiotic Prophylaxis Protocol, keflex 2000 mg prescribed.  Patient notified to take all 4 capsules 1 hour prior to procedure.  Jessica Reyna RN

## 2022-08-08 NOTE — LETTER
8/8/2022         RE: Andre Tyler  [address] [place], [place]. \[facility].\"" [id number]        Dear Colleague,    Thank you for referring your patient, Andre Tyler, to the [facility]. Please see a copy of my visit note below.    [facility] Dermatology Note  Encounter Date: Aug 8, 2022  Store-and-Forward and Telephone ([phone]). Location of teledermatologist: [facility].  Start time: 1139. End time: 1146.    Dermatology Problem List:  1. Hx of Melanoma  - Malignant Melanoma 0.3 breslow depth, left ventral forearm, s/p excision 2/10/22  2. 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  - BCC, upper mid back, s/p excision 7/21/21  - BCC, left clavicle, s/p excision 7/21/21  - BCC, left upper back, s/p excision 12/17/2020  - SCC, base of right thumb, s/p excision 6/26/18  - SCCIS, central chest s/p ED & C 6/11/18  - BCC, vertex scalp, s/p MMS 11/21/2016  - SCCis, R lateral thigh, s/p ED&C 11/21/2016  - SCCIS, right medial ankle, s/p Mohs 4/21/2016  - SCCIS, right ventral forearm, s/p ED&C 4/21/2016  - BCC, left preauricular, s/p Mohs 11/10/2015  - BCC, superficial type, left upper chest, s/p biopsy 4/9/2015, s/p ED&C 10/5/2015  - SCCIS, posterior right lower earlobe, records via [facility] transferred records, Dr. Arnulfo Keyes, 2011. s/p excision in 2005, recurrent  - SCCIS, left dorsal hand, s/p via [facility] transferred records, Dr. Arnulfo Keyes, 2011  3. Actinic keratosis  - HAK, left  dorsal wrist, s/p bx 11/10/2020  - s/p cryotherapy  4. History of benign biopsy  - Intradermal melanocytic nevus, left postauricular scalp, s/p bx 6/18/21  - Compound melanocytic nevus, left lower back, s/p bx 4/9/15  - BLK, right lower leg, s/p biopsy 4/9/2015        Family hx of melanoma is negative.  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     Staff and Scribe:     Scribe Disclosure:   I, Sriram Richmond, am serving as a scribe to document services personally performed by this physician, Dr. Boby Nazario, based on data collection and the provider's statements to me.     Provider Disclosure:   The documentation recorded by the scribe accurately reflects the services I personally performed and the decisions made by me.    Boby Nazario DO    Department of Dermatology  [facility]: Phone: [phone], Fax:[phone]  [facility]: Phone: [phone], Fax: [phone]    ____________________________________________    CC: Consult For (X4 SKIN CANCER treatment option discussion per Dr. Monge )    HPI:  Mr. Andre Tyler is a(n) 76 year old male who presents today as a return patient for a consult.     Last seen on 8/2/22 with Dr. Monge for a skin check. At that time, a bx was performed on the  left paraspinal lower back, left shin, right tricep, and right  dorsal hand.     Today, patient presents for a consult.     Patient is otherwise feeling well, without additional skin concerns.    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  SKIN: Teledermatology photos were reviewed; image quality and interpretability: acceptable. Image date: 8/2/22.  - right dorsal hand with scaly skin colored papule~6mm  - right distal tricep, pink scaly papule  - L lower back, pink scaly papule.   - L mid shin, pik scaly papule ~1.5cm  - No other lesions of concern on areas examined.     Medications:  Current Outpatient Medications   Medication     Catheters MISC     Glucosamine HCl (GLUCOSAMINE PO)     ketoconazole (NIZORAL) 2 % external cream     ketotifen (ZADITOR) 0.025 % ophthalmic solution     metFORMIN (GLUCOPHAGE) 500 MG tablet     PAXLOVID therapy pack     triamcinolone (KENALOG) 0.1 % external cream     vitamin C (ASCORBIC ACID) 1000 MG TABS     Vitamin D, Cholecalciferol, 25 MCG (1000 UT) TABS     valACYclovir (VALTREX) 500 MG tablet     No current facility-administered medications for this visit.      Past Medical/Surgical History:   Patient Active Problem List   Diagnosis     Health Care Home     Hypertension goal BP (blood pressure) < 140/90     Squamous cell skin cancer     Prostate cancer - surgically treated     Advanced directives, counseling/discussion     CARDIOVASCULAR SCREENING; LDL GOAL LESS THAN 130     Sensorineural hearing loss, bilateral     Bladder neck contracture     Bladder stone     Type 2 diabetes mellitus without  complication (H)     History of melanoma     History of nonmelanoma skin cancer     Past Medical History:   Diagnosis Date     Diabetes (H)      History of melanoma     2022 left forearm     History of nonmelanoma skin cancer     NUMEROUS     Hypertension goal BP (blood pressure) < 140/90      Prostate cancer (H)      Squamous cell skin cancer        CC No referring provider defined for this encounter. on close of this encounter.        Again, thank you for allowing me to participate in the care of your patient.        Sincerely,        Boby Nazario MD

## 2022-08-08 NOTE — PATIENT INSTRUCTIONS
[facility] Dermatology Visit    Thank you for allowing us to participate in your care. Your findings, instructions and follow-up plan are as follows:         When should I call my doctor?  If you are worsening or not improving, please, contact us or seek urgent care as noted below.     Who should I call with questions (adults)?  [facility] (adult and pediatric): [phone]  [facility] (adult): [phone]  For urgent needs outside of business hours call the [facility] at [phone] and ask for the dermatology resident on call  If this is a medical emergency and you are unable to reach an ER, Call 911    Who should I call with questions (pediatric)?  [facility]- Pediatric Dermatology  Dr. Lucille Terrazas, Dr. Vianca Steele, Dr. Cherie Kinsey, Madelin Rosario, PA  Dr. An Lee, Dr. Jaleesa Hardin & Dr. Edson Alvarez  Non Urgent  Nurse Triage Line; [phone]- Liliya and Dang RN Care Coordinators   Lea (/Complex ) [phone]    If you need a prescription refill, please contact your pharmacy. Refills are approved or denied by our physicians during normal business hours, Monday through Fridays  Per office policy, refills will not be granted if you have not been seen within the past year (or sooner depending on your child's condition).    Scheduling Information:  Pediatric Appointment Scheduling and Call Center [phone]  Radiology Scheduling- [phone]  Sedation Unit Scheduling- [phone]  [place] Scheduling- General [phone]; Pediatric Dermatology [phone]  Main  Services: [phone]  [place]ran: [phone]  Citizen of [place]: [phone]  Hmong/Lester/Greek: [phone]  Preadmission Nursing Department Fax Number: [phone] (fax all pre-operative paperwork to this number)    For urgent matters arising during evenings, weekends, or  holidays that cannot wait for normal business hours please call [phone] and ask for the dermatology resident on call to be paged.

## 2022-08-29 ENCOUNTER — OFFICE VISIT (OUTPATIENT)
Dept: DERMATOLOGY | Facility: CLINIC | Age: 77
End: 2022-08-29
Payer: COMMERCIAL

## 2022-08-29 VITALS — HEART RATE: 78 BPM | DIASTOLIC BLOOD PRESSURE: 97 MMHG | SYSTOLIC BLOOD PRESSURE: 155 MMHG

## 2022-08-29 DIAGNOSIS — C44.519 BASAL CELL CARCINOMA (BCC) OF LOWER BACK: ICD-10-CM

## 2022-08-29 DIAGNOSIS — D04.61 SQUAMOUS CELL CARCINOMA IN SITU (SCCIS) OF SKIN OF RIGHT UPPER ARM: ICD-10-CM

## 2022-08-29 DIAGNOSIS — D04.61 SQUAMOUS CELL CARCINOMA IN SITU (SCCIS) OF DORSUM OF RIGHT HAND: Primary | ICD-10-CM

## 2022-08-29 DIAGNOSIS — D04.72 SQUAMOUS CELL CARCINOMA IN SITU (SCCIS) OF SKIN OF LEFT LOWER LEG: ICD-10-CM

## 2022-08-29 DIAGNOSIS — L57.0 ACTINIC KERATOSIS: ICD-10-CM

## 2022-08-29 PROCEDURE — 17313 MOHS 1 STAGE T/A/L: CPT | Performed by: DERMATOLOGY

## 2022-08-29 PROCEDURE — 17262 DSTRJ MAL LES T/A/L 1.1-2.0: CPT | Mod: XS | Performed by: DERMATOLOGY

## 2022-08-29 PROCEDURE — 17312 MOHS ADDL STAGE: CPT | Performed by: DERMATOLOGY

## 2022-08-29 PROCEDURE — 17000 DESTRUCT PREMALG LESION: CPT | Mod: XS | Performed by: DERMATOLOGY

## 2022-08-29 PROCEDURE — 17311 MOHS 1 STAGE H/N/HF/G: CPT | Performed by: DERMATOLOGY

## 2022-08-29 ASSESSMENT — PAIN SCALES - GENERAL: PAINLEVEL: NO PAIN (0)

## 2022-08-29 NOTE — NURSING NOTE
Andre Tyler's goals for this visit include:   Chief Complaint   Patient presents with     Procedure     Mohs and ED&C       He requests these members of his care team be copied on today's visit information: n/a    PCP: Arnulfo Joaquin    Referring Provider:  No referring provider defined for this encounter.    BP (!) 155/97   Pulse 78     Do you need any medication refills at today's visit? No  Jessica Reyna RN

## 2022-08-29 NOTE — LETTER
8/29/2022         RE: Andre Tyler  [address] [facility] [address]        Dear Colleague,    Thank you for referring your patient, Andre Tyler, to the [facility]. Please see a copy of my visit note below.    [facility] D[facility]at[facility] Surgery [facility] [place] Procedure Note    Dermatology Problem List:  1. Hx of Melanoma  - Malignant Melanoma 0.3 breslow depth, left ventral forearm, s/p excision 2/10/22  2. 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  - SCCIS, R dorsal hand s/p Mohs and linear repair 8/29/22   - SCCIS, L shin s/p Mohs and linear repair 8/29/22  - SCC, left dorsal wrist, s/p MMS 7/21/21  - BCC, upper mid back, s/p excision 7/21/21  - BCC, left clavicle, s/p excision 7/21/21  - BCC, left upper back, s/p excision 12/17/2020  - SCC, base of right thumb, s/p excision 6/26/18  - SCCIS, central chest s/p ED & C 6/11/18  - BCC, vertex scalp, s/p MMS 11/21/2016  - SCCis, R lateral thigh, s/p ED&C 11/21/2016  - SCCIS, right medial ankle, s/p Mohs 4/21/2016  - SCCIS, right ventral forearm, s/p ED&C 4/21/2016  - BCC, left preauricular, s/p Mohs 11/10/2015  - BCC, superficial type, left upper chest, s/p biopsy 4/9/2015, s/p ED&C 10/5/2015  - SCCIS, posterior right lower earlobe, records via [facility] transferred records, Dr. Arnulfo Keyes, 2011. s/p excision in 2005, recurrent  - SCCIS, left dorsal hand, s/p via [facility] transferred records, Dr. Arnulfo Keyes, 2011  3. Actinic keratosis  - HAK, left dorsal wrist, s/p bx 11/10/2020  - s/p cryotherapy  4. History of benign biopsy  - Intradermal melanocytic nevus, left postauricular scalp, s/p bx 6/18/21  - Compound melanocytic nevus, left lower back, s/p bx 4/9/15  - BLK, right lower leg, s/p biopsy 4/9/2015        Family hx of melanoma is negative.  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    Informed consent was obtained and the patient underwent the procedure as follows:  The patient was placed supine on the operating table.  The cancer was identified, outlined with a marker, and verified by the patient.  The entire surgical field was prepped with Hibiclens.  The surgical site was anesthetized using Lidocaine 1% with epi 1:100,000.      The area of clinically apparent tumor was debulked. The layer of tissue was then surgically excised using a #15 blade and was then transferred onto a specimen sheet maintaining the orientation of the specimen. Hemostasis was obtained using bipolar electrocoagulation. The wound site was then covered with a dressing while the tissue samples were processed for examination.    The excised tissue was transported to the Mohs histology laboratory maintaining the tissue orientation.  The tissue specimen was relaxed so that the entire surgical margin was in a a single horizontal plane for sectioning and inked for precise mapping.  A precise reference map was drawn to reflect the sectioning of the specimen, colored inking of the margins, and orientation on the patient.  The tissue was processed using  horizontal sectioning of the base and continuous peripheral margins.  The histopathologic sections were reviewed in conjunction with the reference map.    Total blocks: 1    Total slides:  1    Residual tumor was identified and indicated in red on the reference map, identifying the location where further tissue excision was necessary. Therefore, an additional stage of Mohs Micrographic surgery was deemed necessary.     Stage II   The patient was returned to the operating room, and the area prepped in the usual manner. The residual tumor was excised using the reference map as a guide. The specimen was transfered to a labeled specimen sheet maintaining the orientation of the specimen. Hemostasis was obtained and the wound site was covered with a dressing while the tissue was processed for examination.     The excised tissue was transported to the Mohs histology laboratory maintaining orientation. The specimen margins were inked for precise mapping and a reference map was prepared for the is additional stage to maintain precise orientation as described above. The tissue was processed using horizontal sectioning of the base and continuous peripheral margins. The histopathologic sections were reviewed in conjunction with the reference map.     Total blocks: 1    Total slides:  1    There were no cancer cells visualized on examination, therefore Mohs surgery was complete.   Actinic keratosis was noted focally along the Mohs margin. See cryosurgery treatment note below.       REPAIR: An intermediate layered linear closure was selected as the procedure which would maximally preserve both function and cosmesis.    After the excision of the tumor, the area was undermined. Hemostasis was obtained with electrocoagulation.  Closure was oriented so that the wound was in the patient's natural skin tension lines. The subcutaneous and dermal layers were then closed with 5-0 monocryl and 4-0 monocryl buried vertical mattress sutures.  The epidermis was then carefully approximated along the length of the wound using 5-0 Fast Absorbing Gut running subcuticular sutures.     Estimated blood loss was less than 10 ml for all surgical sites. A sterile pressure dressing was applied and wound care instructions, with a written handout, were given. The patient was discharged from the [facility] Center alert and ambulatory.    Follow-up in [facility]      Date of Service:  Aug 29, 2022  Surgery: Mohs micrographic surgery    Case 2  Repair Type: intermediate  Repair Size: 4.3 cm  Suture Material: monocryl 4-0  Tumor Type: SCCI - Squamous cell carcinoma in situ  Location: left shin  Derm-Path Accession #: [id number]  PreOp Size: 1.5X1.0 cm  PostOp Size: 2.6X1.6 cm  Mohs Accession #: [id number]  Level of Defect: fascia      Procedure:  We discussed the principles of treatment and most likely complications including scarring, bleeding, infection, swelling, pain, crusting, nerve damage, large wound,  incomplete excision, wound dehiscence,  nerve damage, recurrence, and a second procedure may be recommended to obtain the best cosmetic or functional result.    Informed consent was obtained and the patient underwent the procedure as follows:  The patient was placed supine on the operating table.  The cancer was identified, outlined with a marker, and verified by the patient.  The entire surgical field was prepped with Hibiclens.  The surgical site was anesthetized using Lidocaine 1% with epi 1:100,000.      The area of clinically apparent tumor was debulked. The layer of tissue was then surgically excised using a #15 blade and was then transferred onto a specimen sheet maintaining the orientation of the specimen. Hemostasis was obtained using bipolar electrocoagulation. The wound site was then covered with a dressing while the tissue samples were processed for examination.    The excised tissue was transported to the Mohs histology laboratory maintaining the  tissue orientation.  The tissue specimen was relaxed so that the entire surgical margin was in a a single horizontal plane for sectioning and inked for precise mapping.  A precise reference map was drawn to reflect the sectioning of the specimen, colored inking of the margins, and orientation on the patient.  The tissue was processed using horizontal sectioning of the base and continuous peripheral margins.  The histopathologic sections were reviewed in conjunction with the reference map.    Total blocks: 1    Total slides:  1    There were no cancer cells visualized on examination, therefore Mohs surgery was complete.    REPAIR: An intermediate layered linear closure was selected as the procedure which would maximally preserve both function and cosmesis.    After the excision of the tumor, the area was undermined. Hemostasis was obtained with electrocoagulation.  Closure was oriented so that the wound was in the patient's natural skin tension lines. The subcutaneous and dermal layers were then closed with 4-0 monocryl buried vertical mattress sutures. The epidermis was then carefully approximated along the length of the wound using 4-0 monocryl running subcuticular sutures.     Estimated blood loss was less than 10 ml for all surgical sites. A sterile pressure dressing was applied and wound care instructions, with a written handout, were given. The patient was discharged from the [facility]atologic Surgery Center alert and ambulatory.    Follow-up in [facility]        Actinic Keratosis treatment   Cryotherapy procedure note: right dorsal hand  -After verbal consent and discussion of risks and benefits including but no limited to dyspigmentation/scar, blister, and pain, 1 was(were) treated with 1-2mm freeze border for 2 cycles with liquid nitrogen. Post cryotherapy instructions were provided.         Dermatology Procedure Note: Electrodesiccation and Curettage    PREOPERATIVE DIAGNOSIS: BCC superficial   POSTOPERATIVE DIAGNOSIS:  same    LOCATION: left paraspinal lower back     SIZE: 1.0X0.8 cm     Treatment options including electrodessiccation and curettage (ED and C), excision and topicals were reviewed.  The expected cure rates, healing times and anticipated scars of each option were discussed and the patient elects to proceed with ED and C.     The risks and benefits of the procedure were described to the patient.  These include but are not limited to bleeding, infection, scar, incomplete removal, and recurrence. Written informed consent was obtained. Time-out was performed. The above site was cleansed with and injected with 4.5mL1% lidocaine with epinephrine. Once anesthesia was obtained, the site was prepped with Chlorhexidine and rinsed with sterile saline. The lesion was curetted with 3mm curette in 3 directions with a 3mm margin and this was followed by electrodessication.  This process was repeated three times. The defect measured 1.6X1.4cm. Vaseline and a bandage were applied to the wound. The patient tolerated the procedure well and was given post care instructions.        Dermatology Procedure Note: Electrodesiccation and Curettage    PREOPERATIVE DIAGNOSIS: SCCIS    POSTOPERATIVE DIAGNOSIS: same    LOCATION: right tricep    SIZE: 0.9X0.8 cm     Treatment options including electrodessiccation and curettage (ED and C), excision and topicals were reviewed.  The expected cure rates, healing times and anticipated scars of each option were discussed and the patient elects to proceed with ED and C.     The risks and benefits of the procedure were described to the patient.  These include but are not limited to bleeding, infection, scar, incomplete removal, and recurrence. Written informed consent was obtained. Time-out was performed. The above site was cleansed with and injected with 1.5ccmL1% lidocaine with epinephrine. Once anesthesia was obtained, the site was prepped with Chlorhexidine and rinsed with sterile saline. The lesion  was curetted with 3mm curette in 3 directions with a 3mm margin and this was followed by electrodessication.  This process was repeated three times. The defect measured 1.5X1.4cm. Vaseline and a bandage were applied to the wound. The patient tolerated the procedure well and was given post care instructions.    Clinical Follow-up: 3 months [facility]    Dr. Nazario staffed the patient.     Staff Involved:  Staff     Scribe Disclosure:   Sriram PARRY, am serving as a scribe to document services personally performed by this physician, Dr. Boby Nazario, based on data collection and the provider's statements to me.     Provider Disclosure:   The documentation recorded by the scribe accurately reflects the services I personally performed and the decisions made by me.  I personally performed the procedures today.  Boby Nazario DO    Department of Dermatology  [facility]: Phone: [phone], Fax:[phone]  [facility]: Phone: [phone], Fax: [phone]    Care and Laboratory Testing Performed at:  [facility]   Dermatology Clinic  [address] [place] [address]        Again, thank you for allowing me to participate in the care of your patient.        Sincerely,        Boby Nazario MD

## 2022-08-29 NOTE — PROGRESS NOTES
[facility] [place] Procedure Note    Dermatology Problem List:  1. Hx of Melanoma  - Malignant Melanoma 0.3 breslow depth, left ventral forearm, s/p excision 2/10/22  2. 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  - SCCIS, R dorsal hand s/p Mohs and linear repair 8/29/22   - SCCIS, L shin s/p Mohs and linear repair 8/29/22  - SCC, left dorsal wrist, s/p MMS 7/21/21  - BCC, upper mid back, s/p excision 7/21/21  - BCC, left clavicle, s/p excision 7/21/21  - BCC, left upper back, s/p excision 12/17/2020  - SCC, base of right thumb, s/p excision 6/26/18  - SCCIS, central chest s/p ED & C 6/11/18  - BCC, vertex scalp, s/p MMS 11/21/2016  - SCCis, R lateral thigh, s/p ED&C 11/21/2016  - SCCIS, right medial ankle, s/p Mohs 4/21/2016  - SCCIS, right ventral forearm, s/p ED&C 4/21/2016  - BCC, left preauricular, s/p Mohs 11/10/2015  - BCC, superficial type, left upper chest, s/p biopsy 4/9/2015, s/p ED&C 10/5/2015  - SCCIS, posterior right lower earlobe, records via [facility] transferred records, Dr. Arnulfo Keyes, 2011. s/p excision in 2005, recurrent  - SCCIS, left dorsal hand, s/p via [facility] transferred records, Dr. Arnulfo Keyes, 2011  3. Actinic keratosis  - HAK, left dorsal wrist, s/p bx 11/10/2020  - s/p cryotherapy  4. History of benign biopsy  - Intradermal melanocytic nevus, left postauricular scalp, s/p bx 6/18/21  - Compound melanocytic nevus, left lower back, s/p bx 4/9/15  - BLK, right lower leg, s/p biopsy 4/9/2015        Family hx of melanoma is negative.  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    Informed consent was obtained and the patient underwent the procedure as follows:  The patient was placed supine on the operating table.  The cancer was identified, outlined with a marker, and verified by the patient.  The entire surgical field was prepped with Hibiclens.  The surgical site was anesthetized using Lidocaine 1% with epi 1:100,000.      The area of clinically apparent tumor was debulked. The layer of tissue was then surgically excised using a #15 blade and was then transferred onto a specimen sheet maintaining the orientation of the specimen. Hemostasis was obtained using bipolar electrocoagulation. The wound site was then covered with a dressing while the tissue samples were processed for examination.    The excised tissue was transported to the Mohs histology laboratory maintaining the tissue orientation.  The tissue specimen was relaxed so that the entire surgical margin was in a a single horizontal plane for sectioning and inked for precise mapping.  A precise reference map was drawn to reflect the sectioning of the specimen, colored inking of the margins, and orientation on the patient.  The tissue was processed using horizontal sectioning of the base and continuous peripheral margins.  The histopathologic sections were reviewed in conjunction with the reference map.    Total blocks: 1    Total slides:  1    Residual tumor was identified and indicated in red on the reference map, identifying the location where further tissue  excision was necessary. Therefore, an additional stage of Mohs Micrographic surgery was deemed necessary.     Stage II   The patient was returned to the operating room, and the area prepped in the usual manner. The residual tumor was excised using the reference map as a guide. The specimen was transfered to a labeled specimen sheet maintaining the orientation of the specimen. Hemostasis was obtained and the wound site was covered with a dressing while the tissue was processed for examination.     The excised tissue was transported to the Mohs histology laboratory maintaining orientation. The specimen margins were inked for precise mapping and a reference map was prepared for the is additional stage to maintain precise orientation as described above. The tissue was processed using horizontal sectioning of the base and continuous peripheral margins. The histopathologic sections were reviewed in conjunction with the reference map.     Total blocks: 1    Total slides:  1    There were no cancer cells visualized on examination, therefore Mohs surgery was complete.   Actinic keratosis was noted focally along the Mohs margin. See cryosurgery treatment note below.       REPAIR: An intermediate layered linear closure was selected as the procedure which would maximally preserve both function and cosmesis.    After the excision of the tumor, the area was undermined. Hemostasis was obtained with electrocoagulation.  Closure was oriented so that the wound was in the patient's natural skin tension lines. The subcutaneous and dermal layers were then closed with 5-0 monocryl and 4-0 monocryl buried vertical mattress sutures. The epidermis was then carefully approximated along the length of the wound using 5-0 Fast Absorbing Gut running subcuticular sutures.     Estimated blood loss was less than 10 ml for all surgical sites. A sterile pressure dressing was applied and wound care instructions, with a written handout, were given. The  patient was discharged from the [facility] Center alert and ambulatory.    Follow-up in [facility]      Date of Service:  Aug 29, 2022  Surgery: Mohs micrographic surgery    Case 2  Repair Type: intermediate  Repair Size: 4.3 cm  Suture Material: monocryl 4-0  Tumor Type: SCCI - Squamous cell carcinoma in situ  Location: left shin  Derm-Path Accession #: [id number]  PreOp Size: 1.5X1.0 cm  PostOp Size: 2.6X1.6 cm  Mohs Accession #: [id number]  Level of Defect: fascia      Procedure:  We discussed the principles of treatment and most likely complications including scarring, bleeding, infection, swelling, pain, crusting, nerve damage, large wound,  incomplete excision, wound dehiscence,  nerve damage, recurrence, and a second procedure may be recommended to obtain the best cosmetic or functional result.    Informed consent was obtained and the patient underwent the procedure as follows:  The patient was placed supine on the operating table.  The cancer was identified, outlined with a marker, and verified by the patient.  The entire surgical field was prepped with Hibiclens.  The surgical site was anesthetized using Lidocaine 1% with epi 1:100,000.      The area of clinically apparent tumor was debulked. The layer of tissue was then surgically excised using a #15 blade and was then transferred onto a specimen sheet maintaining the orientation of the specimen. Hemostasis was obtained using bipolar electrocoagulation. The wound site was then covered with a dressing while the tissue samples were processed for examination.    The excised tissue was transported to the Mohs histology laboratory maintaining the tissue orientation.  The tissue specimen was relaxed so that the entire surgical margin was in a a single horizontal plane for sectioning and inked for precise mapping.  A precise reference map was drawn to reflect the sectioning of the specimen, colored inking of the margins, and orientation on the patient.  The  tissue was processed using horizontal sectioning of the base and continuous peripheral margins.  The histopathologic sections were reviewed in conjunction with the reference map.    Total blocks: 1    Total slides:  1    There were no cancer cells visualized on examination, therefore Mohs surgery was complete.    REPAIR: An intermediate layered linear closure was selected as the procedure which would maximally preserve both function and cosmesis.    After the excision of the tumor, the area was undermined. Hemostasis was obtained with electrocoagulation.  Closure was oriented so that the wound was in the patient's natural skin tension lines. The subcutaneous and dermal layers were then closed with 4-0 monocryl buried vertical mattress sutures. The epidermis was then carefully approximated along the length of the wound using 4-0 monocryl running subcuticular sutures.     Estimated blood loss was less than 10 ml for all surgical sites. A sterile pressure dressing was applied and wound care instructions, with a written handout, were given. The patient was discharged from the [facility] Surgery Center alert and ambulatory.    Follow-up in [facility]        Actinic Keratosis treatment   Cryotherapy procedure note: right dorsal hand  -After verbal consent and discussion of risks and benefits including but no limited to dyspigmentation/scar, blister, and pain, 1 was(were) treated with 1-2mm freeze border for 2 cycles with liquid nitrogen. Post cryotherapy instructions were provided.         Dermatology Procedure Note: Electrodesiccation and Curettage    PREOPERATIVE DIAGNOSIS: BCC superficial   POSTOPERATIVE DIAGNOSIS: same    LOCATION: left paraspinal lower back     SIZE: 1.0X0.8 cm     Treatment options including electrodessiccation and curettage (ED and C), excision and topicals were reviewed.  The expected cure rates, healing times and anticipated scars of each option were discussed and the patient elects to proceed with ED  and C.     The risks and benefits of the procedure were described to the patient.  These include but are not limited to bleeding, infection, scar, incomplete removal, and recurrence. Written informed consent was obtained. Time-out was performed. The above site was cleansed with and injected with 4.5mL1% lidocaine with epinephrine. Once anesthesia was obtained, the site was prepped with Chlorhexidine and rinsed with sterile saline. The lesion was curetted with 3mm curette in 3 directions with a 3mm margin and this was followed by electrodessication.  This process was repeated three times. The defect measured 1.6X1.4cm. Vaseline and a bandage were applied to the wound. The patient tolerated the procedure well and was given post care instructions.        Dermatology Procedure Note: Electrodesiccation and Curettage    PREOPERATIVE DIAGNOSIS: SCCIS    POSTOPERATIVE DIAGNOSIS: same    LOCATION: right tricep    SIZE: 0.9X0.8 cm     Treatment options including electrodessiccation and curettage (ED and C), excision and topicals were reviewed.  The expected cure rates, healing times and anticipated scars of each option were discussed and the patient elects to proceed with ED and C.     The risks and benefits of the procedure were described to the patient.  These include but are not limited to bleeding, infection, scar, incomplete removal, and recurrence. Written informed consent was obtained. Time-out was performed. The above site was cleansed with and injected with 1.5ccmL1% lidocaine with epinephrine. Once anesthesia was obtained, the site was prepped with Chlorhexidine and rinsed with sterile saline. The lesion was curetted with 3mm curette in 3 directions with a 3mm margin and this was followed by electrodessication.  This process was repeated three times. The defect measured 1.5X1.4cm. Vaseline and a bandage were applied to the wound. The patient tolerated the procedure well and was given post care  instructions.    Clinical Follow-up: 3 months [facility]    Dr. Nazario staffed the patient.     Staff Involved:  Staff     Scribe Disclosure:   I, Sriram Richmond, am serving as a scribe to document services personally performed by this physician, Dr. Boby Nazario, based on data collection and the provider's statements to me.     Provider Disclosure:   The documentation recorded by the scribe accurately reflects the services I personally performed and the decisions made by me.  I personally performed the procedures today.  Boby Nazario DO    Department of Dermatology  [facility]: Phone: [phone], Fax:[phone]  [facility]: Phone: [phone], Fax: [phone]    Care and Laboratory Testing Performed at:  [facility]   Dermatology Clinic  [address] [place] [address]

## 2022-08-29 NOTE — NURSING NOTE
The following medication was given:     MEDICATION:  Lidocaine with epinephrine 1% 1:[id number]  ROUTE: SQ  SITE: see procedure note  DOSE: 19cc  LOT #: [id number]  : Hospira  EXPIRATION DATE: 1/2023  NDC#: 6631-1519-84  Was there drug waste? 2cc  Multi-dose vial: Yes    Christi Rahman LPN  August 29, 2022    Vaseline and pressure dressing applied to Mohs site on right dorsal hand and ED&C on back and right tricep. Steri strips, tegaderm and pressure dressing applied to MOHS site on left shin  Wound care instructions reviewed with patient and AVS provided.  Patient verbalized understanding.  No further questions or concerns at this time.      Christi Rahman LPN

## 2022-08-29 NOTE — PATIENT INSTRUCTIONS
Wound Care:  Electrodesiccation and Curettage     I will experience scar, altered skin color, bleeding, swelling, pain, crusting and redness. I may experience altered sensation. Risks are excessive bleeding, infection, muscle weakness, thick (hypertrophic or keloidal) scar, and recurrence,. A second procedure may be recommended to obtain the best cosmetic or functional result.    What is electrodesiccation and curettage ?  Scraping off tissue (curettage)  Destroy tissue using electric current or cautery (electrodessication)    How do I perform wound care?  Keep dressing in place for two days. You may shower with the dressing in place(do not get wet)  After 2 days, wash hands and remove dressing. Clean wound with cotton-swab soaked in hydrogen peroxide to remove drainage and crust  Put on a thick layer of Vaseline on the wound using a cotton-swab   Cover the wound with a Band-AidTM to protect from dust and tight clothing  If wound is draining before two days, change your dressing as described above sooner  During wound care, do not allow the area to dry out or form a scab    What do I need?  Hydrogen peroxide   Cotton-swabs   Vaseline or petroleum jelly   Band-AidsTM or dressing supplies as needed     When should I call the doctor?  [facility]: [phone]  [facility]: [phone]  For urgent needs outside of business hours call the [facility] at [phone] and ask for the dermatology resident on call         Excision Wound Care Instructions with Steri Strips    Possible complications of any surgical procedure are bleeding, infection, scarring, alteration in skin color and sensation, muscle weakness in the area, wound dehiscence or seperation, or recurrence of the lesion or disease. On occasion, after healing, a secondary procedure or revision may be recommended in order to obtain the best cosmetic or functional result.     After your surgery,  a pressure bandage will be placed over the area that has sutures. This will help prevent bleeding. Please, follow these instructions as they will help you to prevent complications as your wound heals.    For the First 48 hours After Surgery:    Leave the pressure bandage on and keep it dry. If it should come loose, you may retape it, but do not take it off.    Relax and take it easy. Do not do any vigorous exercise, heavy lifting, or bending forward. This could cause the wound to bleed.    Post-operative pain is usually mild. You may alternate between 1000 mg of Tylenol (acetaminophen) and 400 mg of Ibuprofen every 4 hours.  Do not take more than 4,000 mg of acetaminophen and more than 3200 mg of Ibuprofen in a 24 hr period.  Avoid alcohol and vitamin E as these may increase your tendency to bleed.    You may put an ice pack around the bandaged area for 20 minutes every 2-3 hours. This may help reduce swelling, bruising, and pain. Make sure the ice pack is waterproof so that the pressure bandage does not get wet.     You may see a small amount of drainage or blood on your pressure bandage.  This is normal.  However, if drainage or bleeding continues or saturates the bandage, you will need to apply firm pressure over the bandage with a washcloth for 15 minutes. If bleeding continues after applying pressure for 15 minutes then go to the nearest emergency room.      48 Hours After Surgery:    Remove outer white bandage down to clear plastic film (Tegaderm).    Leave the clear plastic film (Tegaderm) on for up to 2 weeks, as long as it is intact.  If it falls off prior to 2 weeks follow daily wound care below.  If it stays intact for the full 2 weeks, then remove and treat as normal, healthy skin.      Daily Wound Care (if Tegaderm and Steri-Strips fall off prior to 2 weeks):    Wash wound with a mild soap and water.  Use caution when washing the wound, be gentle and do not let the forceful shower stream hit the wound  directly. DO NOT WASH WITH HYDROGEN PEROXIDE AS THIS MIGHT CAUSE THE STITCHES TO DISSOLVE FASTER THAN WHAT WE WANT.    Pat dry.    Apply Vaseline (from a new container or tube) over the suture line with a Q-tip until it is completely healed. It is very important to keep the wound continuously moist, as wounds heal best in a moist environment.    Keep the site covered until it's healed.  You can cover it with a Telfa (non-stick) dressing and tape or a band-aid until healed with normal, healthy skin.      Call Us If:    You have pain that is not controlled with Tylenol/Ibuprofen.    You have signs or symptoms of an infection, such as: fever over 100 degrees F, redness, warmth, or foul-smelling or yellow/creamy drainage from the wound.      Who should I call with questions?  [facility]: [phone]  [facility]: [phone]  For urgent needs outside of business hours call the [facility] at [phone] and ask for the dermatology resident on call         Excision/Mohs Wound Care Instructions  I will experience scar, altered skin color, bleeding, swelling, pain, crusting and redness. I may experience altered sensation. Risks are excessive bleeding, infection, muscle weakness, thick (hypertrophic or keloidal) scar, and recurrence. A second procedure may be recommended to obtain the best cosmetic or functional result.  Possible complications of any surgical procedure are bleeding, infection, scarring, alteration in skin color and sensation, muscle weakness in the area, wound dehiscence or seperation, or recurrence of the lesion or disease. On occasion, after healing, a secondary procedure or revision may be recommended in order to obtain the best cosmetic or functional result.   After your surgery, a pressure bandage will be placed over the area that has sutures. This will help prevent bleeding. Please follow these instructions as they will help you  to prevent complications as your wound heals.  For the First 48 hours After Surgery:  Leave the pressure bandage on and keep it dry. If it should come loose, you may retape it, but do not take it off.  Relax and take it easy. Do not do any vigorous exercise, heavy lifting, or bending forward. This could cause the wound to bleed.  Post-operative pain is usually mild. You may alternate between 1000 mg of Tylenol (acetaminophen) and 400 mg of Ibuprofen every 4 hours.  Do not take more than 4,000mg of acetaminophen in a 24 hour period or 3200 mg of Ibuprofen in a 24 hr period.  Avoid alcohol and vitamin E as these may increase your tendency to bleed.  You may put an ice pack around the bandaged area for 20 minutes every 2-3 hours. This may help reduce swelling, bruising, and pain. Make sure the ice pack is waterproof so that the pressure bandage does not get wet.   You may see a small amount of drainage or blood on your pressure bandage. This is normal. However, if drainage or bleeding continues or saturates the bandage, you will need to apply firm pressure over the bandage with a washcloth for 15 minutes. If bleeding continues after applying pressure for 15 minutes then go to the nearest emergency room.  48 Hours After Surgery  Carefully remove the bandage and start daily wound care and dressing changes. You may also now shower and get the wound wet.  Daily Wound Care:  Wash wound with a mild soap and water.  Use caution when washing the wound, be gentle and do not let the forceful shower stream hit the wound directly.  Pat dry.  Apply Vaseline (from a new container or tube) over the suture line with a Q-tip. It is very important to keep the wound continuously moist, as wounds heal best in a moist environment.  Keep the site covered until sutures have dissolved.  You can cover it with a Telfa (non-stick) dressing and tape or a band-aid.    If you are unable to keep wound covered, you must apply Vaseline every 2-3 hours  (while awake) to ensure it is being kept moist for optimal healing. A dressing overnight is recommended to keep the area moist.  Call Us If:  You have pain that is not controlled with Tylenol/Ibuprofen  You have signs or symptoms of an infection, such as: fever over 100 degrees F, redness, warmth, or foul-smelling or yellow drainage from the wound.  Who should I call with questions?  [facility]: [phone]   [facility]: [phone]  For urgent needs outside of business hours call the [facility] at [phone] and ask to speak with the dermatology resident on call

## 2022-11-10 ENCOUNTER — OFFICE VISIT (OUTPATIENT)
Dept: DERMATOLOGY | Facility: CLINIC | Age: 77
End: 2022-11-10
Payer: COMMERCIAL

## 2022-11-10 DIAGNOSIS — D48.9 NEOPLASM OF UNCERTAIN BEHAVIOR: Primary | ICD-10-CM

## 2022-11-10 DIAGNOSIS — Z85.828 HISTORY OF NONMELANOMA SKIN CANCER: ICD-10-CM

## 2022-11-10 DIAGNOSIS — L57.0 AK (ACTINIC KERATOSIS): ICD-10-CM

## 2022-11-10 DIAGNOSIS — Z85.820 HISTORY OF MELANOMA: ICD-10-CM

## 2022-11-10 PROCEDURE — 17004 DESTROY PREMAL LESIONS 15/>: CPT | Performed by: DERMATOLOGY

## 2022-11-10 PROCEDURE — 88305 TISSUE EXAM BY PATHOLOGIST: CPT | Performed by: DERMATOLOGY

## 2022-11-10 PROCEDURE — 11102 TANGNTL BX SKIN SINGLE LES: CPT | Mod: XS | Performed by: DERMATOLOGY

## 2022-11-10 ASSESSMENT — PAIN SCALES - GENERAL: PAINLEVEL: NO PAIN (0)

## 2022-11-10 NOTE — NURSING NOTE
Andre Tyler's chief complaint for this visit includes:  Chief Complaint   Patient presents with     RECHECK     FBSC. Hx of Melanoma. No areas of concern.      PCP: Arnulfo Joaquin    Referring Provider:  No referring provider defined for this encounter.    There were no vitals taken for this visit.  No Pain (0)      No Known Allergies      Do you need any medication refills at today's visit? No    Shadia Piedra LPN

## 2022-11-10 NOTE — NURSING NOTE
The following medication was given:     MEDICATION:  Lidocaine with epinephrine 1% 1:500031  ROUTE: SQ  SITE: see procedure note  DOSE: 2cc  LOT #: [id number]  : Vimessa  EXPIRATION DATE: 08/2023  NDC#: 6852-3474-74  Was there drug waste? no  Multi-dose vial: Yes    Shadia Piedra LPN  November 10, 2022

## 2022-11-10 NOTE — PROGRESS NOTES
[facility] Dermatology Note  Encounter Date: Nov 10, 2022  Office Visit     Dermatology Problem List:  Last skin check 11/10/22   1. History of Melanoma  - Malignant Melanoma 0.3 breslow depth, left ventral forearm, s/p excision 2/10/22  2. 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  - SCCIS, R dorsal hand s/p Mohs and linear repair 8/29/22   - SCCIS, L shin s/p Mohs and linear repair 8/29/22  - SCC, left dorsal wrist, s/p MMS 7/21/21  - BCC, upper mid back, s/p excision 7/21/21  - BCC, left clavicle, s/p excision 7/21/21  - BCC, left upper back, s/p excision 12/17/2020  - SCC, base of right thumb, s/p excision 6/26/18  - SCCIS, central chest s/p ED & C 6/11/18  - BCC, vertex scalp, s/p MMS 11/21/2016  - SCCis, R lateral thigh, s/p ED&C 11/21/2016  - SCCIS, right medial ankle, s/p Mohs 4/21/2016  - SCCIS, right ventral forearm, s/p ED&C 4/21/2016  - BCC, left preauricular, s/p Mohs 11/10/2015  - BCC, superficial type, left upper chest, s/p biopsy 4/9/2015, s/p ED&C 10/5/2015  - SCCIS, posterior right lower earlobe, records via [employer] transferred records, Dr. Arnulfo Keyes, 2011. s/p excision in 2005, recurrent  - SCCIS, left dorsal hand, s/p via [employer] transferred records, Dr. Arnulfo Keyes, 2011  3. Actinic keratosis  - HAK, left dorsal wrist, s/p bx 11/10/2020  - s/p cryotherapy  4. History of benign biopsy  - Intradermal melanocytic nevus, left postauricular scalp, s/p bx 6/18/21  - Compound melanocytic nevus, left lower back, s/p bx 4/9/15  - BLK, right lower leg, s/p biopsy 4/9/2015        Family hx of melanoma is negative.  Sister with unknown skin issues    ____________________________________________    Assessment & Plan:    # Malignant Melanoma 0.3 breslow depth, left ventral forearm, s/p excision 2/10/22. No clinical evidence of recurrence.  - ABCDEs: Counseled ABCDEs of melanoma: Asymmetry,  Border (irregularity), Color (not uniform, changes in color), Diameter (greater than 6 mm which is about the size of a pencil eraser), and Evolving (any changes in preexisting moles).  - Sun protection: Counseled SPF30+ sunscreen, UPF clothing, sun avoidance, tanning bed avoidance.   - Recommended yearly dental, ophthalmology, and gynecology exams.  - Recommended skin exams for all first-degree relatives   - Recommended follow up in 4 months     # History of nonmelanoma skin cancer, no clinical evidence of recurrence.  - ABCDEs: Counseled ABCDEs of melanoma: Asymmetry, Border (irregularity), Color (not uniform, changes in color), Diameter (greater than 6 mm which is about the size of a pencil eraser), and Evolving (any changes in preexisting moles).  - Sun protection: Counseled SPF30+ sunscreen, UPF clothing, sun avoidance, tanning bed avoidance.   - Recommended yearly skin checks.     # Neoplasm of unspecified behavior of the skin (D49.2) on the left posterior neck. The differential diagnosis includes BCC vs Hidrocystoma  - Photograph obtained.  - See procedure note.     # Actinic keratosis - right forehead x 1, right cheek x 2, frontal scalp x 2, crown x 10, left helix x 1, left neck x 1. Based on the location and chronic irritation to this lesion, treatment with cryotherapy is warranted.   - See cryo procedure note.    Procedures Performed:   - Shave biopsy procedure note, location(s): left posterior neck. After discussion of benefits and risks including but not limited to bleeding, infection, scar, incomplete removal, recurrence, and non-diagnostic biopsy, written consent and photographs were obtained. The area was cleaned with isopropyl alcohol. 2mL of 1% lidocaine with epinephrine was injected to obtain adequate anesthesia of lesion(s). Shave biopsy at site(s) performed. Hemostasis was achieved with aluminium chloride. Petrolatum ointment and a sterile dressing were applied. The patient tolerated the procedure  and no complications were noted. The patient was provided with verbal and written post care instructions.   - Cryotherapy procedure note, location(s): right forehead, right cheek, frontal scalp, crown, left helix, and left neck. After verbal consent and discussion of risks and benefits including, but not limited to, dyspigmentation/scar, blister, and pain, 17 AK(s) was(were) treated with 1-2 mm freeze border for 1-2 cycles with liquid nitrogen. Post cryotherapy instructions were provided.      Follow-up: 4 months photos and phone    Staff and Scribe:     Scribe Disclosure:   I, Carlos Alberto Celeste, am serving as a scribe to document services personally performed by this physician, Dr. Berta Monge, based on data collection and the provider's statements to me.       Provider Disclosure:   The documentation recorded by the scribe accurately reflects the services I personally performed and the decisions made by me.    Berta Monge MD    Department of Dermatology  [facility]: Phone: [phone], Fax:[phone]  [facility]: Phone: [phone], Fax: [phone]    ____________________________________________    CC: [place] [place]x of [place]. No areas of concern. )    HPI:  Mr. Andre Tyler is a(n) 77 year old male who presents today as a return patient for a skin check.    Last seen 8/29/22 by Dr. Nazario for Mohs surgery of an SCC in situ on the right dorsal hand and left shin, and electrodesiccation and curettage of an SCC in situ on the right bicep and a BCC on the left paraspinal lower back.    Today, he has no areas of concern. Denies any fevers, chills, night sweats, or weight loss.     Patient is otherwise feeling well, without additional skin concerns.    Labs Reviewed:  N/A    Physical Exam:  Vitals: There were no vitals taken for this visit.  SKIN: Total skin excluding the  undergarment areas was performed. The exam included the head/face, neck, both arms, chest, back, abdomen, both legs, buttocks, digits and/or nails.   - Well healed scar at site of previous melanoma, no repigmentation or nodularity noted.    - Well healed scars at sites of previous NMSC, no repigmentation or nodularity noted.   - 6 mm pearly papule on the left posterior neck.   - Oral exam is normal.  - There are erythematous macules with overyling adherent scale on the right forehead x 1, right cheek x 2, frontal scalp x 2, crown x 10, left helix x 1, left neck x 1.    - No other lesions of concern on areas examined.   LYMPH: There is no cervical, supraclavicular, axillary, or popliteal lymphadenopathy.     Medications:  Current Outpatient Medications   Medication     Catheters MISC     cephALEXin (KEFLEX) 500 MG capsule     Glucosamine HCl (GLUCOSAMINE PO)     ketoconazole (NIZORAL) 2 % external cream     ketotifen (ZADITOR) 0.025 % ophthalmic solution     metFORMIN (GLUCOPHAGE) 500 MG tablet     vitamin C (ASCORBIC ACID) 1000 MG TABS     Vitamin D, Cholecalciferol, 25 MCG (1000 UT) TABS     No current facility-administered medications for this visit.      Past Medical History:   Patient Active Problem List   Diagnosis     Health Care Home     Hypertension goal BP (blood pressure) < 140/90     Squamous cell skin cancer     Prostate cancer - surgically treated     Advanced directives, counseling/discussion     CARDIOVASCULAR SCREENING; LDL GOAL LESS THAN 130     Sensorineural hearing loss, bilateral     Bladder neck contracture     Bladder stone     Type 2 diabetes mellitus without complication (H)     History of melanoma     History of nonmelanoma skin cancer     Past Medical History:   Diagnosis Date     Diabetes (H)      History of melanoma     2022 left forearm     History of nonmelanoma skin cancer     NUMEROUS     Hypertension goal BP (blood pressure) < 140/90      Prostate cancer (H)      Squamous cell skin  cancer         CC No referring provider defined for this encounter. on close of this encounter.

## 2022-11-10 NOTE — PATIENT INSTRUCTIONS
Wound Care After a Biopsy    What is a skin biopsy?  A skin biopsy allows the doctor to examine a very small piece of tissue under the microscope to determine the diagnosis and the best treatment for the skin condition. A local anesthetic (numbing medicine)  is injected with a very small needle into the skin area to be tested. A small piece of skin is taken from the area. Sometimes a suture (stitch) is used.     What are the risks of a skin biopsy?  I will experience scar, bleeding, swelling, pain, crusting and redness. I may experience incomplete removal or recurrence. Risks of this procedure are excessive bleeding, bruising, infection, nerve damage, numbness, thick (hypertrophic or keloidal) scar and non-diagnostic biopsy.    How should I care for my wound for the first 24 hours?  Keep the wound dry and covered for 24 hours  If it bleeds, hold direct pressure on the area for 15 minutes. If bleeding does not stop then go to the emergency room  Avoid strenuous exercise the first 1-2 days or as your doctor instructs you    How should I care for the wound after 24 hours?  After 24 hours, remove the bandage  You may bathe or shower as normal  If you had a scalp biopsy, you can shampoo as usual and can use shower water to clean the biopsy site daily  Clean the wound twice a day with gentle soap and water  Do not scrub, be gentle  Apply white petroleum/Vaseline after cleaning the wound with a cotton swab or a clean finger, and keep the site covered with a Bandaid /bandage. Bandages are not necessary with a scalp biopsy  If you are unable to cover the site with a Bandaid /bandage, re-apply ointment 2-3 times a day to keep the site moist. Moisture will help with healing  Avoid strenuous activity for first 1-2 days  Avoid lakes, rivers, pools, and oceans until the stitches are removed or the site is healed    How do I clean my wound?  Wash hands thoroughly with soap or use hand  before all wound care  Clean the  wound with gentle soap and water  Apply white petroleum/Vaseline  to wound after it is clean  Replace the Bandaid /bandage to keep the wound covered for the first few days or as instructed by your doctor  If you had a scalp biopsy, warm shower water to the area on a daily basis should suffice    What should I use to clean my wound?   Cotton-tipped applicators (Qtips )  White petroleum jelly (Vaseline ). Use a clean new container and use Q-tips to apply.  Bandaids   as needed  Gentle soap     How should I care for my wound long term?  Do not get your wound dirty  Keep up with wound care for one week or until the area is healed.  A small scab will form and fall off by itself when the area is completely healed. The area will be red and will become pink in color as it heals. Sun protection is very important for how your scar will turn out. Sunscreen with an SPF 30 or greater is recommended once the area is healed.  You should have some soreness but it should be mild and slowly go away over several days. Talk to your doctor about using tylenol for pain,    When should I call my doctor?  If you have increased:   Pain or swelling  Pus or drainage (clear or slightly yellow drainage is ok)  Temperature over 100F  Spreading redness or warmth around wound    When will I hear about my results?  The biopsy results can take 2 weeks to come back.  Your results will automatically release to [facility] before your provider has even reviewed them.  The clinic will call you with the results, send you a [employer] message, or have you schedule a follow-up clinic or phone time to discuss the results.  Contact our clinics if you do not hear from us in 2 weeks.    Who should I call with questions?  [facility]: [phone]  [facility]: [phone]  For urgent needs outside of business hours call the [facility] at [phone] and ask for the dermatology resident on  call        Cryotherapy    What is it?  Use of a very cold liquid, such as liquid nitrogen, to freeze and destroy abnormal skin cells that need to be removed    What should I expect?  Tenderness and redness  A small blister that might grow and fill with dark purple blood. There may be crusting.  More than one treatment may be needed if the lesions do not go away.    How do I care for the treated area?  Gently wash the area with your hands when bathing.  Use a thin layer of Vaseline to help with healing. You may use a Band-Aid.   The area should heal within 7-10 days and may leave behind a pink or lighter color.   Do not use an antibiotic or Neosporin ointment.   You may take acetaminophen (Tylenol) for pain.     Call your doctor if you have:  Severe pain  Signs of infection (warmth, redness, cloudy yellow drainage, and or a bad smell)  Questions or concerns    Who should I call with questions?      [facility]: [phone]      [facility]: [phone]      For urgent needs outside of business hours call the [facility] at [phone] and ask for the dermatology resident on call        Patient Education     Checking for Skin Cancer  You can find cancer early by checking your skin each month. There are 3 kinds of skin cancer. They are melanoma, basal cell carcinoma, and squamous cell carcinoma. Doing monthly skin checks is the best way to find new marks or skin changes. Follow the instructions below for checking your skin.   The ABCDEs of checking moles for melanoma   Check your moles or growths for signs of melanoma using ABCDE:   Asymmetry: the sides of the mole or growth don t match  Border: the edges are ragged, notched, or blurred  Color: the color within the mole or growth varies  Diameter: the mole or growth is larger than 6 mm (size of a pencil eraser)  Evolving: the size, shape, or color of the mole or growth is changing (evolving is not  shown in the images below)    Checking for other types of skin cancer  Basal cell carcinoma or squamous cell carcinoma have symptoms such as:     A spot or mole that looks different from all other marks on your skin  Changes in how an area feels, such as itching, tenderness, or pain  Changes in the skin's surface, such as oozing, bleeding, or scaliness  A sore that does not heal  New swelling or redness beyond the border of a mole    Who s at risk?  Anyone can get skin cancer. But you are at greater risk if you have:   Fair skin, light-colored hair, or light-colored eyes  Many moles or abnormal moles on your skin  A history of sunburns from sunlight or tanning beds  A family history of skin cancer  A history of exposure to radiation or chemicals  A weakened immune system  If you have had skin cancer in the past, you are at risk for recurring skin cancer.   How to check your skin  Do your monthly skin checkups in front of a full-length mirror. Check all parts of your body, including your:   Head (ears, face, neck, and scalp)  Torso (front, back, and sides)  Arms (tops, undersides, upper, and lower armpits)  Hands (palms, backs, and fingers, including under the nails)  Buttocks and genitals  Legs (front, back, and sides)  Feet (tops, soles, toes, including under the nails, and between toes)  If you have a lot of moles, take digital photos of them each month. Make sure to take photos both up close and from a distance. These can help you see if any moles change over time.   Most skin changes are not cancer. But if you see any changes in your skin, call your doctor right away. Only he or she can diagnose a problem. If you have skin cancer, seeing your doctor can be the first step toward getting the treatment that could save your life.   [employer] last reviewed this educational content on 4/1/2019 2000-2020 [employer]. [address] [place] [address], [place] [address]. All rights reserved. This information is not  intended as a substitute for professional medical care. Always follow your healthcare professional's instructions.       When should I call my doctor?  If you are worsening or not improving, please, contact us or seek urgent care as noted below.     Who should I call with questions (adults)?  [facility] (adult and pediatric): [phone]  [facility] (adult): [phone]  For urgent needs outside of business hours call the [facility] at [phone] and ask for the dermatology resident on call to be paged  If this is a medical emergency and you are unable to reach an ER, Call [phone]    Who should I call with questions (pediatric)?  [facility]- Pediatric Dermatology  Dr. Lucille Terrazas, Dr. Vianca Steele, Dr. Cherie Kinsey, BRIT Vincent, Dr. An Lee, Dr. Jaleesa Hardin & Dr. Edson Alvarez  Non-urgent nurse triage line; [phone]- Liliya and Dang [facility] Care Coordinatormarni Tate (/Complex ) [phone]    If you need a prescription refill, please contact your pharmacy. Refills are approved or denied by our Physicians during normal business hours, Monday through Fridays  Per office policy, refills will not be granted if you have not been seen within the past year (or sooner depending on your child's condition)    Scheduling Information:  Pediatric Appointment Scheduling and Call Center [phone]  Radiology Scheduling- [phone]  Sedation Unit Scheduling- [phone]  [facility] Scheduling- General [phone]; Pediatric Dermatology [phone]  Main  Services: [phone]  [place]serratian: [phone]  Surinamese: [phone]  Hmong/Bulgarian/Venezuelan: [phone]  Preadmission Nursing Department Fax Number: [phone] (Fax all pre-operative paperwork to this number)    For urgent matters arising during evenings, weekends, or holidays that cannot wait for normal business  hours please call [phone] and ask for the dermatology resident on call to be paged.

## 2022-11-10 NOTE — LETTER
11/10/2022         RE: Andre Tyler  [address] [facility] [address]        Dear Colleague,    Thank you for referring your patient, Andre Tyler, to the [facility]. Please see a copy of my visit note below.    [facility] Dermatology Note  Encounter Date: Nov 10, 2022  Office Visit     Dermatology Problem List:  Last skin check 11/10/22   1. History of Melanoma  - Malignant Melanoma 0.3 breslow depth, left ventral forearm, s/p excision 2/10/22  2. 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  - SCCIS, R dorsal hand s/p Mohs and linear repair 8/29/22   - SCCIS, L shin s/p Mohs and linear repair 8/29/22  - SCC, left dorsal wrist, s/p MMS 7/21/21  - BCC, upper mid back, s/p excision 7/21/21  - BCC, left clavicle, s/p excision 7/21/21  - BCC, left upper back, s/p excision 12/17/2020  - SCC, base of right thumb, s/p excision 6/26/18  - SCCIS, central chest s/p ED & C 6/11/18  - BCC, vertex scalp, s/p MMS 11/21/2016  - SCCis, R lateral thigh, s/p ED&C 11/21/2016  - SCCIS, right medial ankle, s/p Mohs 4/21/2016  - SCCIS, right ventral forearm, s/p ED&C 4/21/2016  - BCC, left preauricular, s/p Mohs 11/10/2015  - BCC, superficial type, left upper chest, s/p biopsy 4/9/2015, s/p ED&C 10/5/2015  - SCCIS, posterior right lower earlobe, records via [facility] transferred records, Dr. Arnulfo Keyes, 2011. s/p excision in 2005, recurrent  - SCCIS, left dorsal hand, s/p via [facility] transferred records, Dr. Arnulfo Keyes, 2011  3. Actinic keratosis  - HAK, left dorsal wrist, s/p bx 11/10/2020  - s/p cryotherapy  4. History of benign biopsy  - Intradermal melanocytic nevus, left postauricular scalp, s/p bx 6/18/21  - Compound melanocytic nevus, left lower back, s/p bx 4/9/15  - BLK, right lower leg, s/p biopsy 4/9/2015        Family hx of melanoma is negative.  Sister  with unknown skin issues    ____________________________________________    Assessment & Plan:    # Malignant Melanoma 0.3 breslow depth, left ventral forearm, s/p excision 2/10/22. No clinical evidence of recurrence.  - ABCDEs: Counseled ABCDEs of melanoma: Asymmetry, Border (irregularity), Color (not uniform, changes in color), Diameter (greater than 6 mm which is about the size of a pencil eraser), and Evolving (any changes in preexisting moles).  - Sun protection: Counseled SPF30+ sunscreen, UPF clothing, sun avoidance, tanning bed avoidance.   - Recommended yearly dental, ophthalmology, and gynecology exams.  - Recommended skin exams for all first-degree relatives   - Recommended follow up in 4 months     # History of nonmelanoma skin cancer, no clinical evidence of recurrence.  - ABCDEs: Counseled ABCDEs of melanoma: Asymmetry, Border (irregularity), Color (not uniform, changes in color), Diameter (greater than 6 mm which is about the size of a pencil eraser), and Evolving (any changes in preexisting moles).  - Sun protection: Counseled SPF30+ sunscreen, UPF clothing, sun avoidance, tanning bed avoidance.   - Recommended yearly skin checks.     # Neoplasm of unspecified behavior of the skin (D49.2) on the left posterior neck. The differential diagnosis includes BCC vs Hidrocystoma  - Photograph obtained.  - See procedure note.     # Actinic keratosis - right forehead x 1, right cheek x 2, frontal scalp x 2, crown x 10, left helix x 1, left neck x 1. Based on the location and chronic irritation to this lesion, treatment with cryotherapy is warranted.   - See cryo procedure note.    Procedures Performed:   - Shave biopsy procedure note, location(s): left posterior neck. After discussion of benefits and risks including but not limited to bleeding, infection, scar, incomplete removal, recurrence, and non-diagnostic biopsy, written consent and photographs were obtained. The area was cleaned with isopropyl alcohol.  2mL of 1% lidocaine with epinephrine was injected to obtain adequate anesthesia of lesion(s). Shave biopsy at site(s) performed. Hemostasis was achieved with aluminium chloride. Petrolatum ointment and a sterile dressing were applied. The patient tolerated the procedure and no complications were noted. The patient was provided with verbal and written post care instructions.   - Cryotherapy procedure note, location(s): right forehead, right cheek, frontal scalp, crown, left helix, and left neck. After verbal consent and discussion of risks and benefits including, but not limited to, dyspigmentation/scar, blister, and pain, 17 AK(s) was(were) treated with 1-2 mm freeze border for 1-2 cycles with liquid nitrogen. Post cryotherapy instructions were provided.      Follow-up: 4 months photos and phone    Staff and Scribe:     Scribe Disclosure:   I, Carlos Alberto Celeste, am serving as a scribe to document services personally performed by this physician, Dr. Berta Monge, based on data collection and the provider's statements to me.       Provider Disclosure:   The documentation recorded by the scribe accurately reflects the services I personally performed and the decisions made by me.    Berta Monge MD    Department of Dermatology  [facility]: Phone: [phone], Fax:[phone]  [facility]: Phone: [phone], Fax: [phone]    ____________________________________________    CC: RECHECK ([facility]. Hx of Melanoma. No areas of concern. )    HPI:  Mr. Andre Tyler is a(n) 77 year old male who presents today as a return patient for a skin check.    Last seen 8/29/22 by Dr. Nazario for Mohs surgery of an SCC in situ on the right dorsal hand and left shin, and electrodesiccation and curettage of an SCC in situ on the right bicep and a BCC on the left paraspinal lower back.    Today, he has no areas of  concern. Denies any fevers, chills, night sweats, or weight loss.     Patient is otherwise feeling well, without additional skin concerns.    Labs Reviewed:  N/A    Physical Exam:  Vitals: There were no vitals taken for this visit.  SKIN: Total skin excluding the undergarment areas was performed. The exam included the head/face, neck, both arms, chest, back, abdomen, both legs, buttocks, digits and/or nails.   - Well healed scar at site of previous melanoma, no repigmentation or nodularity noted.    - Well healed scars at sites of previous NMSC, no repigmentation or nodularity noted.   - 6 mm pearly papule on the left posterior neck.   - Oral exam is normal.  - There are erythematous macules with overyling adherent scale on the right forehead x 1, right cheek x 2, frontal scalp x 2, crown x 10, left helix x 1, left neck x 1.    - No other lesions of concern on areas examined.   LYMPH: There is no cervical, supraclavicular, axillary, or popliteal lymphadenopathy.     Medications:  Current Outpatient Medications   Medication     Catheters MISC     cephALEXin (KEFLEX) 500 MG capsule     Glucosamine HCl (GLUCOSAMINE PO)     ketoconazole (NIZORAL) 2 % external cream     ketotifen (ZADITOR) 0.025 % ophthalmic solution     metFORMIN (GLUCOPHAGE) 500 MG tablet     vitamin C (ASCORBIC ACID) 1000 MG TABS     Vitamin D, Cholecalciferol, 25 MCG (1000 UT) TABS     No current facility-administered medications for this visit.      Past Medical History:   Patient Active Problem List   Diagnosis     Health Care Home     Hypertension goal BP (blood pressure) < 140/90     Squamous cell skin cancer     Prostate cancer - surgically treated     Advanced directives, counseling/discussion     CARDIOVASCULAR SCREENING; LDL GOAL LESS THAN 130     Sensorineural hearing loss, bilateral     Bladder neck contracture     Bladder stone     Type 2 diabetes mellitus without complication (H)     History of melanoma     History of nonmelanoma skin  cancer     Past Medical History:   Diagnosis Date     Diabetes (H)      History of melanoma     2022 left forearm     History of nonmelanoma skin cancer     NUMEROUS     Hypertension goal BP (blood pressure) < 140/90      Prostate cancer (H)      Squamous cell skin cancer         CC No referring provider defined for this encounter. on close of this encounter.       Again, thank you for allowing me to participate in the care of your patient.        Sincerely,        Berta Monge MD

## 2022-11-16 ENCOUNTER — TELEPHONE (OUTPATIENT)
Dept: DERMATOLOGY | Facility: CLINIC | Age: 77
End: 2022-11-16

## 2022-11-16 NOTE — TELEPHONE ENCOUNTER
----- Message from Boby Nazario MD sent at 11/15/2022  4:26 PM CST -----  My treatment recommendation is excision. Thank you.

## 2022-11-16 NOTE — TELEPHONE ENCOUNTER
Excision/Mohs previsit information                                                    Diagnosis: basal cell carcinoma  Site(s): posterior neck    Over the counter Chlorhexidine surgical soap to wash all skin below the belly button twice before surgery should be recommended for the following:  - Surgical sites below the waist  - Immunosuppressed  - Previous surgical site infection  - Anticipated wound care challenges    Medication & Allergy Information                                                      Review and update allergy and medication list.    Do you take the following medications:    Coumadin, Eliquis, Pradaxa, Xarelto:  NO   -If on Coumadin, INR should be checked within 7 days of surgery.  Range should be 3.5 or less or within therapeutic range.    Past Medical History                                                    Do you currently or have you previously had any of the following conditions:      Hepatitis:  NO    HIV/AIDS:  NO    Prolonged bleeding or bleeding disorder:  NO    Pacemaker or Defibrillator:  NO.      History of artificial or heart valve replacement:  NO    Endocarditis (inflammation of the inner lining of the heart's chambers and valves):  NO    Have you ever had a prosthetic joint infection:  NO    Pregnant or Breastfeeding:  NO    Mobility device (wheelchair, transfer difficulty): NO    Important Reminders:                                                        Ok to take all of their medications as prescribed    Patients can eat, no need to be fasting    Patient will not be able to get the site wet for 48 hrs    No submerging wound in standing water (lake, pool, bathtub, hot tub) for 2 weeks    No physical activity for 48 hrs (further restrictions will be discussed by MD at time of visit)    If any positives, send to RN for further review  Christi Rahman LPN

## 2022-11-16 NOTE — TELEPHONE ENCOUNTER
"Boby Nazario MD  [facility] [place]  My treatment recommendation is excision. Thank you.           Associated Results     Contains abnormal data Dermatological Path Order and Indications: [id number]  Order: [id number]   Status: Final result      Visible to patient: No (inaccessible in MyChart)      Dx: Neoplasm of uncertain behavior      5 Result Notes  Component Ref Range & Units  Resulting Agency   Case Report   Surgical Pathology Report                         Case: [id number]                                   Authorizing Provider:  Berta Monge MD           Collected:           11/10/2022 01:30 PM           Ordering Location:     [facility]   Received:            11/10/2022 04:24 PM                                  [place]                                                                   Pathologist:           Ramsey Varma MD                                                          Specimen:    Skin, left posterior neck                                                                  Final Diagnosis   A. Left posterior neck :  - Basal cell carcinoma, nodular type, extending to the deep margin - (see description)   Electronically signed by Ramsey Varma MD on 11/14/2022 at  3:49 PM   Clinical Information   [facility]   The patient is a 77 year old male.    Gross Description   [facility]   A(1). Skin, left posterior neck :  The specimen is received in formalin with proper patient identification, labeled \"left posterior neck\".  The specimen consists of a 0.5 x 0.5 cm irregular skin shave.  Epidermis displays a 0.5 x 0.5 cm ill-defined, tan-brown lesion.  The resection margin is inked blue, the tissue is bisected.  Also received within the container is a 0.3 x 0.2cm disrupted portion of tan-white possible skin, the resection margin is inked black, the tissue was left intact.  The tissues are submitted entirely in block A1.    Microscopic Description   [facility]   The specimen " exhibits a tumor of atypical basaloid epithelium arranged as islands in the dermis with fibromyxoid stroma and clefting artifact.   MCRS N/A Yes Abnormal   [facility]   Performing Labs   [facility]   The technical component of this testing was completed at [facility]              Specimen Collected: 11/10/22  1:30 PM Last Resulted: 11/14/22  3:49 PM        Order Details      View Encounter      Lab and Collection Details      Routing      Result History     View Encounter Conversation           Scans on Order [id number]    Document on 11/14/2022  3:49 PM by Ramsey Varma MD         Result Care Coordination      Result Notes   Christi Rahman LPN   11/16/2022 11:53 AM CST Back to Top      Called and informed patient of results and excision recommendation. Excision procedure and aftercare reviewed with patient. Patient had no further questions or concerns at this time. Patient scheduled with Dr. Nazario on 12/8/2022     MARILU Cabrera MD   11/15/2022  4:26 PM CST       My treatment recommendation is excision. Thank you.     Tayla Mccarty RN   11/15/2022 11:28 AM CST       Routing to Dr. Nazario to review and advise.    Berta Monge MD   11/14/2022  6:27 PM CST       This is a skin cancer, send to mohs team to review. I think it would be okay for excision or ED and C    Berta Monge MD   11/14/2022  6:27 PM CST       BCC, I think this could go for ED and C or excision as it is very close to back but have mohs team preview

## 2022-12-08 ENCOUNTER — OFFICE VISIT (OUTPATIENT)
Dept: DERMATOLOGY | Facility: CLINIC | Age: 77
End: 2022-12-08
Payer: COMMERCIAL

## 2022-12-08 DIAGNOSIS — C44.41 BASAL CELL CARCINOMA (BCC) OF LEFT SIDE OF NECK: Primary | ICD-10-CM

## 2022-12-08 PROCEDURE — 11622 EXC S/N/H/F/G MAL+MRG 1.1-2: CPT | Mod: GC | Performed by: DERMATOLOGY

## 2022-12-08 PROCEDURE — 12042 INTMD RPR N-HF/GENIT2.6-7.5: CPT | Mod: GC | Performed by: DERMATOLOGY

## 2022-12-08 PROCEDURE — 88305 TISSUE EXAM BY PATHOLOGIST: CPT | Performed by: DERMATOLOGY

## 2022-12-08 ASSESSMENT — PAIN SCALES - GENERAL: PAINLEVEL: NO PAIN (0)

## 2022-12-08 NOTE — NURSING NOTE
The following medication was given:     MEDICATION:  Lidocaine with epinephrine 1% 1:[id number]  ROUTE: SQ  SITE: see procedure note  DOSE: 6 mL  LOT #: [id number]  : [employer]  EXPIRATION DATE: 07/31/2023  NDC#: [address]-67  Was there drug waste? 0  Multi-dose vial: Yes    Gabby Rogers CMA  December 8, 2022    Mastisol, steri-strips, tegaderm and pressure dressing applied to Mohs site on left posterior neck.  Wound care instructions reviewed with patient and AVS provided.  Patient verbalized understanding. No further questions or concerns at this time.

## 2022-12-08 NOTE — NURSING NOTE
Andre Tyler's chief complaint for this visit includes:  Chief Complaint   Patient presents with     Procedure     Excision BCC left posterior neck     PCP: Arnulfo Joaquin    Referring Provider:  No referring provider defined for this encounter.    There were no vitals taken for this visit.  No Pain (0)      No Known Allergies      Do you need any medication refills at today's visit? No    Gabby Rogers CMA

## 2022-12-08 NOTE — PROGRESS NOTES
DERMATOLOGY EXCISION PROCEDURE NOTE    Dermatology Problem List:  Last skin check 11/10/22   1. History of Melanoma  - Malignant Melanoma 0.3 breslow depth, left ventral forearm, s/p excision 2/10/22  2. History of NMSC  - BCC, L posterior neck s/p excision 12/8/22  - sBCC, L paraspinal lower back s/p ED&C 8/29/22  - SCCIS, right tricep s/p ED&C 8/29/22  - SCCIS, R dorsal hand s/p Mohs and linear repair 8/29/22   - SCCIS, L shin s/p Mohs and linear repair 8/29/22  - SCC, left dorsal wrist, s/p MMS 7/21/21  - BCC, upper mid back, s/p excision 7/21/21  - BCC, left clavicle, s/p excision 7/21/21  - BCC, left upper back, s/p excision 12/17/2020  - SCC, base of right thumb, s/p excision 6/26/18  - SCCIS, central chest s/p ED & C 6/11/18  - BCC, vertex scalp, s/p MMS 11/21/2016  - SCCis, R lateral thigh, s/p ED&C 11/21/2016  - SCCIS, right medial ankle, s/p Mohs 4/21/2016  - SCCIS, right ventral forearm, s/p ED&C 4/21/2016  - BCC, left preauricular, s/p Mohs 11/10/2015  - BCC, superficial type, left upper chest, s/p biopsy 4/9/2015, s/p ED&C 10/5/2015  - SCCIS, posterior right lower earlobe, records via [facility] transferred records, Dr. Arnulfo Keyes, 2011. s/p excision in 2005, recurrent  - SCCIS, left dorsal hand, s/p via [facility] transferred records, Dr. Arnulfo Keyes, 2011  3. Actinic keratosis  - HAK, left dorsal wrist, s/p bx 11/10/2020  - s/p cryotherapy  4. History of benign biopsy  - Intradermal melanocytic nevus, left postauricular scalp, s/p bx 6/18/21  - Compound melanocytic nevus, left lower back, s/p bx 4/9/15  - BLK, right lower leg, s/p biopsy 4/9/2015        Family hx of melanoma is negative.  Sister with unknown skin issues     ____________________________________________      NAME OF PROCEDURE: Excision intermediate layered linear closure  Staff surgeon: Boby Nazario DO  Fellow:  Maximiliano Olivera MD  Scrub Nurse: Gabby Rogers CMA    PRE-OPERATIVE DIAGNOSIS:  Basal Cell  Carcinoma  POST-OPERATIVE DIAGNOSIS: Same   LOCATION: left posterior neck  FINAL EXCISION SIZE(DEFECT SIZE): 1.6 cm x 1.4  cm  MARGIN: 4 mm  FINAL REPAIR LENGTH: 4.0 cm   ANESTHESIA: 6ml 1% lidocaine with 1:100,000 epinephrine       INDICATIONS: This patient presented with a 0.8x0.6cm Basal Cell Carcinoma. Excision was indicated. We discussed the principles of treatment and most likely complications including scarring, bleeding, infection, incomplete excision, wound dehiscence, pain, nerve damage, and recurrence. Informed consent was obtained and the patient underwent the procedure as follows:    PROCEDURE: The patient was taken to the operative suite. Time-out was performed.  The treatment area was anesthetized with 1% lidocaine with epinephrine. The area was prepped with Chlorhexidine and rinsed with sterile saline and draped with sterile towels. The lesion was delineated and excised down to subcutaneous fat in a elliptical manner. Hemostasis was obtained by electrocoagulation.     REPAIR: An intermediate layered linear closure was selected as the procedure which would maximally preserve both function and cosmesis.    After the excision of the tumor, the area was carefully undermined. Hemostasis was obtained with bipolar electrocoagulation.  Closure was oriented so that the wound was in the patient's natural skin tension lines. The deep subcutaneous and dermal layers were then closed with 4-0 monocryl sutures. The epidermis was then carefully approximated along the length of the wound using 4-0 monocryl running subcuticular sutures.     Estimated blood loss was less than 10 ml for all surgical sites. A sterile pressure dressing was applied and wound care instructions, with a written handout, were given. The [facility] alert and ambulatory.     The patient elected for pathology results to automatically release and understands that the clinical staff will contact them  as soon as possible to notify them of the results.     Follow-up in PRN.     Dr. Nazario was immediately available for the entire surgery and was physicially present for the key portions of the procedure.    Maximiliano Olivera MD  Dermatology, PGY-5  Mohs surgery fellow    Anatomic Pathology Results: pending    Clinical Follow-Up: PRN    Staff Involved:  Fellow/Staff       Staff Physician Comments:   I saw and evaluated the patient with the Mohs Surgery Fellow (Dr. Maximiliano Olivera) and I agree with the assessment and plan and the above description of the procedure as documented by the scribe. I was present for the key portions of the procedure and entire exam. I was immediately available in the clinic throughout the procedures.       Boby Nazario DO    Department of Dermatology  [facility]: Phone: [phone], Fax:[phone]  [facility]: Phone: [phone], Fax: [phone]

## 2022-12-08 NOTE — LETTER
12/8/2022         RE: Andre Tyler  [address] [facility] [address]        Dear Colleague,    Thank you for referring your patient, Andre Tyler, to the [facility]. Please see a copy of my visit note below.    DERMATOLOGY EXCISION PROCEDURE NOTE    Dermatology Problem List:  Last skin check 11/10/22   1. History of Melanoma  - Malignant Melanoma 0.3 breslow depth, left ventral forearm, s/p excision 2/10/22  2. History of NMSC  - BCC, L posterior neck s/p excision 12/8/22  - sBCC, L paraspinal lower back s/p ED&C 8/29/22  - SCCIS, right tricep s/p ED&C 8/29/22  - SCCIS, R dorsal hand s/p Mohs and linear repair 8/29/22   - SCCIS, L shin s/p Mohs and linear repair 8/29/22  - SCC, left dorsal wrist, s/p MMS 7/21/21  - BCC, upper mid back, s/p excision 7/21/21  - BCC, left clavicle, s/p excision 7/21/21  - BCC, left upper back, s/p excision 12/17/2020  - SCC, base of right thumb, s/p excision 6/26/18  - SCCIS, central chest s/p ED & C 6/11/18  - BCC, vertex scalp, s/p MMS 11/21/2016  - SCCis, R lateral thigh, s/p ED&C 11/21/2016  - SCCIS, right medial ankle, s/p Mohs 4/21/2016  - SCCIS, right ventral forearm, s/p ED&C 4/21/2016  - BCC, left preauricular, s/p Mohs 11/10/2015  - BCC, superficial type, left upper chest, s/p biopsy 4/9/2015, s/p ED&C 10/5/2015  - SCCIS, posterior right lower earlobe, records via [facility] transferred records, Dr. Arnulfo Keyes, 2011. s/p excision in 2005, recurrent  - SCCIS, left dorsal hand, s/p via [facility] transferred records, Dr. Arnulfo Keyes, 2011  3. Actinic keratosis  - HAK, left dorsal wrist, s/p bx 11/10/2020  - s/p cryotherapy  4. History of benign biopsy  - Intradermal melanocytic nevus, left postauricular scalp, s/p bx 6/18/21  - Compound melanocytic nevus, left lower back, s/p bx 4/9/15  - BLK, right lower leg, s/p biopsy 4/9/2015        Family hx of melanoma is negative.  Sister with  unknown skin issues     ____________________________________________      NAME OF PROCEDURE: Excision intermediate layered linear closure  Staff surgeon: Boby Nazario DO  Fellow:  Maximiliano Olivera MD  Scrub Nurse: Gabby Rogers CMA    PRE-OPERATIVE DIAGNOSIS:  Basal Cell Carcinoma  POST-OPERATIVE DIAGNOSIS: Same   LOCATION: left posterior neck  FINAL EXCISION SIZE(DEFECT SIZE): 1.6 cm x 1.4  cm  MARGIN: 4 mm  FINAL REPAIR LENGTH: 4.0 cm   ANESTHESIA: 6ml 1% lidocaine with 1:100,000 epinephrine       INDICATIONS: This patient presented with a 0.8x0.6cm Basal Cell Carcinoma. Excision was indicated. We discussed the principles of treatment and most likely complications including scarring, bleeding, infection, incomplete excision, wound dehiscence, pain, nerve damage, and recurrence. Informed consent was obtained and the patient underwent the procedure as follows:    PROCEDURE: The patient was taken to the operative suite. Time-out was performed.  The treatment area was anesthetized with 1% lidocaine with epinephrine. The area was prepped with Chlorhexidine and rinsed with sterile saline and draped with sterile towels. The lesion was delineated and excised down to subcutaneous fat in a elliptical manner. Hemostasis was obtained by electrocoagulation.     REPAIR: An intermediate layered linear closure was selected as the procedure which would maximally preserve both function and cosmesis.    After the excision of the tumor, the area was carefully undermined. Hemostasis was obtained with bipolar electrocoagulation.  Closure was oriented so that the wound was in the patient's natural skin tension lines. The deep subcutaneous and dermal layers were then closed with 4-0 monocryl sutures. The epidermis was then carefully approximated along the length of the wound using 4-0 monocryl running subcuticular sutures.     Estimated blood loss was less than 10 ml for all surgical sites. A sterile pressure dressing was applied and wound  care instructions, with a written handout, were given. The [facility] and ambulatory.     The patient elected for pathology results to automatically release and understands that the clinical staff will contact them as soon as possible to notify them of the results.     Follow-up in PRN.     Dr. Nazario was immediately available for the entire surgery and was physicially present for the key portions of the procedure.    Maximiliano Olivera MD  Dermatology, PGY-5  Mohs surgery fellow    Anatomic Pathology Results: pending    Clinical Follow-Up: PRN    Staff Involved:  Fellow/Staff       Staff Physician Comments:   I saw and evaluated the patient with the Mohs Surgery Fellow (Dr. Maximiliano Olivera) and I agree with the assessment and plan and the above description of the procedure as documented by the scribe. I was present for the key portions of the procedure and entire exam. I was immediately available in the clinic throughout the procedures.       Boby Nazario DO    Department of Dermatology  [facility]: Phone: [phone], Fax:[phone]  [facility]: Phone: [phone], Fax: [phone]      Again, thank you for allowing me to participate in the care of your patient.        Sincerely,        Boby Nazario MD

## 2022-12-08 NOTE — PATIENT INSTRUCTIONS
Excision Wound Care Instructions with Steri Strips    Possible complications of any surgical procedure are bleeding, infection, scarring, alteration in skin color and sensation, muscle weakness in the area, wound dehiscence or seperation, or recurrence of the lesion or disease. On occasion, after healing, a secondary procedure or revision may be recommended in order to obtain the best cosmetic or functional result.     After your surgery, a pressure bandage will be placed over the area that has sutures. This will help prevent bleeding. Please, follow these instructions as they will help you to prevent complications as your wound heals.    For the First 48 hours After Surgery:    Leave the pressure bandage on and keep it dry. If it should come loose, you may retape it, but do not take it off.    Relax and take it easy. Do not do any vigorous exercise, heavy lifting, or bending forward. This could cause the wound to bleed.    Post-operative pain is usually mild. You may alternate between 1000 mg of Tylenol (acetaminophen) and 400 mg of Ibuprofen every 4 hours.  Do not take more than 4,000 mg of acetaminophen and more than 3200 mg of Ibuprofen in a 24 hr period.  Avoid alcohol and vitamin E as these may increase your tendency to bleed.    You may put an ice pack around the bandaged area for 20 minutes every 2-3 hours. This may help reduce swelling, bruising, and pain. Make sure the ice pack is waterproof so that the pressure bandage does not get wet.     You may see a small amount of drainage or blood on your pressure bandage.  This is normal.  However, if drainage or bleeding continues or saturates the bandage, you will need to apply firm pressure over the bandage with a washcloth for 15 minutes. If bleeding continues after applying pressure for 15 minutes then go to the nearest emergency room.      48 Hours After Surgery:    Remove outer white bandage down to clear plastic film (Tegaderm).    Leave the clear plastic  film (Tegaderm) on for up to 2 weeks, as long as it is intact.  If it falls off prior to 2 weeks follow daily wound care below.  If it stays intact for the full 2 weeks, then remove and treat as normal, healthy skin.      Daily Wound Care (if Tegaderm and Steri-Strips fall off prior to 2 weeks):    Wash wound with a mild soap and water.  Use caution when washing the wound, be gentle and do not let the forceful shower stream hit the wound directly. DO NOT WASH WITH HYDROGEN PEROXIDE AS THIS MIGHT CAUSE THE STITCHES TO DISSOLVE FASTER THAN WHAT WE WANT.    Pat dry.    Apply Vaseline (from a new container or tube) over the suture line with a Q-tip until it is completely healed. It is very important to keep the wound continuously moist, as wounds heal best in a moist environment.    Keep the site covered until it's healed.  You can cover it with a Telfa (non-stick) dressing and tape or a band-aid until healed with normal, healthy skin.      Call Us If:    You have pain that is not controlled with Tylenol/Ibuprofen.    You have signs or symptoms of an infection, such as: fever over 100 degrees F, redness, warmth, or foul-smelling or yellow/creamy drainage from the wound.      Who should I call with questions?  [facility]: [phone]  [facility]: [phone]  For urgent needs outside of business hours call the [facility] at [phone] and ask for the dermatology resident on call

## 2022-12-13 ENCOUNTER — TELEPHONE (OUTPATIENT)
Dept: DERMATOLOGY | Facility: CLINIC | Age: 77
End: 2022-12-13

## 2022-12-13 NOTE — TELEPHONE ENCOUNTER
I spoke with Hilario and notified him of the result.  He denied any concerns with wound healing.  He had no questions.  MARINA Hughes MD   12/12/2022  5:05 PM CST       Please call the patient with pathology results.     The pathologist thought we were successful removing the skin cancer from his neck. He thought the excision margins were clear. As long as the wound is healing well, no additional surgery is necessary.  Thank you.

## 2023-02-02 NOTE — BRIEF OP NOTE
----- Message from Tom Hannah sent at 2/2/2023  3:20 PM CST -----  Contact: Mago Negron [phone]  Requesting an RX refill or new RX.  Is this a refill or new RX: New  RX name and strength (copy/paste from chart):  traZODone (DESYREL) 50 MG tablet  Is this a 30 day or 90 day RX:   Pharmacy name and phone # (copy/paste from chart):    [facility] [id number] [place] [address]  Phone: [phone] Fax: [phone]      Requesting an RX refill or new RX.  Is this a refill or new RX: New  RX name and strength (copy/paste from chart):  rosuvastatin (CRESTOR) 20 MG tablet  Is this a 30 day or 90 day RX: 90  Pharmacy name and phone # (copy/paste from chart):  [facility] [id number] [place] [address]  Phone: [phone] Fax: [phone]      Requesting an RX refill or new RX.  Is this a refill or new RX: New   RX name and strength (copy/paste from chart):  hydrOXYzine HCL (ATARAX) 25 MG tablet  Is this a 30 day or 90 day RX: 90  Pharmacy name and phone # (copy/paste from chart):  [facility] [id number] [place] [address]  Phone: [phone] Fax: [phone]        Requesting an RX refill or new RX.  Is this a refill or new RX: New   RX name and strength (copy/paste from chart):  losartan-hydrochlorothiazide 50-12.5 mg (HYZAAR) 50-12.5 mg per tablet  Is this a 30 day or 90 day RX: 90  Pharmacy name and phone # (copy/paste from chart):  [facility] [address] [facility]  [address] [address]  Phone: [phone] Fax: [phone]           [place]   Urology Brief Operative Note    Pre-operative diagnosis: Bladder stone  Bladder neck contracture   Post-operative diagnosis Bladder stone  Bladder neck contracture  Urethral stricture   Procedure: Procedure(s):  DVIU  Bladder neck cut and dilation  Litholapaxy   Surgeon: Arnulfo Vale MD   Assistants(s): None   Anesthesia: general    Estimated blood loss: 10 ml    Total IV fluids: (See anesthesia record)   Blood transfusion: No transfusion was given during surgery   Total urine output: (See anesthesia record)  Not measured   Drains or packing: None   Specimens: none   Implants: none   Findings: See op note.  Urethral stricture, tight BNC   Complications: None   Condition on discharge: Stable   Comments: See dictated operative report for full details

## 2023-02-16 ENCOUNTER — OFFICE VISIT (OUTPATIENT)
Dept: FAMILY MEDICINE | Facility: CLINIC | Age: 78
End: 2023-02-16
Payer: COMMERCIAL

## 2023-02-16 VITALS
DIASTOLIC BLOOD PRESSURE: 87 MMHG | BODY MASS INDEX: 26 KG/M2 | WEIGHT: 196.2 LBS | OXYGEN SATURATION: 98 % | SYSTOLIC BLOOD PRESSURE: 132 MMHG | TEMPERATURE: 98.3 F | HEIGHT: 73 IN | HEART RATE: 82 BPM

## 2023-02-16 DIAGNOSIS — G25.2 RESTING TREMOR: Primary | ICD-10-CM

## 2023-02-16 DIAGNOSIS — Z11.59 NEED FOR HEPATITIS C SCREENING TEST: ICD-10-CM

## 2023-02-16 DIAGNOSIS — E11.9 TYPE 2 DIABETES MELLITUS WITHOUT COMPLICATION, WITHOUT LONG-TERM CURRENT USE OF INSULIN (H): ICD-10-CM

## 2023-02-16 DIAGNOSIS — Z13.220 SCREENING FOR HYPERLIPIDEMIA: ICD-10-CM

## 2023-02-16 DIAGNOSIS — I10 HYPERTENSION GOAL BP (BLOOD PRESSURE) < 140/90: ICD-10-CM

## 2023-02-16 DIAGNOSIS — Z12.11 SCREEN FOR COLON CANCER: ICD-10-CM

## 2023-02-16 LAB — HBA1C MFR BLD: 7.7 % (ref 0–5.6)

## 2023-02-16 PROCEDURE — 36415 COLL VENOUS BLD VENIPUNCTURE: CPT | Performed by: FAMILY MEDICINE

## 2023-02-16 PROCEDURE — 80053 COMPREHEN METABOLIC PANEL: CPT | Performed by: FAMILY MEDICINE

## 2023-02-16 PROCEDURE — 99214 OFFICE O/P EST MOD 30 MIN: CPT | Performed by: FAMILY MEDICINE

## 2023-02-16 PROCEDURE — 82570 ASSAY OF URINE CREATININE: CPT | Performed by: FAMILY MEDICINE

## 2023-02-16 PROCEDURE — 80061 LIPID PANEL: CPT | Performed by: FAMILY MEDICINE

## 2023-02-16 PROCEDURE — 82043 UR ALBUMIN QUANTITATIVE: CPT | Performed by: FAMILY MEDICINE

## 2023-02-16 PROCEDURE — 83036 HEMOGLOBIN GLYCOSYLATED A1C: CPT | Performed by: FAMILY MEDICINE

## 2023-02-16 NOTE — PROGRESS NOTES
"  Assessment & Plan       ICD-10-CM    1. Resting tremor  G25.2 Adult Neurology  Referral      2. Type 2 diabetes mellitus without complication, without long-term current use of insulin (H)  E11.9 Albumin Random Urine Quantitative with Creat Ratio     HEMOGLOBIN A1C     Lipid panel reflex to direct LDL Non-fasting     Comprehensive metabolic panel (BMP + Alb, Alk Phos, ALT, AST, Total. Bili, TP)     Albumin Random Urine Quantitative with Creat Ratio     HEMOGLOBIN A1C     Lipid panel reflex to direct LDL Non-fasting     Comprehensive metabolic panel (BMP + Alb, Alk Phos, ALT, AST, Total. Bili, TP)      3. Need for hepatitis C screening test  Z11.59       4. Screen for colon cancer  Z12.11       5. Hypertension goal BP (blood pressure) < 140/90  I10       6. Screening for hyperlipidemia  Z13.220 Lipid panel reflex to direct LDL Non-fasting     Lipid panel reflex to direct LDL Non-fasting                     BMI:   Estimated body mass index is 25.89 kg/m  as calculated from the following:    Height as of this encounter: 1.854 m (6' 0.99\").    Weight as of this encounter: 89 kg (196 lb 3.2 oz).       There are no Patient Instructions on file for this visit.    No follow-ups on file.    Arnulfo Aguirre MD  [facility] HospitalMARGARETTE Rich is a 77 year old accompanied by his self, presenting for the following health issues:  Other (Noticing symptoms of Parkinson's Disease. tremors, slur speech, increase saliva in mouth ongoing for 6-9 months)      History of Present Illness       Reason for visit:  Symptons of Parkinsons Disease  Symptom onset:  More than a month    He eats 0-1 servings of fruits and vegetables daily.He consumes 0 sweetened beverage(s) daily.He exercises with enough effort to increase his heart rate 9 or less minutes per day.  He exercises with enough effort to increase his heart rate 3 or less days per week.   He is taking medications regularly.     Tremors of the hand " "since summer  Slurred speech  Feels stiff sometimes          Review of Systems         Objective    /87   Pulse 82   Temp 98.3  F (36.8  C) (Oral)   Ht 1.854 m (6' 0.99\")   Wt 89 kg (196 lb 3.2 oz)   SpO2 98%   BMI 25.89 kg/m    Body mass index is 25.89 kg/m .  Physical Exam  Constitutional:       General: He is not in acute distress.     Appearance: Normal appearance. He is well-developed. He is not ill-appearing.   HENT:      Head: Normocephalic and atraumatic.      Right Ear: External ear normal.      Left Ear: External ear normal.      Nose: Nose normal.   Eyes:      General: No scleral icterus.     Extraocular Movements: Extraocular movements intact.      Conjunctiva/sclera: Conjunctivae normal.   Cardiovascular:      Rate and Rhythm: Normal rate.   Pulmonary:      Effort: Pulmonary effort is normal.   Musculoskeletal:      Cervical back: Normal range of motion and neck supple.   Skin:     General: Skin is warm and dry.   Neurological:      Mental Status: He is alert and oriented to person, place, and time.   Psychiatric:         Behavior: Behavior normal.         Thought Content: Thought content normal.         Judgment: Judgment normal.                            "

## 2023-02-17 LAB
ALBUMIN SERPL-MCNC: 3.9 G/DL (ref 3.4–5)
ALP SERPL-CCNC: 70 U/L (ref 40–150)
ALT SERPL W P-5'-P-CCNC: 32 U/L (ref 0–70)
ANION GAP SERPL CALCULATED.3IONS-SCNC: 7 MMOL/L (ref 3–14)
AST SERPL W P-5'-P-CCNC: 22 U/L (ref 0–45)
BILIRUB SERPL-MCNC: 0.4 MG/DL (ref 0.2–1.3)
BUN SERPL-MCNC: 23 MG/DL (ref 7–30)
CALCIUM SERPL-MCNC: 9.8 MG/DL (ref 8.5–10.1)
CHLORIDE BLD-SCNC: 104 MMOL/L (ref 94–109)
CHOLEST SERPL-MCNC: 157 MG/DL
CO2 SERPL-SCNC: 30 MMOL/L (ref 20–32)
CREAT SERPL-MCNC: 1.39 MG/DL (ref 0.66–1.25)
CREAT UR-MCNC: 223 MG/DL
FASTING STATUS PATIENT QL REPORTED: NO
GFR SERPL CREATININE-BSD FRML MDRD: 53 ML/MIN/1.73M2
GLUCOSE BLD-MCNC: 202 MG/DL (ref 70–99)
HDLC SERPL-MCNC: 37 MG/DL
LDLC SERPL CALC-MCNC: 61 MG/DL
MICROALBUMIN UR-MCNC: 18 MG/L
MICROALBUMIN/CREAT UR: 8.07 MG/G CR (ref 0–17)
NONHDLC SERPL-MCNC: 120 MG/DL
POTASSIUM BLD-SCNC: 4.4 MMOL/L (ref 3.4–5.3)
PROT SERPL-MCNC: 7.3 G/DL (ref 6.8–8.8)
SODIUM SERPL-SCNC: 141 MMOL/L (ref 133–144)
TRIGL SERPL-MCNC: 297 MG/DL

## 2023-02-20 DIAGNOSIS — E11.9 TYPE 2 DIABETES MELLITUS WITHOUT COMPLICATION, WITHOUT LONG-TERM CURRENT USE OF INSULIN (H): Primary | ICD-10-CM

## 2023-02-20 NOTE — TELEPHONE ENCOUNTER
RECORDS RECEIVED FROM: internal   REASON FOR VISIT: tremors   Date of Appt: 2/24/23   NOTES (FOR ALL VISITS) STATUS DETAILS   OFFICE NOTE from referring provider Internal Dr Arnulfo Joaquin @ [facility]:  2/16/23   MEDICATION LIST Internal    IMAGING  (FOR ALL VISITS)     MRI (HEAD, NECK, SPINE) N/A

## 2023-02-21 PROBLEM — I10 ESSENTIAL (PRIMARY) HYPERTENSION: Status: ACTIVE | Noted: 2023-02-21

## 2023-02-21 PROBLEM — H91.90 HEARING LOSS: Status: ACTIVE | Noted: 2023-02-21

## 2023-02-21 PROBLEM — C61 CARCINOMA OF PROSTATE (H): Status: ACTIVE | Noted: 2023-02-21

## 2023-02-21 PROBLEM — R69 OTHER ILL-DEFINED AND UNKNOWN CAUSES OF MORBIDITY AND MORTALITY: Status: ACTIVE | Noted: 2023-02-21

## 2023-02-21 PROBLEM — Z77.028: Status: ACTIVE | Noted: 2023-02-21

## 2023-02-21 RX ORDER — LISINOPRIL/HYDROCHLOROTHIAZIDE 10-12.5 MG
1 TABLET ORAL DAILY
COMMUNITY
Start: 2023-02-14

## 2023-02-21 NOTE — PROGRESS NOTES
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     Review of diagnosis    tremor    Avoidance of dopamine blockers   Not taking    Motor complication review   n/a    Review of Impulse control disorders   n/a    Review of surgical or medication options   reviewed    Gait/Balance/Falls   No falls     Exercise/Therapy performed/offered   Walking around helps his back pain -   Tries to get to [employer] 3 or 4 times per week   Has not been able to go  Can do treadmill    Cognitive/Driving   Does not like night driving but can drive  Can drive  Denies cognitive problems    Mood   denies    Hallucinations/delusions   denies    Sleep   Goes to bed around 10 or 1030pm and can fall asleep right away.   Sleeps in same bed as his wife  Use to snore in the past  No clear rbd behaviors  No sleep study   Wakes up around 6/630am  Gets up rarely to urinate     Bladder/Renal/Prostate/Gyn/Other  Prostate cancer  - surgery - no radiation or chemo  Bladder stone 2019 lithotripsy with no recurrence  Bladder neck contracture dealt with at 2012 at time of prostate surgery in   Rare to no nocturia   No urgency or  frequency     GI/Constipation/GERD   Has daily bowel movement with the metformin has some loose stool   No heartburn     He has having some pain in the past and they removed his appendix   He was having bladder spasms and had appendix removed and this problem with bladder improved     ENDO/Lipid/DM/Bone density/Thyroid  Lipid disorder  Diabetes type 2; A1c = 7.7 on 2/16/2023  triglyceride disorder - elevated at 297  And HDL was 37 and cholesterol 157 and non hdl 120 - non fasting study on 2/16/2023  Denies thyroid problems  Bone density has not been checked for a long time    empagliflozin jardiance 10mg - has not yet started.   Metformin glucophage 500mg twice daily     Glucosamine 1500mg/vitamin d400 international unit(s) and 100mg boswellia rain    Vitamin D dose?    Cardio/heart/Hyper or Hypotensive   Hypertension  134/89 today 95 heart rate   Normal stress was normal 11/13/2015  [facility]  [facility] dr abbie quezada   Normal eCG today   No cardiologist  Lisinopril-hydrochlorothiazide 10-12.5 zestoretic     Vision/Dry Eyes/Cataracts/Glaucoma/Macular   Cataract surgery around 2002   Dry eyes    Heme/Anticoagulation/Antiplatelet/Anemia/Other  Not taking aspirin  No bleeding problems     ENT/Resp  Hearing loss  Wearing hearing aides  Has some tinnitus  Voice has been affected - slurring  Loss of smell for many years  Has had covid in the past      Skin/Cancer/Seborrhea/other  History of melanoma - diagnosed   History of nonmelanoma skin cancer  Squamous cell skin cancer  Dr garcia is following this     Musculoskeletal/Pain/Headache  Denies significant surgery  Has left hip pain in the morning     Other:      Medications            Empagliflozin Jardiance 10 mg Not yet       Ketotifen Zaditor 0.025% solution prn       Lisinopril hydrochlorothiazide Zestoretic 10-12.5 1       Metformin Glucophage 500 mg 1   1    MVI         nonformularly glucosamine 1500, vit d 400, bosw        Vitamin C  ascorbic acid 1000 mg 1       vitamin D cholecalciferol dose? 1                                                                                                                 Plan:    ecg was normal    Parkinson disease -diagnosis based on his slowness, stiffness, etc.   He has soft voice.     Genetic study  PD generation  [email or web address]    Brain MRI to look for iron     Dopamine scan (datscan)    Because of his agent orange exposure -  He qualifies [facility] as [facility] has parkinson  Encouraged to get [facility] with [facility] for this benefit.    They have a motor home - she does the driving and they like to go.     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     Consider echo and cardiology consultation for treadmill study      Return to be determined    Discussed symptomatic medication therapy and physical and speech therapy        Coding statement:   Medical Decision Making:  #  Chronic progressive medical conditions addressed  - see above --   Review and/or interpretation of unique test or documentation from a provider outside of neurology blood work and ecg   Independent historian provided additional details  Calling wife  Prescription drug management and review of potential side effects and/or monitoring for side effects  -- see above ---  Health impacted by social determinants of health  No     I have reviewed the note as documented above.  This accurately captures the substance of my conversation with the patient and total time spent preparing for visit, executing visit and completing visit on the day of the visit:  80 minutes.  The portion of this total time included face to face time 70 minutes    Flex Galicia MD     ______________________________________    Last visit date and details:              ______________________________________      Patient was asked about 14 Review of systems including changes in vision (dry eyes, double vision), hearing, heart, lungs, musculoskeletal, depression, anxiety, snoring, RBD, insomnia, urinary frequency, urinary urgency, constipation, swallowing problems, hematological, ID, allergies, skin problems: seborrhea, endocrinological: thyroid, diabetes, cholesterol; balance, weight changes, and other neurological problems and these were not significant at this time except for   Patient Active Problem List   Diagnosis     Health Care Home     Hypertension goal BP (blood pressure) < 140/90     Squamous cell skin cancer     Prostate cancer - surgically treated     Advanced directives, counseling/discussion     CARDIOVASCULAR SCREENING; LDL GOAL LESS THAN 130     Sensorineural hearing loss, bilateral     Bladder neck contracture     Bladder stone     Type 2 diabetes mellitus without complication (H)     History of melanoma     History of nonmelanoma skin cancer     Carcinoma of prostate (H)     Contact with and (suspected) exposure to other hazardous aromatic compounds     Elevated fasting blood sugar     HDL lipoprotein deficiency     Hearing loss     Hypertriglyceridemia     Other ill-defined and unknown causes of morbidity and mortality     Essential (primary) hypertension     Essential hypertension     Malignant neoplasm of prostate (H)        No Known Allergies  Past Surgical History:   Procedure Laterality Date     APPENDECTOMY       CATARACT IOL, RT/LT      20+ years ago     HC MOHS TRUNK/ARM/LEG 1ST STAGE UP T0 5 BLOCKS       LASER HOLMIUM LITHOTRIPSY URETER(S), INSERT STENT, COMBINED N/A 10/21/2019    Procedure: Urethral dilation and transurethral resection of bladder neck contracture litholapaxy (needs holmium laser) for bladder stone;  Surgeon: Arnulfo Vale MD;  Location: [facility] OR     PROSTATE SURGERY  8/28/12     Past Medical History:   Diagnosis Date      Diabetes (H)      History of melanoma     2022 left forearm     History of nonmelanoma skin cancer     NUMEROUS     Hypertension goal BP (blood pressure) < 140/90      Prostate cancer (H)      Squamous cell skin cancer      Social History     Socioeconomic History     Marital status:      Spouse name: Not on file     Number of children: 3     Years of education: Not on file     Highest education level: Not on file   Occupational History     Employer: RETIRED   Tobacco Use     Smoking status: Never     Smokeless tobacco: Never   Substance and Sexual Activity     Alcohol use: No     Drug use: No     Sexual activity: Yes     Partners: Female   Other Topics Concern     Parent/sibling w/ CABG, MI or angioplasty before 65F 55M? Not Asked   Social History Narrative     Not on file     Social Determinants of Health     Financial Resource Strain: Not on file   Food Insecurity: Not on file   Transportation Needs: Not on file   Physical Activity: Not on file   Stress: Not on file   Social Connections: Not on file   Intimate Partner Violence: Not on file   Housing Stability: Not on file       Drug and lactation database from the [place] National Library of Medicine:  http://toxnet.nlm.nih.gov/cgi-bin/sis/htmlgen?LACT      B/P: Data Unavailable, T: Data Unavailable, P: Data Unavailable, R: Data Unavailable 0 lbs 0 oz  There were no vitals taken for this visit., There is no height or weight on file to calculate BMI.  Medications and Vitals not listed above were documented in the cart and reviewed by me.     Current Outpatient Medications   Medication Sig Dispense Refill     lisinopril-hydrochlorothiazide (ZESTORETIC) 10-12.5 MG tablet Take 1 tablet by mouth daily       metFORMIN (GLUCOPHAGE) 500 MG tablet Take 500 mg by mouth 2 times daily (with meals)       Catheters MISC 1 catheter 2 times daily 30 each 11     cephALEXin (KEFLEX) 500 MG capsule Take 4 capsules (2000 mg) 1 hour prior to procedure. (Patient not  taking: Reported on 12/8/2022) 4 capsule 0     empagliflozin (JARDIANCE) 10 MG TABS tablet Take 1 tablet (10 mg) by mouth daily 90 tablet 0     Glucosamine HCl (GLUCOSAMINE PO)        ketoconazole (NIZORAL) 2 % external cream Apply topically daily Apply twice daily for 2 months to affected areas. (Patient not taking: Reported on 12/8/2022) 60 g 0     ketotifen (ZADITOR) 0.025 % ophthalmic solution Place 1 drop into both eyes 2 times daily 10 mL 3     metFORMIN (GLUCOPHAGE) 500 MG tablet 2 times daily       vitamin C (ASCORBIC ACID) 1000 MG TABS Take 1,000 mg by mouth daily       Vitamin D, Cholecalciferol, 25 MCG (1000 UT) TABS            Flex Angelo, [facility]    10:40 AM  Note          RECORDS RECEIVED FROM: internal   REASON FOR VISIT: tremors   Date of Appt: 2/24/23   NOTES (FOR ALL VISITS) STATUS DETAILS   OFFICE NOTE from referring provider Internal Dr Arnulfo Joaquin @ [facility] Med:  2/16/23   MEDICATION LIST Internal     IMAGING  (FOR ALL VISITS)       MRI (HEAD, NECK, SPINE) N/A

## 2023-02-24 ENCOUNTER — OFFICE VISIT (OUTPATIENT)
Dept: NEUROLOGY | Facility: CLINIC | Age: 78
End: 2023-02-24
Attending: FAMILY MEDICINE
Payer: COMMERCIAL

## 2023-02-24 ENCOUNTER — TRANSFERRED RECORDS (OUTPATIENT)
Dept: NEUROLOGY | Facility: CLINIC | Age: 78
End: 2023-02-24

## 2023-02-24 ENCOUNTER — PRE VISIT (OUTPATIENT)
Dept: NEUROLOGY | Facility: CLINIC | Age: 78
End: 2023-02-24

## 2023-02-24 VITALS — OXYGEN SATURATION: 97 % | DIASTOLIC BLOOD PRESSURE: 89 MMHG | HEART RATE: 95 BPM | SYSTOLIC BLOOD PRESSURE: 134 MMHG

## 2023-02-24 DIAGNOSIS — E11.9 TYPE 2 DIABETES MELLITUS WITHOUT COMPLICATION, WITHOUT LONG-TERM CURRENT USE OF INSULIN (H): ICD-10-CM

## 2023-02-24 DIAGNOSIS — G25.2 RESTING TREMOR: ICD-10-CM

## 2023-02-24 DIAGNOSIS — G20.A1 PARKINSON DISEASE (H): ICD-10-CM

## 2023-02-24 DIAGNOSIS — I10 ESSENTIAL (PRIMARY) HYPERTENSION: ICD-10-CM

## 2023-02-24 DIAGNOSIS — Z13.6 SCREENING FOR HEART DISEASE: ICD-10-CM

## 2023-02-24 DIAGNOSIS — R25.1 TREMOR: Primary | ICD-10-CM

## 2023-02-24 LAB
ATRIAL RATE - MUSE: 78 BPM
DIASTOLIC BLOOD PRESSURE - MUSE: NORMAL MMHG
INTERPRETATION ECG - MUSE: NORMAL
P AXIS - MUSE: 30 DEGREES
PR INTERVAL - MUSE: 176 MS
QRS DURATION - MUSE: 90 MS
QT - MUSE: 362 MS
QTC - MUSE: 412 MS
R AXIS - MUSE: 29 DEGREES
SYSTOLIC BLOOD PRESSURE - MUSE: NORMAL MMHG
T AXIS - MUSE: 23 DEGREES
VENTRICULAR RATE- MUSE: 78 BPM

## 2023-02-24 PROCEDURE — 99205 OFFICE O/P NEW HI 60 MIN: CPT | Performed by: PSYCHIATRY & NEUROLOGY

## 2023-02-24 RX ORDER — QUINIDINE SULFATE 200 MG
TABLET ORAL
COMMUNITY
Start: 2023-02-24 | End: 2023-09-05

## 2023-02-24 NOTE — PATIENT INSTRUCTIONS
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     Review of diagnosis    tremor    Avoidance of dopamine blockers   Not taking    Motor complication review   n/a    Review of Impulse control disorders   n/a    Review of surgical or medication options   reviewed    Gait/Balance/Falls   No falls     Exercise/Therapy performed/offered   Walking around helps his back pain -   Tries to get to [employer] 3 or 4 times per week   Has not been able to go  Can do treadmill    Cognitive/Driving   Does not like night driving but can drive  Can drive  Denies cognitive problems    Mood   denies    Hallucinations/delusions   denies    Sleep   Goes to bed around 10 or 1030pm and can fall asleep right away.   Sleeps in same bed as his wife  Use to snore in the past  No clear rbd behaviors  No sleep study   Wakes up around 6/630am  Gets up rarely to urinate     Bladder/Renal/Prostate/Gyn/Other  Prostate cancer 2012 - surgery - no radiation or chemo  Bladder stone 2019 lithotripsy with no recurrence  Bladder neck contracture dealt with at 2012 at time of prostate surgery in 2012  Rare to no nocturia   No urgency or frequency     GI/Constipation/GERD   Has daily bowel movement with the metformin has some loose stool   No heartburn     He has having some pain in the past and they removed his appendix   He was having bladder spasms and had appendix removed and this problem with bladder improved     ENDO/Lipid/DM/Bone density/Thyroid  Lipid disorder  Diabetes type 2; A1c = 7.7  on 2/16/2023  triglyceride disorder - elevated at 297  And HDL was 37 and cholesterol 157 and non hdl 120 - non fasting study on 2/16/2023  Denies thyroid problems  Bone density has not been checked for a long time    empagliflozin jardiance 10mg - has not yet started.   Metformin glucophage 500mg twice daily     Glucosamine 1500mg/vitamin d400 international unit(s) and 100mg boswellia rain    Vitamin D dose?    Cardio/heart/Hyper or Hypotensive   Hypertension  134/89 today 95 heart rate   Normal stress was normal 11/13/2015  [facility]  Seeing dr abbie quezada   Normal eCG today   No cardiologist  Lisinopril-hydrochlorothiazide 10-12.5 zestoretic     Vision/Dry Eyes/Cataracts/Glaucoma/Macular   Cataract surgery around 2002   Dry eyes    Heme/Anticoagulation/Antiplatelet/Anemia/Other  Not taking aspirin  No bleeding problems     ENT/Resp  Hearing loss  Wearing hearing aides  Has some tinnitus  Voice has been affected - slurring  Loss of smell for many years  Has had covid in the past      Skin/Cancer/Seborrhea/other  History of melanoma - diagnosed   History of nonmelanoma skin cancer  Squamous cell skin cancer  Dr garcia is following this     Musculoskeletal/Pain/Headache  Denies significant surgery  Has left hip pain in the morning     Other:      Medications            Empagliflozin Jardiance 10 mg Not yet       Ketotifen Zaditor 0.025% solution prn       Lisinopril hydrochlorothiazide Zestoretic 10-12.5 1       Metformin Glucophage 500 mg 1   1    MVI         nonformularly glucosamine 1500, vit d 400, bosw        Vitamin C ascorbic acid 1000 mg 1       vitamin D cholecalciferol dose? 1                                                                                                                 Plan:    ecg was normal    Parkinson disease -diagnosis based on his slowness, stiffness, etc.   He has soft voice.     Genetic study  PD  generation  [email or web address]    Brain MRI to look for iron     Dopamine scan (datscan)    Because of his agent orange exposure -  He qualifies [facility] as [facility] has parkinson  Encouraged to get [facility] with [facility] for this benefit.    They have a motor home - she does the driving and they like to go.     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       Return to be determined    Discussed symptomatic medication therapy and physical and speech therapy

## 2023-02-24 NOTE — LETTER
2023       RE: Andre Tyler  [address] [facility] [address]     Dear Colleague,    Thank you for referring your patient, Andre Tyler, to the [facility] NEUROLOGY CLINIC [place] [facility]. Please see a copy of my visit note below.          Diagnosis/Summary/Recommendations:    PATIENT: Andre Tyler  77 year old male     : 1945    JUVE: 2023    MRN: [id number]    [address]   [facility] [id number] [phone] (H)   [phone] (M)     [email or web address]    Salima Tyler   [phone]  He granted her proxy access  To [employer]      Assessment:  (R25.1) Tremor  (primary encounter diagnosis)  No family history of parkinson  Mother and Grandmother passed away at 99 years    Retired law enforcement - retired [employer]  and then worked 16 years [employer] at [employer] 2017    Wife retired exec director nonprofits    Right handed    Handwriting is different and possibly not as good and may be smaller      Notices more right than left hand tremor    Tremor is present when holding a phone and his wife commented on it.     Review of diagnosis    tremor    Avoidance of dopamine blockers   Not taking    Motor complication review   n/a    Review of Impulse control disorders   n/a    Review of surgical or medication options   reviewed    Gait/Balance/Falls   No falls     Exercise/Therapy performed/offered   Walking around helps his back pain -   Tries to get to [employer] 3 or 4 times per week   Has not been able to go  Can do treadmill    Cognitive/Driving   Does not like night driving but can drive  Can drive  Denies cognitive problems    Mood   denies    Hallucinations/delusions   denies    Sleep   Goes to bed around 10 or 1030pm and can fall asleep right away.   Sleeps in same bed as his wife  Use to snore in the past  No clear rbd behaviors  No  sleep study   Wakes up around 6/630am  Gets up rarely to urinate     Bladder/Renal/Prostate/Gyn/Other  Prostate cancer 2012 - surgery - no radiation or chemo  Bladder stone 2019 lithotripsy with no recurrence  Bladder neck contracture dealt with at 2012 at time of prostate surgery in 2012  Rare to no nocturia   No urgency or frequency     GI/Constipation/GERD   Has daily bowel movement with the metformin has some loose stool   No heartburn     He has having some pain in the past and they removed his appendix   He was having bladder spasms and had appendix removed and this problem with bladder improved     ENDO/Lipid/DM/Bone density/Thyroid  Lipid disorder  Diabetes type 2; A1c = 7.7 on 2/16/2023  triglyceride disorder - elevated at 297  And HDL was 37 and cholesterol 157 and non hdl 120 - non fasting study on 2/16/2023  Denies thyroid problems  Bone density has not been checked for a long time    empagliflozin jardiance 10mg - has not yet started.   Metformin glucophage 500mg twice daily     Glucosamine 1500mg/vitamin d400 international unit(s) and 100mg boswellia rain    Vitamin D dose?    Cardio/heart/Hyper or Hypotensive   Hypertension  134/89 today 95 heart rate   Normal stress was normal 11/13/2015  [facility]  [facility] dr abbie quezada   Normal eCG today   No cardiologist  Lisinopril-hydrochlorothiazide 10-12.5 zestoretic     Vision/Dry Eyes/Cataracts/Glaucoma/Macular   Cataract surgery around 2002   Dry eyes    Heme/Anticoagulation/Antiplatelet/Anemia/Other  Not taking aspirin  No bleeding problems     ENT/Resp  Hearing loss  Wearing hearing aides  Has some tinnitus  Voice has been affected - slurring  Loss of smell for many years  Has had covid in the past      Skin/Cancer/Seborrhea/other  History of melanoma - diagnosed   History of nonmelanoma skin cancer  Squamous cell skin cancer  Dr garcia is following this     Musculoskeletal/Pain/Headache  Denies significant surgery  Has left  hip pain in the morning     Other:      Medications            Empagliflozin Jardiance 10 mg Not yet       Ketotifen Zaditor 0.025% solution prn       Lisinopril hydrochlorothiazide Zestoretic 10-12.5 1       Metformin Glucophage 500 mg 1   1    MVI         nonformularly glucosamine 1500, vit d 400, bosw        Vitamin C ascorbic acid 1000 mg 1       vitamin D cholecalciferol dose? 1                                                                                                                 Plan:    ecg was normal    Parkinson disease -diagnosis based on his slowness, stiffness, etc.   He has soft voice.     Genetic study  PD generation  https://www.parkinson.org/advancing-research/our-research/pdgeneration    Brain MRI to look for iron     Dopamine scan (datscan)    Because of his agent orange exposure -  He qualifies for [facility] connected benefits [facility] as he has parkinson  Encouraged to get connected with [facility] for this benefit.    They have a motor home - she does the driving and they like to go.     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     Consider echo and cardiology consultation for treadmill study      Return to be determined    Discussed symptomatic medication therapy and physical and speech therapy        Coding statement:   Medical Decision Making:  #  Chronic progressive medical conditions addressed  - see above --   Review and/or interpretation of unique test or documentation from a provider outside of neurology blood work and ecg   Independent historian provided additional details  Calling wife  Prescription drug management and review of potential side effects and/or monitoring for side effects  -- see above ---  Health impacted by social determinants of health  No     I have reviewed the note as documented above.  This accurately captures the substance of my  conversation with the patient and total time spent preparing for visit, executing visit and completing visit on the day of the visit:  80 minutes.  The portion of this total time included face to face time 70 minutes    Flex Galicia MD     ______________________________________    Last visit date and details:             ______________________________________      Patient was asked about 14 Review of systems including changes in vision (dry eyes, double vision), hearing, heart, lungs, musculoskeletal, depression, anxiety, snoring, RBD, insomnia, urinary frequency, urinary urgency, constipation, swallowing problems, hematological, ID, allergies, skin problems: seborrhea, endocrinological: thyroid, diabetes, cholesterol; balance, weight changes, and other neurological problems and these were not significant at this time except for   Patient Active Problem List   Diagnosis     Health Care Home     Hypertension goal BP (blood pressure) < 140/90     Squamous cell skin cancer     Prostate cancer - surgically treated     Advanced directives, counseling/discussion     CARDIOVASCULAR SCREENING; LDL GOAL LESS THAN 130     Sensorineural hearing loss, bilateral     Bladder neck contracture     Bladder stone     Type 2 diabetes mellitus without complication (H)     History of melanoma     History of nonmelanoma skin cancer     Carcinoma of prostate (H)     Contact with and (suspected) exposure to other hazardous aromatic compounds     Elevated fasting blood sugar     HDL lipoprotein deficiency     Hearing loss     Hypertriglyceridemia     Other ill-defined and unknown causes of morbidity and mortality     Essential (primary) hypertension     Essential hypertension     Malignant neoplasm of prostate (H)        No Known Allergies  Past Surgical History:   Procedure Laterality Date     APPENDECTOMY       CATARACT IOL, RT/LT      20+ years ago     HC MOHS TRUNK/ARM/LEG 1ST STAGE UP T0 5 BLOCKS       LASER HOLMIUM LITHOTRIPSY  URETER(S), INSERT STENT, COMBINED N/A 10/21/2019    Procedure: Urethral dilation and transurethral resection of bladder neck contracture litholapaxy (needs holmium laser) for bladder stone;  Surgeon: Arnulfo Vale MD;  Location: [facility] OR     PROSTATE SURGERY  8/28/12     Past Medical History:   Diagnosis Date     Diabetes (H)      History of melanoma     2022 left forearm     History of nonmelanoma skin cancer     NUMEROUS     Hypertension goal BP (blood pressure) < 140/90      Prostate cancer (H)      Squamous cell skin cancer      Social History     Socioeconomic History     Marital status:      Spouse name: Not on file     Number of children: 3     Years of education: Not on file     Highest education level: Not on file   Occupational History     Employer: RETIRED   Tobacco Use     Smoking status: Never     Smokeless tobacco: Never   Substance and Sexual Activity     Alcohol use: No     Drug use: No     Sexual activity: Yes     Partners: Female   Other Topics Concern     Parent/sibling w/ CABG, MI or angioplasty before 65F 55M? Not Asked   Social History Narrative     Not on file     Social Determinants of Health     Financial Resource Strain: Not on file   Food Insecurity: Not on file   Transportation Needs: Not on file   Physical Activity: Not on file   Stress: Not on file   Social Connections: Not on file   Intimate Partner Violence: Not on file   Housing Stability: Not on file       Drug and lactation database from the [place] National Library of Medicine:  http://toxnet.nlm.nih.gov/cgi-bin/sis/htmlgen?LACT      B/P: Data Unavailable, T: Data Unavailable, P: Data Unavailable, R: Data Unavailable 0 lbs 0 oz  There were no vitals taken for this visit., There is no height or weight on file to calculate BMI.  Medications and Vitals not listed above were documented in the cart and reviewed by me.     Current Outpatient Medications   Medication Sig Dispense Refill     lisinopril-hydrochlorothiazide  (ZESTORETIC) 10-12.5 MG tablet Take 1 tablet by mouth daily       metFORMIN (GLUCOPHAGE) 500 MG tablet Take 500 mg by mouth 2 times daily (with meals)       Catheters MISC 1 catheter 2 times daily 30 each 11     cephALEXin (KEFLEX) 500 MG capsule Take 4 capsules (2000 mg) 1 hour prior to procedure. (Patient not taking: Reported on 12/8/2022) 4 capsule 0     empagliflozin (JARDIANCE) 10 MG TABS tablet Take 1 tablet (10 mg) by mouth daily 90 tablet 0     Glucosamine HCl (GLUCOSAMINE PO)        ketoconazole (NIZORAL) 2 % external cream Apply topically daily Apply twice daily for 2 months to affected areas. (Patient not taking: Reported on 12/8/2022) 60 g 0     ketotifen (ZADITOR) 0.025 % ophthalmic solution Place 1 drop into both eyes 2 times daily 10 mL 3     metFORMIN (GLUCOPHAGE) 500 MG tablet 2 times daily       vitamin C (ASCORBIC ACID) 1000 MG TABS Take 1,000 mg by mouth daily       Vitamin D, Cholecalciferol, 25 MCG (1000 UT) TABS            Flex Galicia MD        [facility]    10:40 AM  Note          RECORDS RECEIVED FROM: internal   REASON FOR VISIT: tremors   Date of Appt: 2/24/23   NOTES (FOR ALL VISITS) STATUS DETAILS   OFFICE NOTE from referring provider Internal Dr Arnulfo Joaquin @ [facility]:  2/16/23   MEDICATION LIST Internal     IMAGING  (FOR ALL VISITS)       MRI (HEAD, NECK, SPINE) N/A                         Sincerely,    Flex Galicia MD

## 2023-02-24 NOTE — NURSING NOTE
Chief Complaint   Patient presents with     Consult For     Follow Up     Patient has concerns of possible tremors times one yr in both arm notice by his wife     Janis Lamar CMA at 10:42 AM on 2/24/2023.

## 2023-02-28 ENCOUNTER — OFFICE VISIT (OUTPATIENT)
Dept: NEUROLOGY | Facility: CLINIC | Age: 78
End: 2023-02-28
Payer: COMMERCIAL

## 2023-02-28 VITALS
OXYGEN SATURATION: 97 % | BODY MASS INDEX: 25.75 KG/M2 | DIASTOLIC BLOOD PRESSURE: 88 MMHG | WEIGHT: 195.1 LBS | SYSTOLIC BLOOD PRESSURE: 125 MMHG | HEART RATE: 80 BPM

## 2023-02-28 DIAGNOSIS — R41.89 COGNITIVE CHANGES: ICD-10-CM

## 2023-02-28 DIAGNOSIS — K11.7 SIALORRHEA: Primary | ICD-10-CM

## 2023-02-28 DIAGNOSIS — G20.A1 PARKINSON'S DISEASE (H): ICD-10-CM

## 2023-02-28 PROCEDURE — 99215 OFFICE O/P EST HI 40 MIN: CPT | Performed by: NURSE PRACTITIONER

## 2023-02-28 ASSESSMENT — UNIFIED PARKINSONS DISEASE RATING SCALE (UPDRS)
FREEZING_GAIT: (0) NORMAL: NO FREEZING.
TOTAL_SCORE: 10
POSTURAL_STABILITY: (0) NORMAL: NO PROBLEMS. RECOVERS WITH ONE OR TWO STEPS.
AMPLITUDE_LIP_JAW: (0) NORMAL: NO TREMOR.
AMPLITUDE_LLE: (0) NORMAL: NO TREMOR.
AXIAL_SCORE: 5
RIGIDITY_LUE: (1) SLIGHT: RIGIDITY ONLY DETECTED WITH ACTIVATION MANEUVER.
CONSTANCY_TREMOR_ATREST: (1) SLIGHT: TREMOR AT REST IS PRESENT 25% OF THE ENTIRE EXAMINATION PERIOD.
RIGIDITY_RLE: (0) NORMAL: NO RIGIDITY.
TOETAPPING_LEFT: (1) SLIGHT: ANY OF THE FOLLOWING: A) THE REGULAR RHYTHM IS BROKEN WITH ONE WITH ONE OR TWO INTERRUPTIONS OR HESITATIONS OF THE MOVEMENT  B) SLIGHT SLOWING  C) THE AMPLITUDE DECREMENTS NEAR THE END OF THE 10 MOVEMENTS.
SPEECH: (1) SLIGHT: LOSS OF MODULATION, DICTION OR VOLUME, BUT STILL ALL WORDS EASY TO UNDERSTAND.
AMPLITUDE_RLE: (0) NORMAL: NO TREMOR.
AMPLITUDE_LUE: (0) NORMAL: NO TREMOR.
FACIAL_EXPRESSION: (3) MASKED FACIES WITH LIPS PARTED SOME OF THE TIME WHEN THE MOUTH IS AT REST.
SPONTANEITY_OF_MOVEMENT: (0) NORMAL: NO PROBLEMS.
LEG_AGILITY_RIGHT: (0) NORMAL: NO PROBLEMS.
PRONATION_SUPINATION_LEFT: (0) NORMAL: NO PROBLEMS.
FINGER_TAPPING_LEFT: (2) MILD: ANY OF THE FOLLOWING: A) 3 TO 5 INTERRUPTIONS DURING TAPPING  B) MILD SLOWING  C) THE AMPLITUDE DECREMENTS MIDWAY IN THE 10-MOVEMENT SEQUENCE.
HANDMOVEMENTS_RIGHT: (2) MILD: ANY OF THE FOLLOWING: A) 3 TO 5 INTERRUPTIONS DURING TAPPING  B) MILD SLOWING  C) THE AMPLITUDE DECREMENTS MIDWAY IN THE 10-MOVEMENT SEQUENCE.
TOETAPPING_RIGHT: (1) SLIGHT: ANY OF THE FOLLOWING: A) THE REGULAR RHYTHM IS BROKEN WITH ONE WITH ONE OR TWO INTERRUPTIONS OR HESITATIONS OF THE MOVEMENT  B) SLIGHT SLOWING  C) THE AMPLITUDE DECREMENTS NEAR THE END OF THE 10 MOVEMENTS.
DYSKINESIAS_PRESENT: NO
FINGER_TAPPING_RIGHT: (2) MILD: ANY OF THE FOLLOWING: A) 3 TO 5 INTERRUPTIONS DURING TAPPING  B) MILD SLOWING  C) THE AMPLITUDE DECREMENTS MIDWAY IN THE 10-MOVEMENT SEQUENCE.
RIGIDITY_LLE: (0) NORMAL: NO RIGIDITY.
PRONATION_SUPINATION_RIGHT: (1) SLIGHT: ANY OF THE FOLLOWING: A) THE REGULAR RHYTHM IS BROKEN WITH ONE WITH ONE OR TWO INTERRUPTIONS OR HESITATIONS OF THE MOVEMENT  B) SLIGHT SLOWING  C) THE AMPLITUDE DECREMENTS NEAR THE END OF THE 10 MOVEMENTS.
AMPLITUDE_RUE: (1) SLIGHT: < 1 CM IN MAXIMAL AMPLITUDE.
TOTAL_SCORE_LEFT: 6
MOVEMENTS_INTERFERE_WITH_RATINGS: NO
LEG_AGILITY_LEFT: (0) NORMAL: NO PROBLEMS.
POSTURE: (0) NORMAL: NO PROBLEMS.
TOTAL_SCORE: 22
PARKINSONS_MEDS: OFF
RIGIDITY_RUE: (2) MILD: RIGIDITY DETECTED WITHOUT THE ACTIVATION MANEUVER. FULL RANGE OF MOTION IS EASILY ACHIEVED.
GAIT: (0) NORMAL: NO PROBLEMS.
ARISING_CHAIR: (0) NORMAL: NO PROBLEMS. ABLE TO ARISE QUICKLY WITHOUT HESITATION.
RIGIDITY_NECK: (1) SLIGHT: RIGIDITY ONLY DETECTED WITH ACTIVATION MANEUVER.
HANDMOVEMENTS_LEFT: (1) SLIGHT: ANY OF THE FOLLOWING: A) THE REGULAR RHYTHM IS BROKEN WITH ONE WITH ONE OR TWO INTERRUPTIONS OR HESITATIONS OF THE MOVEMENT  B) SLIGHT SLOWING  C) THE AMPLITUDE DECREMENTS NEAR THE END OF THE 10 MOVEMENTS.

## 2023-02-28 ASSESSMENT — PAIN SCALES - GENERAL: PAINLEVEL: NO PAIN (0)

## 2023-02-28 NOTE — PROGRESS NOTES
ASSESSMENT:    Parkinson's Disease: Newly diagnosed with PD. Motor symptoms are currently manageable.       Drooling:    Cognitive Changes:      PLAN:    __  All questions answered and the following patient instructions provided: -     __  Okay to cancel the Dopamine Scan.    __  You can do the brain MRI, ECHO Cardiogram, and see the cardiologist.     __  Exercise 3 - 5 times a week.     __  Consider doing the treadmill research after you read the information.    __  Multitasking can be affected, so try to focus on one task at a time.    __  No medication for now.    __  You can consider the [facility] - Physical Therapy and Speech Therapy.    __ For drooling, you can consider Botox injection.  Also, dry candy or chewing gum can help.     __ Return in 6 months to see Dr. Galicia. You may return sooner as needed.           MOVEMENT DISORDERS CLINIC           PATIENT: Andre Tyler    : 1945    JUVE: 2023    REASON FOR VISIT: Parkinson's diease (PD) follow up.    HPI: Mr. Andre Tyler is a 77 year old who came to the [facility] neurology clinic accompanied by his wife, for a follow up visit. He was last seen in the clinic on 2023 by Dr. Galicia for evaluation of termor and was diagnosed with PD. He came today for motor assessment and to discuss questions they might have.     In the last year, he reports tremors in his jaw & both hands; Rt > Lt. His wife wrote a list of symptoms he has been experiencing, including slurred speech, difficulty projecting his voice, excessive saliva, coughing on food, chocking, lack of right arm movements, difficulty multitasking, handwriting is difficult and not fluid, dry eyes, and weight loss.    They brought several questions including, the next steps after diagnosis, treatments, and what [facility] offers. They asked about the reason from the head MRI and Dopamine Scan. If driving is okay for pts with PD. They live in a motor home. His wife has  always drove the motor home. They usually travel in [place] and don't go long distance. He enjoys golfing, if he could continue playing.    MEDICATIONS:   Outpatient Medications Marked as Taking for the 2/28/23 encounter (Office Visit) with Stacy Stanton APRN CNP   Medication Sig     ketotifen (ZADITOR) 0.025 % ophthalmic solution Place 1 drop into both eyes 2 times daily     lisinopril-hydrochlorothiazide (ZESTORETIC) 10-12.5 MG tablet Take 1 tablet by mouth daily     metFORMIN (GLUCOPHAGE) 500 MG tablet Take 500 mg by mouth 2 times daily (with meals)     Multiple Vitamins-Minerals (VITAMIN D3 COMPLETE) TABS Dose?     NONFORMULARY Osteo biflex one per day: 400 Vitamin D, 1500 Glucosamine, 100mg boswellia rain (joint shield)     vitamin C (ASCORBIC ACID) 1000 MG TABS Take 1,000 mg by mouth daily       PHYSICAL EXAM:    VITAL SIGNS:  Blood pressure 125/88, pulse 80, weight 88.5 kg (195 lb 1.6 oz), SpO2 97 %. Body mass index is 25.75 kg/m .    GENERAL:  Mr. Tyler is a pleasant  male who is well-groomed, well-developed and thin sitting comfortably in the exam room without any distress.  Affect is appropriate.    MOVEMENT DISORDERS ASSESSMENT:  MDS UPDRS III (Not on antiparkinsonian medications)  UPDRS Motor Scale 2/28/2023   Time: 2:38 PM   Medication Off   R Brain DBS: None   L Brain DBS: None   Dyskinesia (LID) No   Did LID interfere No   Speech 1   Facial Expression 3   Rigidity Neck 1   Rigidity RUE 2   Rigidity LUE 1   Rigidity RLE 0   Rigidity LLE 0   Finger Taps R 2   Finger Taps L 2   Hand Mvt R 2   Hand Mvt L 1   Pron-/Supinate R 1   Pron-/Supinate L 0   Toe Tap R 1   Toe Tap L 1   Leg Agility R 0   Leg Agility L 0   Arise From Chair 0   Gait 0   Gait Freezing 0   Postural Stability 0   Posture 0   Global Spont Mvt 0   Postural Tremor RUE 1   Postural Tremor LUE 1   Kinetic Tremor RUE 0   Kinetic Tremor LUE 0   Rest Tremor RUE 1   Rest Tremor LUE 0   Rest Tremor RLE 0   Rest Tremor LLE 0   Rest  Tremor Lip/Jaw 0   Rest Tremor Constancy 1   Total Right 10   Total Left 6   Axial Total 5   Total 22     Today I spent 56 minutes caring for the patient. Time was spent with reviewing records, meeting with the patient, answering questions, examining, and documentation.    AMADOR Tillman,  CNP  [facility] Neurology Clinic

## 2023-02-28 NOTE — LETTER
2023       RE: Andre Tlyer  [address] [facility] [address]     Dear Colleague,    Thank you for referring your patient, Andre Tyler, to the [facility] NEUROLOGY CLINIC [facility]. Please see a copy of my visit note below.      ASSESSMENT:    Parkinson's Disease: Newly diagnosed with PD. Motor symptoms are currently manageable.       Drooling:    Cognitive Changes:      PLAN:    __  All questions answered and the following patient instructions provided: -     __  Okay to cancel the Dopamine Scan.    __  You can do the brain MRI, ECHO Cardiogram, and see the cardiologist.     __  Exercise 3 - 5 times a week.     __  Consider doing the treadmill research after you read the information.    __  Multitasking can be affected, so try to focus on one task at a time.    __  No medication for now.    __  You can consider the [facility] - Physical Therapy and Speech Therapy.    __ For drooling, you can consider Botox injection.  Also, dry candy or chewing gum can help.     __ Return in 6 months to see Dr. Galicia. You may return sooner as needed.           MOVEMENT DISORDERS CLINIC           PATIENT: Andre Tyler    : 1945    JUVE: 2023    REASON FOR VISIT: Parkinson's diease (PD) follow up.    HPI: Mr. Andre Tyler is a 77 year old who came to the [facility] neurology clinic accompanied by his wife, for a follow up visit. He was last seen in the clinic on 2023 by Dr. Galicia for evaluation of termor and was diagnosed with PD. He came today for motor assessment and to discuss questions they might have.     In the last year, he reports tremors in his jaw & both hands; Rt > Lt. His wife wrote a list of symptoms he has been experiencing, including slurred speech, difficulty projecting his voice, excessive saliva, coughing on food, chocking, lack of right arm movements, difficulty  multitasking, handwriting is difficult and not fluid, dry eyes, and weight loss.    They brought several questions including, the next steps after diagnosis, treatments, and what [facility] offers. They asked about the reason from the head MRI and Dopamine Scan. If driving is okay for pts with PD. They live in a motor home. His wife has always drove the motor home. They usually travel in [place] and don't go long distance. He enjoys golfing, if he could continue playing.    MEDICATIONS:   Outpatient Medications Marked as Taking for the 2/28/23 encounter (Office Visit) with Stacy Stanton APRN CNP   Medication Sig     ketotifen (ZADITOR) 0.025 % ophthalmic solution Place 1 drop into both eyes 2 times daily     lisinopril-hydrochlorothiazide (ZESTORETIC) 10-12.5 MG tablet Take 1 tablet by mouth daily     metFORMIN (GLUCOPHAGE) 500 MG tablet Take 500 mg by mouth 2 times daily (with meals)     Multiple Vitamins-Minerals (VITAMIN D3 COMPLETE) TABS Dose?     NONFORMULARY Osteo biflex one per day: 400 Vitamin D, 1500 Glucosamine, 100mg boswellia rain (joint shield)     vitamin C (ASCORBIC ACID) 1000 MG TABS Take 1,000 mg by mouth daily       PHYSICAL EXAM:    VITAL SIGNS:  Blood pressure 125/88, pulse 80, weight 88.5 kg (195 lb 1.6 oz), SpO2 97 %. Body mass index is 25.75 kg/m .    GENERAL:  Mr. Tyler is a pleasant  male who is well-groomed, well-developed and thin sitting comfortably in the exam room without any distress.  Affect is appropriate.    MOVEMENT DISORDERS ASSESSMENT:  MDS UPDRS III (Not on antiparkinsonian medications)  UPDRS Motor Scale 2/28/2023   Time: 2:38 PM   Medication Off   R Brain DBS: None   L Brain DBS: None   Dyskinesia (LID) No   Did LID interfere No   Speech 1   Facial Expression 3   Rigidity Neck 1   Rigidity RUE 2   Rigidity LUE 1   Rigidity RLE 0   Rigidity LLE 0   Finger Taps R 2   Finger Taps L 2   Hand Mvt R 2   Hand Mvt L 1   Pron-/Supinate R 1   Pron-/Supinate L 0    Toe Tap R 1   Toe Tap L 1   Leg Agility R 0   Leg Agility L 0   Arise From Chair 0   Gait 0   Gait Freezing 0   Postural Stability 0   Posture 0   Global Spont Mvt 0   Postural Tremor RUE 1   Postural Tremor LUE 1   Kinetic Tremor RUE 0   Kinetic Tremor LUE 0   Rest Tremor RUE 1   Rest Tremor LUE 0   Rest Tremor RLE 0   Rest Tremor LLE 0   Rest Tremor Lip/Jaw 0   Rest Tremor Constancy 1   Total Right 10   Total Left 6   Axial Total 5   Total 22     Today I spent 56 minutes caring for the patient. Time was spent with reviewing records, meeting with the patient, answering questions, examining, and documentation.        Again, thank you for allowing me to participate in the care of your patient.      Sincerely,    AMADOR Farias CNP

## 2023-02-28 NOTE — PATIENT INSTRUCTIONS
Dear  Andre Tyler,    Thank you for coming today.  During your visit, we have discussed the following:     __  Okay to cancel the Dopamine Scan.    __  You can do the brain MRI, ECHO Cardiogram, and see the cardiologist.     __  Exercise 3 - 5 times a week.     __  Consider doing the treadmill research after you read the information.    __  Multitasking can be affected, so try to focus on one task at a time.    __  No medication for now.    __  You can consider the [facility] - Physical Therapy and Speech Therapy.    __ For drooling, you can consider Botox injection.  Also, dry candy or chewing gum can help.     __ Return in 6 months to see Dr. Galicia. You may return sooner as needed.       For questions, you may send us a [employer] message or call [phone]    Fax number: [phone]    AMADOR Tillman, CNP  [facility] Neurology Clinic

## 2023-03-10 ENCOUNTER — OFFICE VISIT (OUTPATIENT)
Dept: CARDIOLOGY | Facility: CLINIC | Age: 78
End: 2023-03-10
Attending: PSYCHIATRY & NEUROLOGY
Payer: COMMERCIAL

## 2023-03-10 VITALS
HEIGHT: 73 IN | OXYGEN SATURATION: 97 % | BODY MASS INDEX: 25.18 KG/M2 | WEIGHT: 190 LBS | DIASTOLIC BLOOD PRESSURE: 78 MMHG | SYSTOLIC BLOOD PRESSURE: 110 MMHG | HEART RATE: 83 BPM

## 2023-03-10 DIAGNOSIS — E11.9 TYPE 2 DIABETES MELLITUS WITHOUT COMPLICATION, WITHOUT LONG-TERM CURRENT USE OF INSULIN (H): ICD-10-CM

## 2023-03-10 DIAGNOSIS — I10 HYPERTENSION, WELL CONTROLLED: Primary | ICD-10-CM

## 2023-03-10 PROBLEM — Z92.89 H/O ECHOCARDIOGRAM: Status: ACTIVE | Noted: 2023-03-10

## 2023-03-10 LAB — LVEF ECHO: NORMAL

## 2023-03-10 PROCEDURE — 99203 OFFICE O/P NEW LOW 30 MIN: CPT | Performed by: INTERNAL MEDICINE

## 2023-03-10 PROCEDURE — 93306 TTE W/DOPPLER COMPLETE: CPT | Performed by: INTERNAL MEDICINE

## 2023-03-10 NOTE — LETTER
3/10/2023      RE: Andre Tyler  [address] [facility] [address]       Dear Colleague,    Thank you for the opportunity to participate in the care of your patient, Andre Tyler, at the [facility]. Please see a copy of my visit note below.                                                                                                 General Cardiology [facility]      Referring provider: Flex Galicia MD    HPI: Mr. Andre Tyler is a 77 year old  male with PMH significant for    -Type 2 diabetes (hemoglobin A1c 7.7 on 2/16/2023)  -Hypertension  -Parkinson's disease (new diagnosis)    Patient was recently seen in neurology clinic on 2/24/2023 and he was diagnosed with Parkinson's disease based on his slowness, stiffness and soft voice.  He was also recommended echocardiogram and cardiology consultation for treadmill study.      He is asymptomatic from cardiac standpoint.  He has no history of cardiac disease.  Denies chest pain, shortness of breath, dizziness, syncope or lower extremity edema.  No palpitations.  Goes to gym few times a week.  Lifting weight and walking.  No exertional symptoms.    Patient completed echocardiogram today which showed normal biventricular function, no significant valve disease, mild dilation of the ascending aorta.    No prior history of cardiac hemoglobin A1c 7.7.  Patient had an exercise stress echocardiogram in 2015 which was normal.      Lifetime non-smoker.  No alcohol abuse.    Current medications  Lisinopril hydrochlorothiazide 10/12.5 mg  Metformin  Jardiance 10 mg (started a week ago)    Medications, personal, family, and social history reviewed with patient and revised.    PAST MEDICAL HISTORY:  Past Medical History:   Diagnosis Date     Diabetes (H)      History of melanoma     2022 left forearm     History of nonmelanoma skin cancer      NUMEROUS     Hypertension goal BP (blood pressure) < 140/90      Prostate cancer (H)      Squamous cell skin cancer      Tremor 2/24/2023       CURRENT MEDICATIONS:  Current Outpatient Medications   Medication Sig Dispense Refill     Catheters MISC 1 catheter 2 times daily (Patient not taking: Reported on 2/28/2023) 30 each 11     empagliflozin (JARDIANCE) 10 MG TABS tablet Take 1 tablet (10 mg) by mouth daily (Patient not taking: Reported on 2/28/2023) 90 tablet 0     ketotifen (ZADITOR) 0.025 % ophthalmic solution Place 1 drop into both eyes 2 times daily 10 mL 3     lisinopril-hydrochlorothiazide (ZESTORETIC) 10-12.5 MG tablet Take 1 tablet by mouth daily       metFORMIN (GLUCOPHAGE) 500 MG tablet Take 500 mg by mouth 2 times daily (with meals)       Multiple Vitamins-Minerals (VITAMIN D3 COMPLETE) TABS Dose?       NONFORMULARY Osteo biflex one per day: 400 Vitamin D, 1500 Glucosamine, 100mg boswellia rain (joint shield)       vitamin C (ASCORBIC ACID) 1000 MG TABS Take 1,000 mg by mouth daily         PAST SURGICAL HISTORY:  Past Surgical History:   Procedure Laterality Date     APPENDECTOMY  2015    had improvement in bladder spasms     CATARACT IOL, RT/LT  2002    20+ years ago     HC MOHS TRUNK/ARM/LEG 1ST STAGE UP T0 5 BLOCKS       LASER HOLMIUM LITHOTRIPSY URETER(S), INSERT STENT, COMBINED N/A 10/21/2019    Procedure: Urethral dilation and transurethral resection of bladder neck contracture litholapaxy (needs holmium laser) for bladder stone;  Surgeon: Arnulfo Vale MD;  Location: [facility] OR     OTHER SURGICAL HISTORY      skin cancer     PROSTATE SURGERY  08/28/2012       ALLERGIES:   No Known Allergies    FAMILY HISTORY:  Family History   Problem Relation Age of Onset     Other - See Comments Mother         swedish     Hypertension Father      Diabetes Father      Prostate Cancer Father      Lung Cancer Father         mesothelioma     Other - See Comments Father         swedish     Hypertension Sister   "    Hypertension Sister      Alcoholism Sister      Hypertension Sister      Alcoholism Sister      Hypertension Sister      Hypertension Brother      Tremor Maternal Grandmother      Other - See Comments Daughter         ivett     Other - See Comments Daughter         alicia melvin/craig     Other - See Comments Son         pili DAVID.A.D. No family hx of      Cerebrovascular Disease No family hx of          SOCIAL HISTORY:  Social History     Tobacco Use     Smoking status: Never     Smokeless tobacco: Never   Substance Use Topics     Alcohol use: No     Drug use: No       ROS:   Constitutional: No fever, chills, or sweats. Weight stable.   Cardiovascular: As per HPI.       Exam:  /78   Pulse 83   Ht 1.854 m (6' 1\")   Wt 86.2 kg (190 lb)   SpO2 97%   BMI 25.07 kg/m    GENERAL APPEARANCE: alert and no distress  HEENT: no icterus, no central cyanosis  LYMPH/NECK: no adenopathy, no asymmetry, JVP not elevated  RESPIRATORY: lungs clear to auscultation - no rales, rhonchi or wheezes, no use of accessory muscles, no retractions, respirations are unlabored, normal respiratory rate  CARDIOVASCULAR: regular rhythm, normal S1, S2, no S3 or S4 and no murmur, click or rub, precordium quiet with normal PMI.  GI: soft, non tender  EXTREMITIES:  no edema  NEURO: alert, normal speech,and affect  SKIN: no ecchymoses, no rashes     I have reviewed the labs and personally reviewed the imaging below and made my comment in the assessment and plan.    Labs:  CBC RESULTS:   No results found for: WBC, RBC, HGB, HCT, MCV, MCH, MCHC, RDW, PLT    BMP RESULTS:  Lab Results   Component Value Date     02/16/2023     09/22/2015    POTASSIUM 4.4 02/16/2023    POTASSIUM 4.6 09/22/2015    CHLORIDE 104 02/16/2023    CHLORIDE 101 09/22/2015    CO2 30 02/16/2023    CO2 28 11/06/2013    ANIONGAP 7 02/16/2023    ANIONGAP 8 11/06/2013     (H) 02/16/2023     (A) 09/22/2015    BUN 23 02/16/2023    BUN 23 " 09/22/2015    CR 1.39 (H) 02/16/2023    CR 1.18 09/22/2015    GFRESTIMATED 53 (L) 02/16/2023    GFRESTIMATED 70 11/06/2013    GFRESTBLACK 85 11/06/2013    STEVENSON 9.8 02/16/2023    STEVENSON 9.9 09/22/2015        Echocardiogram 3/10/2023  Global and regional left ventricular function is normal with an EF of 55-60%.  Right ventricular function, chamber size, wall motion, and thickness are  normal.  Pulmonary artery systolic pressure is normal.  Sinuses of Valsalva 4.1 cm.  Mild dilatation of the aorta is present.  The inferior vena cava is normal.  No pericardial effusion is present.      EKG 2/24/2023 shows sinus rhythm otherwise normal.            Assessment and Plan:   Patient is asymptomatic from cardiac standpoint.  No prior history of cardiac disease.  Vitals are normal.  Physical exam is normal.  Echocardiogram from today is overall unremarkable (mild aortic dilation reported on echo today is likely due to age, hypertension and his body surface area.  No follow-up required for aortic dilation).  He goes to health club few times a week to exercise.  No exertional symptoms.  Blood pressure is well controlled.  No hyperlipidemia.  He recently started Jardiance to improve diabetes control.  I recommended to him to cut down on processed sugar.  I am not suspecting any cardiac disease.  He does not require exercise stress test since he is asymptomatic.    Continue current medications.    Return to cardiology as needed.    Total time spent today for this visit is 30 minutes including precharting, face-to-face clinic visit, review of labs/imaging and medical documentation.    Please donot hesitate to contact me if you have any questions or concerns. Again, thank you for allowing me to participate in the care of your patient.    Rafy CONNER MD  [facility] Division of Cardiology  Pager [phone]

## 2023-03-10 NOTE — PROGRESS NOTES
[facility]      Referring provider: Flex Galicia MD    HPI: Mr. Andre Tyler is a 77 year old  male with PMH significant for    -Type 2 diabetes (hemoglobin A1c 7.7 on 2/16/2023)  -Hypertension  -Parkinson's disease (new diagnosis)    Patient was recently seen in neurology clinic on 2/24/2023 and he was diagnosed with Parkinson's disease based on his slowness, stiffness and soft voice.  He was also recommended echocardiogram and cardiology consultation for treadmill study.      He is asymptomatic from cardiac standpoint.  He has no history of cardiac disease.  Denies chest pain, shortness of breath, dizziness, syncope or lower extremity edema.  No palpitations.  Goes to gym few times a week.  Lifting weight and walking.  No exertional symptoms.    Patient completed echocardiogram today which showed normal biventricular function, no significant valve disease, mild dilation of the ascending aorta.    No prior history of cardiac hemoglobin A1c 7.7.  Patient had an exercise stress echocardiogram in 2015 which was normal.      Lifetime non-smoker.  No alcohol abuse.    Current medications  Lisinopril hydrochlorothiazide 10/12.5 mg  Metformin  Jardiance 10 mg (started a week ago)    Medications, personal, family, and social history reviewed with patient and revised.    PAST MEDICAL HISTORY:  Past Medical History:   Diagnosis Date     Diabetes (H)      History of melanoma     2022 left forearm     History of nonmelanoma skin cancer     NUMEROUS     Hypertension goal BP (blood pressure) < 140/90      Prostate cancer (H)      Squamous cell skin cancer      Tremor 2/24/2023       CURRENT MEDICATIONS:  Current Outpatient Medications   Medication Sig Dispense Refill     Catheters MISC 1 catheter 2 times daily (Patient not taking: Reported on 2/28/2023) 30 each 11     empagliflozin (JARDIANCE) 10 MG  TABS tablet Take 1 tablet (10 mg) by mouth daily (Patient not taking: Reported on 2/28/2023) 90 tablet 0     ketotifen (ZADITOR) 0.025 % ophthalmic solution Place 1 drop into both eyes 2 times daily 10 mL 3     lisinopril-hydrochlorothiazide (ZESTORETIC) 10-12.5 MG tablet Take 1 tablet by mouth daily       metFORMIN (GLUCOPHAGE) 500 MG tablet Take 500 mg by mouth 2 times daily (with meals)       Multiple Vitamins-Minerals (VITAMIN D3 COMPLETE) TABS Dose?       NONFORMULARY Osteo biflex one per day: 400 Vitamin D, 1500 Glucosamine, 100mg boswellia rain (joint shield)       vitamin C (ASCORBIC ACID) 1000 MG TABS Take 1,000 mg by mouth daily         PAST SURGICAL HISTORY:  Past Surgical History:   Procedure Laterality Date     APPENDECTOMY  2015    had improvement in bladder spasms     CATARACT IOL, RT/LT  2002    20+ years ago     HC MOHS TRUNK/ARM/LEG 1ST STAGE UP T0 5 BLOCKS       LASER HOLMIUM LITHOTRIPSY URETER(S), INSERT STENT, COMBINED N/A 10/21/2019    Procedure: Urethral dilation and transurethral resection of bladder neck contracture litholapaxy (needs holmium laser) for bladder stone;  Surgeon: Arnulfo Vale MD;  Location: [facility] OR     OTHER SURGICAL HISTORY      skin cancer     PROSTATE SURGERY  08/28/2012       ALLERGIES:   No Known Allergies    FAMILY HISTORY:  Family History   Problem Relation Age of Onset     Other - See Comments Mother         swedish     Hypertension Father      Diabetes Father      Prostate Cancer Father      Lung Cancer Father         mesothelioma     Other - See Comments Father         swedish     Hypertension Sister      Hypertension Sister      Alcoholism Sister      Hypertension Sister      Alcoholism Sister      Hypertension Sister      Hypertension Brother      Tremor Maternal Grandmother      Other - See Comments Daughter         ivett     Other - See Comments Daughter         vargas olegario/craig     Other - See Comments Son         michaelailene     JOANN.A.KATHIE No family  "hx of      Cerebrovascular Disease No family hx of          SOCIAL HISTORY:  Social History     Tobacco Use     Smoking status: Never     Smokeless tobacco: Never   Substance Use Topics     Alcohol use: No     Drug use: No       ROS:   Constitutional: No fever, chills, or sweats. Weight stable.   Cardiovascular: As per HPI.       Exam:  /78   Pulse 83   Ht 1.854 m (6' 1\")   Wt 86.2 kg (190 lb)   SpO2 97%   BMI 25.07 kg/m    GENERAL APPEARANCE: alert and no distress  HEENT: no icterus, no central cyanosis  LYMPH/NECK: no adenopathy, no asymmetry, JVP not elevated  RESPIRATORY: lungs clear to auscultation - no rales, rhonchi or wheezes, no use of accessory muscles, no retractions, respirations are unlabored, normal respiratory rate  CARDIOVASCULAR: regular rhythm, normal S1, S2, no S3 or S4 and no murmur, click or rub, precordium quiet with normal PMI.  GI: soft, non tender  EXTREMITIES:  no edema  NEURO: alert, normal speech,and affect  SKIN: no ecchymoses, no rashes     I have reviewed the labs and personally reviewed the imaging below and made my comment in the assessment and plan.    Labs:  CBC RESULTS:   No results found for: WBC, RBC, HGB, HCT, MCV, MCH, MCHC, RDW, PLT    BMP RESULTS:  Lab Results   Component Value Date     02/16/2023     09/22/2015    POTASSIUM 4.4 02/16/2023    POTASSIUM 4.6 09/22/2015    CHLORIDE 104 02/16/2023    CHLORIDE 101 09/22/2015    CO2 30 02/16/2023    CO2 28 11/06/2013    ANIONGAP 7 02/16/2023    ANIONGAP 8 11/06/2013     (H) 02/16/2023     (A) 09/22/2015    BUN 23 02/16/2023    BUN 23 09/22/2015    CR 1.39 (H) 02/16/2023    CR 1.18 09/22/2015    GFRESTIMATED 53 (L) 02/16/2023    GFRESTIMATED 70 11/06/2013    GFRESTBLACK 85 11/06/2013    STEVENSON 9.8 02/16/2023    STEVENSON 9.9 09/22/2015        Echocardiogram 3/10/2023  Global and regional left ventricular function is normal with an EF of 55-60%.  Right ventricular function, chamber size, wall motion, and " thickness are  normal.  Pulmonary artery systolic pressure is normal.  Sinuses of Valsalva 4.1 cm.  Mild dilatation of the aorta is present.  The inferior vena cava is normal.  No pericardial effusion is present.      EKG 2/24/2023 shows sinus rhythm otherwise normal.            Assessment and Plan:   Patient is asymptomatic from cardiac standpoint.  No prior history of cardiac disease.  Vitals are normal.  Physical exam is normal.  Echocardiogram from today is overall unremarkable (mild aortic dilation reported on echo today is likely due to age, hypertension and his body surface area.  No follow-up required for aortic dilation).  He goes to health club few times a week to exercise.  No exertional symptoms.  Blood pressure is well controlled.  No hyperlipidemia.  He recently started Jardiance to improve diabetes control.  I recommended to him to cut down on processed sugar.  I am not suspecting any cardiac disease.  He does not require exercise stress test since he is asymptomatic.    Continue current medications.    Return to cardiology as needed.    Total time spent today for this visit is 30 minutes including precharting, face-to-face clinic visit, review of labs/imaging and medical documentation.    Please donot hesitate to contact me if you have any questions or concerns. Again, thank you for allowing me to participate in the care of your patient.    Rafy CONNER MD  [facility] Division of Cardiology  Pager [phone]

## 2023-03-10 NOTE — PATIENT INSTRUCTIONS
Thank you for coming to the [facility] [place]; please note the following instructions:    1. Follow-up as needed in cardiology        If you have any questions regarding your visit, please contact your care team:     CARDIOLOGY  TELEPHONE NUMBER   Gregoria GARCIA, Registered Nurse  Olivia AVINA, Registered Nurse  Judith CORRIGAN, Registered Nurse  Varsha MARTINEZ, Registered Medical Assistant  Cecilia BRENNAN, Certified Medical Assistant  Ranjana KIMBLE, Visit Facilitator [phone] (select option 1)    *After hours: [phone]   For Scheduling Appts:     [phone] (select option 1)    *After hours: [phone]   For the Device Clinic (Pacemakers and ICD's)  Pura HILLIARD, Registered Nurse   During business hours: [phone]    *After business hours:  [phone] (select option 4)      Normal test result notifications will be released via [employer] or mailed within 7 business days.  All other test results, will be communicated via telephone once reviewed by your cardiologist.    If you need a medication refill, please contact your pharmacy.  Please allow 3 business days for your refill to be completed.    As always, thank you for trusting us with your health care needs!

## 2023-03-10 NOTE — NURSING NOTE
"Chief Complaint   Patient presents with     New Patient     Screening for heart disease, Essential (primary) hypertension, Type 2 diabetes mellitus without complication, without long-term current use of insulin. Echo scheduled for 3/10 prior to cardiology appointment       Initial /78   Pulse 83   Ht 1.854 m (6' 1\")   Wt 86.2 kg (190 lb)   SpO2 97%   BMI 25.07 kg/m   Estimated body mass index is 25.07 kg/m  as calculated from the following:    Height as of this encounter: 1.854 m (6' 1\").    Weight as of this encounter: 86.2 kg (190 lb)..  BP completed using cuff size: martínez Gao CMA (AAMA)  "

## 2023-03-23 ENCOUNTER — OFFICE VISIT (OUTPATIENT)
Dept: DERMATOLOGY | Facility: CLINIC | Age: 78
End: 2023-03-23
Payer: COMMERCIAL

## 2023-03-23 DIAGNOSIS — Z85.828 HISTORY OF NONMELANOMA SKIN CANCER: ICD-10-CM

## 2023-03-23 DIAGNOSIS — Z85.820 HISTORY OF MELANOMA: Primary | ICD-10-CM

## 2023-03-23 PROCEDURE — 99213 OFFICE O/P EST LOW 20 MIN: CPT | Performed by: DERMATOLOGY

## 2023-03-23 ASSESSMENT — PAIN SCALES - GENERAL: PAINLEVEL: NO PAIN (0)

## 2023-03-23 NOTE — LETTER
3/23/2023         RE: Andre Tyler  [address] [facility] [address]        Dear Colleague,    Thank you for referring your patient, Andre Tyler, to the [facility]. Please see a copy of my visit note below.    [facility] Dermatology Note  Encounter Date: Mar 23, 2023  Office Visit     Dermatology Problem List:  Last skin check 3/23/23, due every 3 months through 2/2024   1. History of Melanoma  - Malignant Melanoma - left ventral forearm, 0.3 breslow depth, s/p excision 2/10/22  2. History of NMSC  - BCC - L posterior neck, s/p excision 12/8/22  - sBCC - L paraspinal lower back, s/p ED&C 8/29/22  - SCCIS - right tricep, s/p ED&C 8/29/22  - SCCIS - R dorsal hand, s/p Mohs and linear repair 8/29/22   - SCCIS - L shin, s/p Mohs and linear repair 8/29/22  - SCC - left dorsal wrist, s/p MMS 7/21/21  - BCC - upper mid back, s/p excision 7/21/21  - BCC - left clavicle, s/p excision 7/21/21  - BCC - left upper back, s/p excision 12/17/2020  - SCC - base of right thumb, s/p excision 6/26/18  - SCCIS - central chest, s/p ED & C 6/11/18  - BCC - vertex scalp, s/p MMS 11/21/2016  - SCCis - R lateral thigh, s/p ED&C 11/21/2016  - SCCIS - right medial ankle, s/p Mohs 4/21/2016  - SCCIS - right ventral forearm, s/p ED&C 4/21/2016  - BCC - left preauricular, s/p Mohs 11/10/2015  - BCC, superficial type - left upper chest, s/p biopsy 4/9/2015, s/p ED&C 10/5/2015  - SCCIS - posterior right lower earlobe, records via [employer] transferred records, Dr. Arnulfo Keyes, 2011. s/p excision in 2005, recurrent  - SCCIS - left dorsal hand, s/p via [employer] transferred records, Dr. Arnulfo Keyes, 2011  3. Actinic keratosis  - HAK - left dorsal wrist, s/p bx 11/10/2020  - s/p cryotherapy  4. History of benign biopsy  - Intradermal melanocytic nevus - left postauricular scalp, s/p bx 6/18/21  - Compound melanocytic nevus - left lower  back, s/p bx 4/9/15  - BLK - right lower leg, s/p bx 4/9/2015     Family hx of melanoma is negative.  Sister with unknown skin issues  ____________________________________________    Assessment & Plan:    # History of melanoma - left ventral forearm, no clinical evidence of recurrence.  - ABCDEs: Counseled ABCDEs of melanoma: Asymmetry, Border (irregularity), Color (not uniform, changes in color), Diameter (greater than 6 mm which is about the size of a pencil eraser), and Evolving (any changes in preexisting moles).  - Sun protection: Counseled SPF30+ sunscreen, UPF clothing, sun avoidance, tanning bed avoidance.   - Recommended yearly dental, ophthalmology .  - Recommended skin exams for all first-degree relatives.  - Recommended follow up every 3 months    # History of nonmelanoma skin cancer, no clinical evidence of recurrence.  - ABCDEs: Counseled ABCDEs of melanoma: Asymmetry, Border (irregularity), Color (not uniform, changes in color), Diameter (greater than 6 mm which is about the size of a pencil eraser), and Evolving (any changes in preexisting moles).  - Sun protection: Counseled SPF30+ sunscreen, UPF clothing, sun avoidance, tanning bed avoidance.   - Recommended regular skin checks.     Procedures Performed:   None.    Follow-up: 3-4 month(s) in-person for a skin check, or earlier for new or changing lesions    Staff and Scribe:     Scribe Disclosure:   I, Carlos Alberto Celeste, am serving as a scribe to document services personally performed by this physician, Dr. Berta Monge, based on data collection and the provider's statements to me.       Provider Disclosure:   The documentation recorded by the scribe accurately reflects the services I personally performed and the decisions made by me.    Berta Monge MD    Department of Dermatology  [facility]: Phone: [phone], Fax:[phone]  [facility]  Clinical Surgery Center: Phone: [phone], Fax: [phone]    ____________________________________________    CC: Skin Check (FBSC- no areas of concern. Hx of NMSC and MM. Family hx of skin cancer- unsure of which type, mother. )    HPI:  Mr. Andre Tyler is a(n) 77 year old male who presents today as a return patient for a skin check.    Last seen 12/8/22 by Dr. Nazario for excision of a BCC on the left posterior neck.     Today, he has no areas of concern.     Patient is otherwise feeling well, without additional skin concerns.    Labs Reviewed:  N/A    Physical Exam:  Vitals: There were no vitals taken for this visit.  SKIN: Total skin excluding the undergarment areas was performed. The exam included the head/face, neck, both arms, chest, back, abdomen, both legs, digits and/or nails.   - Well healed scar at site of previous melanoma, no repigmentation or nodularity noted.   - Well healed scars at sites of previous NMSC, no repigmentation or nodularity noted.    - No other lesions of concern on areas examined.   Normal oral exam  LYMPH: There is no cervical, supraclavicular, axillary, popliteal, or inguinal lymphadenopathy.     Medications:  Current Outpatient Medications   Medication     empagliflozin (JARDIANCE) 10 MG TABS tablet     lisinopril-hydrochlorothiazide (ZESTORETIC) 10-12.5 MG tablet     metFORMIN (GLUCOPHAGE) 500 MG tablet     Multiple Vitamins-Minerals (VITAMIN D3 COMPLETE) TABS     NONFORMULARY     polyethylene glycol-propylene glycol (SYSTANE ULTRA) 0.4-0.3 % SOLN ophthalmic solution     vitamin C (ASCORBIC ACID) 1000 MG TABS     Catheters MISC     ketotifen (ZADITOR) 0.025 % ophthalmic solution     No current facility-administered medications for this visit.      Past Medical History:   Patient Active Problem List   Diagnosis     Health Care Home     Hypertension goal BP (blood pressure) < 140/90     Squamous cell skin cancer     Prostate cancer - surgically treated     Advanced  directives, counseling/discussion     CARDIOVASCULAR SCREENING; LDL GOAL LESS THAN 130     Sensorineural hearing loss, bilateral     Bladder neck contracture     Bladder stone     Type 2 diabetes mellitus without complication (H)     History of melanoma     History of nonmelanoma skin cancer     Carcinoma of prostate (H)     Contact with and (suspected) exposure to other hazardous aromatic compounds     Elevated fasting blood sugar     HDL lipoprotein deficiency     Hearing loss     Hypertriglyceridemia     Other ill-defined and unknown causes of morbidity and mortality     Essential (primary) hypertension     Essential hypertension     Malignant neoplasm of prostate (H)     Tremor     H/O echocardiogram 2023     Past Medical History:   Diagnosis Date     Diabetes (H)      H/O echocardiogram 2023 3/10/2023    Global and regional left ventricular function is normal with an EF of 55-60%. Right ventricular function, chamber size, wall motion, and thickness are normal. Pulmonary artery systolic pressure is normal. Sinuses of Valsalva 4.1 cm. Mild dilatation of the aorta is present. The inferior vena cava is normal. No pericardial effusion is present. ________________________________________________________     History of melanoma     2022 left forearm     History of nonmelanoma skin cancer     NUMEROUS     Hypertension goal BP (blood pressure) < 140/90      Prostate cancer (H)      Squamous cell skin cancer      Tremor 2/24/2023        CC No referring provider defined for this encounter. on close of this encounter.       Again, thank you for allowing me to participate in the care of your patient.        Sincerely,        Berta Monge MD

## 2023-03-23 NOTE — PROGRESS NOTES
[facility] Dermatology Note  Encounter Date: Mar 23, 2023  Office Visit     Dermatology Problem List:  Last skin check 3/23/23, due every 3 months through 2/2024   1. History of Melanoma  - Malignant Melanoma - left ventral forearm, 0.3 breslow depth, s/p excision 2/10/22  2. History of NMSC  - BCC - L posterior neck, s/p excision 12/8/22  - sBCC - L paraspinal lower back, s/p ED&C 8/29/22  - SCCIS - right tricep, s/p ED&C 8/29/22  - SCCIS - R dorsal hand, s/p Mohs and linear repair 8/29/22   - SCCIS - L shin, s/p Mohs and linear repair 8/29/22  - SCC - left dorsal wrist, s/p MMS 7/21/21  - BCC - upper mid back, s/p excision 7/21/21  - BCC - left clavicle, s/p excision 7/21/21  - BCC - left upper back, s/p excision 12/17/2020  - SCC - base of right thumb, s/p excision 6/26/18  - SCCIS - central chest, s/p ED & C 6/11/18  - BCC - vertex scalp, s/p MMS 11/21/2016  - SCCis - R lateral thigh, s/p ED&C 11/21/2016  - SCCIS - right medial ankle, s/p Mohs 4/21/2016  - SCCIS - right ventral forearm, s/p ED&C 4/21/2016  - BCC - left preauricular, s/p Mohs 11/10/2015  - BCC, superficial type - left upper chest, s/p biopsy 4/9/2015, s/p ED&C 10/5/2015  - SCCIS - posterior right lower earlobe, records via [employer] transferred records, Dr. Arnulfo Keyes, 2011. s/p excision in 2005, recurrent  - SCCIS - left dorsal hand, s/p via [employer] transferred records, Dr. Arnulfo Keyes, 2011  3. Actinic keratosis  - HAK - left dorsal wrist, s/p bx 11/10/2020  - s/p cryotherapy  4. History of benign biopsy  - Intradermal melanocytic nevus - left postauricular scalp, s/p bx 6/18/21  - Compound melanocytic nevus - left lower back, s/p bx 4/9/15  - BLK - right lower leg, s/p bx 4/9/2015     Family hx of melanoma is negative.  Sister with unknown skin issues  ____________________________________________    Assessment & Plan:    # History of melanoma - left ventral forearm, no clinical evidence of  recurrence.  - ABCDEs: Counseled ABCDEs of melanoma: Asymmetry, Border (irregularity), Color (not uniform, changes in color), Diameter (greater than 6 mm which is about the size of a pencil eraser), and Evolving (any changes in preexisting moles).  - Sun protection: Counseled SPF30+ sunscreen, UPF clothing, sun avoidance, tanning bed avoidance.   - Recommended yearly dental, ophthalmology .  - Recommended skin exams for all first-degree relatives.  - Recommended follow up every 3 months    # History of nonmelanoma skin cancer, no clinical evidence of recurrence.  - ABCDEs: Counseled ABCDEs of melanoma: Asymmetry, Border (irregularity), Color (not uniform, changes in color), Diameter (greater than 6 mm which is about the size of a pencil eraser), and Evolving (any changes in preexisting moles).  - Sun protection: Counseled SPF30+ sunscreen, UPF clothing, sun avoidance, tanning bed avoidance.   - Recommended regular skin checks.     Procedures Performed:   None.    Follow-up: 3-4 month(s) in-person for a skin check, or earlier for new or changing lesions    Staff and Scribe:     Scribe Disclosure:   I, Carlos Alberto Celeste, am serving as a scribe to document services personally performed by this physician, Dr. Berta Monge, based on data collection and the provider's statements to me.       Provider Disclosure:   The documentation recorded by the scribe accurately reflects the services I personally performed and the decisions made by me.    Berta Monge MD    Department of Dermatology  [facility]: Phone: [phone], Fax:[phone]  [facility]: Phone: [phone], Fax: [phone]    ____________________________________________    CC: Skin Check (FBSC- no areas of concern. Hx of NMSC and MM. Family hx of skin cancer- unsure of which type, mother. )    HPI:  Mr. Andre Tyler is a(n) 77 year  old male who presents today as a return patient for a skin check.    Last seen 12/8/22 by Dr. Nazario for excision of a BCC on the left posterior neck.     Today, he has no areas of concern.     Patient is otherwise feeling well, without additional skin concerns.    Labs Reviewed:  N/A    Physical Exam:  Vitals: There were no vitals taken for this visit.  SKIN: Total skin excluding the undergarment areas was performed. The exam included the head/face, neck, both arms, chest, back, abdomen, both legs, digits and/or nails.   - Well healed scar at site of previous melanoma, no repigmentation or nodularity noted.   - Well healed scars at sites of previous NMSC, no repigmentation or nodularity noted.    - No other lesions of concern on areas examined.   Normal oral exam  LYMPH: There is no cervical, supraclavicular, axillary, popliteal, or inguinal lymphadenopathy.     Medications:  Current Outpatient Medications   Medication     empagliflozin (JARDIANCE) 10 MG TABS tablet     lisinopril-hydrochlorothiazide (ZESTORETIC) 10-12.5 MG tablet     metFORMIN (GLUCOPHAGE) 500 MG tablet     Multiple Vitamins-Minerals (VITAMIN D3 COMPLETE) TABS     NONFORMULARY     polyethylene glycol-propylene glycol (SYSTANE ULTRA) 0.4-0.3 % SOLN ophthalmic solution     vitamin C (ASCORBIC ACID) 1000 MG TABS     Catheters MISC     ketotifen (ZADITOR) 0.025 % ophthalmic solution     No current facility-administered medications for this visit.      Past Medical History:   Patient Active Problem List   Diagnosis     Health Care Home     Hypertension goal BP (blood pressure) < 140/90     Squamous cell skin cancer     Prostate cancer - surgically treated     Advanced directives, counseling/discussion     CARDIOVASCULAR SCREENING; LDL GOAL LESS THAN 130     Sensorineural hearing loss, bilateral     Bladder neck contracture     Bladder stone     Type 2 diabetes mellitus without complication (H)     History of melanoma     History of nonmelanoma skin  cancer     Carcinoma of prostate (H)     Contact with and (suspected) exposure to other hazardous aromatic compounds     Elevated fasting blood sugar     HDL lipoprotein deficiency     Hearing loss     Hypertriglyceridemia     Other ill-defined and unknown causes of morbidity and mortality     Essential (primary) hypertension     Essential hypertension     Malignant neoplasm of prostate (H)     Tremor     H/O echocardiogram 2023     Past Medical History:   Diagnosis Date     Diabetes (H)      H/O echocardiogram 2023 3/10/2023    Global and regional left ventricular function is normal with an EF of 55-60%. Right ventricular function, chamber size, wall motion, and thickness are normal. Pulmonary artery systolic pressure is normal. Sinuses of Valsalva 4.1 cm. Mild dilatation of the aorta is present. The inferior vena cava is normal. No pericardial effusion is present. ________________________________________________________     History of melanoma     2022 left forearm     History of nonmelanoma skin cancer     NUMEROUS     Hypertension goal BP (blood pressure) < 140/90      Prostate cancer (H)      Squamous cell skin cancer      Tremor 2/24/2023        CC No referring provider defined for this encounter. on close of this encounter.

## 2023-03-23 NOTE — NURSING NOTE
Andre Tyler's chief complaint for this visit includes:  Chief Complaint   Patient presents with     Skin Check     FBSC- no areas of concern. Hx of NMSC and MM. Family hx of skin cancer- unsure of which type, mother.      PCP: Arnulfo Joaquin    Referring Provider:  No referring provider defined for this encounter.    There were no vitals taken for this visit.  No Pain (0)      No Known Allergies      Do you need any medication refills at today's visit?  No.       Cass Trinidad LPN

## 2023-03-23 NOTE — PATIENT INSTRUCTIONS
Patient Education     Checking for Skin Cancer  You can find cancer early by checking your skin each month. There are 3 kinds of skin cancer. They are melanoma, basal cell carcinoma, and squamous cell carcinoma. Doing monthly skin checks is the best way to find new marks or skin changes. Follow the instructions below for checking your skin.   The ABCDEs of checking moles for melanoma   Check your moles or growths for signs of melanoma using ABCDE:   Asymmetry: the sides of the mole or growth don t match  Border: the edges are ragged, notched, or blurred  Color: the color within the mole or growth varies  Diameter: the mole or growth is larger than 6 mm (size of a pencil eraser)  Evolving: the size, shape, or color of the mole or growth is changing (evolving is not shown in the images below)    Checking for other types of skin cancer  Basal cell carcinoma or squamous cell carcinoma have symptoms such as:     A spot or mole that looks different from all other marks on your skin  Changes in how an area feels, such as itching, tenderness, or pain  Changes in the skin's surface, such as oozing, bleeding, or scaliness  A sore that does not heal  New swelling or redness beyond the border of a mole    Who s at risk?  Anyone can get skin cancer. But you are at greater risk if you have:   Fair skin, light-colored hair, or light-colored eyes  Many moles or abnormal moles on your skin  A history of sunburns from sunlight or tanning beds  A family history of skin cancer  A history of exposure to radiation or chemicals  A weakened immune system  If you have had skin cancer in the past, you are at risk for recurring skin cancer.   How to check your skin  Do your monthly skin checkups in front of a full-length mirror. Check all parts of your body, including your:   Head (ears, face, neck, and scalp)  Torso (front, back, and sides)  Arms (tops, undersides, upper, and lower armpits)  Hands (palms, backs, and fingers, including under  the nails)  Buttocks and genitals  Legs (front, back, and sides)  Feet (tops, soles, toes, including under the nails, and between toes)  If you have a lot of moles, take digital photos of them each month. Make sure to take photos both up close and from a distance. These can help you see if any moles change over time.   Most skin changes are not cancer. But if you see any changes in your skin, call your doctor right away. Only he or she can diagnose a problem. If you have skin cancer, seeing your doctor can be the first step toward getting the treatment that could save your life.   [employer] last reviewed this educational content on 4/1/2019 2000-2020 [employer]. [address] [place] [address], [place] [address]. All rights reserved. This information is not intended as a substitute for professional medical care. Always follow your healthcare professional's instructions.       When should I call my doctor?  If you are worsening or not improving, please, contact us or seek urgent care as noted below.     Who should I call with questions (adults)?  [facility] (adult and pediatric): [phone]  [facility] (adult): [phone]  For urgent needs outside of business hours call the [facility] at [phone] and ask for the dermatology resident on call to be paged  If this is a medical emergency and you are unable to reach an ER, Call [phone]    Who should I call with questions (pediatric)?  [facility]- Pediatric Dermatology  Dr. Lucille Terrazas, Dr. Vianca Steele, Dr. Cherie Kinsey, BRIT Vincent, Dr. An Lee, Dr. Jaleesa Hardin & Dr. Edson Alvarez  Non-urgent nurse triage line; [phone]- Liliya and Dang GRAY Care Coordinators   Lea (/Complex ) [phone]    If you need a prescription refill, please contact your pharmacy. Refills are approved or denied by our Physicians  during normal business hours, Monday through Fridays  Per office policy, refills will not be granted if you have not been seen within the past year (or sooner depending on your child's condition)    Scheduling Information:  Pediatric Appointment Scheduling and Call Center [phone]  Radiology Scheduling- [phone]  Sedation Unit Scheduling- [phone]  [facility] Scheduling- General [phone]; Pediatric Dermatology [phone]  Main  Services: [phone]  Ecuadorean: [phone]  Congolese: [phone]  Hmong/Lester/Kg: [phone]  Preadmission Nursing Department Fax Number: [phone] (Fax all pre-operative paperwork to this number)    For urgent matters arising during evenings, weekends, or holidays that cannot wait for normal business hours please call [phone] and ask for the dermatology resident on call to be paged.

## 2023-04-20 ENCOUNTER — PATIENT OUTREACH (OUTPATIENT)
Dept: CARE COORDINATION | Facility: CLINIC | Age: 78
End: 2023-04-20
Payer: COMMERCIAL

## 2023-04-29 ENCOUNTER — HEALTH MAINTENANCE LETTER (OUTPATIENT)
Age: 78
End: 2023-04-29

## 2023-06-03 ENCOUNTER — HEALTH MAINTENANCE LETTER (OUTPATIENT)
Age: 78
End: 2023-06-03

## 2023-07-06 ENCOUNTER — OFFICE VISIT (OUTPATIENT)
Dept: DERMATOLOGY | Facility: CLINIC | Age: 78
End: 2023-07-06
Payer: COMMERCIAL

## 2023-07-06 DIAGNOSIS — L57.0 AK (ACTINIC KERATOSIS): ICD-10-CM

## 2023-07-06 DIAGNOSIS — Z85.820 HISTORY OF MELANOMA: Primary | ICD-10-CM

## 2023-07-06 DIAGNOSIS — Z85.828 HISTORY OF NONMELANOMA SKIN CANCER: ICD-10-CM

## 2023-07-06 DIAGNOSIS — D48.5 NEOPLASM OF UNCERTAIN BEHAVIOR OF SKIN: ICD-10-CM

## 2023-07-06 PROCEDURE — 17004 DESTROY PREMAL LESIONS 15/>: CPT | Performed by: DERMATOLOGY

## 2023-07-06 PROCEDURE — 11103 TANGNTL BX SKIN EA SEP/ADDL: CPT | Performed by: DERMATOLOGY

## 2023-07-06 PROCEDURE — 11102 TANGNTL BX SKIN SINGLE LES: CPT | Mod: XS | Performed by: DERMATOLOGY

## 2023-07-06 PROCEDURE — 88305 TISSUE EXAM BY PATHOLOGIST: CPT | Performed by: DERMATOLOGY

## 2023-07-06 PROCEDURE — 99213 OFFICE O/P EST LOW 20 MIN: CPT | Mod: 25 | Performed by: DERMATOLOGY

## 2023-07-06 ASSESSMENT — PAIN SCALES - GENERAL: PAINLEVEL: NO PAIN (0)

## 2023-07-06 NOTE — NURSING NOTE
Andre Tyler's goals for this visit include:   Chief Complaint   Patient presents with     Skin Check     3 month follow up. No area of concern. History of melanoma and nmsc       He requests these members of his care team be copied on today's visit information:       PCP: Arnulfo Joaquin    Referring Provider:  No referring provider defined for this encounter.    There were no vitals taken for this visit.    Do you need any medication refills at today's visit? No    Drea Martin CMA on 7/6/2023 at 3:09 PM

## 2023-07-06 NOTE — PROGRESS NOTES
[facility] Dermatology Note  Encounter Date: Jul 6, 2023  Office Visit     Dermatology Problem List:  0. NUB - right dorsal hand lateral, left lower leg s/p bx 7/6/23  Last skin check 7/6/23, due every 3 months through 2/2024   1. History of Melanoma  - Malignant Melanoma - left ventral forearm, 0.3 breslow depth, s/p excision 2/10/22  2. History of NMSC  - BCC - L posterior neck, s/p excision 12/8/22  - sBCC - L paraspinal lower back, s/p ED&C 8/29/22  - SCCIS - right tricep, s/p ED&C 8/29/22  - SCCIS - R dorsal hand, s/p Mohs and linear repair 8/29/22   - SCCIS - L shin, s/p Mohs and linear repair 8/29/22  - SCC - left dorsal wrist, s/p MMS 7/21/21  - BCC - upper mid back, s/p excision 7/21/21  - BCC - left clavicle, s/p excision 7/21/21  - BCC - left upper back, s/p excision 12/17/2020  - SCC - base of right thumb, s/p excision 6/26/18  - SCCIS - central chest, s/p ED & C 6/11/18  - BCC - vertex scalp, s/p MMS 11/21/2016  - SCCis - R lateral thigh, s/p ED&C 11/21/2016  - SCCIS - right medial ankle, s/p Mohs 4/21/2016  - SCCIS - right ventral forearm, s/p ED&C 4/21/2016  - BCC - left preauricular, s/p Mohs 11/10/2015  - BCC, superficial type - left upper chest, s/p biopsy 4/9/2015, s/p ED&C 10/5/2015  - SCCIS - posterior right lower earlobe, records via [facility] [employer] [facility] transferred records, Dr. Arnulfo Keyes, 2011. s/p excision in 2005, recurrent  - SCCIS - left dorsal hand, s/p via [facility] [employer] [facility] transferred records, Dr. Arnulfo Keyes, 2011  3. Actinic keratosis  - HAK - left dorsal wrist, s/p bx 11/10/2020  - s/p cryotherapy  4. History of benign biopsy  - Intradermal melanocytic nevus - left postauricular scalp, s/p bx 6/18/21  - Compound melanocytic nevus - left lower back, s/p bx 4/9/15  - BLK - right lower leg, s/p bx 4/9/2015     Family hx of melanoma is negative.  Sister with unknown skin issues  ____________________________________________     Assessment & Plan:     #  History of melanoma - left ventral forearm, no clinical evidence of recurrence.  - ABCDEs: Counseled ABCDEs of melanoma: Asymmetry, Border (irregularity), Color (not uniform, changes in color), Diameter (greater than 6 mm which is about the size of a pencil eraser), and Evolving (any changes in preexisting moles).  - Sun protection: Counseled SPF30+ sunscreen, UPF clothing, sun avoidance, tanning bed avoidance.   - Recommended yearly dental, ophthalmology .  - Recommended skin exams for all first-degree relatives.  - Recommended follow up every 3 months     # History of nonmelanoma skin cancer, no clinical evidence of recurrence.  - ABCDEs: Counseled ABCDEs of melanoma: Asymmetry, Border (irregularity), Color (not uniform, changes in color), Diameter (greater than 6 mm which is about the size of a pencil eraser), and Evolving (any changes in preexisting moles).  - Sun protection: Counseled SPF30+ sunscreen, UPF clothing, sun avoidance, tanning bed avoidance.   - Recommended regular skin checks.     # Neoplasm of unspecified behavior of the skin (D49.2) on the right dorsal hand lateral.   - 5mm keratotic papule. The differential diagnosis includes SCC vs other.  - Photograph obtained.  - See procedure note.    # Neoplasm of unspecified behavior of the skin (D49.2) on the left lower leg.   - 1cm keratotic plaque. The differential diagnosis includes SCC vs other.  - Photograph obtained.  - See procedure note.    # Actinic keratosis - right dorsal hand, left dorsal hand, right temple, left cheek x15. Based on the location and chronic irritation to this lesion, treatment with cryotherapy is warranted.   - See cryo procedure note.    # Lesion near right eye. Faint scale, pt using drops, possibly mild dermatitis or early ak  -recheck at follow up      Procedures Performed:   - Shave biopsy procedure note, location(s): right dorsal hand lateral, left lower leg. After discussion of benefits and risks including but not  limited to bleeding, infection, scar, incomplete removal, recurrence, and non-diagnostic biopsy, verbal consent and photographs were obtained. The area was cleaned with isopropyl alcohol. 0.5mL of 1% lidocaine with epinephrine was injected to obtain adequate anesthesia of lesion(s). Shave biopsy at site(s) performed. Hemostasis was achieved with aluminium chloride. Petrolatum ointment and a sterile dressing were applied. The patient tolerated the procedure and no complications were noted. The patient was provided with verbal and written post care instructions.     - Cryotherapy procedure note, location(s): right dorsal hand, left dorsal hand, right temple, left cheek x15. After verbal consent and discussion of risks and benefits including, but not limited to, dyspigmentation/scar, blister, and pain, 15 AKs (s) was(were) treated with 1-2 mm freeze border for 1-2 cycles with liquid nitrogen. Post cryotherapy instructions were provided.       Follow-up: 3 month(s) in-person, or earlier for new or changing lesions    Staff and Scribe:     Scribe Disclosure:   I, Saba Davidson, am serving as a scribe to document services personally performed by this physician, Dr. Berta Monge, based on data collection and the provider's statements to me.         Provider Disclosure:   The documentation recorded by the scribe accurately reflects the services I personally performed and the decisions made by me.    Berta Monge MD    Department of Dermatology  [facility]: Phone: [phone], Fax:[phone]  [facility]: Phone: [phone], Fax: [phone]    ____________________________________________    CC: Skin Check (3 month follow up. No area of concern. History of melanoma and nmsc)    HPI:  Mr. Andre Tyler is a(n) 77 year old male who presents today as a return patient for a skin check. Pt  reports there are no concerning lesions that he has noted.     Last seen 3/23/23 for a skin check. At that time no concerning lesions were noted.      Patient is otherwise feeling well, without additional skin concerns.    Labs Reviewed:  N/A    Physical Exam:  Vitals: There were no vitals taken for this visit.  SKIN: Total skin excluding the undergarment areas was performed. The exam included the head/face, neck, both arms, chest, back, abdomen, both legs, digits and/or nails. declines genital exam  - Well healed scar at site of previous melanoma, no repigmentation or nodularity noted.   -Well healed scars at sites of previous NMSC, no repigmentation or nodularity noted.  -There is no cervical, supraclavicular, axillary  lymphadenopathy.  -5mm keratotic papule- right dorsal hand lateral.   -Left lower leg 1cm keratotic plaque   - There are erythematous macules with overyling adherent scale on the right dorsal hand, left dorsal hand, right temple, left cheek x15.    - No other lesions of concern on areas examined.     Medications:  Current Outpatient Medications   Medication     empagliflozin (JARDIANCE) 10 MG TABS tablet     lisinopril-hydrochlorothiazide (ZESTORETIC) 10-12.5 MG tablet     metFORMIN (GLUCOPHAGE) 500 MG tablet     Multiple Vitamins-Minerals (VITAMIN D3 COMPLETE) TABS     NONFORMULARY     polyethylene glycol-propylene glycol (SYSTANE ULTRA) 0.4-0.3 % SOLN ophthalmic solution     vitamin C (ASCORBIC ACID) 1000 MG TABS     Catheters MISC     ketotifen (ZADITOR) 0.025 % ophthalmic solution     No current facility-administered medications for this visit.      Past Medical History:   Patient Active Problem List   Diagnosis     Health Care Home     Hypertension goal BP (blood pressure) < 140/90     Squamous cell skin cancer     Prostate cancer - surgically treated     Advanced directives, counseling/discussion     CARDIOVASCULAR SCREENING; LDL GOAL LESS THAN 130     Sensorineural hearing loss, bilateral      Bladder neck contracture     Bladder stone     Type 2 diabetes mellitus without complication (H)     History of melanoma     History of nonmelanoma skin cancer     Carcinoma of prostate (H)     Contact with and (suspected) exposure to other hazardous aromatic compounds     Elevated fasting blood sugar     HDL lipoprotein deficiency     Hearing loss     Hypertriglyceridemia     Other ill-defined and unknown causes of morbidity and mortality     Essential (primary) hypertension     Essential hypertension     Malignant neoplasm of prostate (H)     Tremor     H/O echocardiogram 2023     Past Medical History:   Diagnosis Date     Diabetes (H)      H/O echocardiogram 2023 3/10/2023    Global and regional left ventricular function is normal with an EF of 55-60%. Right ventricular function, chamber size, wall motion, and thickness are normal. Pulmonary artery systolic pressure is normal. Sinuses of Valsalva 4.1 cm. Mild dilatation of the aorta is present. The inferior vena cava is normal. No pericardial effusion is present. ________________________________________________________     History of melanoma     2022 left forearm     History of nonmelanoma skin cancer     NUMEROUS     Hypertension goal BP (blood pressure) < 140/90      Prostate cancer (H)      Squamous cell skin cancer      Tremor 2/24/2023        CC No referring provider defined for this encounter. on close of this encounter.

## 2023-07-06 NOTE — PATIENT INSTRUCTIONS
Cryotherapy    What is it?  Use of a very cold liquid, such as liquid nitrogen, to freeze and destroy abnormal skin cells that need to be removed    What should I expect?  Tenderness and redness  A small blister that might grow and fill with dark purple blood. There may be crusting.  More than one treatment may be needed if the lesions do not go away.    How do I care for the treated area?  Gently wash the area with your hands when bathing.  Use a thin layer of Vaseline to help with healing. You may use a Band-Aid.   The area should heal within 7-10 days and may leave behind a pink or lighter color.   Do not use an antibiotic or Neosporin ointment.   You may take acetaminophen (Tylenol) for pain.     Call your doctor if you have:  Severe pain  Signs of infection (warmth, redness, cloudy yellow drainage, and or a bad smell)  Questions or concerns    Who should I call with questions?      [facility]: [phone]      [facility]: [phone]      For urgent needs outside of business hours call the [facility] at [phone] and ask for the dermatology resident on call Wound Care After a Biopsy    What is a skin biopsy?  A skin biopsy allows the doctor to examine a very small piece of tissue under the microscope to determine the diagnosis and the best treatment for the skin condition. A local anesthetic (numbing medicine) is injected with a very small needle into the skin area to be tested. A small piece of skin is taken from the area. Sometimes a suture (stitch) is used.     What are the risks of a skin biopsy?  I will experience scar, bleeding, swelling, pain, crusting and redness. I may experience incomplete removal or recurrence. Risks of this procedure are excessive bleeding, bruising, infection, nerve damage, numbness, thick (hypertrophic or keloidal) scar and non-diagnostic biopsy.    How should I care for my wound for the first 24  hours?  Keep the wound dry and covered for 24 hours  If it bleeds, hold direct pressure on the area for 15 minutes. If bleeding does not stop, call us or go to the emergency room  Avoid strenuous exercise the first 1-2 days or as your doctor instructs you    How should I care for the wound after 24 hours?  After 24 hours, remove the bandage  You may bathe or shower as normal  If you had a scalp biopsy, you can shampoo as usual and can use shower water to clean the biopsy site daily  Clean the wound once a day with gentle soap and water  Do not scrub, be gentle  Apply white petroleum/Vaseline after cleaning the wound with a cotton swab or a clean finger, and keep the site covered with a Bandaid /bandage. Bandages are not necessary with a scalp biopsy  If you are unable to cover the site with a Bandaid /bandage, re-apply ointment 2-3 times a day to keep the site moist. Moisture will help with healing  Avoid strenuous activity for first 1-2 days  Avoid lakes, rivers, pools, and oceans until the stitches are removed or the site is healed    How do I clean my wound?  Wash hands thoroughly with soap or use hand  before all wound care  Clean the wound with gentle soap and water  Apply white petroleum/Vaseline  to wound after it is clean  Replace the Bandaid /bandage to keep the wound covered for the first few days or as instructed by your doctor  If you had a scalp biopsy, warm shower water to the area on a daily basis should suffice    What should I use to clean my wound?   Cotton-tipped applicators (Qtips )  White petroleum jelly (Vaseline ). Use a clean new container and use Q-tips to apply.  Bandaids  as needed  Gentle soap     How should I care for my wound long term?  Do not get your wound dirty  Keep up with wound care for one week or until the area is healed.     A small scab will form and fall off by itself when the area is completely healed. The area will be red and will become pink in color as it heals.  Sun protection is very important for how your scar will turn out. Sunscreen with an SPF 30 or greater is recommended once the area is healed.  You should have some soreness but it should be mild and slowly go away over several days. Talk to your doctor about using tylenol for pain,    When should I call my doctor?  If you have increased:   Pain or swelling  Pus or drainage (clear or slightly yellow drainage is ok)  Temperature over 100F  Spreading redness or warmth around wound    When will I hear about my results?  The biopsy results can take 2 weeks to come back.  Your results will automatically release to [employer] before your provider has even reviewed them.  The clinic will call you with the results, send you a [employer] message, or have you schedule a follow-up clinic or phone time to discuss the results.  Contact our clinics if you do not hear from us in 2 weeks.    Who should I call with questions?  [facility]: [phone]  [facility]: [phone]  For urgent needs outside of business hours call the [facility] at [phone] and ask for the dermatology resident on call       Wound Care After a Biopsy    What is a skin biopsy?  A skin biopsy allows the doctor to examine a very small piece of tissue under the microscope to determine the diagnosis and the best treatment for the skin condition. A local anesthetic (numbing medicine) is injected with a very small needle into the skin area to be tested. A small piece of skin is taken from the area. Sometimes a suture (stitch) is used.     What are the risks of a skin biopsy?  I will experience scar, bleeding, swelling, pain, crusting and redness. I may experience incomplete removal or recurrence. Risks of this procedure are excessive bleeding, bruising, infection, nerve damage, numbness, thick (hypertrophic or keloidal) scar and non-diagnostic biopsy.    How should I care for my wound for the  first 24 hours?  Keep the wound dry and covered for 24 hours  If it bleeds, hold direct pressure on the area for 15 minutes. If bleeding does not stop, call us or go to the emergency room  Avoid strenuous exercise the first 1-2 days or as your doctor instructs you    How should I care for the wound after 24 hours?  After 24 hours, remove the bandage  You may bathe or shower as normal  If you had a scalp biopsy, you can shampoo as usual and can use shower water to clean the biopsy site daily  Clean the wound once a day with gentle soap and water  Do not scrub, be gentle  Apply white petroleum/Vaseline after cleaning the wound with a cotton swab or a clean finger, and keep the site covered with a Bandaid /bandage. Bandages are not necessary with a scalp biopsy  If you are unable to cover the site with a Bandaid /bandage, re-apply ointment 2-3 times a day to keep the site moist. Moisture will help with healing  Avoid strenuous activity for first 1-2 days  Avoid lakes, rivers, pools, and oceans until the stitches are removed or the site is healed    How do I clean my wound?  Wash hands thoroughly with soap or use hand  before all wound care  Clean the wound with gentle soap and water  Apply white petroleum/Vaseline  to wound after it is clean  Replace the Bandaid /bandage to keep the wound covered for the first few days or as instructed by your doctor  If you had a scalp biopsy, warm shower water to the area on a daily basis should suffice    What should I use to clean my wound?   Cotton-tipped applicators (Qtips )  White petroleum jelly (Vaseline ). Use a clean new container and use Q-tips to apply.  Bandaids  as needed  Gentle soap     How should I care for my wound long term?  Do not get your wound dirty  Keep up with wound care for one week or until the area is healed.  A small scab will form and fall off by itself when the area is completely healed. The area will be red and will become pink in color as it  heals. Sun protection is very important for how your scar will turn out. Sunscreen with an SPF 30 or greater is recommended once the area is healed.  You should have some soreness but it should be mild and slowly go away over several days. Talk to your doctor about using tylenol for pain,    When should I call my doctor?  If you have increased:   Pain or swelling  Pus or drainage (clear or slightly yellow drainage is ok)  Temperature over 100F  Spreading redness or warmth around wound    When will I hear about my results?  The biopsy results can take 2 weeks to come back.  Your results will automatically release to [employer] before your provider has even reviewed them.  The clinic will call you with the results, send you a [employer] message, or have you schedule a follow-up clinic or phone time to discuss the results.  Contact our clinics if you do not hear from us in 2 weeks.    Who should I call with questions?  [facility]: [phone]  [facility]: [phone]  For urgent needs outside of business hours call the [facility] at [phone] and ask for the dermatology resident on call       Patient Education     Checking for Skin Cancer  You can find cancer early by checking your skin each month. There are 3 kinds of skin cancer. They are melanoma, basal cell carcinoma, and squamous cell carcinoma. Doing monthly skin checks is the best way to find new marks or skin changes. Follow the instructions below for checking your skin.   The ABCDEs of checking moles for melanoma   Check your moles or growths for signs of melanoma using ABCDE:   Asymmetry: the sides of the mole or growth don t match  Border: the edges are ragged, notched, or blurred  Color: the color within the mole or growth varies  Diameter: the mole or growth is larger than 6 mm (size of a pencil eraser)  Evolving: the size, shape, or color of the mole or growth is changing (evolving is  not shown in the images below)    Checking for other types of skin cancer  Basal cell carcinoma or squamous cell carcinoma have symptoms such as:     A spot or mole that looks different from all other marks on your skin  Changes in how an area feels, such as itching, tenderness, or pain  Changes in the skin's surface, such as oozing, bleeding, or scaliness  A sore that does not heal  New swelling or redness beyond the border of a mole    Who s at risk?  Anyone can get skin cancer. But you are at greater risk if you have:   Fair skin, light-colored hair, or light-colored eyes  Many moles or abnormal moles on your skin  A history of sunburns from sunlight or tanning beds  A family history of skin cancer  A history of exposure to radiation or chemicals  A weakened immune system  If you have had skin cancer in the past, you are at risk for recurring skin cancer.   How to check your skin  Do your monthly skin checkups in front of a full-length mirror. Check all parts of your body, including your:   Head (ears, face, neck, and scalp)  Torso (front, back, and sides)  Arms (tops, undersides, upper, and lower armpits)  Hands (palms, backs, and fingers, including under the nails)  Buttocks and genitals  Legs (front, back, and sides)  Feet (tops, soles, toes, including under the nails, and between toes)  If you have a lot of moles, take digital photos of them each month. Make sure to take photos both up close and from a distance. These can help you see if any moles change over time.   Most skin changes are not cancer. But if you see any changes in your skin, call your doctor right away. Only he or she can diagnose a problem. If you have skin cancer, seeing your doctor can be the first step toward getting the treatment that could save your life.   [employer] last reviewed this educational content on 4/1/2019 2000-2020 [employer]. [address] [place] [address], [place] [address]. All rights reserved. This information is  not intended as a substitute for professional medical care. Always follow your healthcare professional's instructions.       When should I call my doctor?  If you are worsening or not improving, please, contact us or seek urgent care as noted below.     Who should I call with questions (adults)?  [facility] (adult and pediatric): [phone]  [facility] (adult): [phone]  For urgent needs outside of business hours call the [facility] at [phone] and ask for the dermatology resident on call to be paged  If this is a medical emergency and you are unable to reach an ER, Call [phone]    Who should I call with questions (pediatric)?  [facility]- Pediatric Dermatology  Dr. Lucille Terrazas, Dr. Vianca Steele, Dr. Cherie Kinsey, BRIT Vincent, Dr. An Lee, Dr. Jaleesa Hardin & Dr. Edson Alvarez  Non-urgent nurse triage line; [phone]- Liliya and Dang G[facility] Care Coordinatormarni Tate (/Complex ) [phone]    If you need a prescription refill, please contact your pharmacy. Refills are approved or denied by our Physicians during normal business hours, Monday through Fridays  Per office policy, refills will not be granted if you have not been seen within the past year (or sooner depending on your child's condition)    Scheduling Information:  Pediatric Appointment Scheduling and Call Center [phone]  Radiology Scheduling- [phone]  Sedation Unit Scheduling- [phone]  [place] Scheduling- General [phone]; Pediatric Dermatology [phone]  Main  Services: [phone]  Zambian: [phone]  Surinamese: [phone]  Hmong/Setswana/German: [phone]  Preadmission Nursing Department Fax Number: [phone] (Fax all pre-operative paperwork to this number)    For urgent matters arising during evenings, weekends, or holidays that cannot wait for normal  business hours please call [phone] and ask for the dermatology resident on call to be paged.

## 2023-07-06 NOTE — LETTER
7/6/2023         RE: Andre Tyler  [address] [facility] [address]        Dear Colleague,    Thank you for referring your patient, Andre Tyler, to the [facility]. Please see a copy of my visit note below.    [facility] Dermatology Note  Encounter Date: Jul 6, 2023  Office Visit     Dermatology Problem List:  0. NUB - right dorsal hand lateral, left lower leg s/p bx 7/6/23  Last skin check 7/6/23, due every 3 months through 2/2024   1. History of Melanoma  - Malignant Melanoma - left ventral forearm, 0.3 breslow depth, s/p excision 2/10/22  2. History of NMSC  - BCC - L posterior neck, s/p excision 12/8/22  - sBCC - L paraspinal lower back, s/p ED&C 8/29/22  - SCCIS - right tricep, s/p ED&C 8/29/22  - SCCIS - R dorsal hand, s/p Mohs and linear repair 8/29/22   - SCCIS - L shin, s/p Mohs and linear repair 8/29/22  - SCC - left dorsal wrist, s/p MMS 7/21/21  - BCC - upper mid back, s/p excision 7/21/21  - BCC - left clavicle, s/p excision 7/21/21  - BCC - left upper back, s/p excision 12/17/2020  - SCC - base of right thumb, s/p excision 6/26/18  - SCCIS - central chest, s/p ED & C 6/11/18  - BCC - vertex scalp, s/p MMS 11/21/2016  - SCCis - R lateral thigh, s/p ED&C 11/21/2016  - SCCIS - right medial ankle, s/p Mohs 4/21/2016  - SCCIS - right ventral forearm, s/p ED&C 4/21/2016  - BCC - left preauricular, s/p Mohs 11/10/2015  - BCC, superficial type - left upper chest, s/p biopsy 4/9/2015, s/p ED&C 10/5/2015  - SCCIS - posterior right lower earlobe, records via [facility] transferred records, Dr. Arnulfo Keyes, 2011. s/p excision in 2005, recurrent  - SCCIS - left dorsal hand, s/p via [facility] records, Dr. Arnulfo Keyes, 2011  3. Actinic keratosis  - HAK - left dorsal wrist, s/p bx 11/10/2020  - s/p cryotherapy  4. History of benign biopsy  - Intradermal melanocytic nevus - left postauricular scalp,  s/p bx 6/18/21  - Compound melanocytic nevus - left lower back, s/p bx 4/9/15  - BLK - right lower leg, s/p bx 4/9/2015     Family hx of melanoma is negative.  Sister with unknown skin issues  ____________________________________________     Assessment & Plan:     # History of melanoma - left ventral forearm, no clinical evidence of recurrence.  - ABCDEs: Counseled ABCDEs of melanoma: Asymmetry, Border (irregularity), Color (not uniform, changes in color), Diameter (greater than 6 mm which is about the size of a pencil eraser), and Evolving (any changes in preexisting moles).  - Sun protection: Counseled SPF30+ sunscreen, UPF clothing, sun avoidance, tanning bed avoidance.   - Recommended yearly dental, ophthalmology .  - Recommended skin exams for all first-degree relatives.  - Recommended follow up every 3 months     # History of nonmelanoma skin cancer, no clinical evidence of recurrence.  - ABCDEs: Counseled ABCDEs of melanoma: Asymmetry, Border (irregularity), Color (not uniform, changes in color), Diameter (greater than 6 mm which is about the size of a pencil eraser), and Evolving (any changes in preexisting moles).  - Sun protection: Counseled SPF30+ sunscreen, UPF clothing, sun avoidance, tanning bed avoidance.   - Recommended regular skin checks.     # Neoplasm of unspecified behavior of the skin (D49.2) on the right dorsal hand lateral.   - 5mm keratotic papule. The differential diagnosis includes SCC vs other.  - Photograph obtained.  - See procedure note.    # Neoplasm of unspecified behavior of the skin (D49.2) on the left lower leg.   - 1cm keratotic plaque. The differential diagnosis includes SCC vs other.  - Photograph obtained.  - See procedure note.    # Actinic keratosis - right dorsal hand, left dorsal hand, right temple, left cheek x15. Based on the location and chronic irritation to this lesion, treatment with cryotherapy is warranted.   - See cryo procedure note.    # Lesion near right eye.  Faint scale, pt using drops, possibly mild dermatitis or early ak  -recheck at follow up      Procedures Performed:   - Shave biopsy procedure note, location(s): right dorsal hand lateral, left lower leg. After discussion of benefits and risks including but not limited to bleeding, infection, scar, incomplete removal, recurrence, and non-diagnostic biopsy, verbal consent and photographs were obtained. The area was cleaned with isopropyl alcohol. 0.5mL of 1% lidocaine with epinephrine was injected to obtain adequate anesthesia of lesion(s). Shave biopsy at site(s) performed. Hemostasis was achieved with aluminium chloride. Petrolatum ointment and a sterile dressing were applied. The patient tolerated the procedure and no complications were noted. The patient was provided with verbal and written post care instructions.     - Cryotherapy procedure note, location(s): right dorsal hand, left dorsal hand, right temple, left cheek x15. After verbal consent and discussion of risks and benefits including, but not limited to, dyspigmentation/scar, blister, and pain, 15 AKs (s) was(were) treated with 1-2 mm freeze border for 1-2 cycles with liquid nitrogen. Post cryotherapy instructions were provided.       Follow-up: 3 month(s) in-person, or earlier for new or changing lesions    Staff and Scribe:     Scribe Disclosure:   I, Saba Davidson, am serving as a scribe to document services personally performed by this physician, Dr. Berta Monge, based on data collection and the provider's statements to me.         Provider Disclosure:   The documentation recorded by the scribe accurately reflects the services I personally performed and the decisions made by me.    Berta Monge MD    Department of Dermatology  [facility]: Phone: [phone], Fax:[phone]  [facility]: Phone: [phone], Fax:  [phone]    ____________________________________________    CC: Skin Check (3 month follow up. No area of concern. History of melanoma and nmsc)    HPI:  Mr. Andre Tyler is a(n) 77 year old male who presents today as a return patient for a skin check. Pt reports there are no concerning lesions that he has noted.     Last seen 3/23/23 for a skin check. At that time no concerning lesions were noted.      Patient is otherwise feeling well, without additional skin concerns.    Labs Reviewed:  N/A    Physical Exam:  Vitals: There were no vitals taken for this visit.  SKIN: Total skin excluding the undergarment areas was performed. The exam included the head/face, neck, both arms, chest, back, abdomen, both legs, digits and/or nails. declines genital exam  - Well healed scar at site of previous melanoma, no repigmentation or nodularity noted.   -Well healed scars at sites of previous NMSC, no repigmentation or nodularity noted.  -There is no cervical, supraclavicular, axillary  lymphadenopathy.  -5mm keratotic papule- right dorsal hand lateral.   -Left lower leg 1cm keratotic plaque   - There are erythematous macules with overyling adherent scale on the right dorsal hand, left dorsal hand, right temple, left cheek x15.    - No other lesions of concern on areas examined.     Medications:  Current Outpatient Medications   Medication     empagliflozin (JARDIANCE) 10 MG TABS tablet     lisinopril-hydrochlorothiazide (ZESTORETIC) 10-12.5 MG tablet     metFORMIN (GLUCOPHAGE) 500 MG tablet     Multiple Vitamins-Minerals (VITAMIN D3 COMPLETE) TABS     NONFORMULARY     polyethylene glycol-propylene glycol (SYSTANE ULTRA) 0.4-0.3 % SOLN ophthalmic solution     vitamin C (ASCORBIC ACID) 1000 MG TABS     Catheters MISC     ketotifen (ZADITOR) 0.025 % ophthalmic solution     No current facility-administered medications for this visit.      Past Medical History:   Patient Active Problem List   Diagnosis     Health Care Home      Hypertension goal BP (blood pressure) < 140/90     Squamous cell skin cancer     Prostate cancer - surgically treated     Advanced directives, counseling/discussion     CARDIOVASCULAR SCREENING; LDL GOAL LESS THAN 130     Sensorineural hearing loss, bilateral     Bladder neck contracture     Bladder stone     Type 2 diabetes mellitus without complication (H)     History of melanoma     History of nonmelanoma skin cancer     Carcinoma of prostate (H)     Contact with and (suspected) exposure to other hazardous aromatic compounds     Elevated fasting blood sugar     HDL lipoprotein deficiency     Hearing loss     Hypertriglyceridemia     Other ill-defined and unknown causes of morbidity and mortality     Essential (primary) hypertension     Essential hypertension     Malignant neoplasm of prostate (H)     Tremor     H/O echocardiogram 2023     Past Medical History:   Diagnosis Date     Diabetes (H)      H/O echocardiogram 2023 3/10/2023    Global and regional left ventricular function is normal with an EF of 55-60%. Right ventricular function, chamber size, wall motion, and thickness are normal. Pulmonary artery systolic pressure is normal. Sinuses of Valsalva 4.1 cm. Mild dilatation of the aorta is present. The inferior vena cava is normal. No pericardial effusion is present. ________________________________________________________     History of melanoma     2022 left forearm     History of nonmelanoma skin cancer     NUMEROUS     Hypertension goal BP (blood pressure) < 140/90      Prostate cancer (H)      Squamous cell skin cancer      Tremor 2/24/2023        CC No referring provider defined for this encounter. on close of this encounter.     Again, thank you for allowing me to participate in the care of your patient.        Sincerely,        Berta Monge MD

## 2023-07-17 NOTE — RESULT ENCOUNTER NOTE
Skin cancer, left lower leg SCCIS, have fellow preview for scheduling in mohs or excision    HAK, right hand, please plan for cryo at follow up

## 2023-07-18 ENCOUNTER — TELEPHONE (OUTPATIENT)
Dept: DERMATOLOGY | Facility: CLINIC | Age: 78
End: 2023-07-18
Payer: COMMERCIAL

## 2023-07-18 NOTE — TELEPHONE ENCOUNTER
Results and plan reviewed with Hilario.  Procedure scheduled for 8/16/23.     I advised patient to plan on being in clinic through the lunch hour.  I advised patient that they don't need to be fasting and they can take medications as scheduled.  I reviewed that they will have a pressure bandage on for 48 hrs following procedure and that we don't want this to get wet.  I reviewed that following the 48 hrs they will begin daily wound care for 2 weeks, but should not submerge the wound in standing water such as a bathtub, pool, hot tub, etc.  Patient will purchase OTC Hibiclens and wash from waist to toes the night before and morning of the procedure.     Patient verbalized understanding and agreed with the plan.     MARINA Hughes MD   7/17/2023  6:03 PM CDT       Skin cancer, left lower leg SCCIS, have fellow preview for scheduling in mohs or excision     HAK, right hand, please plan for cryo at follow up     Final Diagnosis   A. Right dorsal hand lateral:  - Hypertrophic actinic keratosis with overlying cutaneous horn - (see description)     B. Left lower leg:  - Squamous cell carcinoma in situ - (see description

## 2023-07-18 NOTE — TELEPHONE ENCOUNTER
Excision/Mohs previsit information                                                    Diagnosis: squamous cell carcinoma in situ  Site(s): left lower leg    Over the counter Chlorhexidine surgical soap to wash all skin below the belly button twice before surgery should be recommended for the following:  - Surgical sites below the waist  - Immunosuppressed  - Previous surgical site infection  - Anticipated wound care challenges    Medication & Allergy Information                                                      Review and update allergy and medication list.    Do you take the following medications:    Coumadin, Eliquis, Pradaxa, Xarelto:  NO   -If on Coumadin, INR should be checked within 7 days of surgery.  Range should be 3.5 or less or within therapeutic range.    Past Medical History                                                    Do you currently or have you previously had any of the following conditions:      Hepatitis:  NO    HIV/AIDS:  NO    Prolonged bleeding or bleeding disorder:  NO    Pacemaker or Defibrillator:  NO.      History of artificial or heart valve replacement:  NO    Endocarditis (inflammation of the inner lining of the heart's chambers and valves):  NO    Have you ever had a prosthetic joint infection:  NO    Pregnant or Breastfeeding:  N/A    Mobility device (wheelchair, transfer difficulty): NO    Important Reminders:                                                        Ok to take all of their medications as prescribed    Patients can eat, no need to be fasting    Patient will not be able to get the site wet for 48 hrs    No submerging wound in standing water (lake, pool, bathtub, hot tub) for 2 weeks    No physical activity for 48 hrs (further restrictions will be discussed by MD at time of visit)    Jessica Reyna RN

## 2023-07-24 NOTE — RESULT ENCOUNTER NOTE
A. Right dorsal hand lateral:  - Hypertrophic actinic keratosis with overlying cutaneous horn -  schedule for rehceck within 6 months     B. Left lower leg:  - Squamous cell carcinoma in situ - skin cancer. Have mohs team preview image for scheduling

## 2023-08-12 PROBLEM — Z90.79 H/O PROSTATECTOMY: Status: ACTIVE | Noted: 2023-08-12

## 2023-08-12 PROBLEM — Z80.1 FAMILY HX OF LUNG CANCER: Status: ACTIVE | Noted: 2023-08-12

## 2023-08-12 PROBLEM — G20.A1 PARKINSON'S DISEASE (H): Status: ACTIVE | Noted: 2023-08-12

## 2023-08-13 NOTE — PROGRESS NOTES
Diagnosis/Summary/Recommendations:    PATIENT: Andre yTler  77 year old male     : 1945    JUVE: 2023    MRN: [id number]  [address] [facility] [id number] [phone] (H)   [phone] (M)      [email or web address]     Salima Tyler   [phone]  He granted her proxy access  To "[employer]"        Assessment:  (R25.1) Tremor  (primary encounter diagnosis)  No family history of parkinson  Mother and Grandmother passed away at 99 years     Retired law enforcement - retired [employer]  and then worked 16 years [employer] at [employer]      Wife retired exec director nonprofits     Right handed     Handwriting is different and possibly not as good and may be smaller                Notices more right than left hand tremor     Tremor is present when holding a phone and his wife commented on it.      Review of diagnosis    tremor     Avoidance of dopamine blockers   Not taking     Motor complication review   n/a     Review of Impulse control disorders   n/a     Review of surgical or medication options   reviewed     Gait/Balance/Falls   No falls      Exercise/Therapy performed/offered   Walking around helps his back pain -   Tries to get to [employer] 3 or 4 times per week   Has not been able to go  Can do treadmill     Cognitive/Driving   Does not like night driving but can drive  Can drive  Denies cognitive problems     Mood   denies     Hallucinations/delusions   denies     Sleep   Goes to bed around 10 or 1030pm and can fall asleep right away.   Sleeps in same bed as his wife  Use to snore in the past  No clear rbd behaviors  No sleep study   Wakes up around 6/630am  Gets up rarely to urinate      Bladder/Renal/Prostate/Gyn/Other  Prostate cancer 2012 - surgery - no radiation or chemo  Bladder stone 2019 lithotripsy with no recurrence  Bladder neck contracture dealt with at 2012 at time of prostate surgery in   Rare to no  nocturia   No urgency or frequency      GI/Constipation/GERD   Has daily bowel movement with the metformin has some loose stool   No heartburn      He has having some pain in the past and they removed his appendix   He was having bladder spasms and had appendix removed and this problem with bladder improved      ENDO/Lipid/DM/Bone density/Thyroid  Lipid disorder  Diabetes type 2; A1c = 7.7 on 2/16/2023  triglyceride disorder - elevated at 297  And HDL was 37 and cholesterol 157 and non hdl 120 - non fasting study on 2/16/2023  Denies thyroid problems  Bone density has not been checked for a long time     empagliflozin jardiance 10mg - has not yet started.   Metformin glucophage 500mg twice daily      Glucosamine 1500mg/vitamin d400 international unit(s) and 100mg boswellia rain     Vitamin D dose?     Cardio/heart/Hyper or Hypotensive   Hypertension  134/89 today 95 heart rate   Normal stress was normal 11/13/2015  [facility]  [facility] dr abbie quezada   Normal eCG today   No cardiologist  Lisinopril-hydrochlorothiazide 10-12.5 zestoretic      Vision/Dry Eyes/Cataracts/Glaucoma/Macular   Cataract surgery around 2002   Dry eyes     Heme/Anticoagulation/Antiplatelet/Anemia/Other  Not taking aspirin  No bleeding problems      ENT/Resp  Hearing loss  Wearing hearing aides  Has some tinnitus  Voice has been affected - slurring  Loss of smell for many years  Has had covid in the past    Speech and slurring  Swallowing and coughing  May be related to drooling         Skin/Cancer/Seborrhea/other  History of melanoma - diagnosed   History of nonmelanoma skin cancer  Squamous cell skin cancer  Dr garcia is following this      Musculoskeletal/Pain/Headache  Denies significant surgery  Has left hip pain in the morning      Other:   ecg was normal     Parkinson disease -diagnosis based on his slowness, stiffness, etc.   He has soft voice.      Genetic study  PD  generation  [email or web address]     Brain MRI to look for iron      Dopamine scan (datscan)     Because of his agent orange exposure -  He qualifies for service connected benefits [facility] the [facility] as he has parkinson  Encouraged to get connected with [facility] for this benefit.     They have a motor home - she does the driving and they like to go.      Return visit with Mendy Stanton for [facility] UPDRS     Discussion about the SPARX3 trial and messaged Rosita Gauthier about their interest in learning more about the study.      Consider echo and cardiology consultation for treadmill study        Return to be determined     Discussed symptomatic medication therapy and physical and speech therapy    Parkinson's Disease: Newly diagnosed with PD. Motor symptoms are currently manageable.        Drooling:     Cognitive Changes:        PLAN:     __  All questions answered and the following patient instructions provided: -      __  Okay to cancel the Dopamine Scan.     __  You can do the brain MRI, ECHO Cardiogram, and see the cardiologist.      __  Exercise 3 - 5 times a week.      __  Consider doing the treadmill research after you read the information.     __  Multitasking can be affected, so try to focus on one task at a time.     __  No medication for now.     __  You can consider the [facility] - Physical Therapy and Speech Therapy.     __ For drooling, you can consider Botox injection.  Also, dry candy or chewing gum can help.      __ Return in 6 months to see Dr. Galicia. You may return sooner as needed.            Medications               Empagliflozin Jardiance 10 mg 1         Ketotifen Zaditor 0.025% solution no         Lisinopril hydrochlorothiazide Zestoretic 10-12.5 1         Metformin Glucophage 500 mg 1     1   MVI            nonformularly glucosamine 1500, vit d 400, bosw  1         Polyethylene glycol-propylene systane ultra       Vitamin C ascorbic acid 1000 mg 1          vitamin D cholecalciferol dose? restart                                                                                                                                                                            Plan:    Has had more slowness in movement  More stooped posture.   Loss of fine motor  Problems writing and wife is signing bills  He has right sided tremors  He has more problems with coordination with his right hand    He has had more problems with slurring of speech    Did not do the dopamine scan -     Did not do the brain MRI scan     Echocardiogram was fine - EF was 55-60%     Loss of smell is long standing    Loss of facial expression    He granted proxy access to his mychart     Dry eyes    melanoma    Weight loss    Swallowing problems/drooling/    Physical and speech therapy    Ent consultation if needed    Genetic study  PD generation  [email or web address]    He has more problems with h is lingual speech   He has a nasal pattern    Pharmacy (MTM) consultation and medication management  Please call the scheduling number I@ [phone] to set up an appointment with pharmacists Serena Lombardi or Yoli Encarnacion.     Return visit to see Mendy     Start with 1/2 tab daily x 3days then 1/2 tab twice daily for 3-7 days then 1 tab twice daily for one week then 1 tablet 3 times per day. The dose can be increased if there is no significant benefit. IT may take 1-3 weeks before benefit is seen    To lessen the effects that protein has on the effectiveness of sinemet (i.e.. Levodopa), it is best to take this medication on an empty stomach one hour before or two hours after meals.     If you are experiencing nausea with the medication, then you should take the medication with a CRACKER, JOSE MANUEL TABLETS OR WITH FOOD. CALL IF STILL HAVING NAUSEA AS WE CAN TRY EXTRA CARBIDOPA, TRIMETHOBENZAMIDE ETC.     Besides nausea there are other side effects including  the rare occurrence of a rash to the yellow dye in the sinemet tablets if you are taking the 25/100 formulation.     Iron may interfere with the effectiveness of this medication so do not take iron supplements at the same time you are taking sinemet.      Some people recommend taking Folbic or similar products with their sinemet. Folbic contains Folic Acid (2.5 mg), vitamin B6 (25mg), and vitamin b12 (2mg).           Coding statement:   Medical Decision Making:  #  Chronic progressive medical conditions addressed  - see above --   Review and/or interpretation of unique test or documentation from a provider outside of neurology no   Independent historian provided additional details  yes I  Prescription drug management and review of potential side effects and/or monitoring for side effects  -- see above ---  Health impacted by social determinants of health  no    I have reviewed the note as documented above.  This accurately captures the substance of my conversation with the patient and total time spent preparing for visit, executing visit and completing visit on the day of the visit:  30 minutes.  The portion of this total time included face to face time 30 minutes    Flex Galicia MD     ______________________________________    Last visit date and details:             ______________________________________      Patient was asked about 14 Review of systems including changes in vision (dry eyes, double vision), hearing, heart, lungs, musculoskeletal, depression, anxiety, snoring, RBD, insomnia, urinary frequency, urinary urgency, constipation, swallowing problems, hematological, ID, allergies, skin problems: seborrhea, endocrinological: thyroid, diabetes, cholesterol; balance, weight changes, and other neurological problems and these were not significant at this time except for   Patient Active Problem List   Diagnosis    Health Care Home    Hypertension goal BP (blood pressure) < 140/90    Squamous cell skin cancer     Prostate cancer - surgically treated    Advanced directives, counseling/discussion    CARDIOVASCULAR SCREENING; LDL GOAL LESS THAN 130    Sensorineural hearing loss, bilateral    Bladder neck contracture    Bladder stone    Type 2 diabetes mellitus without complication (H)    History of melanoma    History of nonmelanoma skin cancer    Carcinoma of prostate (H)    Contact with and (suspected) exposure to other hazardous aromatic compounds    Elevated fasting blood sugar    HDL lipoprotein deficiency    Hearing loss    Hypertriglyceridemia    Other ill-defined and unknown causes of morbidity and mortality    Essential (primary) hypertension    Essential hypertension    Malignant neoplasm of prostate (H)    Tremor    H/O echocardiogram 2023    Family hx of lung cancer    Parkinson's disease (H)    H/O prostatectomy        No Known Allergies  Past Surgical History:   Procedure Laterality Date    APPENDECTOMY  2015    had improvement in bladder spasms    CATARACT IOL, RT/LT  2002    20+ years ago    HC MOHS TRUNK/ARM/LEG 1ST STAGE UP T0 5 BLOCKS      LASER HOLMIUM LITHOTRIPSY URETER(S), INSERT STENT, COMBINED N/A 10/21/2019    Procedure: Urethral dilation and transurethral resection of bladder neck contracture litholapaxy (needs holmium laser) for bladder stone;  Surgeon: Arnulfo Vale MD;  Location:  OR    OTHER SURGICAL HISTORY      skin cancer    PROSTATE SURGERY  08/28/2012     Past Medical History:   Diagnosis Date    Diabetes (H)     H/O echocardiogram 2023 3/10/2023    Global and regional left ventricular function is normal with an EF of 55-60%. Right ventricular function, chamber size, wall motion, and thickness are normal. Pulmonary artery systolic pressure is normal. Sinuses of Valsalva 4.1 cm. Mild dilatation of the aorta is present. The inferior vena cava is normal. No pericardial effusion is present. ________________________________________________________    History of melanoma     2022 left  forearm    History of nonmelanoma skin cancer     NUMEROUS    Hypertension goal BP (blood pressure) < 140/90     Prostate cancer (H)     Squamous cell skin cancer     Tremor 2/24/2023     Social History     Socioeconomic History    Marital status:      Spouse name: Not on file    Number of children: 3    Years of education: Not on file    Highest education level: Not on file   Occupational History     Employer: RETIRED   Tobacco Use    Smoking status: Never    Smokeless tobacco: Never   Vaping Use    Vaping Use: Never used   Substance and Sexual Activity    Alcohol use: No    Drug use: No    Sexual activity: Yes     Partners: Female   Other Topics Concern    Parent/sibling w/ CABG, MI or angioplasty before 65F 55M? Not Asked   Social History Narrative    Not on file     Social Determinants of Health     Financial Resource Strain: Not on file   Food Insecurity: Not on file   Transportation Needs: Not on file   Physical Activity: Not on file   Stress: Not on file   Social Connections: Not on file   Intimate Partner Violence: Not on file   Housing Stability: Not on file       Drug and lactation database from the [place] National Library of Medicine:  http://toxnet.nlm.nih.gov/cgi-bin/sis/htmlgen?LACT      B/P: Data Unavailable, T: Data Unavailable, P: Data Unavailable, R: Data Unavailable 0 lbs 0 oz  There were no vitals taken for this visit., There is no height or weight on file to calculate BMI.  Medications and Vitals not listed above were documented in the cart and reviewed by me.     Current Outpatient Medications   Medication Sig Dispense Refill    Catheters MISC 1 catheter 2 times daily (Patient not taking: Reported on 2/28/2023) 30 each 11    empagliflozin (JARDIANCE) 10 MG TABS tablet Take 1 tablet (10 mg) by mouth daily 90 tablet 0    ketotifen (ZADITOR) 0.025 % ophthalmic solution Place 1 drop into both eyes 2 times daily (Patient not taking: Reported on 3/23/2023) 10 mL 3     lisinopril-hydrochlorothiazide (ZESTORETIC) 10-12.5 MG tablet Take 1 tablet by mouth daily      metFORMIN (GLUCOPHAGE) 500 MG tablet Take 500 mg by mouth 2 times daily (with meals)      Multiple Vitamins-Minerals (VITAMIN D3 COMPLETE) TABS Dose?      NONFORMULARY Osteo biflex one per day: 400 Vitamin D, 1500 Glucosamine, 100mg boswellia rain (joint shield)      polyethylene glycol-propylene glycol (SYSTANE ULTRA) 0.4-0.3 % SOLN ophthalmic solution Place 1 drop into both eyes every hour as needed for dry eyes      vitamin C (ASCORBIC ACID) 1000 MG TABS Take 1,000 mg by mouth daily           Flex Galicia MD

## 2023-08-16 ENCOUNTER — OFFICE VISIT (OUTPATIENT)
Dept: DERMATOLOGY | Facility: CLINIC | Age: 78
End: 2023-08-16
Payer: COMMERCIAL

## 2023-08-16 VITALS — SYSTOLIC BLOOD PRESSURE: 117 MMHG | DIASTOLIC BLOOD PRESSURE: 80 MMHG

## 2023-08-16 DIAGNOSIS — D04.72 SQUAMOUS CELL CARCINOMA IN SITU (SCCIS) OF SKIN OF LEFT LOWER LEG: ICD-10-CM

## 2023-08-16 DIAGNOSIS — L57.0 AK (ACTINIC KERATOSIS): Primary | ICD-10-CM

## 2023-08-16 PROCEDURE — 12032 INTMD RPR S/A/T/EXT 2.6-7.5: CPT | Performed by: DERMATOLOGY

## 2023-08-16 PROCEDURE — 17000 DESTRUCT PREMALG LESION: CPT | Mod: 59 | Performed by: DERMATOLOGY

## 2023-08-16 PROCEDURE — 17313 MOHS 1 STAGE T/A/L: CPT | Performed by: DERMATOLOGY

## 2023-08-16 NOTE — PATIENT INSTRUCTIONS
Excision Wound Care Instructions    Possible complications of any surgical procedure are bleeding, infection, scarring, alteration in skin color and sensation, muscle weakness in the area, wound dehiscence or seperation, or recurrence of the lesion or disease. On occasion, after healing, a secondary procedure or revision may be recommended in order to obtain the best cosmetic or functional result.     After your surgery, a pressure bandage will be placed over the area that has sutures. This will help prevent bleeding. Please, follow these instructions as they will help you to prevent complications as your wound heals.    For the First 48 hours After Surgery:    Leave the pressure bandage on and keep it dry. If it should come loose, you may retape it, but do not take it off.    Relax and take it easy. Do not do any vigorous exercise, heavy lifting, or bending forward. This could cause the wound to bleed.    Post-operative pain is usually mild. You may alternate between 1000 mg of Tylenol (acetaminophen) and 400 mg of Ibuprofen every 4 hours.  Do not take more than 4,000 mg of acetaminophen and more than 3200 mg of Ibuprofen in a 24 hr period.  Avoid alcohol and vitamin E as these may increase your tendency to bleed.    You may put an ice pack around the bandaged area for 20 minutes every 2-3 hours. This may help reduce swelling, bruising, and pain. Make sure the ice pack is waterproof so that the pressure bandage does not get wet.     If your bandage is saturated with blood apply firm pressure over the bandage with a washcloth for 15 minutes. If bleeding continues after applying pressure for 15 minutes then go to the nearest emergency room.      48 Hours After Surgery:    Remove outer white bandage down to clear plastic film (Tegaderm).  You may notice dark brown, dried blood under the Tegaderm.  This is normal.    Leave the clear plastic film (Tegaderm) on for up to 2 weeks, as long as it is intact.  If it falls off  prior to 2 weeks follow daily wound care below.  If it stays intact for the full 2 weeks, then remove and treat as normal, healthy skin.      Daily Wound Care (if Tegaderm and Steri-Strips fall off prior to 2 weeks):    Wash wound with a mild soap and water.  Use caution when washing the wound, be gentle and do not let the forceful shower stream hit the wound directly. DO NOT WASH WITH HYDROGEN PEROXIDE AS THIS MIGHT CAUSE THE STITCHES TO DISSOLVE FASTER THAN WHAT WE WANT.    Pat dry.    Apply Vaseline (from a new container or tube) over the suture line with a Q-tip until it is completely healed. It is very important to keep the wound continuously moist, as wounds heal best in a moist environment.    Keep the site covered until it's healed.  You can cover it with a Telfa (non-stick) dressing and tape or a band-aid until healed with normal, healthy skin.      Call Us If:    You have pain that is not controlled with Tylenol/Ibuprofen.    You have signs or symptoms of an infection, such as: fever over 100 degrees F, redness, warmth, or foul-smelling or yellow/creamy drainage from the wound.      Who should I call with questions?  [facility]: [phone] - ask for [facility]  For urgent needs outside of business hours call the [facility] at [phone] and ask for the dermatology resident on call

## 2023-08-16 NOTE — LETTER
8/16/2023         RE: Andre Tyler  [address] [address]        Dear Colleague,    Thank you for referring your patient, Andre Tyler, to the [facility]. Please see a copy of my visit note below.    [facility] Procedure Note    Dermatology Problem List:    Last skin check 7/6/23, due every 3 months through 2/2024   1. History of Melanoma  - Malignant Melanoma - L ventral forearm, 0.3mm breslow depth, s/p excision 2/10/22    2. 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  - BCC - L posterior neck, s/p excision 12/8/22  - sBCC - L paraspinal lower back, s/p ED&C 8/29/22  - SCCIS - right tricep, s/p ED&C 8/29/22  - SCCIS - R dorsal hand, s/p Mohs and linear repair 8/29/22   - SCCIS - L shin, s/p Mohs and linear repair 8/29/22  - SCC - left dorsal wrist, s/p MMS 7/21/21  - BCC - upper mid back, s/p excision 7/21/21  - BCC - left clavicle, s/p excision 7/21/21  - BCC - left upper back, s/p excision 12/17/2020  - SCC - base of right thumb, s/p excision 6/26/18  - SCCIS - central chest, s/p ED & C 6/11/18  - BCC - vertex scalp, s/p MMS 11/21/2016  - SCCis - R lateral thigh, s/p ED&C 11/21/2016  - SCCIS - right medial ankle, s/p Mohs 4/21/2016  - SCCIS - right ventral forearm, s/p ED&C 4/21/2016  - BCC - left preauricular, s/p Mohs 11/10/2015  - BCC, superficial type - left upper chest, s/p biopsy 4/9/2015, s/p ED&C 10/5/2015  - SCCIS - posterior right lower earlobe, records via [facility] transferred records, Dr. Arnulfo Keyes, 2011. s/p excision in 2005, recurrent  - SCCIS - left dorsal hand, s/p via [facility] transferred records, Dr. Arnulfo Keyes, 2011  3. Actinic keratosis  -  right dorsal hand lateral, s/p biopsy 7/6/23; s/p LN2 8/16/23  - HAK - left dorsal wrist, s/p bx 11/10/2020  - s/p cryotherapy  4. History of benign biopsy  - Intradermal  melanocytic nevus - left postauricular scalp, s/p bx 6/18/21  - Compound melanocytic nevus - left lower back, s/p bx 4/9/15  - BLK - right lower leg, s/p bx 4/9/2015     Family hx of melanoma is negative.  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    Informed consent was obtained and the patient underwent the procedure as follows:  The patient was placed supine on the operating table.  The cancer was identified, outlined with a marker, and verified by the patient.  The entire surgical field was prepped with Hibiclens;Sterile saline.  The surgical site was anesthetized using Lidocaine 1% with epi 1:100,000.      The area of clinically apparent tumor was debulked. The layer of tissue was then surgically excised using a #15 blade and was then transferred onto a specimen sheet maintaining the orientation of the specimen. Hemostasis was obtained using heat cautery. The wound site was then covered with a dressing while the tissue samples were processed for examination.    The excised tissue was transported to the Mohs histology laboratory maintaining the tissue orientation.  The tissue specimen was relaxed so that the entire surgical margin was in a a single horizontal  plane for sectioning and inked for precise mapping.  A precise reference map was drawn to reflect the sectioning of the specimen, colored inking of the margins, and orientation on the patient. The tissue was processed using horizontal sectioning of the base and continuous peripheral margins.  The histopathologic sections were reviewed in conjunction with the reference map.    Total blocks: 1    Total slides:  1    There were no cancer cells visualized on examination, therefore Mohs surgery was complete.    REPAIR: An intermediate layered linear closure was selected as the procedure which would maximally preserve both function and cosmesis.    After the excision of the tumor, the area was undermined. Hemostasis was obtained with bipolar electrocoagulation.  Closure was oriented so that the wound was in the patient's natural skin tension lines. The deep subcutaneous and dermal layers were then closed with 4-0 monocryl buried vertical mattress sutures. The epidermis was then carefully approximated along the length of the wound using 4-0 Prolene  simple running sutures.     Estimated blood loss was less than 10 ml for all surgical sites. A sterile pressure dressing was applied and wound care instructions, with a written handout, were given. The patient was discharged from the [facility] alert and ambulatory.    Follow-up in PRN    Additional procedure     Actinic Keratosis, R dorsal hand lateral  Precancerous nature reviewed.   LN2 treatment.     Cryotherapy procedure note: After verbal consent and discussion of risks and benefits including but no limited to dyspigmentation/scar, blister, and pain, 1 AK was treated with 1-2mm freeze border for 2 cycles with liquid nitrogen. Post cryotherapy instructions were provided.     Boby Nazario DO    Department of Dermatology  [facility]: Phone: [phone], Fax:[phone]  [place]  [facility]: Phone: [phone], Fax: [phone]    Care and Laboratory Testing Performed at:  [facility]   Dermatology Clinic  [address] [place] [address]      Again, thank you for allowing me to participate in the care of your patient.        Sincerely,        Boby Nazario MD

## 2023-08-16 NOTE — NURSING NOTE
Andre Tyler's goals for this visit include:   Chief Complaint   Patient presents with    Procedure     Mohs - SCC left lower leg       He requests these members of his care team be copied on today's visit information: n/a    PCP: Arnulfo Joaquin    Referring Provider:  No referring provider defined for this encounter.    There were no vitals taken for this visit.    Do you need any medication refills at today's visit? No  Jessica Reyna RN

## 2023-08-16 NOTE — NURSING NOTE
The following medication was given:     MEDICATION:  Lidocaine with epinephrine 1% 1:[id number]  ROUTE: SQ  SITE: see procedure note  DOSE: 9 ml  LOT #: [id number]  : [employer]  EXPIRATION DATE: 01/31/2025  NDC#: 09156-432-90  Was there drug waste? no  Multi-dose vial: Yes    Paper tape, Tegaderm and pressure dressing applied to Mohs site on left lower leg.  Wound care instructions reviewed with patient and AVS provided.  Patient verbalized understanding.  Patient will follow up for suture removal: Yes - 08/31/2023.  No further questions or concerns at this time.      Gabby Rogers CMA  August 16, 2023

## 2023-08-31 ENCOUNTER — OFFICE VISIT (OUTPATIENT)
Dept: DERMATOLOGY | Facility: CLINIC | Age: 78
End: 2023-08-31
Payer: COMMERCIAL

## 2023-08-31 DIAGNOSIS — Z48.02 VISIT FOR SUTURE REMOVAL: Primary | ICD-10-CM

## 2023-08-31 PROCEDURE — 99207 PR NO CHARGE NURSE ONLY: CPT | Performed by: DERMATOLOGY

## 2023-08-31 NOTE — NURSING NOTE
Andre Tylre comes into clinic today at the request of Dr. Nazario Ordering Provider for suture removal.        This service provided today was under the supervising provider of the day Dr. Nazario, who was available if needed.    Nusrat Garcia RN   Dermatology Suture Removal Record  S: Andre presents to the clinic today for  removal of sutures.  The patient has had the sutures in place for 15 days.    There has been no history of infection or drainage.    O: 1 suture is seen located on the L shin.  The wound is healing well with no signs of infection.      A: Suture removal.    P:  All sutures were easily removed today.  Routine wound care discussed.  The patient will follow up as needed.

## 2023-09-05 ENCOUNTER — OFFICE VISIT (OUTPATIENT)
Dept: NEUROLOGY | Facility: CLINIC | Age: 78
End: 2023-09-05
Payer: COMMERCIAL

## 2023-09-05 VITALS
RESPIRATION RATE: 16 BRPM | BODY MASS INDEX: 24.8 KG/M2 | DIASTOLIC BLOOD PRESSURE: 86 MMHG | OXYGEN SATURATION: 98 % | TEMPERATURE: 97.6 F | SYSTOLIC BLOOD PRESSURE: 121 MMHG | WEIGHT: 188 LBS | HEART RATE: 89 BPM

## 2023-09-05 DIAGNOSIS — R13.19 OTHER DYSPHAGIA: ICD-10-CM

## 2023-09-05 DIAGNOSIS — G20.A1 PARKINSON'S DISEASE (H): Primary | ICD-10-CM

## 2023-09-05 DIAGNOSIS — F80.0 ARTICULATION DISORDER: ICD-10-CM

## 2023-09-05 PROCEDURE — 99214 OFFICE O/P EST MOD 30 MIN: CPT | Performed by: PSYCHIATRY & NEUROLOGY

## 2023-09-05 RX ORDER — CARBIDOPA AND LEVODOPA 25; 100 MG/1; MG/1
TABLET ORAL
Qty: 270 TABLET | Refills: 3 | Status: SHIPPED | OUTPATIENT
Start: 2023-09-05

## 2023-09-05 NOTE — LETTER
2023       RE: Andre Tyler  [address] [facility] [address]     Dear Colleague,    Thank you for referring your patient, Andre Tyler, to the [facility] [place] [facility]. Please see a copy of my visit note below.        Diagnosis/Summary/Recommendations:    PATIENT: Andre Tyler  77 year old male     : 1945    JUVE: 2023    MRN: [id number]  [address]   [facility] [place] [id number] [phone] (H)   [phone] (M)      [email or web address]     Salima Tyler   [phone]  He granted her proxy access  To [employer]        Assessment:  (R25.1) Tremor  (primary encounter diagnosis)  No family history of parkinson  Mother and Grandmother passed away at 99 years     Retired law enforcement - retired [employer]  and then worked 16 years [employer] at [employer] 2017     Wife retired exec director nonprofits     Right handed     Handwriting is different and possibly not as good and may be smaller                Notices more right than left hand tremor     Tremor is present when holding a phone and his wife commented on it.      Review of diagnosis    tremor     Avoidance of dopamine blockers   Not taking     Motor complication review   n/a     Review of Impulse control disorders   n/a     Review of surgical or medication options   reviewed     Gait/Balance/Falls   No falls      Exercise/Therapy performed/offered   Walking around helps his back pain -   Tries to get to [employer] 3 or 4 times per week   Has not been able to go  Can do treadmill     Cognitive/Driving   Does not like night driving but can drive  Can drive  Denies cognitive problems     Mood   denies     Hallucinations/delusions   denies     Sleep   Goes to bed around 10 or 1030pm and can fall asleep right away.   Sleeps in same bed as his wife  Use to snore in the past  No  clear rbd behaviors  No sleep study   Wakes up around 6/630am  Gets up rarely to urinate      Bladder/Renal/Prostate/Gyn/Other  Prostate cancer 2012 - surgery - no radiation or chemo  Bladder stone 2019 lithotripsy with no recurrence  Bladder neck contracture dealt with at 2012 at time of prostate surgery in 2012  Rare to no nocturia   No urgency or frequency      GI/Constipation/GERD   Has daily bowel movement with the metformin has some loose stool   No heartburn      He has having some pain in the past and they removed his appendix   He was having bladder spasms and had appendix removed and this problem with bladder improved      ENDO/Lipid/DM/Bone density/Thyroid  Lipid disorder  Diabetes type 2; A1c = 7.7 on 2/16/2023  triglyceride disorder - elevated at 297  And HDL was 37 and cholesterol 157 and non hdl 120 - non fasting study on 2/16/2023  Denies thyroid problems  Bone density has not been checked for a long time     empagliflozin jardiance 10mg - has not yet started.   Metformin glucophage 500mg twice daily      Glucosamine 1500mg/vitamin d400 international unit(s) and 100mg boswellia rain     Vitamin D dose?     Cardio/heart/Hyper or Hypotensive   Hypertension  134/89 today 95 heart rate   Normal stress was normal 11/13/2015  [facility]  [facility] dr abbie quezada   Normal eCG today   No cardiologist  Lisinopril-hydrochlorothiazide 10-12.5 zestoretic      Vision/Dry Eyes/Cataracts/Glaucoma/Macular   Cataract surgery around 2002   Dry eyes     Heme/Anticoagulation/Antiplatelet/Anemia/Other  Not taking aspirin  No bleeding problems      ENT/Resp  Hearing loss  Wearing hearing aides  Has some tinnitus  Voice has been affected - slurring  Loss of smell for many years  Has had covid in the past    Speech and slurring  Swallowing and coughing  May be related to drooling         Skin/Cancer/Seborrhea/other  History of melanoma - diagnosed   History of nonmelanoma skin cancer  Squamous  cell skin cancer  Dr garcia is following this      Musculoskeletal/Pain/Headache  Denies significant surgery  Has left hip pain in the morning      Other:   ecg was normal     Parkinson disease -diagnosis based on his slowness, stiffness, etc.   He has soft voice.      Genetic study  PD generation  [email or web address]     Brain MRI to look for iron      Dopamine scan (datscan)     Because of his agent orange exposure -  He qualifies for service connected benefits [facility] as he has parkinson  Encouraged to get connected with [facility] for this benefit.     They have a motor home - she does the driving and they like to go.      Return visit with Mendy Stanton for MDS UPDRS     Discussion about the SPARX3 trial and johnd Rosita Gauthier about their interest in learning more about the study.      Consider echo and cardiology consultation for treadmill study        Return to be determined     Discussed symptomatic medication therapy and physical and speech therapy    Parkinson's Disease: Newly diagnosed with PD. Motor symptoms are currently manageable.        Drooling:     Cognitive Changes:        PLAN:     __  All questions answered and the following patient instructions provided: -      __  Okay to cancel the Dopamine Scan.     __  You can do the brain MRI, ECHO Cardiogram, and see the cardiologist.      __  Exercise 3 - 5 times a week.      __  Consider doing the treadmill research after you read the information.     __  Multitasking can be affected, so try to focus on one task at a time.     __  No medication for now.     __  You can consider the [facility] - Physical Therapy and Speech Therapy.     __ For drooling, you can consider Botox injection.  Also, dry candy or chewing gum can help.      __ Return in 6 months to see Dr. Galicia. You may return sooner as needed.            Medications               Empagliflozin Jardiance 10 mg 1         Ketotifen  Zaditor 0.025% solution no         Lisinopril hydrochlorothiazide Zestoretic 10-12.5 1         Metformin Glucophage 500 mg 1     1   MVI            nonformularly glucosamine 1500, vit d 400, bosw  1         Polyethylene glycol-propylene systane ultra       Vitamin C ascorbic acid 1000 mg 1         vitamin D cholecalciferol dose? restart                                                                                                                                                                            Plan:    Has had more slowness in movement  More stooped posture.   Loss of fine motor  Problems writing and wife is signing bills  He has right sided tremors  He has more problems with coordination with his right hand    He has had more problems with slurring of speech    Did not do the dopamine scan -     Did not do the brain MRI scan     Echocardiogram was fine - EF was 55-60%     Loss of smell is long standing    Loss of facial expression    He granted proxy access to his mychart     Dry eyes    melanoma    Weight loss    Swallowing problems/drooling/    Physical and speech therapy    Ent consultation if needed    Genetic study  PD generation  [email or web address]    He has more problems with h is lingual speech   He has a nasal pattern    Pharmacy ([facility]) consultation and medication management  Please call the scheduling number I@ [phone] to set up an appointment with pharmacists Serena oLmbardi or Yoli Encarnacion.     Return visit to see Mendy     Start with 1/2 tab daily x 3days then 1/2 tab twice daily for 3-7 days then 1 tab twice daily for one week then 1 tablet 3 times per day. The dose can be increased if there is no significant benefit. IT may take 1-3 weeks before benefit is seen    To lessen the effects that protein has on the effectiveness of sinemet (i.e.. Levodopa), it is best to take this medication on an empty stomach one hour before or two  hours after meals.     If you are experiencing nausea with the medication, then you should take the medication with a CRACKER, JOSE MANUEL TABLETS OR WITH FOOD. CALL IF STILL HAVING NAUSEA AS WE CAN TRY EXTRA CARBIDOPA, TRIMETHOBENZAMIDE ETC.     Besides nausea there are other side effects including the rare occurrence of a rash to the yellow dye in the sinemet tablets if you are taking the 25/100 formulation.     Iron may interfere with the effectiveness of this medication so do not take iron supplements at the same time you are taking sinemet.      Some people recommend taking Folbic or similar products with their sinemet. Folbic contains Folic Acid (2.5 mg), vitamin B6 (25mg), and vitamin b12 (2mg).           Coding statement:   Medical Decision Making:  #  Chronic progressive medical conditions addressed  - see above --   Review and/or interpretation of unique test or documentation from a provider outside of neurology no   Independent historian provided additional details  yes I  Prescription drug management and review of potential side effects and/or monitoring for side effects  -- see above ---  Health impacted by social determinants of health  no    I have reviewed the note as documented above.  This accurately captures the substance of my conversation with the patient and total time spent preparing for visit, executing visit and completing visit on the day of the visit:  30 minutes.  The portion of this total time included face to face time 30 minutes    Flex Galicia MD     ______________________________________    Last visit date and details:             ______________________________________      Patient was asked about 14 Review of systems including changes in vision (dry eyes, double vision), hearing, heart, lungs, musculoskeletal, depression, anxiety, snoring, RBD, insomnia, urinary frequency, urinary urgency, constipation, swallowing problems, hematological, ID, allergies, skin problems: seborrhea,  endocrinological: thyroid, diabetes, cholesterol; balance, weight changes, and other neurological problems and these were not significant at this time except for   Patient Active Problem List   Diagnosis    Health Care Home    Hypertension goal BP (blood pressure) < 140/90    Squamous cell skin cancer    Prostate cancer - surgically treated    Advanced directives, counseling/discussion    CARDIOVASCULAR SCREENING; LDL GOAL LESS THAN 130    Sensorineural hearing loss, bilateral    Bladder neck contracture    Bladder stone    Type 2 diabetes mellitus without complication (H)    History of melanoma    History of nonmelanoma skin cancer    Carcinoma of prostate (H)    Contact with and (suspected) exposure to other hazardous aromatic compounds    Elevated fasting blood sugar    HDL lipoprotein deficiency    Hearing loss    Hypertriglyceridemia    Other ill-defined and unknown causes of morbidity and mortality    Essential (primary) hypertension    Essential hypertension    Malignant neoplasm of prostate (H)    Tremor    H/O echocardiogram 2023    Family hx of lung cancer    Parkinson's disease (H)    H/O prostatectomy        No Known Allergies  Past Surgical History:   Procedure Laterality Date    APPENDECTOMY  2015    had improvement in bladder spasms    CATARACT IOL, RT/LT  2002    20+ years ago    HC MOHS TRUNK/ARM/LEG 1ST STAGE UP T0 5 BLOCKS      LASER HOLMIUM LITHOTRIPSY URETER(S), INSERT STENT, COMBINED N/A 10/21/2019    Procedure: Urethral dilation and transurethral resection of bladder neck contracture litholapaxy (needs holmium laser) for bladder stone;  Surgeon: Arnulfo Vale MD;  Location:  OR    OTHER SURGICAL HISTORY      skin cancer    PROSTATE SURGERY  08/28/2012     Past Medical History:   Diagnosis Date    Diabetes (H)     H/O echocardiogram 2023 3/10/2023    Global and regional left ventricular function is normal with an EF of 55-60%. Right ventricular function, chamber size, wall motion, and  thickness are normal. Pulmonary artery systolic pressure is normal. Sinuses of Valsalva 4.1 cm. Mild dilatation of the aorta is present. The inferior vena cava is normal. No pericardial effusion is present. ________________________________________________________    History of melanoma     2022 left forearm    History of nonmelanoma skin cancer     NUMEROUS    Hypertension goal BP (blood pressure) < 140/90     Prostate cancer (H)     Squamous cell skin cancer     Tremor 2/24/2023     Social History     Socioeconomic History    Marital status:      Spouse name: Not on file    Number of children: 3    Years of education: Not on file    Highest education level: Not on file   Occupational History     Employer: RETIRED   Tobacco Use    Smoking status: Never    Smokeless tobacco: Never   Vaping Use    Vaping Use: Never used   Substance and Sexual Activity    Alcohol use: No    Drug use: No    Sexual activity: Yes     Partners: Female   Other Topics Concern    Parent/sibling w/ CABG, MI or angioplasty before 65F 55M? Not Asked   Social History Narrative    Not on file     Social Determinants of Health     Financial Resource Strain: Not on file   Food Insecurity: Not on file   Transportation Needs: Not on file   Physical Activity: Not on file   Stress: Not on file   Social Connections: Not on file   Intimate Partner Violence: Not on file   Housing Stability: Not on file       Drug and lactation database from the [place] National [employer] of Medicine:  http://toxnet.nlm.nih.gov/cgi-bin/sis/htmlgen?LACT      B/P: Data Unavailable, T: Data Unavailable, P: Data Unavailable, R: Data Unavailable 0 lbs 0 oz  There were no vitals taken for this visit., There is no height or weight on file to calculate BMI.  Medications and Vitals not listed above were documented in the cart and reviewed by me.     Current Outpatient Medications   Medication Sig Dispense Refill    Catheters MISC 1 catheter 2 times daily (Patient not  taking: Reported on 2/28/2023) 30 each 11    empagliflozin (JARDIANCE) 10 MG TABS tablet Take 1 tablet (10 mg) by mouth daily 90 tablet 0    ketotifen (ZADITOR) 0.025 % ophthalmic solution Place 1 drop into both eyes 2 times daily (Patient not taking: Reported on 3/23/2023) 10 mL 3    lisinopril-hydrochlorothiazide (ZESTORETIC) 10-12.5 MG tablet Take 1 tablet by mouth daily      metFORMIN (GLUCOPHAGE) 500 MG tablet Take 500 mg by mouth 2 times daily (with meals)      Multiple Vitamins-Minerals (VITAMIN D3 COMPLETE) TABS Dose?      NONFORMULARY Osteo biflex one per day: 400 Vitamin D, 1500 Glucosamine, 100mg boswellia rain (joint shield)      polyethylene glycol-propylene glycol (SYSTANE ULTRA) 0.4-0.3 % SOLN ophthalmic solution Place 1 drop into both eyes every hour as needed for dry eyes      vitamin C (ASCORBIC ACID) 1000 MG TABS Take 1,000 mg by mouth daily           Flex Galicia MD

## 2023-09-05 NOTE — PATIENT INSTRUCTIONS
Medications               Empagliflozin Jardiance 10 mg 1         Ketotifen Zaditor 0.025% solution no         Lisinopril hydrochlorothiazide Zestoretic 10-12.5 1         Metformin Glucophage 500 mg 1     1   MVI            nonformularly glucosamine 1500, vit d 400, bosw  1         Polyethylene glycol-propylene systane ultra       Vitamin C ascorbic acid 1000 mg 1         vitamin D cholecalciferol dose? restart                                                                                                                                                                            Plan:    Has had more slowness in movement  More stooped posture.   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     Return visit to see Mendy     Start with 1/2 tab daily x 3days then 1/2 tab twice daily for 3-7 days then 1 tab twice daily for one week then 1 tablet 3 times per day. The dose can be increased if there is no significant benefit. IT may take 1-3 weeks before benefit is seen    To lessen the effects that protein has on the effectiveness of sinemet (i.e.. Levodopa), it  is best to take this medication on an empty stomach one hour before or two hours after meals.     If you are experiencing nausea with the medication, then you should take the medication with a CRACKER, JOSE MANUEL TABLETS OR WITH FOOD. CALL IF STILL HAVING NAUSEA AS WE CAN TRY EXTRA CARBIDOPA, TRIMETHOBENZAMIDE ETC.     Besides nausea there are other side effects including the rare occurrence of a rash to the yellow dye in the sinemet tablets if you are taking the 25/100 formulation.     Iron may interfere with the effectiveness of this medication so do not take iron supplements at the same time you are taking sinemet.      Some people recommend taking Folbic or similar products with their sinemet. Folbic contains Folic Acid (2.5 mg), vitamin B6 (25mg), and vitamin b12 (2mg).

## 2023-09-06 NOTE — PROGRESS NOTES
[facility] [place] Procedure Note    Dermatology Problem List:    Last skin check 7/6/23, due every 3 months through 2/2024   1. History of Melanoma  - Malignant Melanoma - L ventral forearm, 0.3mm breslow depth, s/p excision 2/10/22    2. 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  - BCC - L posterior neck, s/p excision 12/8/22  - sBCC - L paraspinal lower back, s/p ED&C 8/29/22  - SCCIS - right tricep, s/p ED&C 8/29/22  - SCCIS - R dorsal hand, s/p Mohs and linear repair 8/29/22   - SCCIS - L shin, s/p Mohs and linear repair 8/29/22  - SCC - left dorsal wrist, s/p MMS 7/21/21  - BCC - upper mid back, s/p excision 7/21/21  - BCC - left clavicle, s/p excision 7/21/21  - BCC - left upper back, s/p excision 12/17/2020  - SCC - base of right thumb, s/p excision 6/26/18  - SCCIS - central chest, s/p ED & C 6/11/18  - BCC - vertex scalp, s/p MMS 11/21/2016  - SCCis - R lateral thigh, s/p ED&C 11/21/2016  - SCCIS - right medial ankle, s/p Mohs 4/21/2016  - SCCIS - right ventral forearm, s/p ED&C 4/21/2016  - BCC - left preauricular, s/p Mohs 11/10/2015  - BCC, superficial type - left upper chest, s/p biopsy 4/9/2015, s/p ED&C 10/5/2015  - SCCIS - posterior right lower earlobe, records via [employer] transferred records, Dr. Arnulfo Keyes, 2011. s/p excision in 2005, recurrent  - SCCIS - left dorsal hand, s/p via [email or web address] transferred records, Dr. Arnulfo Keyes, 2011  3. Actinic keratosis  -  right dorsal hand lateral, s/p biopsy 7/6/23; s/p LN2 8/16/23  - HAK - left dorsal wrist, s/p bx 11/10/2020  - s/p cryotherapy  4. History of benign biopsy  - Intradermal melanocytic nevus - left postauricular scalp, s/p bx 6/18/21  - Compound melanocytic nevus - left lower back, s/p bx 4/9/15  - BLK - right lower leg, s/p bx 4/9/2015     Family hx of melanoma is negative.  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    Informed consent was obtained and the patient underwent the procedure as follows:  The patient was placed supine on the operating table.  The cancer was identified, outlined with a marker, and verified by the patient.  The entire surgical field was prepped with Hibiclens;Sterile saline.  The surgical site was anesthetized using Lidocaine 1% with epi 1:100,000.      The area of clinically apparent tumor was debulked. The layer of tissue was then surgically excised using a #15 blade and was then transferred onto a specimen sheet maintaining the orientation of the specimen. Hemostasis was obtained using heat cautery. The wound site was then covered with a dressing while the tissue samples were processed for examination.    The excised tissue was transported to the Mohs histology laboratory maintaining the tissue orientation.  The tissue specimen was relaxed so that the entire surgical margin was in a a single horizontal plane for sectioning and inked for precise mapping.  A precise reference map was drawn to reflect the sectioning of the specimen, colored inking of the margins, and orientation on the patient. The tissue was processed using horizontal  sectioning of the base and continuous peripheral margins.  The histopathologic sections were reviewed in conjunction with the reference map.    Total blocks: 1    Total slides:  1    There were no cancer cells visualized on examination, therefore Mohs surgery was complete.    REPAIR: An intermediate layered linear closure was selected as the procedure which would maximally preserve both function and cosmesis.    After the excision of the tumor, the area was undermined. Hemostasis was obtained with bipolar electrocoagulation.  Closure was oriented so that the wound was in the patient's natural skin tension lines. The deep subcutaneous and dermal layers were then closed with 4-0 monocryl buried vertical mattress sutures. The epidermis was then carefully approximated along the length of the wound using 4-0 Prolene  simple running sutures.     Estimated blood loss was less than 10 ml for all surgical sites. A sterile pressure dressing was applied and wound care instructions, with a written handout, were given. The patient was discharged from the [facility] alert and ambulatory.    Follow-up in PRN    Additional procedure     Actinic Keratosis, R dorsal hand lateral  Precancerous nature reviewed.   LN2 treatment.     Cryotherapy procedure note: After verbal consent and discussion of risks and benefits including but no limited to dyspigmentation/scar, blister, and pain, 1 AK was treated with 1-2mm freeze border for 2 cycles with liquid nitrogen. Post cryotherapy instructions were provided.     Boby Nazario DO    Department of Dermatology  [facility]: Phone: [phone], Fax:[phone]  [facility]: Phone: [phone], Fax: [phone]    Care and Laboratory Testing Performed at:  [facility]   Dermatology Clinic  [address] [place] [address]

## 2023-09-19 ENCOUNTER — VIRTUAL VISIT (OUTPATIENT)
Dept: PHARMACY | Facility: CLINIC | Age: 78
End: 2023-09-19
Payer: COMMERCIAL

## 2023-09-19 DIAGNOSIS — I10 HYPERTENSION GOAL BP (BLOOD PRESSURE) < 140/90: ICD-10-CM

## 2023-09-19 DIAGNOSIS — Z78.9 TAKES DIETARY SUPPLEMENTS: ICD-10-CM

## 2023-09-19 DIAGNOSIS — H04.123 DRY EYES: ICD-10-CM

## 2023-09-19 DIAGNOSIS — G20.A1 PARKINSON'S DISEASE (H): Primary | ICD-10-CM

## 2023-09-19 DIAGNOSIS — E11.9 TYPE 2 DIABETES MELLITUS WITHOUT COMPLICATION, WITHOUT LONG-TERM CURRENT USE OF INSULIN (H): ICD-10-CM

## 2023-09-19 PROCEDURE — 99607 MTMS BY PHARM ADDL 15 MIN: CPT | Performed by: PHARMACIST

## 2023-09-19 PROCEDURE — 99605 MTMS BY PHARM NP 15 MIN: CPT | Performed by: PHARMACIST

## 2023-09-19 NOTE — PATIENT INSTRUCTIONS
"Recommendations from today's MTM visit:                                                    Today we reviewed what your medicines are for, how to know if they are working, that your medicines are safe and how to make your medicine regimen as easy as possible.      Discussed dosing, administration, and side effects of carbidopa/levodopa.  -Start with 1/2 tab daily x 3days then 1/2 tab twice daily for 3-7 days then 1 tab twice daily for one week then 1 tablet 3 times per day. It may take 1-3 weeks at this dose before benefit is seen.  -Take the medication one hour before or two hours after high protein meals, as large amounts of protein MAY reduce the effectiveness of the medication  -Medication may cause nausea. Try to eat a snack or small meal with doses. You may also try ginger chews or ginger ale that may help reduce nausea.  -Medication may also cause lightheadedness, especially when changing positions. Be sure to get up slowly. You may also notice muscle jerks or spasms and possible hallucinations, though are usually quite rare in these lower doses. Please let us know if these occur.  -Iron may interfere with the effectiveness of this medication. Separate any iron supplements the same you would with protein--take the carbidopa/levodopa one hour before or two hours after any iron supplements    Follow-up: 10/24/23 at 8:30 am by phone    It was great speaking with you today.  I value your experience and would be very thankful for your time in providing feedback in our clinic survey. In the next few days, you may receive an email or text message from [employer] with a link to a survey related to your  clinical pharmacist.\"     To schedule another MTM appointment, please call the clinic directly or you may call the MTM scheduling line at [phone] or toll-free at [phone].     My Clinical Pharmacist's contact information:                                                      Please feel free to contact me " with any questions or concerns you have.      Serena Lombardi, Pharm.D.  Medication Therapy Management Pharmacist  [facility]

## 2023-09-19 NOTE — LETTER
September 19, 2023  Andre LAWS Jayson  [address] [facility] [address]    Dear PITA Newman [facility]     Thank you for talking with me on Sep 19, 2023 about your health and medications. As a follow-up to our conversation, I have included two documents:      Your Recommended To-Do List has steps you should take to get the best results from your medications.  Your Medication List will help you keep track of your medications and how to take them.    If you want to talk about these documents, please call Serena Lombardi RPH at phone: [phone], Monday-Friday 8-4:30pm.    I look forward to working with you and your doctors to make sure your medications work well for you.    Sincerely,  Serena Lombardi RPH  [facility] Pharmacist, [facility]

## 2023-09-19 NOTE — LETTER
"Recommended To-Do List      Prepared on: 09/19/2023       You can get the best results from your medications by completing the items on this \"To-Do List.\"      Bring your To-Do List when you go to your doctor. And, share it with your family or caregivers.    My To-Do List:  What we talked about: What I should do:   The importance of taking your medication as intended    Education: start taking carbidopa-levodopa as we discussed          What we talked about: What I should do:                     "

## 2023-09-19 NOTE — LETTER
_  Medication List        Prepared on: 09/19/2023     Bring your Medication List when you go to the doctor, hospital, or   emergency room. And, share it with your family or caregivers.     Note any changes to how you take your medications.  Cross out medications when you no longer use them.    Medication How I take it Why I use it Prescriber   carbidopa-levodopa (SINEMET)  MG tablet Take 1 tablet by mouth 3 times daily  Parkinson's Disease Flex Galicia MD   empagliflozin (JARDIANCE) 10 MG TABS tablet Take 1 tablet (10 mg) by mouth daily Diabetes  Arnulfo Joaquin MD   lisinopril-hydrochlorothiazide (ZESTORETIC) 10-12.5 MG tablet Take 1 tablet by mouth daily High blood pressure  Arnulfo Joaquin MD   metFORMIN (GLUCOPHAGE) 500 MG tablet Take 500 mg by mouth 2 times daily (with meals) Diabetes  Arnulfo Joaquin MD   OSTEO-BIFLEX tablet Take 1 tablet by mouth twice daily  General health  Patient Reported    polyethylene glycol-propylene glycol (SYSTANE ULTRA) 0.4-0.3 % SOLN ophthalmic solution Place 1 drop into both eyes every hour as needed for dry eyes Dry eyes  Patient Reported   vitamin C (ASCORBIC ACID) 1000 MG TABS Take 1,000 mg by mouth 2 times daily General Health  Patient Reported         Add new medications, over-the-counter drugs, herbals, vitamins, or  minerals in the blank rows below.    Medication How I take it Why I use it Prescriber                                      Allergies:      No Known Allergies        Side effects I have had:               Other Information:              My notes and questions:

## 2023-09-19 NOTE — PROGRESS NOTES
Medication Therapy Management (MTM) Encounter    ASSESSMENT:                            Medication Adherence/Access: No issues identified    Parkinsonism:   Patient may benefit from a trial of carbidopa-levodopa. Discussed at length about possible benefits, side effects, and administration.     Diabetes:  Appears to be at goal given his older age    Hypertension:  Stable     Dry eyes:  Stable     Supplements:   Stable     PLAN:                            Discussed dosing, administration, and side effects of carbidopa/levodopa.  -Start with 1/2 tab daily x 3days then 1/2 tab twice daily for 3-7 days then 1 tab twice daily for one week then 1 tablet 3 times per day. It may take 1-3 weeks at this dose before benefit is seen.  -Take the medication one hour before or two hours after high protein meals, as large amounts of protein MAY reduce the effectiveness of the medication  -Medication may cause nausea. Try to eat a snack or small meal with doses. You may also try ginger chews or ginger ale that may help reduce nausea.  -Medication may also cause lightheadedness, especially when changing positions. Be sure to get up slowly. You may also notice muscle jerks or spasms and possible hallucinations, though are usually quite rare in these lower doses. Please let us know if these occur.  -Iron may interfere with the effectiveness of this medication. Separate any iron supplements the same you would with protein--take the carbidopa/levodopa one hour before or two hours after any iron supplements    Follow-up: 10/24/23 at 8:30 am by phone    SUBJECTIVE/OBJECTIVE:                          Hilario Tyler is a 78 year old male called for an initial visit. He was referred to me from Dr. Galicia. Patient was accompanied by wife, Salima.     Reason for visit: medication review and discussion about carbidopa-levodopa     Allergies/ADRs: None  Past Medical History: Reviewed in chart  Tobacco: He reports that he has never smoked. He has  never used smokeless tobacco.  Alcohol: glass of wine 1-2 times weekly  Caffeine: 3-4 cups of coffee daily    Medication Adherence/Access:   Patient takes medications directly from bottles.  Patient takes medications 3 time(s) per day.   Per patient, misses medication 0 times per week.   Medication barriers: none.     Parkinsonism:   Carbidopa-levodopa  mg - has not started it  Reports he is having tremor, stiffness, and slow movement.   Going to [employer] as much as he can to exercise.  Typically eating 2 meals/day- late breakfast and early dinner.     Diabetes   Jardiance 10 mg daily (evening)  Metformin 500 mg twice daily   Patient is not experiencing side effects.  Blood sugar monitoring: never  [facility] He thinks the A1c decreased the last time (? 7.1%)  Current diabetes symptoms: none  Eye exam in the last 12 months? No- 1.5 years ago  Foot exam: due  Urine Albumin:   Lab Results   Component Value Date    UMALCR 8.07 02/16/2023      Lab Results   Component Value Date    A1C 7.7 (H) 02/16/2023     Hypertension   Lisinopril-hydrochlorothiazide 10-12.5 mg daily (morning)  Patient reports no current medication side effects.  Patient does not self-monitor blood pressure.         BP Readings from Last 3 Encounters:   09/05/23 121/86   08/16/23 117/80   03/10/23 110/78     Pulse Readings from Last 3 Encounters:   09/05/23 89   03/10/23 83   02/28/23 80     Dry eyes:  Artifical tears in right eye as needed  The dry eyes have improved with the drops. Mornings are the worst.    Supplements:   Glucosamine twice daily   Vitamin C twice daily   No reported issues at this time.     Today's Vitals: There were no vitals taken for this visit.  ----------------    I spent 28 minutes with this patient today. All changes were made via collaborative practice agreement with Dr. Galicia. A copy of the visit note was provided to the patient's provider(s).    A summary of these recommendations was sent via  [employer].    Serena Lombardi, Pharm.D.  Medication Therapy Management Pharmacist  [facility]    Telemedicine Visit Details  Type of service:  Telephone visit  Start Time:  8:31 AM  End Time:  8:59 AM       Medication Therapy Recommendations  Parkinson's disease (H)    Current Medication: carbidopa-levodopa (SINEMET)  MG tablet   Rationale: Does not understand instructions - Adherence - Adherence   Recommendation: Provide Education   Status: Patient Agreed - Adherence/Education

## 2023-09-21 ENCOUNTER — ANCILLARY PROCEDURE (OUTPATIENT)
Dept: MRI IMAGING | Facility: CLINIC | Age: 78
End: 2023-09-21
Attending: PSYCHIATRY & NEUROLOGY
Payer: COMMERCIAL

## 2023-09-21 RX ORDER — GADOBUTROL 604.72 MG/ML
0.1 INJECTION INTRAVENOUS ONCE
Status: COMPLETED | OUTPATIENT
Start: 2023-09-21 | End: 2023-09-21

## 2023-09-21 RX ADMIN — GADOBUTROL 8.5 ML: 604.72 INJECTION INTRAVENOUS at 09:19

## 2023-09-30 ENCOUNTER — HEALTH MAINTENANCE LETTER (OUTPATIENT)
Age: 78
End: 2023-09-30

## 2023-10-17 ENCOUNTER — HOSPITAL ENCOUNTER (OUTPATIENT)
Dept: RADIOLOGY | Facility: HOSPITAL | Age: 78
Discharge: HOME | End: 2023-10-17
Attending: PSYCHIATRY & NEUROLOGY
Payer: COMMERCIAL

## 2023-10-17 ENCOUNTER — HOSPITAL ENCOUNTER (OUTPATIENT)
Dept: SPEECH THERAPY | Facility: HOSPITAL | Age: 78
Discharge: HOME | End: 2023-10-17
Attending: PSYCHIATRY & NEUROLOGY
Payer: COMMERCIAL

## 2023-10-17 DIAGNOSIS — R13.19 OTHER DYSPHAGIA: ICD-10-CM

## 2023-10-17 PROCEDURE — 92611 MOTION FLUOROSCOPY/SWALLOW: CPT | Mod: GN

## 2023-10-17 PROCEDURE — 74230 X-RAY XM SWLNG FUNCJ C+: CPT

## 2023-10-17 PROCEDURE — 92610 EVALUATE SWALLOWING FUNCTION: CPT | Mod: GN

## 2023-10-17 NOTE — PROGRESS NOTES
SPEECH LANGUAGE PATHOLOGY EVALUATION    See electronic medical record for Abuse and Falls Screening details.    Subjective      Presenting condition or subjective complaint:  Patient is a 78 year old male with diagnosis of Parkinson's Disease in the last 6 months with a few years presentation of slurred speech and impaired voice. Patient denies any specific difficulty swallowing, he does endorse ongoing speech and voice changes. He denies history of pneumonia and denies frequent coughing with intake.   Date of onset: 10/9/23   Relevant medical history: Parkinson's Disease    Objective     SWALLOW EVALUTION  Dysphagia history: see above  Current Diet/Method of Nutritional Intake: thin liquids (level 0), regular diet        CLINICAL SWALLOW EVALUATION  Clinical swallow evaluation completed prior to VFSS via records review, clinical interview and oral motor examination.   Oral Motor Function: Dentition: natural dentition, adequate dentition  Oral labial function: WFL  Lingual function: WFL  Buccal function: intact, reduced tone noted on left  Mild left facial droop or aysymmetry at rest noted; appearance of asymmetrical masked facies  but overall WFL    VIDEOFLUOROSCOPIC SWALLOW STUDY  Radiologist: Dr. Jerome  Views Taken: left lateral   Physical location of procedure: [facility]  Patient sitting upright on chair/stool     VFSS textures trialed:   VFSS Eval: Thin Liquids  Mode of Presentation: cup, straw, self-fed   Preparatory Phase: WFL  Oral Phase: WFL, premature pharyngeal entry, minimal tongue pumping  Bolus Location When Swallow Initiated: posterior laryngeal surface of epiglottis, pyriforms  Pharyngeal Phase: WFL, residue in valleculae  Rosenbeck's Penetration Aspiration Scale: 3 - contrast remains above the vocal cords, visible residue remains (penetration) on first two trials of thin liquids, cleared with cued throat clear/cough; #2 penetration on pen/asp scale on other trials, eventually fading to  no penetration  Strategies and Compensations: chin tuck trialed to determine impact on penetration and stasis with no true negative or positive impact on swallow function  Diagnostic Statement: penetration on initial swallows of thin liquids only; no aspiration or penetration with consecutive straw sips of thin    VFSS Eval: Purees  Mode of Presentation: spoon, self-fed   Preparatory Phase: WFL  Oral Phase: WFL, mild tongue pumping but WFL  Bolus Location When Swallow Initiated: valleculae  Pharyngeal Phase: WFL, residue in valleculae  Rosenbeck s Penetration Aspiration Scale: 1 - no aspiration, contrast does not enter airway  Strategies and Compensations: double swallow effective in reducing stasis    VFSS Eval: Solids  Mode of Presentation: self-fed   Preparatory Phase: WFL  Oral Phase: WFL, mild tongue pumping but WFL  Bolus Location When Swallow Initiated: valleculae  Pharyngeal Phase: WFL, residue in valleculae   Rosenbeck s Penetration Aspiration Scale: 1 - no aspiration, contrast does not enter airway  Strategies and Compensations: double swallow effective    ESOPHAGEAL PHASE OF SWALLOW  no observed or reported concerns related to esophageal function     SWALLOW ASSESSMENT CLINICAL IMPRESSIONS AND RATIONALE  Diet Consistency Recommendations: thin liquids (level 0), regular diet    Recommended Feeding/Eating Techniques: slow rate of intake, alternate food and liquid intake, double swallow ; patient encouraged to monitor swallow function and report any changes or concerns to MD as any decline in function may impact safety with swallow  Medication Administration Recommendations: as tolerated and patient preference    Assessment & Plan   CLINICAL IMPRESSIONS   Medical Diagnosis: Parkinson's Disease   Treatment Diagnosis: Dysphagia     Impression/Assessment:   Videofluoroscopic Swallow Study completed. Patient had no aspiration with any trials.  Shallow to moderately deep penetration with thin liquids on first few  trials, resolved as test progressed. Oral phase is largely WFL with mild tongue pumping but adequate AP transit and bolus control. Tongue base retraction was WFL. Swallow response was mildly delayed with thin liquids, expected with age group and PD. Epiglottic inversion was slow in movement but complete.  Mild to moderate valleculae stasis occurred with puree and solids, minimal valleculae stasis with liquids. Hyolaryngeal elevation was adequate and hyolaryngeal excursion was adequate.  Mastication safe and complete. Cricopharyngeus was unremarkable with bolus readily passing PES. Recommend diet of Regular textures and thin liquids with strategies intermittent volitional cough or throat clear to clear any residual penetration, double swallow to clear any sensation of stasis and slow rate of intake to compensate for delay and slow epiglottic movement. Patient may benefit from the [employer] [facility] for speech and voice.     PLAN OF CARE    Recommended Referrals to Other Professionals: Speech Language Pathology when desired for speech and voice  Education Assessment:   Learner/Method: Patient;Family;Listening;No Barriers to Learning;Demonstration  Education Comments: VFSS protocol, results and recommendations    Risks and benefits of evaluation/treatment have been explained.   Patient/Family/caregiver agrees with Plan of Care.     Evaluation Time:    SLP Eval: oral/pharyngeal swallow function, clinical minutes (92590): 8  SLP Eval: VideoFluoroscopic Swallow function Minutes (56649): 15      Signing Clinician: ALEKS Schwartz [facility]      Initial Assessment  See Epic Evaluation-

## 2023-10-24 ENCOUNTER — VIRTUAL VISIT (OUTPATIENT)
Dept: PHARMACY | Facility: CLINIC | Age: 78
End: 2023-10-24
Payer: COMMERCIAL

## 2023-10-24 DIAGNOSIS — G20.A1 PARKINSON'S DISEASE WITHOUT DYSKINESIA OR FLUCTUATING MANIFESTATIONS (H): Primary | ICD-10-CM

## 2023-10-24 PROCEDURE — 99606 MTMS BY PHARM EST 15 MIN: CPT | Performed by: PHARMACIST

## 2023-10-24 NOTE — PROGRESS NOTES
Medication Therapy Management (MTM) Encounter    ASSESSMENT:                            Medication Adherence/Access: No issues identified    Parkinsonism:   Patient is tolerating carbidopa-levodopa well but benefits have been minimal; he will be following up with Dr. Stanton in December and will have her assess his symptoms and decide whether it would be worth increasing the dosage further     PLAN:                            Continue carbidopa-levodopa  mg, 1 tablet 3 times/day  Continue to try to take the medication at least 1 hour before or 2 hours after high-protein meals    Follow-up: with Dr. Stanton on 12/5/23 and with me as needed    SUBJECTIVE/OBJECTIVE:                          Hilario Tyler is a 78 year old male called for a follow-up visit from 9/19/23. Patient was accompanied by wife, Salima.      Reason for visit: follow up on carbidopa-levodopa    Allergies/ADRs: Reviewed in chart  Past Medical History: Reviewed in chart  Tobacco: He reports that he has never smoked. He has never used smokeless tobacco.  Alcohol: glass of wine 1-2 times weekly  Caffeine: 3-4 cups of coffee daily    Medication Adherence/Access: no issues reported    Parkinsonism:   Carbidopa-levodopa  mg, 1 tablet 3 times daily- early morning, mid-day, and evening; he has been pretty good about  the medication from meals   Has not noticed any side effects from the medication  He has noticed that when he brushes his teeth his coordination has improved   His hand-writing and tremors haven't improved at all  Wife notices that the tremors have increased in right arm and hand   She can feel the increase in his body shaking- more distinct tremor when feeling him but not looking at him; patient doesn't notice a difference in this   He has noticed any change in stiffness   He is exercising at the health club and not noticing any big difference with movement abilities     Today's Vitals: There were no vitals taken for this  visit.  ---------------    I spent 12 minutes with this patient today. All changes were made via collaborative practice agreement with Dr. Stanton. A copy of the visit note was provided to the patient's provider(s).    A summary of these recommendations was sent via [employer].    Serena Lombardi, Pharm.D.  Medication Therapy Management Pharmacist  [facility]    Telemedicine Visit Details  Type of service:  Telephone visit  Start Time:  8:32 AM  End Time: 8:44 AM       Medication Therapy Recommendations  No medication therapy recommendations to display

## 2023-10-24 NOTE — PATIENT INSTRUCTIONS
"Recommendations from today's MTM visit:                                                      Continue carbidopa-levodopa  mg, 1 tablet 3 times/day  Continue to try to take the medication at least 1 hour before or 2 hours after high-protein meals    Follow-up: with Dr. Stanton on 12/5/23 and with me as needed    It was great speaking with you today.  I value your experience and would be very thankful for your time in providing feedback in our clinic survey. In the next few days, you may receive an email or text message from [employer] with a link to a survey related to your  clinical pharmacist.\"     To schedule another MTM appointment, please call the clinic directly or you may call the MTM scheduling line at [phone] or toll-free at [phone].     My Clinical Pharmacist's contact information:                                                      Please feel free to contact me with any questions or concerns you have.      Serena Lombardi, Pharm.D.  Medication Therapy Management Pharmacist  [facility]     "

## 2023-11-29 ENCOUNTER — DOCUMENTATION ONLY (OUTPATIENT)
Dept: OTHER | Facility: CLINIC | Age: 78
End: 2023-11-29
Payer: COMMERCIAL

## 2023-12-05 ENCOUNTER — OFFICE VISIT (OUTPATIENT)
Dept: NEUROLOGY | Facility: CLINIC | Age: 78
End: 2023-12-05
Payer: COMMERCIAL

## 2023-12-05 VITALS
HEART RATE: 80 BPM | RESPIRATION RATE: 20 BRPM | BODY MASS INDEX: 26.6 KG/M2 | DIASTOLIC BLOOD PRESSURE: 77 MMHG | HEIGHT: 71 IN | OXYGEN SATURATION: 100 % | SYSTOLIC BLOOD PRESSURE: 108 MMHG | WEIGHT: 190 LBS

## 2023-12-05 DIAGNOSIS — R49.0 DYSPHONIA: ICD-10-CM

## 2023-12-05 DIAGNOSIS — G20.A1 PARKINSON'S DISEASE WITHOUT DYSKINESIA OR FLUCTUATING MANIFESTATIONS (H): Primary | ICD-10-CM

## 2023-12-05 PROCEDURE — 99215 OFFICE O/P EST HI 40 MIN: CPT | Performed by: NURSE PRACTITIONER

## 2023-12-05 ASSESSMENT — UNIFIED PARKINSONS DISEASE RATING SCALE (UPDRS)
SPONTANEITY_OF_MOVEMENT: (0) NORMAL: NO PROBLEMS.
TOETAPPING_RIGHT: (0) NORMAL: NO PROBLEMS.
AMPLITUDE_LLE: (0) NORMAL: NO TREMOR.
TOTAL_SCORE: 7
RIGIDITY_RLE: (0) NORMAL: NO RIGIDITY.
HANDMOVEMENTS_LEFT: (1) SLIGHT: ANY OF THE FOLLOWING: A) THE REGULAR RHYTHM IS BROKEN WITH ONE WITH ONE OR TWO INTERRUPTIONS OR HESITATIONS OF THE MOVEMENT  B) SLIGHT SLOWING  C) THE AMPLITUDE DECREMENTS NEAR THE END OF THE 10 MOVEMENTS.
POSTURAL_STABILITY: (0) NORMAL: NO PROBLEMS. RECOVERS WITH ONE OR TWO STEPS.
FREEZING_GAIT: (0) NORMAL: NO FREEZING.
TOTAL_SCORE_LEFT: 4
FINGER_TAPPING_RIGHT: (2) MILD: ANY OF THE FOLLOWING: A) 3 TO 5 INTERRUPTIONS DURING TAPPING  B) MILD SLOWING  C) THE AMPLITUDE DECREMENTS MIDWAY IN THE 10-MOVEMENT SEQUENCE.
PARKINSONS_MEDS: ON
LEG_AGILITY_LEFT: (0) NORMAL: NO PROBLEMS.
SPEECH: (2) MILD: LOSS OF MODULATION, DICTION OR VOLUME, WITH A FEW WORDS UNCLEAR, BUT THE OVERALL SENTENCES EASY TO FOLLOW.
GAIT: (0) NORMAL: NO PROBLEMS.
RIGIDITY_LLE: (0) NORMAL: NO RIGIDITY.
HANDMOVEMENTS_RIGHT: (1) SLIGHT: ANY OF THE FOLLOWING: A) THE REGULAR RHYTHM IS BROKEN WITH ONE WITH ONE OR TWO INTERRUPTIONS OR HESITATIONS OF THE MOVEMENT  B) SLIGHT SLOWING  C) THE AMPLITUDE DECREMENTS NEAR THE END OF THE 10 MOVEMENTS.
PRONATION_SUPINATION_RIGHT: (1) SLIGHT: ANY OF THE FOLLOWING: A) THE REGULAR RHYTHM IS BROKEN WITH ONE WITH ONE OR TWO INTERRUPTIONS OR HESITATIONS OF THE MOVEMENT  B) SLIGHT SLOWING  C) THE AMPLITUDE DECREMENTS NEAR THE END OF THE 10 MOVEMENTS.
POSTURE: (0) NORMAL: NO PROBLEMS.
RIGIDITY_LUE: (0) NORMAL: NO RIGIDITY.
DYSKINESIAS_PRESENT: NO
AMPLITUDE_RLE: (0) NORMAL: NO TREMOR.
LEG_AGILITY_RIGHT: (0) NORMAL: NO PROBLEMS.
TOETAPPING_LEFT: (1) SLIGHT: ANY OF THE FOLLOWING: A) THE REGULAR RHYTHM IS BROKEN WITH ONE WITH ONE OR TWO INTERRUPTIONS OR HESITATIONS OF THE MOVEMENT  B) SLIGHT SLOWING  C) THE AMPLITUDE DECREMENTS NEAR THE END OF THE 10 MOVEMENTS.
AXIAL_SCORE: 5
RIGIDITY_RUE: (2) MILD: RIGIDITY DETECTED WITHOUT THE ACTIVATION MANEUVER. FULL RANGE OF MOTION IS EASILY ACHIEVED.
PRONATION_SUPINATION_LEFT: (0) NORMAL: NO PROBLEMS.
FACIAL_EXPRESSION: (3) MASKED FACIES WITH LIPS PARTED SOME OF THE TIME WHEN THE MOUTH IS AT REST.
TOTAL_SCORE: 16
FINGER_TAPPING_LEFT: (1) SLIGHT: ANY OF THE FOLLOWING: A) THE REGULAR RHYTHM IS BROKEN WITH ONE WITH ONE OR TWO INTERRUPTIONS OR HESITATIONS OF THE MOVEMENT  B) SLIGHT SLOWING  C) THE AMPLITUDE DECREMENTS NEAR THE END OF THE 10 MOVEMENTS.
AMPLITUDE_LIP_JAW: (0) NORMAL: NO TREMOR.
MOVEMENTS_INTERFERE_WITH_RATINGS: NO
RIGIDITY_NECK: (0) NORMAL: NO RIGIDITY.
CONSTANCY_TREMOR_ATREST: (0) NORMAL: NO TREMOR.
ARISING_CHAIR: (0) NORMAL: NO PROBLEMS. ABLE TO ARISE QUICKLY WITHOUT HESITATION.
AMPLITUDE_LUE: (0) NORMAL: NO TREMOR.
AMPLITUDE_RUE: (0) NORMAL: NO TREMOR.

## 2023-12-05 ASSESSMENT — PAIN SCALES - GENERAL: PAINLEVEL: NO PAIN (0)

## 2023-12-05 NOTE — NURSING NOTE
"Chief Complaint   Patient presents with    RECHECK     /77 (BP Location: Right arm, Patient Position: Sitting, Cuff Size: Adult Regular)   Pulse 80   Resp 20   Ht 1.81 m (5' 11.26\")   Wt 86.2 kg (190 lb)   SpO2 100%   BMI 26.31 kg/m      CRYSTAL ANDERSON    "

## 2023-12-05 NOTE — PROGRESS NOTES
ASSESSMENT:    Parkinson's Disease:  Started Levodopa in Sep 2023. He reports slight improvement. He takes his last dose an hour before bedtime.  Since he doesn't have bothersome nighttime symptoms, I recommended changing the times he take Sinemet.      PLAN:    __  All questions answered and the following patient instructions provided: -     __  Stay on the same dose of Sinemet 25/100 mg. Change the times to take your pills 4 -5 hours apart during the day.  There is no need to take a bedtime dose as you don't have nigh time bothersome symptoms like difficulty turing in bed or walking.     __  Referral to Speech Therapy. If you have not heard from the scheduling office within 2 business days, please call [phone] for [employer].    __  Continue exercising 3 times a week.     __  Continue staying socially active.     __ Return in 6 months. You may return sooner as needed.            MOVEMENT DISORDERS CLINIC           PATIENT: Andre Tyler    : 1945    JUVE: 2023    REASON FOR VISIT: Parkinson's diease (PD) follow up.    HPI: Mr. Andre Tyler is a 78 year old who came to the [facility] neurology clinic accompanied by his wife, for a follow up visit. He was last seen in the clinic on 2023 by Dr. Galicia for a follow up visit. I saw him last on 2023.    He was diagnosed with PD in 2023. Symptoms started in  with tremors in his jaw & both hands; Rt > Lt.     During his last visit, he was started on Sinemet 25/100 mg.  Since then, he was also seen by Dr. Lombardi, Pharm.D. on 10/24/2023.    PD Medications 6-6:30 am 1 pm 9 pm   Sinemet 25/100 mg  1 1 1     Pt reports that he has slight improvement in mobility, speech, and tremor. He usually goes to bed at 10 or 10:30 pm. He reports no bothersome nighttime symptoms like bed mobility or walking to the bathroom in the middle of the night.     Pt and his wife usually eat twice a day and avoid taking Sinemet around meal time.  "    He goes to the health club. He walks, occupationally runs, and walks in the pool, and does light weightlifting.     Reviewed his video swallow. He hasn't done Speech Therapy and is interested.    He asked if he has enough Rx refill for Sinemet.     MEDICATIONS:   Outpatient Medications Marked as Taking for the 12/5/23 encounter (Office Visit) with Stacy Stanton APRN CNP   Medication Sig    carbidopa-levodopa (SINEMET)  MG tablet Start with 1/2 tab daily x 3days then 1/2 tab twice daily for 3-7 days then 1 tab twice daily for one week then 1 tablet 3 times per day. The dose can be increased if there is no significant benefit. IT may take 1-3 weeks before benefit is seen    empagliflozin (JARDIANCE) 10 MG TABS tablet Take 1 tablet (10 mg) by mouth daily    lisinopril-hydrochlorothiazide (ZESTORETIC) 10-12.5 MG tablet Take 1 tablet by mouth daily    metFORMIN (GLUCOPHAGE) 500 MG tablet Take 500 mg by mouth 2 times daily (with meals)    NONFORMULARY Osteo biflex one per day: 400 Vitamin D, 1500 Glucosamine, 100mg boswellia rain (joint shield)    polyethylene glycol-propylene glycol (SYSTANE ULTRA) 0.4-0.3 % SOLN ophthalmic solution Place 1 drop into both eyes every hour as needed for dry eyes    vitamin C (ASCORBIC ACID) 1000 MG TABS Take 1,000 mg by mouth 2 times daily       PHYSICAL EXAM:    VITAL SIGNS:  Blood pressure 108/77, pulse 80, resp. rate 20, height 1.81 m (5' 11.26\"), weight 86.2 kg (190 lb), SpO2 100%. Body mass index is 26.31 kg/m .    GENERAL:  Mr. Tyler is a pleasant  male who is well-groomed, well-developed and thin sitting comfortably in the exam room without any distress.  Affect is appropriate.    MOVEMENT DISORDERS ASSESSMENT:  MDS UPDRS III (Last Sinemet was at 12 pm)      2/28/2023     2:00 PM   UPDRS Motor Scale   Time: 14:38   Medication Off   R Brain DBS: None   L Brain DBS: None   Dyskinesia (LID) No   Did LID interfere No   Speech 1   Facial Expression 3 "   Rigidity Neck 1   Rigidity RUE 2   Rigidity LUE 1   Rigidity RLE 0   Rigidity LLE 0   Finger Taps R 2   Finger Taps L 2   Hand Mvt R 2   Hand Mvt L 1   Pron-/Supinate R 1   Pron-/Supinate L 0   Toe Tap R 1   Toe Tap L 1   Leg Agility R 0   Leg Agility L 0   Arise From Chair 0   Gait 0   Gait Freezing 0   Postural Stability 0   Posture 0   Global Spont Mvt 0   Postural Tremor RUE 1   Postural Tremor LUE 1   Kinetic Tremor RUE 0   Kinetic Tremor LUE 0   Rest Tremor RUE 1   Rest Tremor LUE 0   Rest Tremor RLE 0   Rest Tremor LLE 0   Rest Tremor Lip/Jaw 0   Rest Tremor Constancy 1   Total Right 10   Total Left 6   Axial Total 5   Total 22       Today I spent 45 minutes caring for the patient. Time was spent with reviewing records, meeting with the patient, answering questions, examining, referral to Speech Therapy, and documentation.    Mendy Stanton DNP, APRN  [facility]'

## 2023-12-05 NOTE — PATIENT INSTRUCTIONS
Dear . Andre Tyler,    Thank you for coming today.  During your visit, we have discussed the following:     __  Stay on the same dose of Sinemet 25/100 mg. Change the times to take your pills 4 -5 hours apart during the day.  There is no need to take a bedtime dose as you don't have nigh time bothersome symptoms like difficulty turing in bed or walking.     __  Referral to Speech Therapy. If you have not heard from the scheduling office within 2 business days, please call [phone] for [employer].    __  Continue exercising 3 times a week.     __  Continue staying socially active.     __ Return in 6 months. You may return sooner as needed.       For questions, you may send us a Meludia message or call [phone]    Fax number: [phone]    To make an appointment with one of our pharmacists, (Serena Lombardi, Pharm.D. or Lia RoperD), call [phone].    Mendy Stanton, CHRIS, APRN  [facility]

## 2023-12-05 NOTE — LETTER
2023       RE: Andre Tyler  [address] [place] [facility] [address]       Dear Colleague,    Thank you for referring your patient, Andre Tyler, to the [facility] [place] at [facility]. Please see a copy of my visit note below.      ASSESSMENT:    Parkinson's Disease:  Started Levodopa in Sep 2023. He reports slight improvement. He takes his last dose an hour before bedtime.  Since he doesn't have bothersome nighttime symptoms, I recommended changing the times he take Sinemet.      PLAN:    __  All questions answered and the following patient instructions provided: -     __  Stay on the same dose of Sinemet 25/100 mg. Change the times to take your pills 4 -5 hours apart during the day.  There is no need to take a bedtime dose as you don't have nigh time bothersome symptoms like difficulty turing in bed or walking.     __  Referral to Speech Therapy. If you have not heard from the scheduling office within 2 business days, please call [phone] for [facility].    __  Continue exercising 3 times a week.     __  Continue staying socially active.     __ Return in 6 months. You may return sooner as needed.            MOVEMENT DISORDERS CLINIC           PATIENT: Andre Tyler    : 1945    JUVE: 2023    REASON FOR VISIT: Parkinson's diease (PD) follow up.    HPI: Mr. Andre Tyler is a 78 year old who came to the [facility] neurology clinic accompanied by his wife, for a follow up visit. He was last seen in the clinic on 2023 by Dr. Galicia for a follow up visit. I saw him last on 2023.    He was diagnosed with PD in 2023. Symptoms started in  with tremors in his jaw & both hands; Rt > Lt.     During his last visit, he was started on Sinemet 25/100 mg.  Since then, he was also seen by Dr. Lombardi, Pharm.D. on 10/24/2023.    PD Medications 6-6:30 am 1 pm 9 pm   Sinemet 25/100  "mg  1 1 1     Pt reports that he has slight improvement in mobility, speech, and tremor. He usually goes to bed at 10 or 10:30 pm. He reports no bothersome nighttime symptoms like bed mobility or walking to the bathroom in the middle of the night.     Pt and his wife usually eat twice a day and avoid taking Sinemet around meal time.     He goes to the health club. He walks, occupationally runs, and walks in the pool, and does light weightlifting.     Reviewed his video swallow. He hasn't done Speech Therapy and is interested.    He asked if he has enough Rx refill for Sinemet.     MEDICATIONS:   Outpatient Medications Marked as Taking for the 12/5/23 encounter (Office Visit) with Stacy Stanton APRN CNP   Medication Sig    carbidopa-levodopa (SINEMET)  MG tablet Start with 1/2 tab daily x 3days then 1/2 tab twice daily for 3-7 days then 1 tab twice daily for one week then 1 tablet 3 times per day. The dose can be increased if there is no significant benefit. IT may take 1-3 weeks before benefit is seen    empagliflozin (JARDIANCE) 10 MG TABS tablet Take 1 tablet (10 mg) by mouth daily    lisinopril-hydrochlorothiazide (ZESTORETIC) 10-12.5 MG tablet Take 1 tablet by mouth daily    metFORMIN (GLUCOPHAGE) 500 MG tablet Take 500 mg by mouth 2 times daily (with meals)    NONFORMULARY Osteo biflex one per day: 400 Vitamin D, 1500 Glucosamine, 100mg boswellia rain (joint shield)    polyethylene glycol-propylene glycol (SYSTANE ULTRA) 0.4-0.3 % SOLN ophthalmic solution Place 1 drop into both eyes every hour as needed for dry eyes    vitamin C (ASCORBIC ACID) 1000 MG TABS Take 1,000 mg by mouth 2 times daily       PHYSICAL EXAM:    VITAL SIGNS:  Blood pressure 108/77, pulse 80, resp. rate 20, height 1.81 m (5' 11.26\"), weight 86.2 kg (190 lb), SpO2 100%. Body mass index is 26.31 kg/m .    GENERAL:  Mr. Tyler is a pleasant  male who is well-groomed, well-developed and thin sitting comfortably in the " exam room without any distress.  Affect is appropriate.    MOVEMENT DISORDERS ASSESSMENT:  MDS UPDRS III (Last Sinemet was at 12 pm)      2/28/2023     2:00 PM   UPDRS Motor Scale   Time: 14:38   Medication Off   R Brain DBS: None   L Brain DBS: None   Dyskinesia (LID) No   Did LID interfere No   Speech 1   Facial Expression 3   Rigidity Neck 1   Rigidity RUE 2   Rigidity LUE 1   Rigidity RLE 0   Rigidity LLE 0   Finger Taps R 2   Finger Taps L 2   Hand Mvt R 2   Hand Mvt L 1   Pron-/Supinate R 1   Pron-/Supinate L 0   Toe Tap R 1   Toe Tap L 1   Leg Agility R 0   Leg Agility L 0   Arise From Chair 0   Gait 0   Gait Freezing 0   Postural Stability 0   Posture 0   Global Spont Mvt 0   Postural Tremor RUE 1   Postural Tremor LUE 1   Kinetic Tremor RUE 0   Kinetic Tremor LUE 0   Rest Tremor RUE 1   Rest Tremor LUE 0   Rest Tremor RLE 0   Rest Tremor LLE 0   Rest Tremor Lip/Jaw 0   Rest Tremor Constancy 1   Total Right 10   Total Left 6   Axial Total 5   Total 22       Today I spent 45 minutes caring for the patient. Time was spent with reviewing records, meeting with the patient, answering questions, examining, referral to Speech Therapy, and documentation.        Again, thank you for allowing me to participate in the care of your patient.      Sincerely,    AMADOR Farias CNP

## 2023-12-11 ENCOUNTER — THERAPY VISIT (OUTPATIENT)
Dept: SPEECH THERAPY | Facility: CLINIC | Age: 78
End: 2023-12-11
Attending: NURSE PRACTITIONER
Payer: COMMERCIAL

## 2023-12-11 DIAGNOSIS — R49.0 DYSPHONIA: ICD-10-CM

## 2023-12-11 DIAGNOSIS — G20.A1 PARKINSON'S DISEASE WITHOUT DYSKINESIA OR FLUCTUATING MANIFESTATIONS (H): ICD-10-CM

## 2023-12-11 PROCEDURE — 92524 BEHAVRAL QUALIT ANALYS VOICE: CPT | Mod: GN | Performed by: SPEECH-LANGUAGE PATHOLOGIST

## 2023-12-11 PROCEDURE — 92507 TX SP LANG VOICE COMM INDIV: CPT | Mod: GN | Performed by: SPEECH-LANGUAGE PATHOLOGIST

## 2023-12-11 NOTE — PROGRESS NOTES
"SPEECH LANGUAGE PATHOLOGY EVALUATION    See electronic medical record for Abuse and Falls Screening details.    Subjective      Presenting condition or subjective complaint:  Speech changes secondary to Parkinson's Disease.  Date of onset: 12/05/23    Relevant medical history:  Hilario was diagnosed with Parkinson's Disease in September of 2023. He reports changes in his speech including slurred speech and a softer voice.  Dates & types of surgery:  Prostate surgery in 2012.    Prior therapy history for the same diagnosis, illness or injury:  None.      Living Environment  Social support:  Wife, Salima    Employment:  Retired .    Hobbies/Interests:  Watching his [employer].    Patient goals for therapy:  Hilario would like to know if there's anything to do to help his speech and voice or if \"it is what it is\".    Pain assessment: Pain denied     Objective     PERSONAL RATING/VOICE USE  Avocational Voice Use: Social communication with family and friends.  Patient description of current voice quality: Hilario describes his voice as occasionally slurred and reports his wife reports his voice is too soft.  Describe the amount of typical non-work voice use: moderate    Describe the intensity of typical non-work voice use: moderate     VOICE PROFILE DURING CONVERSATION (1 min monologue & paragraph reading)  Voice quality: WNL   Voice quality severity rating continuum: 7 - WNL   Breath control: WNL, Hilario demonstrated an average of 23.3 seconds in sustained vowel task. He showed no functional limits due to breath support.    Breath control severity rating continuum: 7 - WNL  Voice Use/Effort: WNL   Voice Use/Effort severity rating continuum: 7 - WNL  Fundamental frequency (Hz): Grossly appropriate, not instrumentally assessed. Hilario reports no significant changes.  Pitch/Frequency Description: WNL   Pitch/Frequency severity rating continuum: 7 - WNL  Average dB: 71  Volume: WNL - Hilario's volume in " today's reading was within normal range. However, he reports his wife has told him his voice is softer.  Volume severity rating continuum: 6 - mild  Neuromuscular Control: WNL   Neuromuscular Control severity rating continuum: 7 - WNL  Resonance: WNL   Resonance severity rating continuum: 7 - WNL    ADDUCTION/ABDUCTION FUNCTION  Coupe do glottis per sec: 3.2  Laryngeal diadokinetic strength: sluggish   Laryngeal diadokinetic consistency: regular, run together   Adduction/Abduction function scale: Age 66+, norm per sec: 4     VIBRATORY FUNCTION OF VOCAL FOLDS  Prolonged  ah  at mid pitch (sec):  70dB SPL for an average of 23.3 seconds  Vibratory Function of Vocal Folds Scale Norms: males 20 - 80 yrs: 15 - 25 secs     COMPRESSION STRENGTH OF VOCAL FOLDS/LARYNX MUSCLES  Vowels average volume dB: 70  Reading aloud average volume dB: 71  Conversation average volume dB: 70  Dynamic volume range minimum dB: 66  Dynamic volume range maximum dB: 90  Efficiency of increased volume: relaxed throat   Ease/Effort of loud/quiet volumes: both loud/quiet is difficult, Increased volume requires increased awareness and thought.      FUNCTIONAL LENGTHENERS/SHORTENERS (CT & TA muscles)  Pitch glides: upper register present     Assessment & Plan   CLINICAL IMPRESSIONS   Medical Diagnosis: Parkinson's Disease    Treatment Diagnosis: Mild dysarthria and voice changes.   Impression/Assessment: Pt is a 78 year old male with mild speech and voice changes consistent with Parkinson's Disease. These changes are characterized by mild dysarthria of run together segments of speech and a mildly reduced vocal volume. These changes occasionally result in decreased intelligibility, usually in less than ideal environments or with impaired listeners. Hilario is always able to clarify and effectively communicate his message. Given the progressive nature of Parkinson's Disease he is recommended to begin basic voice and speech exercises to limit changes in  his communication. He was in agreement with this.    PLAN OF CARE  Treatment Interventions: Voice    Prognosis to achieve stated therapy goals is good   Rehab potential is impacted by:  Progressive nature of Parkinson's Disease.    Long Term Goals   SLP Goal 1  Goal Identifier: Home Exercise Training  Goal Description: Hilario will state his understanding of voice exercises (sustained vowel, functional phrases and paragraph reading) to be completed at least 5/7 days/week for improved functional communication.  Target Date: 01/11/23      Frequency of Treatment: 2x/month  Duration of Treatment: 1 month     Recommended Referrals to Other Professionals:  None at this time.  Education Assessment:   Learner/Method: Patient;No Barriers to Learning    Risks and benefits of evaluation/treatment have been explained.   Patient/Family/caregiver agrees with Plan of Care.     Evaluation Time:    Voice Minutes (16383): 36    Signing Clinician: ALEKS Laureano    [facility]                                                                                   OUTPATIENT SPEECH LANGUAGE PATHOLOGY      PLAN OF TREATMENT FOR OUTPATIENT REHABILITATION   Patient's Last Name, First Name, Andre Santos YOB: 1945   Provider's Name   [facility]   Medical Record No.  [id number]     Onset Date: 12/05/23 Start of Care Date: 12/11/23     Medical Diagnosis:  Parkinson's Disease      SLP Treatment Diagnosis: Mild dysarthria and voice changes.  Plan of Treatment  Frequency/Duration: 2x/month  / 1 month     Certification date from 12/11/23   To 01/11/24          See note for plan of treatment details and functional goals     ALEKS Laureano                         I CERTIFY THE NEED FOR THESE SERVICES FURNISHED UNDER        THIS PLAN OF TREATMENT AND WHILE UNDER MY CARE     (Physician attestation of this document indicates review and certification of the therapy  plan).              Referring Provider:  Stacy Stanton    Initial Assessment  See Epic Evaluation- 12/11/23

## 2023-12-11 NOTE — PATIENT INSTRUCTIONS
Hold a nice strong  ah  sound for as long as you can. Do this 3 times.    Read these phrases out loud using a strong voice. Use intent to say them as clearly as you can:  Good morning.  What's happening today? What's on the schedule?  Wanna go outside?  What do you have figured for supper?  I'm going to bed.    Read a paragraph from a newspaper, book, etc. out loud. Think loud and read it with intent.

## 2024-01-04 ENCOUNTER — TELEPHONE (OUTPATIENT)
Dept: DERMATOLOGY | Facility: CLINIC | Age: 79
End: 2024-01-04
Payer: COMMERCIAL

## 2024-01-04 NOTE — TELEPHONE ENCOUNTER
Writer talked to patient regarding rescheduling appointment, patient hx of melanoma and has concerns.     Patient scheduled with Dr. Nazario on Jan 25 at 3:45 pm, writer confirmed appt time with Dr. Nazario team.     Manuela Alvares RN

## 2024-01-25 ENCOUNTER — OFFICE VISIT (OUTPATIENT)
Dept: DERMATOLOGY | Facility: CLINIC | Age: 79
End: 2024-01-25
Payer: COMMERCIAL

## 2024-01-25 DIAGNOSIS — L57.0 AK (ACTINIC KERATOSIS): Primary | ICD-10-CM

## 2024-01-25 DIAGNOSIS — Z85.828 HISTORY OF BASAL CELL CARCINOMA OF SKIN: ICD-10-CM

## 2024-01-25 DIAGNOSIS — Z85.828 HISTORY OF SQUAMOUS CELL CARCINOMA OF SKIN: ICD-10-CM

## 2024-01-25 DIAGNOSIS — D48.9 NEOPLASM OF UNCERTAIN BEHAVIOR: ICD-10-CM

## 2024-01-25 PROCEDURE — 99213 OFFICE O/P EST LOW 20 MIN: CPT | Mod: 25 | Performed by: DERMATOLOGY

## 2024-01-25 PROCEDURE — 17000 DESTRUCT PREMALG LESION: CPT | Mod: XS | Performed by: DERMATOLOGY

## 2024-01-25 PROCEDURE — 11102 TANGNTL BX SKIN SINGLE LES: CPT | Mod: GC | Performed by: DERMATOLOGY

## 2024-01-25 PROCEDURE — 17003 DESTRUCT PREMALG LES 2-14: CPT | Mod: XS | Performed by: DERMATOLOGY

## 2024-01-25 PROCEDURE — 88305 TISSUE EXAM BY PATHOLOGIST: CPT | Performed by: DERMATOLOGY

## 2024-01-25 ASSESSMENT — PAIN SCALES - GENERAL: PAINLEVEL: NO PAIN (0)

## 2024-01-25 NOTE — NURSING NOTE
"Andre Tyler's goals for this visit include:   Chief Complaint   Patient presents with    Derm Problem     Hilario is here today for skin check, pt states \"no concerns\"       He requests these members of his care team be copied on today's visit information:     PCP: Arnulfo Joaquin    Referring Provider:  No referring provider defined for this encounter.    There were no vitals taken for this visit.    Do you need any medication refills at today's visit? No    Manuela Alvares RN      "

## 2024-01-25 NOTE — PROGRESS NOTES
[facility] Dermatology Note  Encounter Date: Jan 25, 2024  Office Visit     Dermatology Problem List:  Last FBSE 1/25/24 (due every 3 months through 2/2024)   # NUB  - Left volar forearm s/p shave bx 1/25/25, path pending   1. History of Melanoma  - Malignant Melanoma - L ventral forearm, 0.3mm breslow depth, s/p excision 2/10/22  2. 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  - BCC - L posterior neck, s/p excision 12/8/22  - sBCC - L paraspinal lower back, s/p ED&C 8/29/22  - SCCIS - right tricep, s/p ED&C 8/29/22  - SCCIS - R dorsal hand, s/p Mohs and linear repair 8/29/22   - SCCIS - L shin, s/p Mohs and linear repair 8/29/22  - SCC - left dorsal wrist, s/p MMS 7/21/21  - BCC - upper mid back, s/p excision 7/21/21  - BCC - left clavicle, s/p excision 7/21/21  - BCC - left upper back, s/p excision 12/17/2020  - SCC - base of right thumb, s/p excision 6/26/18  - SCCIS - central chest, s/p ED & C 6/11/18  - BCC - vertex scalp, s/p MMS 11/21/2016  - SCCis - R lateral thigh, s/p ED&C 11/21/2016  - SCCIS - right medial ankle, s/p Mohs 4/21/2016  - SCCIS - right ventral forearm, s/p ED&C 4/21/2016  - BCC - left preauricular, s/p Mohs 11/10/2015  - BCC, superficial type - left upper chest, s/p biopsy 4/9/2015, s/p ED&C 10/5/2015  - SCCIS - posterior right lower earlobe, records via [employer] transferred records, Dr. Arnulfo Keyes, 2011. s/p excision in 2005, recurrent  - SCCIS - left dorsal hand, s/p via [facility] [employer] transferred records, Dr. Arnulfo Keyes, 2011  3. Actinic keratosis  - Planning for PDT Spring 2024  -  right dorsal hand lateral, s/p biopsy 7/6/23; s/p LN2 8/16/23  - HAK - left dorsal wrist, s/p bx 11/10/2020  - s/p cryotherapy  4. History of benign biopsy  - Intradermal melanocytic nevus - left postauricular scalp, s/p bx 6/18/21  - Compound melanocytic nevus - left lower back, s/p bx 4/9/15  - BLK - right lower leg, s/p bx 4/9/2015      Family hx of melanoma is negative.  Sister with unknown skin issues    ____________________________________________    Assessment & Plan:    # Neoplasm of unspecified behavior of the skin (D49.2) on the left volar forearm. The differential diagnosis includes SCC vs SK vs BLK.  - Shave biopsy performed today (see procedure note(s) below).    # History of NMSC  # History of melanoma - left ventral forearm, no clinical evidence of recurrence.  - ABCDEs: Counseled ABCDEs of melanoma: Asymmetry, Border (irregularity), Color (not uniform, changes in color), Diameter (greater than 6 mm which is about the size of a pencil eraser), and Evolving (any changes in preexisting moles).  - Sun protection: Counseled SPF30+ sunscreen, UPF clothing, sun avoidance, tanning bed avoidance.   - Recommended yearly dental, ophthalmology .  - Recommended skin exams for all first-degree relatives.  - Recommended follow up every 3 months    # Actinic keratosis - dorsal hands, arms, forearms, neck, ears,  x12. Based on the location and chronic irritation to this lesion, treatment with cryotherapy is warranted. Will plan to start PDT which patient was in agreement with.   - See cryo procedure note.  - Order placed for PT bilateral dorsal hands and forearms      Procedures Performed:   - Shave biopsy procedure note, location(s): left volar forearm. After discussion of benefits and risks including but not limited to bleeding, infection, scar, incomplete removal, recurrence, and non-diagnostic biopsy, written consent and photographs were obtained. The area was cleaned with isopropyl alcohol. 0.5mL of 1% lidocaine with epinephrine was injected to obtain adequate anesthesia of lesion(s). Shave biopsy at site(s) performed. Hemostasis was achieved with aluminium chloride. Petrolatum ointment and a sterile dressing were applied. The patient tolerated the procedure and no complications were noted. The patient was provided with verbal and written post  "care instructions.     Cryotherapy procedure note: After verbal consent and discussion of risks and benefits including but no limited to dyspigmentation/scar, blister, and pain, 12 AK's were treated with 1-2mm freeze border for 2 cycles with liquid nitrogen. Post cryotherapy instructions were provided.         Follow-up: 3 month(s) in-person, or earlier for new or changing lesions    Staff and Resident:    I, Kirby Blanton MD, discussed and evaluated the patient with Dr. Nazario.    Staff Physician Comments:   I saw and evaluated the patient with the resident and I agree with the assessment and plan. I was present for the entire procedures and examination.    Boby Nazario DO    Department of Dermatology  [facility]: Phone: [phone], Fax:[phone]  [facility]: Phone: [phone], Fax: [phone]      ____________________________________________    CC: Derm Problem (Hilario is here today for skin check, pt states \"no concerns\")    HPI:  Mr. Andre Tyler is a(n) 78 year old male who presents today as a return patient for FBSE. He reports a pink spot on his left volar forearm. He thinks it has been there for about a year. Not itchy or painful. He denies any fever, chills, weight loss, night sweats.     Patient is otherwise feeling well, without additional skin concerns.    Labs Reviewed:  N/A    Physical Exam:  Vitals: There were no vitals taken for this visit.  SKIN: Total skin excluding the undergarment areas was performed. The exam included the head/face, neck, both arms, chest, back, abdomen, both legs, digits and/or nails. declines genital exam  - Well healed scar at site of previous melanoma, no repigmentation or nodularity noted.   -Well healed scars at sites of previous NMSC, no repigmentation or nodularity noted.  -There is no cervical, supraclavicular, axillary " lymphadenopathy.  - Pink keratotic gritty papules on hands,arms, forearms, ears x 12  - 5mm pink papule left volar forearm  - No other lesions of concern on areas examined.       Medications:  Current Outpatient Medications   Medication    carbidopa-levodopa (SINEMET)  MG tablet    empagliflozin (JARDIANCE) 10 MG TABS tablet    lisinopril-hydrochlorothiazide (ZESTORETIC) 10-12.5 MG tablet    metFORMIN (GLUCOPHAGE) 500 MG tablet    NONFORMULARY    polyethylene glycol-propylene glycol (SYSTANE ULTRA) 0.4-0.3 % SOLN ophthalmic solution    vitamin C (ASCORBIC ACID) 1000 MG TABS     No current facility-administered medications for this visit.      Past Medical History:   Patient Active Problem List   Diagnosis    Health Care Home    Hypertension goal BP (blood pressure) < 140/90    Squamous cell skin cancer    Prostate cancer - surgically treated    CARDIOVASCULAR SCREENING; LDL GOAL LESS THAN 130    Sensorineural hearing loss, bilateral    Bladder neck contracture    Bladder stone    Type 2 diabetes mellitus without complication (H)    History of melanoma    History of nonmelanoma skin cancer    Carcinoma of prostate (H)    Contact with and (suspected) exposure to other hazardous aromatic compounds    Elevated fasting blood sugar    HDL lipoprotein deficiency    Hearing loss    Hypertriglyceridemia    Other ill-defined and unknown causes of morbidity and mortality    Essential (primary) hypertension    Essential hypertension    Malignant neoplasm of prostate (H)    Tremor    H/O echocardiogram 2023    Family hx of lung cancer    Parkinson's disease    H/O prostatectomy     Past Medical History:   Diagnosis Date    Diabetes (H)     H/O echocardiogram 2023 3/10/2023    Global and regional left ventricular function is normal with an EF of 55-60%. Right ventricular function, chamber size, wall motion, and thickness are normal. Pulmonary artery systolic pressure is normal. Sinuses of Valsalva 4.1 cm. Mild dilatation of  the aorta is present. The inferior vena cava is normal. No pericardial effusion is present. ________________________________________________________    History of melanoma     2022 left forearm    History of nonmelanoma skin cancer     NUMEROUS    Hypertension goal BP (blood pressure) < 140/90     Prostate cancer (H)     Squamous cell skin cancer     Tremor 2/24/2023

## 2024-01-25 NOTE — PATIENT INSTRUCTIONS
Cryotherapy    What is it?  Use of a very cold liquid, such as liquid nitrogen, to freeze and destroy abnormal skin cells that need to be removed    What should I expect?  Tenderness and redness  A small blister that might grow and fill with dark purple blood. There may be crusting.  More than one treatment may be needed if the lesions do not go away.    How do I care for the treated area?  Gently wash the area with your hands when bathing.  Use a thin layer of Vaseline to help with healing. You may use a Band-Aid.   The area should heal within 7-10 days and may leave behind a pink or lighter color.   Do not use an antibiotic or Neosporin ointment.   You may take acetaminophen (Tylenol) for pain.     Call your doctor if you have:  Severe pain  Signs of infection (warmth, redness, cloudy yellow drainage, and or a bad smell)  Questions or concerns    Who should I call with questions?      [facility]: [phone]      [facility]: [phone]      For urgent needs outside of business hours call the [facility] at [phone] and ask for the dermatology resident on call       Photodynamic Therapy (PDT) Home Care Instructions  I will experience redness, swelling, pain and discomfort which may last 5-10 days. I may experience pinkness or redness that persists for 2-3 weeks but longer duration is possible in some individuals. Risks are infection, eye injury, blistering, reactivation of the cold sore virus, skin lightening, skin darkening or scarring. Multiple treatments may be required.   DAY OF TREATMENT  Wash treated area with mild cleanser.  Redness of the treated skin is expected and can vary significantly from person to person and treatment to treatment.  Apply SPF 50 before leaving your home/office and while driving to and from work.  Remain indoors if possible and avoid direct as well as indirect sunlight.  If your head or face were treated,  wear a wide brimmed hat while going to and from your car.  If your hands/arms were treated, were long sleeves and gloves.  Ice packs every 1-2 hours may be helpful as well.  In the evening, cleanse treated area gently.  Your skin will feel dry; keep treated area moisturized with a moisturizer.    DAY TWO  You may take a shower.  Men should NOT shave if their face was treated.  Cleanse treated area gently.  Apply SPF 50  Most discomfort usually subsides by Day 3.  You should avoid direct sunlight and try to remain indoors on Day 2.  The sensitivity to sunlight will significantly decrease after 48 hours.  You should soak the treated areas with as soft washcloth with cold water for 20 minutes every 4-6 hours.  Ice may be applied after the cold-water soaks, if this feels good.  The area should be patted dry and moisturized following the cold-water soaks.  Follow evening routine as you did on Day 1  DAYS 3- 7   Try to avoid direct sunlight and minimize incidental exposure for one week.  NO BEACHES!  Continue using SPF 50.  This is very important in protecting your newly rejuvenated skin.  You may begin applying make-up once any crusting has healed.  The area may be slightly red for a few weeks.  The skin will feel dry and tightened.  Moisturize once to twice daily.  Who should I call with questions?  [facility]: [phone]  [facility]: [phone]  For urgent needs outside of business hours call the [facility] at [phone] and ask for the dermatology resident on call

## 2024-01-25 NOTE — Clinical Note
Diego Spangler and Faviola,  I can't recall who saw this patient with me. It looks like the virtual scribe wasn't involved. May I have someone finish the note? Thank you.  AM

## 2024-01-25 NOTE — LETTER
1/25/2024         RE: Andre Tyler  [address] [address]        Dear Colleague,    Thank you for referring your patient, Andre Tyler, to the [facility]. Please see a copy of my visit note below.    [facility] Dermatology Note  Encounter Date: Jan 25, 2024  Office Visit     Dermatology Problem List:  Last FBSE 1/25/24 (due every 3 months through 2/2024)   # NUB  - Left volar forearm s/p shave bx 1/25/25, path pending   1. History of Melanoma  - Malignant Melanoma - L ventral forearm, 0.3mm breslow depth, s/p excision 2/10/22  2. 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  - BCC - L posterior neck, s/p excision 12/8/22  - sBCC - L paraspinal lower back, s/p ED&C 8/29/22  - SCCIS - right tricep, s/p ED&C 8/29/22  - SCCIS - R dorsal hand, s/p Mohs and linear repair 8/29/22   - SCCIS - L shin, s/p Mohs and linear repair 8/29/22  - SCC - left dorsal wrist, s/p MMS 7/21/21  - BCC - upper mid back, s/p excision 7/21/21  - BCC - left clavicle, s/p excision 7/21/21  - BCC - left upper back, s/p excision 12/17/2020  - SCC - base of right thumb, s/p excision 6/26/18  - SCCIS - central chest, s/p ED & C 6/11/18  - BCC - vertex scalp, s/p MMS 11/21/2016  - SCCis - R lateral thigh, s/p ED&C 11/21/2016  - SCCIS - right medial ankle, s/p Mohs 4/21/2016  - SCCIS - right ventral forearm, s/p ED&C 4/21/2016  - BCC - left preauricular, s/p Mohs 11/10/2015  - BCC, superficial type - left upper chest, s/p biopsy 4/9/2015, s/p ED&C 10/5/2015  - SCCIS - posterior right lower earlobe, records via [employer] [facility] transferred records, Dr. Arnulfo Keyes, 2011. s/p excision in 2005, recurrent  - SCCIS - left dorsal hand, s/p via [employer] transferred records, Dr. Arnulfo Keyes, 2011  3. Actinic keratosis  - Planning for PDT Spring 2024  -  right dorsal hand lateral, s/p biopsy 7/6/23; s/p LN2 8/16/23  -  HAK - left dorsal wrist, s/p bx 11/10/2020  - s/p cryotherapy  4. History of benign biopsy  - Intradermal melanocytic nevus - left postauricular scalp, s/p bx 6/18/21  - Compound melanocytic nevus - left lower back, s/p bx 4/9/15  - BLK - right lower leg, s/p bx 4/9/2015     Family hx of melanoma is negative.  Sister with unknown skin issues    ____________________________________________    Assessment & Plan:    # Neoplasm of unspecified behavior of the skin (D49.2) on the left volar forearm. The differential diagnosis includes SCC vs SK vs BLK.  - Shave biopsy performed today (see procedure note(s) below).    # History of NMSC  # History of melanoma - left ventral forearm, no clinical evidence of recurrence.  - ABCDEs: Counseled ABCDEs of melanoma: Asymmetry, Border (irregularity), Color (not uniform, changes in color), Diameter (greater than 6 mm which is about the size of a pencil eraser), and Evolving (any changes in preexisting moles).  - Sun protection: Counseled SPF30+ sunscreen, UPF clothing, sun avoidance, tanning bed avoidance.   - Recommended yearly dental, ophthalmology .  - Recommended skin exams for all first-degree relatives.  - Recommended follow up every 3 months    # Actinic keratosis - dorsal hands, arms, forearms, neck, ears,  x12. Based on the location and chronic irritation to this lesion, treatment with cryotherapy is warranted. Will plan to start PDT which patient was in agreement with.   - See cryo procedure note.  - Order placed for PT bilateral dorsal hands and forearms      Procedures Performed:   - Shave biopsy procedure note, location(s): left volar forearm. After discussion of benefits and risks including but not limited to bleeding, infection, scar, incomplete removal, recurrence, and non-diagnostic biopsy, written consent and photographs were obtained. The area was cleaned with isopropyl alcohol. 0.5mL of 1% lidocaine with epinephrine was injected to obtain adequate anesthesia of  "lesion(s). Shave biopsy at site(s) performed. Hemostasis was achieved with aluminium chloride. Petrolatum ointment and a sterile dressing were applied. The patient tolerated the procedure and no complications were noted. The patient was provided with verbal and written post care instructions.     Cryotherapy procedure note: After verbal consent and discussion of risks and benefits including but no limited to dyspigmentation/scar, blister, and pain, 12 AK's were treated with 1-2mm freeze border for 2 cycles with liquid nitrogen. Post cryotherapy instructions were provided.         Follow-up: 3 month(s) in-person, or earlier for new or changing lesions    Staff and Resident:    I, Kirby Blanton MD, discussed and evaluated the patient with Dr. Nazario.    Staff Physician Comments:   I saw and evaluated the patient with the resident and I agree with the assessment and plan. I was present for the entire procedures and examination.    Boby Nazario DO    Department of Dermatology  [facility]: Phone: [phone], Fax:[phone]  [facility]: Phone: [phone], Fax: [phone]      ____________________________________________    CC: Derm Problem (Hilario is here today for skin check, pt states \"no concerns\")    HPI:  Mr. Andre Tyler is a(n) 78 year old male who presents today as a return patient for FBSE. He reports a pink spot on his left volar forearm. He thinks it has been there for about a year. Not itchy or painful. He denies any fever, chills, weight loss, night sweats.     Patient is otherwise feeling well, without additional skin concerns.    Labs Reviewed:  N/A    Physical Exam:  Vitals: There were no vitals taken for this visit.  SKIN: Total skin excluding the undergarment areas was performed. The exam included the head/face, neck, both arms, chest, back, abdomen, both legs, " digits and/or nails. declines genital exam  - Well healed scar at site of previous melanoma, no repigmentation or nodularity noted.   -Well healed scars at sites of previous NMSC, no repigmentation or nodularity noted.  -There is no cervical, supraclavicular, axillary lymphadenopathy.  - Pink keratotic gritty papules on hands,arms, forearms, ears x 12  - 5mm pink papule left volar forearm  - No other lesions of concern on areas examined.       Medications:  Current Outpatient Medications   Medication     carbidopa-levodopa (SINEMET)  MG tablet     empagliflozin (JARDIANCE) 10 MG TABS tablet     lisinopril-hydrochlorothiazide (ZESTORETIC) 10-12.5 MG tablet     metFORMIN (GLUCOPHAGE) 500 MG tablet     NONFORMULARY     polyethylene glycol-propylene glycol (SYSTANE ULTRA) 0.4-0.3 % SOLN ophthalmic solution     vitamin C (ASCORBIC ACID) 1000 MG TABS     No current facility-administered medications for this visit.      Past Medical History:   Patient Active Problem List   Diagnosis     Health Care Home     Hypertension goal BP (blood pressure) < 140/90     Squamous cell skin cancer     Prostate cancer - surgically treated     CARDIOVASCULAR SCREENING; LDL GOAL LESS THAN 130     Sensorineural hearing loss, bilateral     Bladder neck contracture     Bladder stone     Type 2 diabetes mellitus without complication (H)     History of melanoma     History of nonmelanoma skin cancer     Carcinoma of prostate (H)     Contact with and (suspected) exposure to other hazardous aromatic compounds     Elevated fasting blood sugar     HDL lipoprotein deficiency     Hearing loss     Hypertriglyceridemia     Other ill-defined and unknown causes of morbidity and mortality     Essential (primary) hypertension     Essential hypertension     Malignant neoplasm of prostate (H)     Tremor     H/O echocardiogram 2023     Family hx of lung cancer     Parkinson's disease     H/O prostatectomy     Past Medical History:   Diagnosis Date      Diabetes (H)      H/O echocardiogram 2023 3/10/2023    Global and regional left ventricular function is normal with an EF of 55-60%. Right ventricular function, chamber size, wall motion, and thickness are normal. Pulmonary artery systolic pressure is normal. Sinuses of Valsalva 4.1 cm. Mild dilatation of the aorta is present. The inferior vena cava is normal. No pericardial effusion is present. ________________________________________________________     History of melanoma     2022 left forearm     History of nonmelanoma skin cancer     NUMEROUS     Hypertension goal BP (blood pressure) < 140/90      Prostate cancer (H)      Squamous cell skin cancer      Tremor 2/24/2023            Again, thank you for allowing me to participate in the care of your patient.        Sincerely,        Boby Nazario MD

## 2024-01-29 ENCOUNTER — TELEPHONE (OUTPATIENT)
Dept: DERMATOLOGY | Facility: CLINIC | Age: 79
End: 2024-01-29
Payer: COMMERCIAL

## 2024-01-29 NOTE — TELEPHONE ENCOUNTER
Financial Counselor Review:    Procedure to be performed (include CPT code):  Photodynamic Therapy - 30029  Diagnosis:  actinic keratoses  ICD-10 code:  L57.0  # of treatments requested:  3  Medication order (include J code):  aminolevulonic acid (Levulan) -   Medication dose and frequency: 1-2 Levulan sticks per treatment    Include coverage and patient financial responsibility information:  Yes    Does patient need to be contacted by Financial Counselor:  No    Route response back to the dermatology patient care [place] - [id number]

## 2024-01-30 ENCOUNTER — TELEPHONE (OUTPATIENT)
Dept: DERMATOLOGY | Facility: CLINIC | Age: 79
End: 2024-01-30
Payer: COMMERCIAL

## 2024-01-30 NOTE — TELEPHONE ENCOUNTER
Excision/Mohs previsit information                                                    Diagnosis: squamous cell carcinoma in situ  Site(s): Left volar forearm     Over the counter Chlorhexidine surgical soap to wash all skin below the belly button twice before surgery should be recommended for the following:  - Surgical sites below the waist  - Immunosuppressed  - Previous surgical site infection  - Anticipated wound care challenges    Medication & Allergy Information                                                      Review and update allergy and medication list.    Do you take the following medications:  Coumadin, Eliquis, Pradaxa, Xarelto:  NO   -If on Coumadin, INR should be checked within 7 days of surgery.  Range should be 3.5 or less or within therapeutic range.    Past Medical History                                                    Do you currently or have you previously had any of the following conditions:    Hepatitis:  NO  HIV/AIDS:  NO  Prolonged bleeding or bleeding disorder:  NO  Pacemaker or Defibrillator:  NO.    History of artificial or heart valve replacement:  NO  Endocarditis (inflammation of the inner lining of the heart's chambers and valves):  NO  Have you ever had a prosthetic joint infection:  NO  Pregnant or Breastfeeding:  N/A  Mobility device (wheelchair, transfer difficulty): NO    Important Reminders:                                                      Ok to take all of their medications as prescribed  Patients can eat, no need to be fasting  Patient will not be able to get the site wet for 48 hrs  No submerging wound in standing water (lake, pool, bathtub, hot tub) for 2 weeks  No physical activity for 48 hrs (further restrictions will be discussed by MD at time of visit)    If any positives, send to RN for further review  Tayla Mccarty RN     Writer called pt to discuss pathology results. Pt scheduled for excision. Excision checklist complete and all questions were  negative. Pt denied having any questions or concerns at this time.     Tayla Mccarty RN on 1/30/2024 at 9:42 AM      Final Diagnosis  A. Left volar forearm:  - Squamous cell carcinoma in situ - (see description)  Electronically signed by Ramsey Varma MD on 1/29/2024 at  3:52 PM    Result Notes     Boby Nazario MD  1/29/2024  5:51 PM CST Back to Top    Please call the patient with pathology results.     The pathologist diagnosed another SCCIS, an early skin cancer. My treatment recommendation is excision.     Thank you.

## 2024-02-01 NOTE — TELEPHONE ENCOUNTER
PB DOS: TBD   Type of Procedure: Photodynamic Therapy  ICD-10 code:  # of treatments requested:  3  Medication dose and frequency: 1-2 Levulan sticks per treatment  CPT Codes: 67209,   ICD10 Codes: L57.0    Surgeon/Ordering provider: Boby Nazario MD  Pre-cert/Authorization completed:  no PA required   Payer: [employer]   Spoke to PA list and CMS   Ref. # / Auth #   Valid Dates:     Please advise the patient to contact their insurance for coverage and benefits. Prior authorization is not a guarantee of payment. Payment is based on the patient's benefit plan documents at the time of service

## 2024-02-02 NOTE — TELEPHONE ENCOUNTER
Patient scheduled for 1st PDT on Feb 15th at 11:00 am.     No further questions or concerns.    Manuela Alvares RN

## 2024-02-15 ENCOUNTER — OFFICE VISIT (OUTPATIENT)
Dept: DERMATOLOGY | Facility: CLINIC | Age: 79
End: 2024-02-15
Payer: COMMERCIAL

## 2024-02-15 DIAGNOSIS — L57.0 AK (ACTINIC KERATOSIS): Primary | ICD-10-CM

## 2024-02-15 PROCEDURE — 96567 PDT DSTR PRMLG LES SKN: CPT | Performed by: DERMATOLOGY

## 2024-02-15 ASSESSMENT — PAIN SCALES - GENERAL: PAINLEVEL: NO PAIN (0)

## 2024-02-15 NOTE — LETTER
2/15/2024         RE: Andre Tyler  [address] [address]        Dear Colleague,    Thank you for referring your patient, Andre Tyler, to the [facility]. Please see a copy of my visit note below.    Photodynamic Therapy Intake:    If patient has a history of HSV or VZV (shingles in treatment area) they have been given prophylaxis or documented that they refused it:  NO  If Valtrex prescribed, when did patient start?   N/a.    1.Close monitoring for more significant reaction, and avoid saran wrap if YES to any of the following:  New medications since seen by physician? No (If yes, contact supervising physician)  NSAIDs, ibuprofen, aspirin, naproxen, piroxicam: No  Antiarrhythmics (amiodarone, quinidine): No  Hydrochlorothiazide: Yes    2. Will hold PDT for YES to any of the following:  Pregnancy/breastfeeding/unknown pregnancy: N/A  Sun burn: No  Tetracyclines such as doxycycline: No  Ciprofloxacin: No  Methotrexate: No    3. Will hold LEVULAN for YES to any of the following:   Treatment today is for acne: NO    4. If patient is receiving sand paper (RN only procedure) do they have a history of MRSA:  No    5. Consent within 90 days with MD verified in patient chart?  Yes  The sites to be treated were verified and discussed with patient. Sites verified were hands and forearms.   Expected symptoms and/or complications of redness, peeling, stinging, and burning were reviewed.  Comfort measures including moisturizer, cool compresses, and sun protection were also reviewed with patient.  After review, patient verbalized understanding of procedure and consent form signed by patient (as per MD documentation) and patient agrees to proceed with treatment.     Treatment site(s) cleansed with [employer]: Yes   Treatment site(s) de-greased with Acetone: Yes  Applied 2 Levulan stick to the site/s.  NO, saran wrap applied to n/a, sand paper used on n/a.      MEDICATION: Levulan  DOSE: 3 mL  ROUTE: Topical  : [employer]  LOCATION GIVEN: bilateral hands and forearms  LOT NO: [id number]  EXP. DATE:   NDC: 31570-114-46    TREATMENT NUMBER (30 maximum treatments per site): 1    Photodynamic Therapy(PDT) Post Op Note    Patient successfully completed PDT treatment today. Patient tolerated treatment without complication.  Sites were cleansed with mild soap and water and SPF 50 was applied after treatment.     Sun protection was reviewed with patient:  Patient wore long sleeve shirt to protect or to shield arms    After care instructions were reviewed with patient. AVS printed with clinic contact information and given to patient. Patient verbalized understanding and had no further questions or concerns at this time. Patient left clinic in good condition.     Tayla Mccarty RN on 2/15/2024 at 11:50 AM    Staff Physician Comments:   The RN saw and evaluated the patient (Rusty Mccarty RN).  I agree with the assessment and plan and description of the procedure as above.   I was immediately available at all times.     Boby Nazario DO    Department of Dermatology  [facility]: Phone: [phone], Fax:[phone]  [facility]: Phone: [phone], Fax: [phone]        Again, thank you for allowing me to participate in the care of your patient.        Sincerely,        Boby Nazario MD

## 2024-02-15 NOTE — PROGRESS NOTES
Photodynamic Therapy Intake:    If patient has a history of HSV or VZV (shingles in treatment area) they have been given prophylaxis or documented that they refused it:  NO  If Valtrex prescribed, when did patient start?   N/a.    1.Close monitoring for more significant reaction, and avoid saran wrap if YES to any of the following:  New medications since seen by physician? No (If yes, contact supervising physician)  NSAIDs, ibuprofen, aspirin, naproxen, piroxicam: No  Antiarrhythmics (amiodarone, quinidine): No  Hydrochlorothiazide: Yes    2. Will hold PDT for YES to any of the following:  Pregnancy/breastfeeding/unknown pregnancy: N/A  Sun burn: No  Tetracyclines such as doxycycline: No  Ciprofloxacin: No  Methotrexate: No    3. Will hold LEVULAN for YES to any of the following:   Treatment today is for acne: NO    4. If patient is receiving sand paper (RN only procedure) do they have a history of MRSA:  No    5. Consent within 90 days with MD verified in patient chart?  Yes  The sites to be treated were verified and discussed with patient. Sites verified were hands and forearms.   Expected symptoms and/or complications of redness, peeling, stinging, and burning were reviewed.  Comfort measures including moisturizer, cool compresses, and sun protection were also reviewed with patient.  After review, patient verbalized understanding of procedure and consent form signed by patient (as per MD documentation) and patient agrees to proceed with treatment.     Treatment site(s) cleansed with [employer]: Yes   Treatment site(s) de-greased with Acetone: Yes  Applied 2 Levulan stick to the site/s.  NO, saran wrap applied to n/a, sand paper used on n/a.     MEDICATION: Levulan  DOSE: 3 mL  ROUTE: Topical  : [employer]  LOCATION GIVEN: bilateral hands and forearms  LOT NO: [id number]  EXP. DATE:   NDC: 44939-865-63    TREATMENT NUMBER (30 maximum treatments per site): 1    Photodynamic Therapy(PDT) Post Op  Note    Patient successfully completed PDT treatment today. Patient tolerated treatment without complication.  Sites were cleansed with mild soap and water and SPF 50 was applied after treatment.     Sun protection was reviewed with patient:  Patient wore long sleeve shirt to protect or to shield arms    After care instructions were reviewed with patient. AVS printed with clinic contact information and given to patient. Patient verbalized understanding and had no further questions or concerns at this time. Patient left clinic in good condition.     Tayla Mccarty RN on 2/15/2024 at 11:50 AM    Staff Physician Comments:   The RN saw and evaluated the patient (Rusty Mccarty RN).  I agree with the assessment and plan and description of the procedure as above.   I was immediately available at all times.     Boby Nazario DO    Department of Dermatology  [facility]: Phone: [phone], Fax:[phone]  [facility]: Phone: [phone], Fax: [phone]

## 2024-02-15 NOTE — PATIENT INSTRUCTIONS
Photodynamic Therapy (PDT) Home Care Instructions  I will experience redness, swelling, pain and discomfort which may last 5-10 days. I may experience pinkness or redness that persists for 2-3 weeks but longer duration is possible in some individuals. Risks are infection, eye injury, blistering, reactivation of the cold sore virus, skin lightening, skin darkening or scarring. Multiple treatments may be required.   DAY OF TREATMENT  Wash treated area with mild cleanser.  Redness of the treated skin is expected and can vary significantly from person to person and treatment to treatment.  Apply SPF 50 before leaving your home/office and while driving to and from work.  Remain indoors if possible and avoid direct as well as indirect sunlight.  If your head or face were treated, wear a wide brimmed hat while going to and from your car.  If your hands/arms were treated, were long sleeves and gloves.  Ice packs every 1-2 hours may be helpful as well.  In the evening, cleanse treated area gently.  Your skin will feel dry; keep treated area moisturized with a moisturizer.  If you have a history of cold sores, take (1) Valtrex 500mg tablet before bedtime, which will be prescribed by your physician.  DAY TWO  If you have a history of cold sores, take (1) Valtrex 500 mg tablets in the morning and in the evening on days 2 and 3.  You may take a shower.  Men should NOT shave if their face was treated.  Cleanse treated area gently.  Apply SPF 50  Most discomfort usually subsides by Day 3.  You should avoid direct sunlight and try to remain indoors on Day 2.  The sensitivity to sunlight will significantly decrease after 48 hours.  You should soak the treated areas with as soft washcloth with cold water for 20 minutes every 4-6 hours.  Ice may be applied after the cold-water soaks, if this feels good.  The area should be patted dry and moisturized following the cold-water soaks.  Follow evening routine as you did on Day 1  DAYS 3- 7    Try to avoid direct sunlight and minimize incidental exposure for one week.  NO BEACHES!  Continue using SPF 50.  This is very important in protecting your newly rejuvenated skin.  You may begin applying make-up once any crusting has healed.  The area may be slightly red for a few weeks.  The skin will feel dry and tightened.  Moisturize once to twice daily.  Who should I call with questions?  [facility]: [phone]  [facility]: [phone]  For urgent needs outside of business hours call the [facility] at [phone] and ask for the dermatology resident on call

## 2024-02-17 ENCOUNTER — HEALTH MAINTENANCE LETTER (OUTPATIENT)
Age: 79
End: 2024-02-17

## 2024-03-12 ENCOUNTER — OFFICE VISIT (OUTPATIENT)
Dept: DERMATOLOGY | Facility: CLINIC | Age: 79
End: 2024-03-12
Payer: COMMERCIAL

## 2024-03-12 DIAGNOSIS — L57.0 AK (ACTINIC KERATOSIS): ICD-10-CM

## 2024-03-12 PROCEDURE — 96573 PDT DSTR PRMLG LES PHYS/QHP: CPT | Performed by: DERMATOLOGY

## 2024-03-12 ASSESSMENT — PAIN SCALES - GENERAL: PAINLEVEL: NO PAIN (0)

## 2024-03-12 NOTE — LETTER
3/12/2024         RE: Andre Tyler  [address] [address]        Dear Colleague,    Thank you for referring your patient, Andre Tyler, to the [facility]. Please see a copy of my visit note below.    Photodynamic Therapy Intake:    If patient has a history of HSV or VZV (shingles in treatment area) they have been given prophylaxis or documented that they refused it:  NO  If Valtrex prescribed, when did patient start?   N/a.    1.Close monitoring for more significant reaction, and avoid saran wrap if YES to any of the following:  New medications since seen by physician? No (If yes, contact supervising physician)  NSAIDs, ibuprofen, aspirin, naproxen, piroxicam: No  Antiarrhythmics (amiodarone, quinidine): No  Hydrochlorothiazide: No    2. Will hold PDT for YES to any of the following:  Pregnancy/breastfeeding/unknown pregnancy: NO  Sun burn: No  Tetracyclines such as doxycycline: No  Ciprofloxacin: No  Methotrexate: No    3. Will hold LEVULAN for YES to any of the following:   Treatment today is for acne: NO    4. If patient is receiving sand paper (RN only procedure) do they have a history of MRSA:  No    5. Consent within 90 days with MD verified in patient chart?  Yes  The sites to be treated were verified and discussed with patient. Sites verified were bilateral forearms and hands.   Expected symptoms and/or complications of redness, peeling, stinging, and burning were reviewed.  Comfort measures including moisturizer, cool compresses, and sun protection were also reviewed with patient.  After review, patient verbalized understanding of procedure and consent form signed by patient (as per MD documentation) and patient agrees to proceed with treatment.     Treatment site(s) cleansed with [employer]: Yes   Treatment site(s) de-greased with Acetone: Yes  Applied 2 Levulan stick to the site/s.  NO, saran wrap applied to n/a, sand paper used on  n/a.     MEDICATION: Levulan  DOSE: 1.5 mL to each forearm so 3mL  ROUTE: Topical  : [employer]  LOCATION GIVEN: bilateral forearms and hands  LOT NO: [id number]  EXP. DATE: 09/2026  NDC: 87419-659-93    TREATMENT NUMBER (30 maximum treatments per site): 2    Photodynamic Therapy(PDT) Post Op Note    Patient successfully completed PDT treatment today. Patient tolerated treatment without complication.  Sites were cleansed with mild soap and water and SPF 50 was applied after treatment.     Sun protection was reviewed with patient:  Patient wore long sleeve shirt to protect or to shield arms    After care instructions were reviewed with patient. AVS printed with clinic contact information and given to patient. Patient verbalized understanding and had no further questions or concerns at this time. Patient left clinic in good condition.         Tayla Mccarty RN on 3/12/2024 at 3:36 PM      Attestation signed by Jorge Mcgregor MD at 3/18/2024  9:44 AM:    Attending attestation:  I was present for key elements of the procedure and immediately available for all other portions of the procedure.  I have reviewed the note and edited it as necessary.    Jorge Mcgregor M.D.  Professor  Director of Dermatologic Surgery  Department of Dermatology  [facility]    Dermatology Surgery Clinic  [facility] [place] [address]      Again, thank you for allowing me to participate in the care of your patient.        Sincerely,        Jorge Mcgregor MD   Bro Payan(PA)

## 2024-03-12 NOTE — PATIENT INSTRUCTIONS
Photodynamic Therapy (PDT) Home Care Instructions  I will experience redness, swelling, pain and discomfort which may last 5-10 days. I may experience pinkness or redness that persists for 2-3 weeks but longer duration is possible in some individuals. Risks are infection, eye injury, blistering, reactivation of the cold sore virus, skin lightening, skin darkening or scarring. Multiple treatments may be required.   DAY OF TREATMENT  Wash treated area with mild cleanser.  Redness of the treated skin is expected and can vary significantly from person to person and treatment to treatment.  Apply SPF 50 before leaving your home/office and while driving to and from work.  Remain indoors if possible and avoid direct as well as indirect sunlight.  If your head or face were treated, wear a wide brimmed hat while going to and from your car.  If your hands/arms were treated, were long sleeves and gloves.  Ice packs every 1-2 hours may be helpful as well.  In the evening, cleanse treated area gently.  Your skin will feel dry; keep treated area moisturized with a moisturizer.  If you have a history of cold sores, take (1) Valtrex 500mg tablet before bedtime, which will be prescribed by your physician.  DAY TWO  If you have a history of cold sores, take (1) Valtrex 500 mg tablets in the morning and in the evening on days 2 and 3.  You may take a shower.  Men should NOT shave if their face was treated.  Cleanse treated area gently.  Apply SPF 50  Most discomfort usually subsides by Day 3.  You should avoid direct sunlight and try to remain indoors on Day 2.  The sensitivity to sunlight will significantly decrease after 48 hours.  You should soak the treated areas with as soft washcloth with cold water for 20 minutes every 4-6 hours.  Ice may be applied after the cold-water soaks, if this feels good.  The area should be patted dry and moisturized following the cold-water soaks.  Follow evening routine as you did on Day 1  DAYS 3- 7    Try to avoid direct sunlight and minimize incidental exposure for one week.  NO BEACHES!  Continue using SPF 50.  This is very important in protecting your newly rejuvenated skin.  You may begin applying make-up once any crusting has healed.  The area may be slightly red for a few weeks.  The skin will feel dry and tightened.  Moisturize once to twice daily.  Who should I call with questions?  [facility]: [phone]  [facility]: [phone]  For urgent needs outside of business hours call the [facility] at [phone] and ask for the dermatology resident on call

## 2024-03-12 NOTE — PROGRESS NOTES
Photodynamic Therapy Intake:    If patient has a history of HSV or VZV (shingles in treatment area) they have been given prophylaxis or documented that they refused it:  NO  If Valtrex prescribed, when did patient start?   N/a.    1.Close monitoring for more significant reaction, and avoid saran wrap if YES to any of the following:  New medications since seen by physician? No (If yes, contact supervising physician)  NSAIDs, ibuprofen, aspirin, naproxen, piroxicam: No  Antiarrhythmics (amiodarone, quinidine): No  Hydrochlorothiazide: No    2. Will hold PDT for YES to any of the following:  Pregnancy/breastfeeding/unknown pregnancy: NO  Sun burn: No  Tetracyclines such as doxycycline: No  Ciprofloxacin: No  Methotrexate: No    3. Will hold LEVULAN for YES to any of the following:   Treatment today is for acne: NO    4. If patient is receiving sand paper (RN only procedure) do they have a history of MRSA:  No    5. Consent within 90 days with MD verified in patient chart?  Yes  The sites to be treated were verified and discussed with patient. Sites verified were bilateral forearms and hands.   Expected symptoms and/or complications of redness, peeling, stinging, and burning were reviewed.  Comfort measures including moisturizer, cool compresses, and sun protection were also reviewed with patient.  After review, patient verbalized understanding of procedure and consent form signed by patient (as per MD documentation) and patient agrees to proceed with treatment.     Treatment site(s) cleansed with [employer]: Yes   Treatment site(s) de-greased with Acetone: Yes  Applied 2 Levulan stick to the site/s.  NO, saran wrap applied to n/a, sand paper used on n/a.     MEDICATION: Levulan  DOSE: 1.5 mL to each forearm so 3mL  ROUTE: Topical  : [employer]  LOCATION GIVEN: bilateral forearms and hands  LOT NO: [id number]  EXP. DATE: 09/2026  NDC: 00443-802-43    TREATMENT NUMBER (30 maximum treatments per  site): 2    Photodynamic Therapy(PDT) Post Op Note    Patient successfully completed PDT treatment today. Patient tolerated treatment without complication.  Sites were cleansed with mild soap and water and SPF 50 was applied after treatment.     Sun protection was reviewed with patient:  Patient wore long sleeve shirt to protect or to shield arms    After care instructions were reviewed with patient. AVS printed with clinic contact information and given to patient. Patient verbalized understanding and had no further questions or concerns at this time. Patient left clinic in good condition.         Tayla Mccarty RN on 3/12/2024 at 3:36 PM

## 2024-03-17 NOTE — PROGRESS NOTES
Diagnosis/Summary/Recommendations:    PATIENT: Andre Tyler  78 year old male     : 1945    JUVE: 2024    MRN: [id number]  [facility] [place] [id number] [phone] (H)   [phone] (M)      [email or web address]     Salima Tyler   [phone]  He granted her proxy access  To [employer]        Assessment:  (R25.1) Tremor  (primary encounter diagnosis)  No family history of parkinson  Mother and Grandmother passed away at 99 years     Retired law enforcement - retired [employer]  and then worked 16 years [employer] at [employer]      Wife retired exec director nonprofits     Right handed     Handwriting is different and possibly not as good and may be smaller                Notices more right than left hand tremor     Tremor is present when holding a phone and his wife commented on it.      Review of diagnosis    tremor     Avoidance of dopamine blockers   Not taking     Motor complication review   n/a     Review of Impulse control disorders   n/a     Review of surgical or medication options   reviewed     Gait/Balance/Falls   No falls      Exercise/Therapy performed/offered   Walking around helps his back pain -   Tries to get to [employer] 3 or 4 times per week   Has not been able to go  Can do treadmill     Cognitive/Driving   Does not like night driving but can drive  Can drive  Denies cognitive problems     Mood   denies     Hallucinations/delusions   denies     Sleep   Goes to bed around 10 or 1030pm and can fall asleep right away.   Sleeps in same bed as his wife  Use to snore in the past  No clear rbd behaviors  No sleep study   Wakes up around 6/630am  Gets up rarely to urinate      Bladder/Renal/Prostate/Gyn/Other  Prostate cancer 2012 - surgery - no radiation or chemo  Bladder stone 2019 lithotripsy with no recurrence  Bladder neck contracture dealt with at 2012 at time of prostate surgery in   Rare to no nocturia    No urgency or frequency      GI/Constipation/GERD   Has daily bowel movement with the metformin has some loose stool   No heartburn      He has having some pain in the past and they removed his appendix   He was having bladder spasms and had appendix removed and this problem with bladder improved      ENDO/Lipid/DM/Bone density/Thyroid  Lipid disorder  Diabetes type 2; A1c = 7.7 on 2/16/2023  triglyceride disorder - elevated at 297  And HDL was 37 and cholesterol 157 and non hdl 120 - non fasting study on 2/16/2023  Denies thyroid problems  Bone density has not been checked for a long time     empagliflozin jardiance 10mg - has not yet started.   Metformin glucophage 500mg twice daily      Glucosamine 1500mg/vitamin d400 international unit(s) and 100mg boswellia rain     Vitamin D dose?     Cardio/heart/Hyper or Hypotensive   Hypertension  134/89 today 95 heart rate   Normal stress was normal 11/13/2015  [facility]  [facility] dr abbie quezada   Normal eCG today   No cardiologist  Lisinopril-hydrochlorothiazide 10-12.5 zestoretic      Vision/Dry Eyes/Cataracts/Glaucoma/Macular   Cataract surgery around 2002   Dry eyes     Heme/Anticoagulation/Antiplatelet/Anemia/Other  Not taking aspirin  No bleeding problems      ENT/Resp  Hearing loss  Wearing hearing aides  Has some tinnitus  Voice has been affected - slurring  Loss of smell for many years  Has had covid in the past     Speech and slurring  Swallowing and coughing  May be related to drooling         Skin/Cancer/Seborrhea/other  History of melanoma - diagnosed   History of nonmelanoma skin cancer  Squamous cell skin cancer  Dr garcia is following this      Musculoskeletal/Pain/Headache  Denies significant surgery  Has left hip pain in the morning      Other:   ecg was normal     Parkinson disease -diagnosis based on his slowness, stiffness, etc.   He has soft voice.      Genetic study  PD  generation  [email or web address]     Brain MRI to look for iron      Dopamine scan (datscan)     Because of his agent orange exposure -  He qualifies for service connected benefits from the [facility] as he has parkinson  Encouraged to get connected with [facility] for this benefit.     They have a motor home - she does the driving and they like to go.      Return visit with Mendy Stanton for [facility] UPDRS     Discussion about the SPARX3 trial and messaged Rosita Gauthier about their interest in learning more about the study.      Consider echo and cardiology consultation for treadmill study        Return to be determined     Discussed symptomatic medication therapy and physical and speech therapy     Parkinson's Disease: Newly diagnosed with PD. Motor symptoms are currently manageable.        Drooling:     Cognitive Changes:        PLAN:     __  All questions answered and the following patient instructions provided: -      __  Okay to cancel the Dopamine Scan.     __  You can do the brain MRI, ECHO Cardiogram, and see the cardiologist.      __  Exercise 3 - 5 times a week.      __  Consider doing the treadmill research after you read the information.     __  Multitasking can be affected, so try to focus on one task at a time.     __  No medication for now.     __  You can consider the [facility] - Physical Therapy and Speech Therapy.     __ For drooling, you can consider Botox injection.  Also, dry candy or chewing gum can help.      __ Return in 6 months to see Dr. Galicia. You may return sooner as needed.       Has had more slowness in movement  More stooped posture.   Loss of fine motor  Problems writing and wife is signing bills  He has right sided tremors  He has more problems with coordination with his right hand     He has had more problems with slurring of speech     Did not do the dopamine scan -      Did not do the brain MRI scan      Echocardiogram was fine - EF was  55-60%      Loss of smell is long standing     Loss of facial expression     He granted proxy access to his mychart      Dry eyes     melanoma     Weight loss     Swallowing problems/drooling/     Physical and speech therapy     Ent consultation if needed     Genetic study  PD generation  [email or web address]     He has more problems with h is lingual speech   He has a nasal pattern     Pharmacy ([facility]) consultation and medication management    Parkinson's Disease:  Started Levodopa in Sep 2023. He reports slight improvement. He takes his last dose an hour before bedtime.  Since he doesn't have bothersome nighttime symptoms, I recommended changing the times he take Sinemet.      __  Referral to Speech Therapy. If you have not heard from the scheduling office within 2 business days, please call [phone] for  [facility].       Medications      6am  10:30-11a  3-5p   Carbidopa/levodopa Sinemet 25/100 1 1 1   Empagliflozin Jardiance 10 mg     1   Ketotifen Zaditor 0.025% solution no       Lisinopril hydrochlorothiazide Zestoretic 10-12.5 1       Metformin Glucophage 500 mg 1   1   MVI   1       nonformularly glucosamine 1500, vit d 400, bosw  1       Polyethylene glycol-propylene systane ultra         Vitamin C ascorbic acid 1000 mg 1       vitamin D cholecalciferol dose? OFF                                          Plan:    [email or web address]   [phone]  [email or web address]   Parkinson genetics       Mr. Tyler and his wife are requesting assistance with obtaining handicap parking  Three of their children play hockey during the winter and grandchildren play sports during the summer  Parking can be a challenge at these events  Mr. Tyler has to drop his wife off and park due to her knee issues  He and his family are concerned about him walking long distances, especially in icy conditions  His  "wife has a temporary handicap pass while awaiting knee surgery    Balance has been \"pretty good.\" He has to pay closer attention  Not quite as balanced with certain tasks such as putting pants on in the morning  Dealing with this by concentrating harder in situations where he may be off balance helps  No falls or near falls  Not using an assistive device walking  He has not worked with physical therapy on balance    He tries to get to the health club regularly  Some days he does light lifting  Also doing some walking and running - alternative around the track  He has also been doing exercises with his wife in the water  He is getting to the health club 2-3 days/week  Plays golf once a week - he would like his golf game to be better, notices he isn't always fluid when putting    Carbidopa/Levodopa 1 tab TID - he has been on this dose for about a year  He used to have trouble coordinating brushing his teeth - this has improved since starting medication  His arms seem to swing better since starting medication  He also feels like his speech has improved  Trying not to take medication with food - avoiding one hour before or two hours after  If he has to delay a dose due to meals he may have wearing off - this usually happens because of grandchildren's hockey schedule (usually after 6 hours)  Hasn't noticed any side effects - no nausea or lightheadedness  He has a tremor in his right hand/leg and jaw - CD/LD has maybe helped this a little but it is still present  Writing has been more difficult - cursive writing is \"not where it should be.\" Smaller and also shakier.     Frequent coughing/choking - this can occur with food, liquids and with his own saliva  He had a swallow study in Oct 2023 - this showed flash penetration on thin liquids with initial swallow - not seen on subsequent swallows and no aspiration. He has not subsequently worked with speech therapy  He doesn't believe swallowing is worse than it used to be, his " "wife thinks that this has ana more of an issue    He met with a speech therapist once in December 2023.   Voice remains soft - he feels like his speech is ok for the most part  He tends to project better when his hearing aids are out  Waitresses may get his order wrong, have to ask him to repeat  Less slurring since he has been on CD/LD  SLP gave him a sheet with exercises to do at home but he does not do them    He is not interested in working with SLP at this time    Mr. Tyler has had more saliva - drooling both during the day and at night  He keeps a hanky around  He had excess saliva - this was one of his earliest symptoms   Discussed lozenges vs medication vs botulinum toxin  He does not want treatment at this time    No issues with constipation  No concerns about bladder function    Mood is good - not feeling down/depressed/anxious  \"We do a lot of laughing.\"    Sleep is good - 8 hours/night on average  Wakes feeling well rested  Rare brief jerks in sleep per his wife - \"he's kind of been like that his whole life.\" No more complex actions.  Not falling out of bed.     No concerns about thinking or memory - \"I'm as sharp as always\"  Wife has no concerns although he is not quite as good at multitasking as he used to be.    Zenia Copeland MD  Movement Disorders Fellow    Time Spent: 27 minutes spent on the date of the encounter doing chart review, history and exam, documentation and further activities as noted above      Coding statement:   Medical Decision Making:  #  Chronic progressive medical conditions addressed  - see above --   Review and/or interpretation of unique test or documentation from a provider outside of neurology no   Independent historian provided additional details  yes I  Prescription drug management and review of potential side effects and/or monitoring for side effects  -- see above ---  Health impacted by social determinants of health  no    I have reviewed the note as documented above.  " This accurately captures the substance of my conversation with the patient and total time spent preparing for visit, executing visit and completing visit on the day of the visit:  45 minutes.  The portion of this total time included face to face time 38 minutes    The longitudinal plan of care for Andre Tyler was addressed during this visit. Due to the added complexity in care, I will continue to support Andre Tyler in the subsequent management of this condition(s) and with the ongoing continuity of care of this condition(s).      Flex Galicia MD     ______________________________________    Last visit date and details:             ______________________________________      Patient was asked about 14 Review of systems including changes in vision (dry eyes, double vision), hearing, heart, lungs, musculoskeletal, depression, anxiety, snoring, RBD, insomnia, urinary frequency, urinary urgency, constipation, swallowing problems, hematological, ID, allergies, skin problems: seborrhea, endocrinological: thyroid, diabetes, cholesterol; balance, weight changes, and other neurological problems and these were not significant at this time except for   Patient Active Problem List   Diagnosis    Health Care Home    Hypertension goal BP (blood pressure) < 140/90    Squamous cell skin cancer    Prostate cancer - surgically treated    CARDIOVASCULAR SCREENING; LDL GOAL LESS THAN 130    Sensorineural hearing loss, bilateral    Bladder neck contracture    Bladder stone    Type 2 diabetes mellitus without complication (H)    History of melanoma    History of nonmelanoma skin cancer    Carcinoma of prostate (H)    Contact with and (suspected) exposure to other hazardous aromatic compounds    Elevated fasting blood sugar    HDL lipoprotein deficiency    Hearing loss    Hypertriglyceridemia    Other ill-defined and unknown causes of morbidity and mortality    Essential (primary) hypertension    Essential hypertension     Malignant neoplasm of prostate (H)    Tremor    H/O echocardiogram 2023    Family hx of lung cancer    Parkinson's disease    H/O prostatectomy        No Known Allergies  Past Surgical History:   Procedure Laterality Date    APPENDECTOMY  2015    had improvement in bladder spasms    CATARACT IOL, RT/LT  2002    20+ years ago    HC MOHS TRUNK/ARM/LEG 1ST STAGE UP T0 5 BLOCKS      LASER HOLMIUM LITHOTRIPSY URETER(S), INSERT STENT, COMBINED N/A 10/21/2019    Procedure: Urethral dilation and transurethral resection of bladder neck contracture litholapaxy (needs holmium laser) for bladder stone;  Surgeon: Arnulfo Vale MD;  Location: [facility] OR    OTHER SURGICAL HISTORY      skin cancer    PROSTATE SURGERY  08/28/2012     Past Medical History:   Diagnosis Date    Diabetes (H)     H/O echocardiogram 2023 3/10/2023    Global and regional left ventricular function is normal with an EF of 55-60%. Right ventricular function, chamber size, wall motion, and thickness are normal. Pulmonary artery systolic pressure is normal. Sinuses of Valsalva 4.1 cm. Mild dilatation of the aorta is present. The inferior vena cava is normal. No pericardial effusion is present. ________________________________________________________    History of melanoma     2022 left forearm    History of nonmelanoma skin cancer     NUMEROUS    Hypertension goal BP (blood pressure) < 140/90     Prostate cancer (H)     Squamous cell skin cancer     Tremor 2/24/2023     Social History     Socioeconomic History    Marital status:      Spouse name: Not on file    Number of children: 3    Years of education: Not on file    Highest education level: Not on file   Occupational History     Employer: RETIRED   Tobacco Use    Smoking status: Never    Smokeless tobacco: Never   Vaping Use    Vaping Use: Never used   Substance and Sexual Activity    Alcohol use: No    Drug use: No    Sexual activity: Yes     Partners: Female   Other Topics Concern     Parent/sibling w/ CABG, MI or angioplasty before 65F 55M? Not Asked   Social History Narrative    Not on file     Social Determinants of Health     Financial Resource Strain: Not on file   Food Insecurity: Not on file   Transportation Needs: Not on file   Physical Activity: Not on file   Stress: Not on file   Social Connections: Not on file   Interpersonal Safety: Not on file   Housing Stability: Not on file       Drug and lactation database from the [place] National Library of Medicine:  http://toxnet.nlm.nih.gov/cgi-bin/sis/htmlgen?LACT      B/P: Data Unavailable, T: Data Unavailable, P: Data Unavailable, R: Data Unavailable 0 lbs 0 oz  There were no vitals taken for this visit., There is no height or weight on file to calculate BMI.  Medications and Vitals not listed above were documented in the cart and reviewed by me.     Current Outpatient Medications   Medication Sig Dispense Refill    carbidopa-levodopa (SINEMET)  MG tablet Start with 1/2 tab daily x 3days then 1/2 tab twice daily for 3-7 days then 1 tab twice daily for one week then 1 tablet 3 times per day. The dose can be increased if there is no significant benefit. IT may take 1-3 weeks before benefit is seen 270 tablet 3    empagliflozin (JARDIANCE) 10 MG TABS tablet Take 1 tablet (10 mg) by mouth daily 90 tablet 0    lisinopril-hydrochlorothiazide (ZESTORETIC) 10-12.5 MG tablet Take 1 tablet by mouth daily      metFORMIN (GLUCOPHAGE) 500 MG tablet Take 500 mg by mouth 2 times daily (with meals)      NONFORMULARY Osteo biflex one per day: 400 Vitamin D, 1500 Glucosamine, 100mg boswellia rain (joint shield)      polyethylene glycol-propylene glycol (SYSTANE ULTRA) 0.4-0.3 % SOLN ophthalmic solution Place 1 drop into both eyes every hour as needed for dry eyes      vitamin C (ASCORBIC ACID) 1000 MG TABS Take 1,000 mg by mouth 2 times daily           Flex Galicia MD

## 2024-03-25 ENCOUNTER — OFFICE VISIT (OUTPATIENT)
Dept: DERMATOLOGY | Facility: CLINIC | Age: 79
End: 2024-03-25
Payer: COMMERCIAL

## 2024-03-25 VITALS — OXYGEN SATURATION: 95 % | HEART RATE: 81 BPM | SYSTOLIC BLOOD PRESSURE: 102 MMHG | DIASTOLIC BLOOD PRESSURE: 71 MMHG

## 2024-03-25 DIAGNOSIS — D09.9 SQUAMOUS CELL CARCINOMA IN SITU: Primary | ICD-10-CM

## 2024-03-25 PROCEDURE — 88305 TISSUE EXAM BY PATHOLOGIST: CPT | Performed by: PATHOLOGY

## 2024-03-25 PROCEDURE — 12032 INTMD RPR S/A/T/EXT 2.6-7.5: CPT | Performed by: DERMATOLOGY

## 2024-03-25 PROCEDURE — 11602 EXC TR-EXT MAL+MARG 1.1-2 CM: CPT | Performed by: DERMATOLOGY

## 2024-03-25 NOTE — LETTER
3/25/2024         RE: Andre Tyler  [address] [id number]        Dear Colleague,    Thank you for referring your patient, Andre Tyler, to the [facility]. Please see a copy of my visit note below.    DERMATOLOGY EXCISION PROCEDURE NOTE    Dermatology Problem List:  Last FBSE 1/25/24 (due every 3 months through 2/2024)     1. History of Melanoma  - Malignant Melanoma - L ventral forearm, 0.3mm breslow depth, s/p excision 2/10/22  2. History of NMSC  - SCCis, L volar forearm, s/p excision 3/25/24  - SCCIS, L lower leg, s/p Mohs and linear repair 8/16/23  - BCC - L posterior neck, s/p excision 12/8/22  - sBCC - L paraspinal lower back, s/p ED&C 8/29/22  - SCCIS - right tricep, s/p ED&C 8/29/22  - SCCIS - R dorsal hand, s/p Mohs and linear repair 8/29/22   - SCCIS - L shin, s/p Mohs and linear repair 8/29/22  - SCC - left dorsal wrist, s/p MMS 7/21/21  - BCC - upper mid back, s/p excision 7/21/21  - BCC - left clavicle, s/p excision 7/21/21  - BCC - left upper back, s/p excision 12/17/2020  - SCC - base of right thumb, s/p excision 6/26/18  - SCCIS - central chest, s/p ED & C 6/11/18  - BCC - vertex scalp, s/p MMS 11/21/2016  - SCCis - R lateral thigh, s/p ED&C 11/21/2016  - SCCIS - right medial ankle, s/p Mohs 4/21/2016  - SCCIS - right ventral forearm, s/p ED&C 4/21/2016  - BCC - left preauricular, s/p Mohs 11/10/2015  - BCC, superficial type - left upper chest, s/p biopsy 4/9/2015, s/p ED&C 10/5/2015  - SCCIS - posterior right lower earlobe, records via [facility] transferred records, Dr. Arnulfo Keyes, 2011. s/p excision in 2005, recurrent  - SCCIS - left dorsal hand, s/p via [facility] transferred records, Dr. Arnulfo Keyes, 2011  3. Actinic keratosis  - s/p PDT 2/15/24, 3/12/24,   -  right dorsal hand lateral, s/p biopsy 7/6/23; s/p LN2 8/16/23  - HAK - left dorsal wrist, s/p bx 11/10/2020  - s/p cryotherapy  4. History  of benign biopsy  - Intradermal melanocytic nevus - left postauricular scalp, s/p bx 6/18/21  - Compound melanocytic nevus - left lower back, s/p bx 4/9/15  - BLK - right lower leg, s/p bx 4/9/2015     Family hx of melanoma is negative.  Sister with unknown skin issues  _____________________________________________________________    NAME OF PROCEDURE: Excision intermediate layered linear closure  Staff surgeon: Boby Nazario MD  Fellow: Eleanor Diaz MD  Scrub Nurse: Kathy     PRE-OPERATIVE DIAGNOSIS:  Squamous cell carcinoma in situ  POST-OPERATIVE DIAGNOSIS: Same   LOCATION: L Volar Forearm  FINAL EXCISION SIZE(DEFECT SIZE): 1.8 x 1.7 cm  MARGIN: 0.4 cm  FINAL REPAIR LENGTH: 3.8 cm   ANESTHESIA: 6 cc 1% lidocaine with 1:100,000 epinephrine    INDICATIONS: This patient presented with a 1.0 x 0.9 cm Squamous cell carcinoma in situ. Excision was indicated. We discussed the principles of treatment and most likely complications including scarring, bleeding, infection, incomplete excision, wound dehiscence, pain, nerve damage, and recurrence. Informed consent was obtained and the patient underwent the procedure as follows:    PROCEDURE: The patient was taken to the operative suite. Time-out was performed.  The treatment area was anesthetized with 1% lidocaine with epinephrine. The area was prepped with Chlorhexidine and rinsed with sterile saline and draped with sterile towels. The lesion was delineated and excised down to subcutaneous fat in a elliptical manner. Hemostasis was obtained by electrocoagulation.     REPAIR: An intermediate layered linear closure was selected as the procedure which would maximally preserve both function and cosmesis.    After the excision of the tumor, the area was carefully undermined. Hemostasis was obtained with electrocoagulation.  Closure was oriented so that the wound was in the patient's natural skin tension lines. The deep subcutaneous and dermal layers were then  closed with 4-0 Monocryl sutures. The epidermis was then carefully approximated along the length of the wound using 4-0 Monocryl running subcuticular sutures.     Estimated blood loss was less than 10 ml for all surgical sites. A sterile pressure dressing was applied and wound care instructions, with a written handout, were given. The [facility] alert and ambulatory.    Suture removal: N/A    Dr. Boby Nazario was immediately available for the entire surgery and was physicially present for the entire procedure.    Anatomic Pathology Results: pending    Clinical Follow-Up: 4/23/24 as scheduled     Eleanor Diaz MD  Micrographic Surgery and Dermatologic Oncology (MSDO) Fellow, PGY-5    Staff Involved:   Scribe/Staff    Scribe Disclosure:   I, CYNTHIA CURTIS, am serving as a scribe; to document services personally performed by Boby Nazario MD -based on data collection and the provider's statements to me.     Staff Physician Comments:   I saw and evaluated the patient with the Mohs Surgery Fellow (Dr. Eleanor Diaz) and I agree with the assessment and plan and the above description of the procedure as documented by the scribe. I was present for the key portions of the procedure and entire exam. I was immediately available in the clinic throughout the procedures.       Boby Nazario DO    Department of Dermatology  [facility]: Phone: [phone], Fax:[phone]  [facility]: Phone: [phone], Fax: [phone]          The following medication was given:     MEDICATION:  Lidocaine with epinephrine 1% [id number]  ROUTE: SQ  SITE: see procedure note  DOSE: 7.5mL  LOT #: [id number]  : [employer]  EXPIRATION DATE: 06-30-25  NDC#: 51860-427-77  Was there drug waste? no  Multi-dose vial: Yes    Agustina Masterson LPN  March 25, 2024        Again, thank you for allowing me to participate in the care of your patient.        Sincerely,        Boby Nazario MD

## 2024-03-25 NOTE — NURSING NOTE
Andre Tyler's goals for this visit include:   Chief Complaint   Patient presents with    Procedure     Excision from left forearm       He requests these members of his care team be copied on today's visit information:     PCP: Arnulfo Joaquin    Referring Provider:  No referring provider defined for this encounter.    /71 (BP Location: Right arm, Patient Position: Sitting)   Pulse 81   SpO2 95%     Do you need any medication refills at today's visit?     Agustina Masterson LPN on 3/25/2024 at 3:04 PM

## 2024-03-25 NOTE — PATIENT INSTRUCTIONS
Caring for your skin after surgery    After your surgery, a pressure bandage will be placed over the area. This will prevent bleeding. Please follow these instructions over the next 1 to 2 weeks. Following this regimen will help to prevent complications as your wound heals.     For the first 48 hours after your surgery:    Leave the pressure dressing on and keep it dry. If it should come loose, you may re-tape it, but do not take it off.  Relax and take it easy. Do not do any vigorous exercise, heavy lifting or bending forward. This could cause the wound to bleed.  Post-operative pain is usually mild. You may alternate between 1000 mg of Tylenol (acetaminophen) and 400 mg of Ibuprofen every 4 hours.  Do not take more than 4,000 mg of acetaminophen in a 24-hour period or 3200 mg of Ibuprofen in a 24-hr period.  Avoid alcohol and vitamin E as these may increase your tendency to bleed.  You may put an ice pack around the bandaged area for 20 minutes at a time as needed. This may help reduce swelling, bruising, and pain. Make sure the ice pack is waterproof so that the pressure bandage doesn't get wet.  You may see a small amount of drainage or blood on your pressure bandage. This is normal. However:  If drainage or bleeding continues or saturates the bandage, you will need to apply firm pressure over the bandage with a clean washcloth for 15 minutes.  If bleeding continues after applying pressure for 15 minutes, apply an ice pack with gentle pressure to the bandaged area for another 15 minutes.  If bleeding still continues, call our office or go to the nearest emergency room.    48 Hours After Surgery:    Remove outer white bandage down to clear plastic film (Tegaderm).  You may notice dark brown, dried blood under the Tegaderm.  This is normal.    Leave the clear plastic film (Tegaderm) on for up to 2 weeks, as long as it is intact.  If it falls off prior to 2 weeks follow daily wound care below.  If it stays intact  for the full 2 weeks, then remove and treat as normal, healthy skin.    Daily Wound Care (if Tegaderm and Steri-Strips fall off prior to 2 weeks):    Wash wound with a mild soap and water.  Use caution when washing the wound, be gentle and do not let the forceful shower stream hit the wound directly. DO NOT WASH WITH HYDROGEN PEROXIDE AS THIS MIGHT CAUSE THE STITCHES TO DISSOLVE FASTER THAN WHAT WE WANT.    Pat dry.    Apply Vaseline (from a new container or tube) over the suture line with a Q-tip until it is completely healed. It is very important to keep the wound continuously moist, as wounds heal best in a moist environment.    Keep the site covered until it's healed.  You can cover it with a Telfa (non-stick) dressing and tape or a band-aid until healed with normal, healthy skin.      Call Us If:    You have bleeding that will not stop after applying pressure and ice.  You have pain that is not controlled with Tylenol and Ibuprofen.  You have signs or symptoms of an infection such as fever over 100 degrees Fahrenheit, redness, warmth or foul-smelling drainage from the wound    [facility]: [phone]   [facility]: [phone]  For urgent needs outside of business hours call the [facility] at [phone] and ask to speak with the dermatology resident on call

## 2024-03-25 NOTE — NURSING NOTE
Mastisol, Steri-Strips, Tegaderm and pressure dressing applied to excision site on left forearm.  Wound care instructions reviewed with patient and AVS provided.  Patient verbalized understanding.  Patient will follow up for suture removal: N/A.  No further questions or concerns at this time.    Gabby Rogers, CMA

## 2024-03-25 NOTE — PROGRESS NOTES
DERMATOLOGY EXCISION PROCEDURE NOTE    Dermatology Problem List:  Last FBSE 1/25/24 (due every 3 months through 2/2024)     1. History of Melanoma  - Malignant Melanoma - L ventral forearm, 0.3mm breslow depth, s/p excision 2/10/22  2. History of NMSC  - SCCis, L volar forearm, s/p excision 3/25/24  - SCCIS, L lower leg, s/p Mohs and linear repair 8/16/23  - BCC - L posterior neck, s/p excision 12/8/22  - sBCC - L paraspinal lower back, s/p ED&C 8/29/22  - SCCIS - right tricep, s/p ED&C 8/29/22  - SCCIS - R dorsal hand, s/p Mohs and linear repair 8/29/22   - SCCIS - L shin, s/p Mohs and linear repair 8/29/22  - SCC - left dorsal wrist, s/p MMS 7/21/21  - BCC - upper mid back, s/p excision 7/21/21  - BCC - left clavicle, s/p excision 7/21/21  - BCC - left upper back, s/p excision 12/17/2020  - SCC - base of right thumb, s/p excision 6/26/18  - SCCIS - central chest, s/p ED & C 6/11/18  - BCC - vertex scalp, s/p MMS 11/21/2016  - SCCis - R lateral thigh, s/p ED&C 11/21/2016  - SCCIS - right medial ankle, s/p Mohs 4/21/2016  - SCCIS - right ventral forearm, s/p ED&C 4/21/2016  - BCC - left preauricular, s/p Mohs 11/10/2015  - BCC, superficial type - left upper chest, s/p biopsy 4/9/2015, s/p ED&C 10/5/2015  - SCCIS - posterior right lower earlobe, records via [facility] [employer] transferred records, Dr. Arnulfo Keyes, 2011. s/p excision in 2005, recurrent  - SCCIS - left dorsal hand, s/p via [employer] transferred records, Dr. Arnulfo Keyes, 2011  3. Actinic keratosis  - s/p PDT 2/15/24, 3/12/24,   -  right dorsal hand lateral, s/p biopsy 7/6/23; s/p LN2 8/16/23  - HAK - left dorsal wrist, s/p bx 11/10/2020  - s/p cryotherapy  4. History of benign biopsy  - Intradermal melanocytic nevus - left postauricular scalp, s/p bx 6/18/21  - Compound melanocytic nevus - left lower back, s/p bx 4/9/15  - BLK - right lower leg, s/p bx 4/9/2015     Family hx of melanoma is negative.  Sister with unknown skin  issues  _____________________________________________________________    NAME OF PROCEDURE: Excision intermediate layered linear closure  Staff surgeon: Boby Nazario MD  Fellow: Eleanor Diaz MD  Scrub Nurse: Kathy     PRE-OPERATIVE DIAGNOSIS:  Squamous cell carcinoma in situ  POST-OPERATIVE DIAGNOSIS: Same   LOCATION: L Volar Forearm  FINAL EXCISION SIZE(DEFECT SIZE): 1.8 x 1.7 cm  MARGIN: 0.4 cm  FINAL REPAIR LENGTH: 3.8 cm   ANESTHESIA: 6 cc 1% lidocaine with 1:100,000 epinephrine    INDICATIONS: This patient presented with a 1.0 x 0.9 cm Squamous cell carcinoma in situ. Excision was indicated. We discussed the principles of treatment and most likely complications including scarring, bleeding, infection, incomplete excision, wound dehiscence, pain, nerve damage, and recurrence. Informed consent was obtained and the patient underwent the procedure as follows:    PROCEDURE: The patient was taken to the operative suite. Time-out was performed.  The treatment area was anesthetized with 1% lidocaine with epinephrine. The area was prepped with Chlorhexidine and rinsed with sterile saline and draped with sterile towels. The lesion was delineated and excised down to subcutaneous fat in a elliptical manner. Hemostasis was obtained by electrocoagulation.     REPAIR: An intermediate layered linear closure was selected as the procedure which would maximally preserve both function and cosmesis.    After the excision of the tumor, the area was carefully undermined. Hemostasis was obtained with electrocoagulation.  Closure was oriented so that the wound was in the patient's natural skin tension lines. The deep subcutaneous and dermal layers were then closed with 4-0 Monocryl sutures. The epidermis was then carefully approximated along the length of the wound using 4-0 Monocryl running subcuticular sutures.     Estimated blood loss was less than 10 ml for all surgical sites. A sterile pressure dressing was applied  and wound care instructions, with a written handout, were given. The [facility] alert and ambulatory.    Suture removal: N/A    Dr. Boby Nazario was immediately available for the entire surgery and was physicially present for the entire procedure.    Anatomic Pathology Results: pending    Clinical Follow-Up: 4/23/24 as scheduled     Eleanor Diaz MD  Micrographic Surgery and Dermatologic Oncology (MSDO) Fellow, PGY-5    Staff Involved:   Scribe/Staff    Scribe Disclosure:   I, CYNTHIA CURTIS, am serving as a scribe; to document services personally performed by Boby Nazario MD -based on data collection and the provider's statements to me.     Staff Physician Comments:   I saw and evaluated the patient with the Mohs Surgery Fellow (Dr. Eleanor Diaz) and I agree with the assessment and plan and the above description of the procedure as documented by the scribe. I was present for the key portions of the procedure and entire exam. I was immediately available in the clinic throughout the procedures.       Boby Nazario DO    Department of Dermatology  [facility]: Phone: [phone], Fax:[phone]  [facility]: Phone: [phone], Fax: [phone]

## 2024-03-25 NOTE — PROGRESS NOTES
The following medication was given:     MEDICATION:  Lidocaine with epinephrine 1% [id number]  ROUTE: SQ  SITE: see procedure note  DOSE: 7.5mL  LOT #: [id number]  : [employer]  EXPIRATION DATE: 06-30-25  NDC#: 99644-103-02  Was there drug waste? no  Multi-dose vial: Yes    Agustina Masterson LPN  March 25, 2024    Prophylactic measure

## 2024-04-04 ENCOUNTER — TELEPHONE (OUTPATIENT)
Dept: DERMATOLOGY | Facility: CLINIC | Age: 79
End: 2024-04-04
Payer: COMMERCIAL

## 2024-04-04 NOTE — TELEPHONE ENCOUNTER
Manuela Alvares RN  4/4/2024  9:29 AM CDT Back to Top      I spoke to Andre Tyler and gave results. Patient understood and had no further questions or concerns.     MARINA Harper RN  4/1/2024 11:50 AM CDT       Writer called pt, no answer. Left message for pt to return our call at [phone].        Tayla Mccarty RN on 4/1/2024 at 11:50 AM    Eleanor Diaz MD  3/27/2024  8:46 AM CDT       Please let patient know that it looks like the SCCIS on his forearm was completely removed in the excision. No further treatment needed. Thanks!     Eleanor Diaz MD          Patient Communication     Released  Not seen Back to Top    Dr Aravind Rodrigues wanted us to let you know that it looks like the SCCIS on his forearm was completely removed in the excision. No further treatment needed. Please reach out with any questions or concerns.   ...   Written by Tayla Mccarty RN on 3/27/2024  1:25 PM CDT View Full Comments

## 2024-04-16 ENCOUNTER — OFFICE VISIT (OUTPATIENT)
Dept: NEUROLOGY | Facility: CLINIC | Age: 79
End: 2024-04-16
Payer: COMMERCIAL

## 2024-04-16 VITALS
OXYGEN SATURATION: 97 % | HEART RATE: 81 BPM | SYSTOLIC BLOOD PRESSURE: 116 MMHG | WEIGHT: 194 LBS | BODY MASS INDEX: 26.86 KG/M2 | DIASTOLIC BLOOD PRESSURE: 77 MMHG

## 2024-04-16 DIAGNOSIS — G20.A1 PARKINSON'S DISEASE WITHOUT DYSKINESIA OR FLUCTUATING MANIFESTATIONS (H): Primary | ICD-10-CM

## 2024-04-16 PROCEDURE — 99215 OFFICE O/P EST HI 40 MIN: CPT | Performed by: PSYCHIATRY & NEUROLOGY

## 2024-04-16 PROCEDURE — G2211 COMPLEX E/M VISIT ADD ON: HCPCS | Performed by: PSYCHIATRY & NEUROLOGY

## 2024-04-16 ASSESSMENT — PAIN SCALES - GENERAL: PAINLEVEL: NO PAIN (0)

## 2024-04-16 NOTE — PATIENT INSTRUCTIONS
"  Speech Therapy Exercise Resources  Make Your Voice [place]! - Healthy Communication and Parkinson's disease     The Parkinson's Voice Project  -Includes lice and recorded voice exercises  [email or web address].org/program/online-practice/    LSVT at [place]:  Big and Loud therapy is available at [facility], [facility], [place], [facility], [place], [place], [place], [place], and [facility].   Call [phone] for details    LSVT LOUD Vocal Therapy  https://[email or web address].com/[email or web address]    Sing Loud for Parkinson's disease  [email or web address]/events/sing-loud-[email or web address]          Medications      6am  10:30-11a  3-5p   Carbidopa/levodopa Sinemet 25/100 1 1 1   Empagliflozin Jardiance 10 mg     1   Ketotifen Zaditor 0.025% solution no       Lisinopril hydrochlorothiazide Zestoretic 10-12.5 1       Metformin Glucophage 500 mg 1   1   MVI   1       nonformularly glucosamine 1500, vit d 400, bosw  1       Polyethylene glycol-propylene systane ultra         Vitamin C ascorbic acid 1000 mg 1       vitamin D cholecalciferol dose? OFF                                          Plan:    [email or web address]   [phone]  [email or web address].parkinson.org/advancing-research/our-research/pdgeneration   Parkinson genetics       Mr. Tyler and his wife are requesting assistance with obtaining handicap parking  Three of their children play hockey during the winter and grandchildren play sports during the summer  Parking can be a challenge at these events  Mr. Tyler has to drop his wife off and park due to her knee issues  He and his family are concerned about him walking long distances, especially in icy conditions  His wife has a temporary handicap pass while awaiting knee surgery    Balance has been \"pretty good.\" He has to pay closer attention  Not quite as balanced with " "certain tasks such as putting pants on in the morning  Dealing with this by concentrating harder in situations where he may be off balance helps  No falls or near falls  Not using an assistive device walking  He has not worked with physical therapy on balance    He tries to get to the health club regularly  Some days he does light lifting  Also doing some walking and running - alternative around the track  He has also been doing exercises with his wife in the water  He is getting to the health club 2-3 days/week  Plays golf once a week - he would like his golf game to be better, notices he isn't always fluid when putting    Carbidopa/Levodopa 1 tab TID - he has been on this dose for about a year  He used to have trouble coordinating brushing his teeth - this has improved since starting medication  His arms seem to swing better since starting medication  He also feels like his speech has improved  Trying not to take medication with food - avoiding one hour before or two hours after  If he has to delay a dose due to meals he may have wearing off - this usually happens because of grandchildren's hockey schedule (usually after 6 hours)  Hasn't noticed any side effects - no nausea or lightheadedness  He has a tremor in his right hand/leg and jaw - CD/LD has maybe helped this a little but it is still present  Writing has been more difficult - cursive writing is \"not where it should be.\" Smaller and also shakier.     Frequent coughing/choking - this can occur with food, liquids and with his own saliva  He had a swallow study in Oct 2023 - this showed flash penetration on thin liquids with initial swallow - not seen on subsequent swallows and no aspiration. He has not subsequently worked with speech therapy  He doesn't believe swallowing is worse than it used to be, his wife thinks that this has ana more of an issue    He met with a speech therapist once in December 2023.   Voice remains soft - he feels like his speech is " "ok for the most part  He tends to project better when his hearing aids are out  Waitresses may get his order wrong, have to ask him to repeat  Less slurring since he has been on CD/LD  SLP gave him a sheet with exercises to do at home but he does not do them    He is not interested in working with SLP at this time    Mr. Tyler has had more saliva - drooling both during the day and at night  He keeps a hanky around  He had excess saliva - this was one of his earliest symptoms   Discussed lozenges vs medication vs botulinum toxin  He does not want treatment at this time    No issues with constipation  No concerns about bladder function    Mood is good - not feeling down/depressed/anxious  \"We do a lot of laughing.\"    Sleep is good - 8 hours/night on average  Wakes feeling well rested  Rare brief jerks in sleep per his wife - \"he's kind of been like that his whole life.\" No more complex actions.  Not falling out of bed.     No concerns about thinking or memory - \"I'm as sharp as always\"  Wife has no concerns although he is not quite as good at multitasking as he used to be.    "

## 2024-04-16 NOTE — LETTER
2024       RE: Andre Tyler  [address] [facility] [address]     Dear Colleague,    Thank you for referring your patient, Andre Tyler, to the [facility] [place] [facility]. Please see a copy of my visit note below.        Diagnosis/Summary/Recommendations:    PATIENT: Andre Tyler  78 year old male     : 1945    JUVE: 2024    MRN: [id number]  [address]   [facility] [id number] [phone] (H)   [phone] (M)      [email or web address]     Salima Tyler   [phone]  He granted her proxy access  To eegoes        Assessment:  (R25.1) Tremor  (primary encounter diagnosis)  No family history of parkinson  Mother and Grandmother passed away at 99 years     Retired law enforcement - retired [employer]  and then worked 16 years [employer] at [employer] 2017     Wife retired exec director nonprofits     Right handed     Handwriting is different and possibly not as good and may be smaller                Notices more right than left hand tremor     Tremor is present when holding a phone and his wife commented on it.      Review of diagnosis    tremor     Avoidance of dopamine blockers   Not taking     Motor complication review   n/a     Review of Impulse control disorders   n/a     Review of surgical or medication options   reviewed     Gait/Balance/Falls   No falls      Exercise/Therapy performed/offered   Walking around helps his back pain -   Tries to get to [employer] 3 or 4 times per week   Has not been able to go  Can do treadmill     Cognitive/Driving   Does not like night driving but can drive  Can drive  Denies cognitive problems     Mood   denies     Hallucinations/delusions   denies     Sleep   Goes to bed around 10 or 1030pm and can fall asleep right away.   Sleeps in same bed as his wife  Use to snore in the past  No  clear rbd behaviors  No sleep study   Wakes up around 6/630am  Gets up rarely to urinate      Bladder/Renal/Prostate/Gyn/Other  Prostate cancer 2012 - surgery - no radiation or chemo  Bladder stone 2019 lithotripsy with no recurrence  Bladder neck contracture dealt with at 2012 at time of prostate surgery in 2012  Rare to no nocturia   No urgency or frequency      GI/Constipation/GERD   Has daily bowel movement with the metformin has some loose stool   No heartburn      He has having some pain in the past and they removed his appendix   He was having bladder spasms and had appendix removed and this problem with bladder improved      ENDO/Lipid/DM/Bone density/Thyroid  Lipid disorder  Diabetes type 2; A1c = 7.7 on 2/16/2023  triglyceride disorder - elevated at 297  And HDL was 37 and cholesterol 157 and non hdl 120 - non fasting study on 2/16/2023  Denies thyroid problems  Bone density has not been checked for a long time     empagliflozin jardiance 10mg - has not yet started.   Metformin glucophage 500mg twice daily      Glucosamine 1500mg/vitamin d400 international unit(s) and 100mg boswellia rain     Vitamin D dose?     Cardio/heart/Hyper or Hypotensive   Hypertension  134/89 today 95 heart rate   Normal stress was normal 11/13/2015  [facility]  [facility] dr abbie quezada   Normal eCG today   No cardiologist  Lisinopril-hydrochlorothiazide 10-12.5 zestoretic      Vision/Dry Eyes/Cataracts/Glaucoma/Macular   Cataract surgery around 2002   Dry eyes     Heme/Anticoagulation/Antiplatelet/Anemia/Other  Not taking aspirin  No bleeding problems      ENT/Resp  Hearing loss  Wearing hearing aides  Has some tinnitus  Voice has been affected - slurring  Loss of smell for many years  Has had covid in the past     Speech and slurring  Swallowing and coughing  May be related to drooling         Skin/Cancer/Seborrhea/other  History of melanoma - diagnosed   History of nonmelanoma skin cancer  Squamous  cell skin cancer  Dr garcia is following this      Musculoskeletal/Pain/Headache  Denies significant surgery  Has left hip pain in the morning      Other:   ecg was normal     Parkinson disease -diagnosis based on his slowness, stiffness, etc.   He has soft voice.      Genetic study  PD generation  [email or web address]     Brain MRI to look for iron      Dopamine scan (datscan)     Because of his agent orange exposure -  He qualifies for service connected benefits from the [facility] as he has parkinson  Encouraged to get connected with [facility] for this benefit.     They have a motor home - she does the driving and they like to go.      Return visit with Mendy Stanton for [facility]     Discussion about the SPARX3 trial and johnd Rosita Gauthier about their interest in learning more about the study.      Consider echo and cardiology consultation for treadmill study        Return to be determined     Discussed symptomatic medication therapy and physical and speech therapy     Parkinson's Disease: Newly diagnosed with PD. Motor symptoms are currently manageable.        Drooling:     Cognitive Changes:        PLAN:     __  All questions answered and the following patient instructions provided: -      __  Okay to cancel the Dopamine Scan.     __  You can do the brain MRI, ECHO Cardiogram, and see the cardiologist.      __  Exercise 3 - 5 times a week.      __  Consider doing the treadmill research after you read the information.     __  Multitasking can be affected, so try to focus on one task at a time.     __  No medication for now.     __  You can consider the [facility] - Physical Therapy and Speech Therapy.     __ For drooling, you can consider Botox injection.  Also, dry candy or chewing gum can help.      __ Return in 6 months to see Dr. Galicia. You may return sooner as needed.       Has had more slowness in movement  More stooped posture.   Loss of fine  motor  Problems writing and wife is signing bills  He has right sided tremors  He has more problems with coordination with his right hand     He has had more problems with slurring of speech     Did not do the dopamine scan -      Did not do the brain MRI scan      Echocardiogram was fine - EF was 55-60%      Loss of smell is long standing     Loss of facial expression     He granted proxy access to his mychart      Dry eyes     melanoma     Weight loss     Swallowing problems/drooling/     Physical and speech therapy     Ent consultation if needed     Genetic study  PD generation  [email or web address]     He has more problems with h is lingual speech   He has a nasal pattern     Pharmacy ([facility]) consultation and medication management    Parkinson's Disease:  Started Levodopa in Sep 2023. He reports slight improvement. He takes his last dose an hour before bedtime.  Since he doesn't have bothersome nighttime symptoms, I recommended changing the times he take Sinemet.      __  Referral to Speech Therapy. If you have not heard from the scheduling office within 2 business days, please call [phone] for  [facility] [place].       Medications      6am  10:30-11a  3-5p   Carbidopa/levodopa Sinemet 25/100 1 1 1   Empagliflozin Jardiance 10 mg     1   Ketotifen Zaditor 0.025% solution no       Lisinopril hydrochlorothiazide Zestoretic 10-12.5 1       Metformin Glucophage 500 mg 1   1   MVI   1       nonformularly glucosamine 1500, vit d 400, bosw  1       Polyethylene glycol-propylene systane ultra         Vitamin C ascorbic acid 1000 mg 1       vitamin D cholecalciferol dose? OFF                                          Plan:    [email or web address]   [phone]  [email or web address]   Parkinson genetics       Mr. Tyler and his wife are requesting assistance with obtaining handicap parking  Three of their  "children play hockey during the winter and grandchildren play sports during the summer  Parking can be a challenge at these events  Mr. Tyler has to drop his wife off and park due to her knee issues  He and his family are concerned about him walking long distances, especially in icy conditions  His wife has a temporary handicap pass while awaiting knee surgery    Balance has been \"pretty good.\" He has to pay closer attention  Not quite as balanced with certain tasks such as putting pants on in the morning  Dealing with this by concentrating harder in situations where he may be off balance helps  No falls or near falls  Not using an assistive device walking  He has not worked with physical therapy on balance    He tries to get to the health club regularly  Some days he does light lifting  Also doing some walking and running - alternative around the track  He has also been doing exercises with his wife in the water  He is getting to the health club 2-3 days/week  Plays golf once a week - he would like his golf game to be better, notices he isn't always fluid when putting    Carbidopa/Levodopa 1 tab TID - he has been on this dose for about a year  He used to have trouble coordinating brushing his teeth - this has improved since starting medication  His arms seem to swing better since starting medication  He also feels like his speech has improved  Trying not to take medication with food - avoiding one hour before or two hours after  If he has to delay a dose due to meals he may have wearing off - this usually happens because of grandchildren's hockey schedule (usually after 6 hours)  Hasn't noticed any side effects - no nausea or lightheadedness  He has a tremor in his right hand/leg and jaw - CD/LD has maybe helped this a little but it is still present  Writing has been more difficult - cursive writing is \"not where it should be.\" Smaller and also shakier.     Frequent coughing/choking - this can occur with food, " "liquids and with his own saliva  He had a swallow study in Oct 2023 - this showed flash penetration on thin liquids with initial swallow - not seen on subsequent swallows and no aspiration. He has not subsequently worked with speech therapy  He doesn't believe swallowing is worse than it used to be, his wife thinks that this has ana more of an issue    He met with a speech therapist once in December 2023.   Voice remains soft - he feels like his speech is ok for the most part  He tends to project better when his hearing aids are out  Waitresses may get his order wrong, have to ask him to repeat  Less slurring since he has been on CD/LD  SLP gave him a sheet with exercises to do at home but he does not do them    He is not interested in working with SLP at this time    Mr. Tyler has had more saliva - drooling both during the day and at night  He keeps a hanky around  He had excess saliva - this was one of his earliest symptoms   Discussed lozenges vs medication vs botulinum toxin  He does not want treatment at this time    No issues with constipation  No concerns about bladder function    Mood is good - not feeling down/depressed/anxious  \"We do a lot of laughing.\"    Sleep is good - 8 hours/night on average  Wakes feeling well rested  Rare brief jerks in sleep per his wife - \"he's kind of been like that his whole life.\" No more complex actions.  Not falling out of bed.     No concerns about thinking or memory - \"I'm as sharp as always\"  Wife has no concerns although he is not quite as good at multitasking as he used to be.    Zenia Copeland MD  Movement Disorders Fellow    Time Spent: 27 minutes spent on the date of the encounter doing chart review, history and exam, documentation and further activities as noted above      Coding statement:   Medical Decision Making:  #  Chronic progressive medical conditions addressed  - see above --   Review and/or interpretation of unique test or documentation from a provider " outside of neurology no   Independent historian provided additional details  yes I  Prescription drug management and review of potential side effects and/or monitoring for side effects  -- see above ---  Health impacted by social determinants of health  no    I have reviewed the note as documented above.  This accurately captures the substance of my conversation with the patient and total time spent preparing for visit, executing visit and completing visit on the day of the visit:  45 minutes.  The portion of this total time included face to face time 38 minutes    The longitudinal plan of care for Andre Tyler was addressed during this visit. Due to the added complexity in care, I will continue to support Andre Tyler in the subsequent management of this condition(s) and with the ongoing continuity of care of this condition(s).      Flex Galicia MD     ______________________________________    Last visit date and details:             ______________________________________      Patient was asked about 14 Review of systems including changes in vision (dry eyes, double vision), hearing, heart, lungs, musculoskeletal, depression, anxiety, snoring, RBD, insomnia, urinary frequency, urinary urgency, constipation, swallowing problems, hematological, ID, allergies, skin problems: seborrhea, endocrinological: thyroid, diabetes, cholesterol; balance, weight changes, and other neurological problems and these were not significant at this time except for   Patient Active Problem List   Diagnosis     Health Care Home     Hypertension goal BP (blood pressure) < 140/90     Squamous cell skin cancer     Prostate cancer - surgically treated     CARDIOVASCULAR SCREENING; LDL GOAL LESS THAN 130     Sensorineural hearing loss, bilateral     Bladder neck contracture     Bladder stone     Type 2 diabetes mellitus without complication (H)     History of melanoma     History of nonmelanoma skin cancer     Carcinoma of prostate  (H)     Contact with and (suspected) exposure to other hazardous aromatic compounds     Elevated fasting blood sugar     HDL lipoprotein deficiency     Hearing loss     Hypertriglyceridemia     Other ill-defined and unknown causes of morbidity and mortality     Essential (primary) hypertension     Essential hypertension     Malignant neoplasm of prostate (H)     Tremor     H/O echocardiogram 2023     Family hx of lung cancer     Parkinson's disease     H/O prostatectomy        No Known Allergies  Past Surgical History:   Procedure Laterality Date     APPENDECTOMY  2015    had improvement in bladder spasms     CATARACT IOL, RT/LT  2002    20+ years ago     HC MOHS TRUNK/ARM/LEG 1ST STAGE UP T0 5 BLOCKS       LASER HOLMIUM LITHOTRIPSY URETER(S), INSERT STENT, COMBINED N/A 10/21/2019    Procedure: Urethral dilation and transurethral resection of bladder neck contracture litholapaxy (needs holmium laser) for bladder stone;  Surgeon: Arnulfo Vale MD;  Location:  OR     OTHER SURGICAL HISTORY      skin cancer     PROSTATE SURGERY  08/28/2012     Past Medical History:   Diagnosis Date     Diabetes (H)      H/O echocardiogram 2023 3/10/2023    Global and regional left ventricular function is normal with an EF of 55-60%. Right ventricular function, chamber size, wall motion, and thickness are normal. Pulmonary artery systolic pressure is normal. Sinuses of Valsalva 4.1 cm. Mild dilatation of the aorta is present. The inferior vena cava is normal. No pericardial effusion is present. ________________________________________________________     History of melanoma     2022 left forearm     History of nonmelanoma skin cancer     NUMEROUS     Hypertension goal BP (blood pressure) < 140/90      Prostate cancer (H)      Squamous cell skin cancer      Tremor 2/24/2023     Social History     Socioeconomic History     Marital status:      Spouse name: Not on file     Number of children: 3     Years of education: Not on  file     Highest education level: Not on file   Occupational History     Employer: RETIRED   Tobacco Use     Smoking status: Never     Smokeless tobacco: Never   Vaping Use     Vaping Use: Never used   Substance and Sexual Activity     Alcohol use: No     Drug use: No     Sexual activity: Yes     Partners: Female   Other Topics Concern     Parent/sibling w/ CABG, MI or angioplasty before 65F 55M? Not Asked   Social History Narrative     Not on file     Social Determinants of Health     Financial Resource Strain: Not on file   Food Insecurity: Not on file   Transportation Needs: Not on file   Physical Activity: Not on file   Stress: Not on file   Social Connections: Not on file   Interpersonal Safety: Not on file   Housing Stability: Not on file       Drug and lactation database from the [place] National Library of Medicine:  http://toxnet.nlm.nih.gov/cgi-bin/sis/htmlgen?LACT      B/P: Data Unavailable, T: Data Unavailable, P: Data Unavailable, R: Data Unavailable 0 lbs 0 oz  There were no vitals taken for this visit., There is no height or weight on file to calculate BMI.  Medications and Vitals not listed above were documented in the cart and reviewed by me.     Current Outpatient Medications   Medication Sig Dispense Refill     carbidopa-levodopa (SINEMET)  MG tablet Start with 1/2 tab daily x 3days then 1/2 tab twice daily for 3-7 days then 1 tab twice daily for one week then 1 tablet 3 times per day. The dose can be increased if there is no significant benefit. IT may take 1-3 weeks before benefit is seen 270 tablet 3     empagliflozin (JARDIANCE) 10 MG TABS tablet Take 1 tablet (10 mg) by mouth daily 90 tablet 0     lisinopril-hydrochlorothiazide (ZESTORETIC) 10-12.5 MG tablet Take 1 tablet by mouth daily       metFORMIN (GLUCOPHAGE) 500 MG tablet Take 500 mg by mouth 2 times daily (with meals)       NONFORMULARY Osteo biflex one per day: 400 Vitamin D, 1500 Glucosamine, 100mg boswellia rain  (joint shield)       polyethylene glycol-propylene glycol (SYSTANE ULTRA) 0.4-0.3 % SOLN ophthalmic solution Place 1 drop into both eyes every hour as needed for dry eyes       vitamin C (ASCORBIC ACID) 1000 MG TABS Take 1,000 mg by mouth 2 times daily           Flex Galicia MD

## 2024-04-23 ENCOUNTER — OFFICE VISIT (OUTPATIENT)
Dept: DERMATOLOGY | Facility: CLINIC | Age: 79
End: 2024-04-23
Payer: COMMERCIAL

## 2024-04-23 DIAGNOSIS — L57.0 AK (ACTINIC KERATOSIS): Primary | ICD-10-CM

## 2024-04-23 PROCEDURE — 96573 PDT DSTR PRMLG LES PHYS/QHP: CPT | Performed by: DERMATOLOGY

## 2024-04-23 ASSESSMENT — PAIN SCALES - GENERAL: PAINLEVEL: NO PAIN (0)

## 2024-04-23 NOTE — NURSING NOTE
Drug Administration Record    Prior to injection, verified patient identity using patient's name and date of birth.  Due to injection administration, patient instructed to remain in clinic for 15 minutes  afterwards, and to report any adverse reaction to me immediately.    Drug Name: Levulan  Dose: 1.5 mL  Route administered: topically  NDC #: 41940-788-89  Amount of waste(mL):none  Reason for waste: Single use vial    LOT #: [id number]  SITE:   : [employer]  EXPIRATION DATE:

## 2024-04-23 NOTE — LETTER
4/23/2024         RE: Andre Tyler  [address] [address]        Dear Colleague,    Thank you for referring your patient, Andre Tyler, to the [facility]. Please see a copy of my visit note below.    Photodynamic Therapy Intake:    If patient has a history of HSV or VZV (shingles in treatment area) they have been given prophylaxis or documented that they refused it:  NO  If Valtrex prescribed, when did patient start?   N/A.    1.Close monitoring for more significant reaction, and avoid saran wrap if YES to any of the following:  New medications since seen by physician? No (If yes, contact supervising physician)  NSAIDs, ibuprofen, aspirin, naproxen, piroxicam: No  Antiarrhythmics (amiodarone, quinidine): No  Hydrochlorothiazide: Yes    2. Will hold PDT for YES to any of the following:  Pregnancy/breastfeeding/unknown pregnancy: N/A  Sun burn: No  Tetracyclines such as doxycycline: No  Ciprofloxacin: No  Methotrexate: No    3. Will hold LEVULAN for YES to any of the following:   Treatment today is for acne: NO    4. If patient is receiving sand paper (RN only procedure) do they have a history of MRSA:  No    5. Consent within 90 days with MD verified in patient chart?  Yes  The sites to be treated were verified and discussed with patient. Sites verified were Bilateral forearms.   Expected symptoms and/or complications of redness, peeling, stinging, and burning were reviewed.  Comfort measures including moisturizer, cool compresses, and sun protection were also reviewed with patient.  After review, patient verbalized understanding of procedure and consent form signed by patient (as per MD documentation) and patient agrees to proceed with treatment.     Treatment site(s) cleansed with [employer]: Yes   Treatment site(s) de-greased with Acetone: Yes  Applied 1 Levulan stick to the site/s.      MEDICATION: Levulan  DOSE: 1.5 mL  ROUTE:  Topical  : [employer]  LOCATION GIVEN: bilateral forearms/hands  LOT NO: [id number]  EXP. DATE: 11/2026  NDC: 62732-457-18    TREATMENT NUMBER (30 maximum treatments per site): 3    Photodynamic Therapy(PDT) Post Op Note    Patient successfully completed PDT treatment today. Patient tolerated treatment without complication.  Sites were cleansed with mild soap and water and SPF 50 was applied after treatment.     Sun protection was reviewed with patient:  Patient wore long sleeve shirt to protect or to shield arms    After care instructions were reviewed with patient. AVS printed with clinic contact information and given to patient. Patient verbalized understanding and had no further questions or concerns at this time. Patient left clinic in good condition.     Attestation signed by Jorge Mcgregor MD at 4/25/2024  9:05 AM:    Attending attestation:  I was present for key elements of the procedure and immediately available for all other portions of the procedure.  I have reviewed the note and edited it as necessary.    Jorge Mcgregor M.D.  Professor  Director of Dermatologic Surgery  Department of Dermatology  [facility]    Dermatology Surgery Clinic  [facility]  [address] [place] [address]      Again, thank you for allowing me to participate in the care of your patient.        Sincerely,        Jorge Mcgregor MD

## 2024-04-23 NOTE — PROGRESS NOTES
Photodynamic Therapy Intake:    If patient has a history of HSV or VZV (shingles in treatment area) they have been given prophylaxis or documented that they refused it:  NO  If Valtrex prescribed, when did patient start?   N/A.    1.Close monitoring for more significant reaction, and avoid saran wrap if YES to any of the following:  New medications since seen by physician? No (If yes, contact supervising physician)  NSAIDs, ibuprofen, aspirin, naproxen, piroxicam: No  Antiarrhythmics (amiodarone, quinidine): No  Hydrochlorothiazide: Yes    2. Will hold PDT for YES to any of the following:  Pregnancy/breastfeeding/unknown pregnancy: N/A  Sun burn: No  Tetracyclines such as doxycycline: No  Ciprofloxacin: No  Methotrexate: No    3. Will hold LEVULAN for YES to any of the following:   Treatment today is for acne: NO    4. If patient is receiving sand paper (RN only procedure) do they have a history of MRSA:  No    5. Consent within 90 days with MD verified in patient chart?  Yes  The sites to be treated were verified and discussed with patient. Sites verified were Bilateral forearms.   Expected symptoms and/or complications of redness, peeling, stinging, and burning were reviewed.  Comfort measures including moisturizer, cool compresses, and sun protection were also reviewed with patient.  After review, patient verbalized understanding of procedure and consent form signed by patient (as per MD documentation) and patient agrees to proceed with treatment.     Treatment site(s) cleansed with [employer]: Yes   Treatment site(s) de-greased with Acetone: Yes  Applied 1 Levulan stick to the site/s.      MEDICATION: Levulan  DOSE: 1.5 mL  ROUTE: Topical  : [employer]  LOCATION GIVEN: bilateral forearms/hands  LOT NO: [id number]  EXP. DATE: 11/2026  NDC: 63769-641-73    TREATMENT NUMBER (30 maximum treatments per site): 3    Photodynamic Therapy(PDT) Post Op Note    Patient successfully completed PDT  treatment today. Patient tolerated treatment without complication.  Sites were cleansed with mild soap and water and SPF 50 was applied after treatment.     Sun protection was reviewed with patient:  Patient wore long sleeve shirt to protect or to shield arms    After care instructions were reviewed with patient. AVS printed with clinic contact information and given to patient. Patient verbalized understanding and had no further questions or concerns at this time. Patient left clinic in good condition.

## 2024-04-23 NOTE — PATIENT INSTRUCTIONS
Photodynamic Therapy (PDT) Home Care Instructions  I will experience redness, swelling, pain and discomfort which may last 5-10 days. I may experience pinkness or redness that persists for 2-3 weeks but longer duration is possible in some individuals. Risks are infection, eye injury, blistering, reactivation of the cold sore virus, skin lightening, skin darkening or scarring. Multiple treatments may be required.   DAY OF TREATMENT  Wash treated area with mild cleanser.  Redness of the treated skin is expected and can vary significantly from person to person and treatment to treatment.  Apply SPF 50 before leaving your home/office and while driving to and from work.  Remain indoors if possible and avoid direct as well as indirect sunlight.  If your head or face were treated, wear a wide brimmed hat while going to and from your car.  If your hands/arms were treated, were long sleeves and gloves.  Ice packs every 1-2 hours may be helpful as well.  In the evening, cleanse treated area gently.  Your skin will feel dry; keep treated area moisturized with a moisturizer.  If you have a history of cold sores, take (1) Valtrex 500mg tablet before bedtime, which will be prescribed by your physician.  DAY TWO  If you have a history of cold sores, take (1) Valtrex 500 mg tablets in the morning and in the evening on days 2 and 3.  You may take a shower.  Men should NOT shave if their face was treated.  Cleanse treated area gently.  Apply SPF 50  Most discomfort usually subsides by Day 3.  You should avoid direct sunlight and try to remain indoors on Day 2.  The sensitivity to sunlight will significantly decrease after 48 hours.  You should soak the treated areas with as soft washcloth with cold water for 20 minutes every 4-6 hours.  Ice may be applied after the cold-water soaks, if this feels good.  The area should be patted dry and moisturized following the cold-water soaks.  Follow evening routine as you did on Day 1  DAYS 3- 7    Try to avoid direct sunlight and minimize incidental exposure for one week.  NO BEACHES!  Continue using SPF 50.  This is very important in protecting your newly rejuvenated skin.  You may begin applying make-up once any crusting has healed.  The area may be slightly red for a few weeks.  The skin will feel dry and tightened.  Moisturize once to twice daily.  Who should I call with questions?  [facility]: [phone]  [facility]: [phone]  For urgent needs outside of business hours call the [facility] at [phone] and ask for the dermatology resident on call

## 2024-07-06 ENCOUNTER — HEALTH MAINTENANCE LETTER (OUTPATIENT)
Age: 79
End: 2024-07-06

## 2024-07-08 ENCOUNTER — DOCUMENTATION ONLY (OUTPATIENT)
Dept: FAMILY MEDICINE | Facility: CLINIC | Age: 79
End: 2024-07-08
Payer: COMMERCIAL

## 2024-07-08 DIAGNOSIS — Z00.00 WELL ADULT EXAM: Primary | ICD-10-CM

## 2024-07-08 DIAGNOSIS — Z13.220 ENCOUNTER FOR LIPID SCREENING FOR CARDIOVASCULAR DISEASE: ICD-10-CM

## 2024-07-08 DIAGNOSIS — Z13.6 ENCOUNTER FOR LIPID SCREENING FOR CARDIOVASCULAR DISEASE: ICD-10-CM

## 2024-07-08 DIAGNOSIS — E11.9 TYPE 2 DIABETES MELLITUS WITHOUT COMPLICATION, WITHOUT LONG-TERM CURRENT USE OF INSULIN (H): ICD-10-CM

## 2024-07-12 ENCOUNTER — LAB (OUTPATIENT)
Dept: LAB | Facility: CLINIC | Age: 79
End: 2024-07-12
Payer: COMMERCIAL

## 2024-07-12 DIAGNOSIS — Z13.220 ENCOUNTER FOR LIPID SCREENING FOR CARDIOVASCULAR DISEASE: ICD-10-CM

## 2024-07-12 DIAGNOSIS — E11.9 TYPE 2 DIABETES MELLITUS WITHOUT COMPLICATION, WITHOUT LONG-TERM CURRENT USE OF INSULIN (H): ICD-10-CM

## 2024-07-12 DIAGNOSIS — Z00.00 WELL ADULT EXAM: ICD-10-CM

## 2024-07-12 DIAGNOSIS — Z13.6 ENCOUNTER FOR LIPID SCREENING FOR CARDIOVASCULAR DISEASE: ICD-10-CM

## 2024-07-12 LAB
ANION GAP SERPL CALCULATED.3IONS-SCNC: 12 MMOL/L (ref 7–15)
BUN SERPL-MCNC: 23.1 MG/DL (ref 8–23)
CALCIUM SERPL-MCNC: 9.5 MG/DL (ref 8.8–10.2)
CHLORIDE SERPL-SCNC: 101 MMOL/L (ref 98–107)
CHOLEST SERPL-MCNC: 164 MG/DL
CREAT SERPL-MCNC: 1.23 MG/DL (ref 0.67–1.17)
CREAT UR-MCNC: 100 MG/DL
DEPRECATED HCO3 PLAS-SCNC: 26 MMOL/L (ref 22–29)
EGFRCR SERPLBLD CKD-EPI 2021: 61 ML/MIN/1.73M2
FASTING STATUS PATIENT QL REPORTED: YES
FASTING STATUS PATIENT QL REPORTED: YES
GLUCOSE SERPL-MCNC: 136 MG/DL (ref 70–99)
HBA1C MFR BLD: 7.3 % (ref 0–5.6)
HDLC SERPL-MCNC: 38 MG/DL
LDLC SERPL CALC-MCNC: 89 MG/DL
MICROALBUMIN UR-MCNC: <12 MG/L
MICROALBUMIN/CREAT UR: NORMAL MG/G{CREAT}
NONHDLC SERPL-MCNC: 126 MG/DL
POTASSIUM SERPL-SCNC: 4.7 MMOL/L (ref 3.4–5.3)
SODIUM SERPL-SCNC: 139 MMOL/L (ref 135–145)
TRIGL SERPL-MCNC: 186 MG/DL

## 2024-07-12 PROCEDURE — 80061 LIPID PANEL: CPT

## 2024-07-12 PROCEDURE — 83036 HEMOGLOBIN GLYCOSYLATED A1C: CPT

## 2024-07-12 PROCEDURE — 36415 COLL VENOUS BLD VENIPUNCTURE: CPT

## 2024-07-12 PROCEDURE — 82043 UR ALBUMIN QUANTITATIVE: CPT

## 2024-07-12 PROCEDURE — 82570 ASSAY OF URINE CREATININE: CPT

## 2024-07-12 PROCEDURE — 80048 BASIC METABOLIC PNL TOTAL CA: CPT

## 2024-07-25 ENCOUNTER — OFFICE VISIT (OUTPATIENT)
Dept: DERMATOLOGY | Facility: CLINIC | Age: 79
End: 2024-07-25
Payer: COMMERCIAL

## 2024-07-25 DIAGNOSIS — L57.0 ACTINIC KERATOSIS: Primary | ICD-10-CM

## 2024-07-25 DIAGNOSIS — Z86.007 HISTORY OF SQUAMOUS CELL CARCINOMA IN SITU OF SKIN: ICD-10-CM

## 2024-07-25 DIAGNOSIS — D48.9 NEOPLASM OF UNCERTAIN BEHAVIOR: ICD-10-CM

## 2024-07-25 DIAGNOSIS — Z85.828 HISTORY OF BASAL CELL CARCINOMA OF SKIN: ICD-10-CM

## 2024-07-25 PROCEDURE — 11102 TANGNTL BX SKIN SINGLE LES: CPT | Mod: GC | Performed by: DERMATOLOGY

## 2024-07-25 PROCEDURE — 11103 TANGNTL BX SKIN EA SEP/ADDL: CPT | Mod: GC | Performed by: DERMATOLOGY

## 2024-07-25 PROCEDURE — 17004 DESTROY PREMAL LESIONS 15/>: CPT | Mod: XS | Performed by: DERMATOLOGY

## 2024-07-25 PROCEDURE — 88305 TISSUE EXAM BY PATHOLOGIST: CPT | Performed by: DERMATOLOGY

## 2024-07-25 PROCEDURE — 99213 OFFICE O/P EST LOW 20 MIN: CPT | Mod: 25 | Performed by: DERMATOLOGY

## 2024-07-25 RX ORDER — FLUOROURACIL 50 MG/G
CREAM TOPICAL 2 TIMES DAILY
Qty: 40 G | Refills: 0 | Status: SHIPPED | OUTPATIENT
Start: 2024-07-25

## 2024-07-25 RX ORDER — CALCIPOTRIENE 50 UG/G
CREAM TOPICAL 2 TIMES DAILY
Qty: 60 G | Refills: 0 | Status: SHIPPED | OUTPATIENT
Start: 2024-07-25

## 2024-07-25 NOTE — PROGRESS NOTES
[facility] Dermatology Note  Encounter Date: Jul 25, 2024  Office Visit     Dermatology Problem List:  Last FBSE 7/25/24  0. NUB, R lateral distal forearm, s/p bx 7/25/24  0. NUB, L lateral thigh, s/p bx 7/25/24  0. NUB, L neck, s/p bx 7/25/24     1. History of Melanoma  - Malignant Melanoma - L ventral forearm, 0.3mm breslow depth, s/p excision 2/10/22  2. History of NMSC  - SCCis, L volar forearm, s/p excision 3/25/24  - SCCIS, L lower leg, s/p Mohs and linear repair 8/16/23  - BCC - L posterior neck, s/p excision 12/8/22  - sBCC - L paraspinal lower back, s/p ED&C 8/29/22  - SCCIS - right tricep, s/p ED&C 8/29/22  - SCCIS - R dorsal hand, s/p Mohs and linear repair 8/29/22   - SCCIS - L shin, s/p Mohs and linear repair 8/29/22  - SCC - left dorsal wrist, s/p MMS 7/21/21  - BCC - upper mid back, s/p excision 7/21/21  - BCC - left clavicle, s/p excision 7/21/21  - BCC - left upper back, s/p excision 12/17/2020  - SCC - base of right thumb, s/p excision 6/26/18  - SCCIS - central chest, s/p ED & C 6/11/18  - BCC - vertex scalp, s/p MMS 11/21/2016  - SCCis - R lateral thigh, s/p ED&C 11/21/2016  - SCCIS - right medial ankle, s/p Mohs 4/21/2016  - SCCIS - right ventral forearm, s/p ED&C 4/21/2016  - BCC - left preauricular, s/p Mohs 11/10/2015  - BCC, superficial type - left upper chest, s/p biopsy 4/9/2015, s/p ED&C 10/5/2015  - SCCIS - posterior right lower earlobe, records via [facility] transferred records, Dr. Arnulfo Keyes, 2011. s/p excision in 2005, recurrent  - SCCIS - left dorsal hand, s/p via [facility] transferred records, Dr. Arnulfo Keyes, 2011  3. Actinic keratosis  - s/p PDT 2/15/24, 3/12/24, 4/23/24  -  right dorsal hand lateral, s/p biopsy 7/6/23; s/p LN2 8/16/23  - HAK - left dorsal wrist, s/p bx 11/10/2020  - s/p cryotherapy  4. History of benign biopsy  - Intradermal melanocytic nevus - left postauricular scalp, s/p bx 6/18/21  - Compound melanocytic nevus  - left lower back, s/p bx 4/9/15  - BLK - right lower leg, s/p bx 4/9/2015     Family hx of melanoma is negative.  Sister with unknown skin issues    ____________________________________________    Assessment & Plan:  # History of NMSC: No clinical evidence of recurrence  # History of Melanoma: No clinical evidence of recurrence  # Hx of Parkinson's on Carbidopa-Levodopa.   - Continue sun avoidance and sun protection with SPF 30+ sunscreen over-the-counter.  - Continue with skin cancer screenings every 6 months.    # Actinic keratosis, s/p PDT, bilateral dorsal hands and forearms. Tolerated the procedure well with a good response. Actinic damage noted still on the bilateral arms particularly the forehead and temples today. Discussed treatment options which include freezing today as well as field treatment with Calcipotriene and Efudex. Counseled the patient that it stimulates the immune system so there can be associated redness and tenderness when applying the two creams. If after 5 days the pain is intolerable and the redness is too uncomfortable, okay to stop after five days.   - Patient has undergone 3 PDT sessions   - Recommend starting field treatment, mixing small part of Efudex with small part of Calcipotriene two times a day for 4-7 days. Will treat a side at a time   - Left forearm and left dorsal hand will treat this area first and then will proceed to the right forearm and right dorsal hand   - Ln2 preformed today, procedure note below     # Neoplasm of uncertain behavior x 3  - R Lateral Distal Forearm s/p shave bx 7/25/24  - L Lateral Thigh s/p shave bx 7/25/24  - L Neck s/p shave bx 7/25/24 s/p shave bx 7/25/24  - shave biopsy procedure note below       Procedures Performed:   - PROCEDURE: Shave biopsy, right lateral distal forearm, L lateral thigh, l neck   After discussion of benefits and risks including but not limited to bleeding, infection, scar, incomplete removal, recurrence, and non-diagnostic  biopsy, written consent and photographs were obtained. The area was cleaned with isopropyl alcohol. < 1mL of 1% lidocaine with epinephrine was injected to obtain adequate anesthesia. A shave biopsy was performed. Hemostasis was achieved with aluminium chloride. Vaseline and a sterile dressing were applied. The patient tolerated the procedure and no complications were noted. The patient was provided with verbal and written post care instructions.     - Cryotherapy procedure note location(s): bilateral forearms and dorsal hands. After verbal consent and discussion of risks and benefits including, but not limited to, dyspigmentation/scar, blister, and pain, 15 AK lesion(s) was(were) treated with 1-2 mm freeze border for 2 cycles with liquid nitrogen. Post cryotherapy instructions were provided.      Follow-up: 6 month(s) in-person, or earlier for new or changing lesions    Staff and Resident:     Suha Palma MD  Dermatology Resident, PGY-4  [facility]  Pager: [phone]    Staff Physician Comments:   I saw and evaluated the patient with the Mohs Surgery Fellow (Dr. Itz Roblero) and Dermatology Resident (Dr. Suha Palma) and I agree with the assessment and plan and the above description of the procedure. I was present for the entire procedure and entire exam.    Boby Nazario DO    Department of Dermatology  [facility]: Phone: [phone], Fax:[phone]  [facility]: Phone: [phone], Fax: [phone]    ____________________________________________    CC: Skin Check (Full body skin check.  Patient reports no new areas that are bleeding, growing, crusting or itching)    HPI:  Mr. Andre Tyler is a(n) 78 year old male who presents today as a return patient for a 6 month skin check.   - Last seen 4/23/24 by Dr. Mcgregor for PDT treatment #3 on the bilateral  forearms. Tolerated the procedure well.   - Last skin check 1/25/24 by Dr. Nazario. At that time, the patient had 12 AK's treated with LN2 on the dorsal hands, arms, forearms, neck and ears. A shave biopsy was performed on the left volar forearm, pathology showed SCCIS and the lesion was completely excited on 3/25/24.  - Today the patient reports no spots of concern.    Patient is otherwise feeling well, without additional skin concerns.    Labs Reviewed:  N/A    Physical Exam:  Vitals: There were no vitals taken for this visit.  SKIN: Total skin excluding the undergarment areas was performed. The exam included the head/face, neck, both arms, chest, back, abdomen, both legs, digits and/or nails.   - numerous gritty papules noted on the bilateral forearms and dorsal hands   - left upper arm with pink macules non coalescing   - left neck with erythematous plaque with atypical erosion noted under dermoscopy   - 1.1cm x 8mm right lateral distal forearm erythematous plaque   - L thigh 8mm x 6mm irregular erythematous plaque with superficial erosion  - Well healed scar at site of previous melanoma, no repigmentation or nodularity noted.   - Well healed scars at sites of previous NMSC, no repigmentation or nodularity noted.  - No other lesions of concern on areas examined.   - No lymphadenopathy noted     Medications:  Current Outpatient Medications   Medication Sig Dispense Refill    calcipotriene (DOVONOX) 0.005 % external cream Apply topically 2 times daily Use a pea sized amount and mix with Efudex to the forearms and the top of the hands for  two times a day for 4-7 days. If after 5 days too irritated can stop 60 g 0    carbidopa-levodopa (SINEMET)  MG tablet Start with 1/2 tab daily x 3days then 1/2 tab twice daily for 3-7 days then 1 tab twice daily for one week then 1 tablet 3 times per day. The dose can be increased if there is no significant benefit. IT may take 1-3 weeks before benefit is seen 270 tablet 3     empagliflozin (JARDIANCE) 10 MG TABS tablet Take 1 tablet (10 mg) by mouth daily 90 tablet 0    fluorouracil (EFUDEX) 5 % external cream Apply topically 2 times daily Use a pea sized amount and mix with Calcipotriene to the forearms and the top of the hands for  two times a day for 4-7 days. If after 5 days too irritated can stop 40 g 0    lisinopril-hydrochlorothiazide (ZESTORETIC) 10-12.5 MG tablet Take 1 tablet by mouth daily      metFORMIN (GLUCOPHAGE) 500 MG tablet Take 500 mg by mouth 2 times daily (with meals)      NONFORMULARY Osteo biflex one per day: 400 Vitamin D, 1500 Glucosamine, 100mg boswellia rain (joint shield)      polyethylene glycol-propylene glycol (SYSTANE ULTRA) 0.4-0.3 % SOLN ophthalmic solution Place 1 drop into both eyes every hour as needed for dry eyes      vitamin C (ASCORBIC ACID) 1000 MG TABS Take 1,000 mg by mouth 2 times daily       No current facility-administered medications for this visit.      Past Medical History:   Patient Active Problem List   Diagnosis    Hypertension goal BP (blood pressure) < 140/90    Squamous cell skin cancer    Prostate cancer - surgically treated    CARDIOVASCULAR SCREENING; LDL GOAL LESS THAN 130    Sensorineural hearing loss, bilateral    Bladder neck contracture    Bladder stone    Type 2 diabetes mellitus without complication (H)    History of melanoma    History of nonmelanoma skin cancer    Carcinoma of prostate (H)    Contact with and (suspected) exposure to other hazardous aromatic compounds    Elevated fasting blood sugar    HDL lipoprotein deficiency    Hearing loss    Hypertriglyceridemia    Other ill-defined and unknown causes of morbidity and mortality    Essential (primary) hypertension    Essential hypertension    Malignant neoplasm of prostate (H)    Tremor    H/O echocardiogram 2023    Family hx of lung cancer    Parkinson's disease (H)    H/O prostatectomy     Past Medical History:   Diagnosis Date    Diabetes (H)     H/O  echocardiogram 2023 3/10/2023    Global and regional left ventricular function is normal with an EF of 55-60%. Right ventricular function, chamber size, wall motion, and thickness are normal. Pulmonary artery systolic pressure is normal. Sinuses of Valsalva 4.1 cm. Mild dilatation of the aorta is present. The inferior vena cava is normal. No pericardial effusion is present. ________________________________________________________    History of melanoma     2022 left forearm    History of nonmelanoma skin cancer     NUMEROUS    Hypertension goal BP (blood pressure) < 140/90     Prostate cancer (H)     Squamous cell skin cancer     Tremor 2/24/2023

## 2024-07-25 NOTE — NURSING NOTE
The following medication was given:     MEDICATION:  Lidocaine with epinephrine 1% [id number]  ROUTE: SQ  SITE: see procedure note  DOSE: 2.5 mL  LOT #: [id number]  : [employer]  EXPIRATION DATE: 4/30/25  NDC#: 25928-084-27  Was there drug waste? No  Multi-dose vial: Yes    Jessica Reyna RN  July 25, 2024

## 2024-07-25 NOTE — LETTER
7/25/2024      Andre Tyler  [address] [facility] [address]      Dear Colleague,    Thank you for referring your patient, Andre Tyler, to the [facility]. Please see a copy of my visit note below.    [facility] Dermatology Note  Encounter Date: Jul 25, 2024  Office Visit     Dermatology Problem List:  Last FBSE 7/25/24  0. NUB, R lateral distal forearm, s/p bx 7/25/24  0. NUB, L lateral thigh, s/p bx 7/25/24  0. NUB, L neck, s/p bx 7/25/24     1. History of Melanoma  - Malignant Melanoma - L ventral forearm, 0.3mm breslow depth, s/p excision 2/10/22  2. History of NMSC  - SCCis, L volar forearm, s/p excision 3/25/24  - SCCIS, L lower leg, s/p Mohs and linear repair 8/16/23  - BCC - L posterior neck, s/p excision 12/8/22  - sBCC - L paraspinal lower back, s/p ED&C 8/29/22  - SCCIS - right tricep, s/p ED&C 8/29/22  - SCCIS - R dorsal hand, s/p Mohs and linear repair 8/29/22   - SCCIS - L shin, s/p Mohs and linear repair 8/29/22  - SCC - left dorsal wrist, s/p MMS 7/21/21  - BCC - upper mid back, s/p excision 7/21/21  - BCC - left clavicle, s/p excision 7/21/21  - BCC - left upper back, s/p excision 12/17/2020  - SCC - base of right thumb, s/p excision 6/26/18  - SCCIS - central chest, s/p ED & C 6/11/18  - BCC - vertex scalp, s/p MMS 11/21/2016  - SCCis - R lateral thigh, s/p ED&C 11/21/2016  - SCCIS - right medial ankle, s/p Mohs 4/21/2016  - SCCIS - right ventral forearm, s/p ED&C 4/21/2016  - BCC - left preauricular, s/p Mohs 11/10/2015  - BCC, superficial type - left upper chest, s/p biopsy 4/9/2015, s/p ED&C 10/5/2015  - SCCIS - posterior right lower earlobe, records via [facility] transferred records, Dr. Arnulfo Keyes, 2011. s/p excision in 2005, recurrent  - SCCIS - left dorsal hand, s/p via [facility] transferred records, Dr. Arnulfo Keyes, 2011  3. Actinic keratosis  - s/p PDT 2/15/24, 3/12/24, 4/23/24  -   right dorsal hand lateral, s/p biopsy 7/6/23; s/p LN2 8/16/23  - HAK - left dorsal wrist, s/p bx 11/10/2020  - s/p cryotherapy  4. History of benign biopsy  - Intradermal melanocytic nevus - left postauricular scalp, s/p bx 6/18/21  - Compound melanocytic nevus - left lower back, s/p bx 4/9/15  - BLK - right lower leg, s/p bx 4/9/2015     Family hx of melanoma is negative.  Sister with unknown skin issues    ____________________________________________    Assessment & Plan:  # History of NMSC: No clinical evidence of recurrence  # History of Melanoma: No clinical evidence of recurrence  # Hx of Parkinson's on Carbidopa-Levodopa.   - Continue sun avoidance and sun protection with SPF 30+ sunscreen over-the-counter.  - Continue with skin cancer screenings every 6 months.    # Actinic keratosis, s/p PDT, bilateral dorsal hands and forearms. Tolerated the procedure well with a good response. Actinic damage noted still on the bilateral arms particularly the forehead and temples today. Discussed treatment options which include freezing today as well as field treatment with Calcipotriene and Efudex. Counseled the patient that it stimulates the immune system so there can be associated redness and tenderness when applying the two creams. If after 5 days the pain is intolerable and the redness is too uncomfortable, okay to stop after five days.   - Patient has undergone 3 PDT sessions   - Recommend starting field treatment, mixing small part of Efudex with small part of Calcipotriene two times a day for 4-7 days. Will treat a side at a time   - Left forearm and left dorsal hand will treat this area first and then will proceed to the right forearm and right dorsal hand   - Ln2 preformed today, procedure note below     # Neoplasm of uncertain behavior x 3  - R Lateral Distal Forearm s/p shave bx 7/25/24  - L Lateral Thigh s/p shave bx 7/25/24  - L Neck s/p shave bx 7/25/24 s/p shave bx 7/25/24  - shave biopsy procedure note  below       Procedures Performed:   - PROCEDURE: Shave biopsy, right lateral distal forearm, L lateral thigh, l neck   After discussion of benefits and risks including but not limited to bleeding, infection, scar, incomplete removal, recurrence, and non-diagnostic biopsy, written consent and photographs were obtained. The area was cleaned with isopropyl alcohol. < 1mL of 1% lidocaine with epinephrine was injected to obtain adequate anesthesia. A shave biopsy was performed. Hemostasis was achieved with aluminium chloride. Vaseline and a sterile dressing were applied. The patient tolerated the procedure and no complications were noted. The patient was provided with verbal and written post care instructions.     - Cryotherapy procedure note location(s): bilateral forearms and dorsal hands. After verbal consent and discussion of risks and benefits including, but not limited to, dyspigmentation/scar, blister, and pain, 15 AK lesion(s) was(were) treated with 1-2 mm freeze border for 2 cycles with liquid nitrogen. Post cryotherapy instructions were provided.      Follow-up: 6 month(s) in-person, or earlier for new or changing lesions    Staff and Resident:     Suha Palma MD  Dermatology Resident, PGY-4  [facility]  Pager: [phone]    Staff Physician Comments:   I saw and evaluated the patient with the Mohs Surgery Fellow (Dr. Itz Roblero) and Dermatology Resident (Dr. Suha Palma) and I agree with the assessment and plan and the above description of the procedure. I was present for the entire procedure and entire exam.    Boby Nazario DO    Department of Dermatology  [facility]: Phone: [phone], Fax:[phone]  [facility]: Phone: [phone], Fax: [phone]    ____________________________________________    CC: Skin Check (Full body skin check.  Patient reports  no new areas that are bleeding, growing, crusting or itching)    HPI:  Mr. Andre Tyler is a(n) 78 year old male who presents today as a return patient for a 6 month skin check.   - Last seen 4/23/24 by Dr. Mcgregor for PDT treatment #3 on the bilateral forearms. Tolerated the procedure well.   - Last skin check 1/25/24 by Dr. Nazario. At that time, the patient had 12 AK's treated with LN2 on the dorsal hands, arms, forearms, neck and ears. A shave biopsy was performed on the left volar forearm, pathology showed SCCIS and the lesion was completely excited on 3/25/24.  - Today the patient reports no spots of concern.    Patient is otherwise feeling well, without additional skin concerns.    Labs Reviewed:  N/A    Physical Exam:  Vitals: There were no vitals taken for this visit.  SKIN: Total skin excluding the undergarment areas was performed. The exam included the head/face, neck, both arms, chest, back, abdomen, both legs, digits and/or nails.   - numerous gritty papules noted on the bilateral forearms and dorsal hands   - left upper arm with pink macules non coalescing   - left neck with erythematous plaque with atypical erosion noted under dermoscopy   - 1.1cm x 8mm right lateral distal forearm erythematous plaque   - L thigh 8mm x 6mm irregular erythematous plaque with superficial erosion  - Well healed scar at site of previous melanoma, no repigmentation or nodularity noted.   - Well healed scars at sites of previous NMSC, no repigmentation or nodularity noted.  - No other lesions of concern on areas examined.   - No lymphadenopathy noted     Medications:  Current Outpatient Medications   Medication Sig Dispense Refill     calcipotriene (DOVONOX) 0.005 % external cream Apply topically 2 times daily Use a pea sized amount and mix with Efudex to the forearms and the top of the hands for  two times a day for 4-7 days. If after 5 days too irritated can stop 60 g 0     carbidopa-levodopa (SINEMET)  MG tablet  Start with 1/2 tab daily x 3days then 1/2 tab twice daily for 3-7 days then 1 tab twice daily for one week then 1 tablet 3 times per day. The dose can be increased if there is no significant benefit. IT may take 1-3 weeks before benefit is seen 270 tablet 3     empagliflozin (JARDIANCE) 10 MG TABS tablet Take 1 tablet (10 mg) by mouth daily 90 tablet 0     fluorouracil (EFUDEX) 5 % external cream Apply topically 2 times daily Use a pea sized amount and mix with Calcipotriene to the forearms and the top of the hands for  two times a day for 4-7 days. If after 5 days too irritated can stop 40 g 0     lisinopril-hydrochlorothiazide (ZESTORETIC) 10-12.5 MG tablet Take 1 tablet by mouth daily       metFORMIN (GLUCOPHAGE) 500 MG tablet Take 500 mg by mouth 2 times daily (with meals)       NONFORMULARY Osteo biflex one per day: 400 Vitamin D, 1500 Glucosamine, 100mg boswellia rain (joint shield)       polyethylene glycol-propylene glycol (SYSTANE ULTRA) 0.4-0.3 % SOLN ophthalmic solution Place 1 drop into both eyes every hour as needed for dry eyes       vitamin C (ASCORBIC ACID) 1000 MG TABS Take 1,000 mg by mouth 2 times daily       No current facility-administered medications for this visit.      Past Medical History:   Patient Active Problem List   Diagnosis     Hypertension goal BP (blood pressure) < 140/90     Squamous cell skin cancer     Prostate cancer - surgically treated     CARDIOVASCULAR SCREENING; LDL GOAL LESS THAN 130     Sensorineural hearing loss, bilateral     Bladder neck contracture     Bladder stone     Type 2 diabetes mellitus without complication (H)     History of melanoma     History of nonmelanoma skin cancer     Carcinoma of prostate (H)     Contact with and (suspected) exposure to other hazardous aromatic compounds     Elevated fasting blood sugar     HDL lipoprotein deficiency     Hearing loss     Hypertriglyceridemia     Other ill-defined and unknown causes of morbidity and mortality      Essential (primary) hypertension     Essential hypertension     Malignant neoplasm of prostate (H)     Tremor     H/O echocardiogram 2023     Family hx of lung cancer     Parkinson's disease (H)     H/O prostatectomy     Past Medical History:   Diagnosis Date     Diabetes (H)      H/O echocardiogram 2023 3/10/2023    Global and regional left ventricular function is normal with an EF of 55-60%. Right ventricular function, chamber size, wall motion, and thickness are normal. Pulmonary artery systolic pressure is normal. Sinuses of Valsalva 4.1 cm. Mild dilatation of the aorta is present. The inferior vena cava is normal. No pericardial effusion is present. ________________________________________________________     History of melanoma     2022 left forearm     History of nonmelanoma skin cancer     NUMEROUS     Hypertension goal BP (blood pressure) < 140/90      Prostate cancer (H)      Squamous cell skin cancer      Tremor 2/24/2023         Again, thank you for allowing me to participate in the care of your patient.        Sincerely,        Boby Nazario MD

## 2024-07-25 NOTE — NURSING NOTE
Andre Tyler's goals for this visit include:   Chief Complaint   Patient presents with    Skin Check     Full body skin check.  Patient reports no new areas that are bleeding, growing, crusting or itching       He requests these members of his care team be copied on today's visit information: n/a    PCP: Arnulfo Joaquin    Referring Provider:  Referred Self, MD  No address on file    There were no vitals taken for this visit.    Do you need any medication refills at today's visit? No  Jessica Reyna RN

## 2024-07-26 ENCOUNTER — TELEPHONE (OUTPATIENT)
Dept: DERMATOLOGY | Facility: CLINIC | Age: 79
End: 2024-07-26
Payer: COMMERCIAL

## 2024-07-26 NOTE — TELEPHONE ENCOUNTER
Pt called regarding the creams he was prescribed yesterday by Dr Nazario. Pt advised that he can start those creams now and they don't have to be done in the winter months.      Verbally discuss efudex instructions with pt.        Tayla Mccarty RN on 7/26/2024 at 12:46 PM

## 2024-07-30 ENCOUNTER — TELEPHONE (OUTPATIENT)
Dept: DERMATOLOGY | Facility: CLINIC | Age: 79
End: 2024-07-30
Payer: COMMERCIAL

## 2024-07-30 NOTE — TELEPHONE ENCOUNTER
"2 Result Notes       Component  Ref Range & Units  Resulting Agency   Case Report   Surgical Pathology Report                         Case: [id number]                                   Authorizing Provider:  Boby Nazario MD         Collected:           07/25/2024 02:09 PM           Ordering Location:     [facility]   Received:            07/25/2024 03:50 PM                                  [place]                                                                   Pathologist:           Ramsey Varma MD                                                         Specimens:   A) - Skin, R Lateral Distal Forearm                                                                  B) - Skin, L Lateral Thigh                                                                          C) - Skin, Left Neck                                                                      Final Diagnosis   A. 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   Gross Description  [facility]   A(1). Skin, R Lateral Distal Forearm:  The specimen is received in formalin with proper patient identification, labeled \"R lateral distal forearm\".  The specimen consists of a 1.2 x 0.5 cm tan-white skin shave whose cutaneous surface is remarkable for a 0.3 x 0.3 cm brown lesion.  The subcutaneous surface is inked black.  The specimen is serially sectioned and submitted entirely in cassette A1.         B(2). Skin, L Lateral Thigh:  The specimen is received in formalin with proper patient identification, labeled \"L lateral thigh\".  The specimen consists of 0.4 x 0.7 cm tan-white skin shave whose cutaneous surface " "is unremarkable.  The subcutaneous surface is inked black.  The specimen is serially sectioned and submitted entirely in cassette B1.         C(3). Skin, Left Neck:  The specimen is received in formalin with proper patient identification, labeled \"left neck\".  The specimen consists of a 1.0 x 0.5 cm tan-white skin shave whose cutaneous surface is remarkable for a 0.1 x 0.1 white lesion.  The subcutaneous surface is inked black and the specimen is serially sectioned and submitted entirely in cassette C1.          Microscopic Description  [facility]   A. The specimen exhibits epidermal hyperplasia with full-thickness keratinocyte atypia and suprabasal mitoses.     B. The specimen exhibits epidermal hyperplasia with full-thickness keratinocyte atypia and suprabasal mitoses.    C. The specimen exhibits epidermal hyperplasia with full-thickness keratinocyte atypia and suprabasal mitoses.   MCRS  N/A Yes Abnormal  [facility]   Performing Labs  [facility]   The technical component of this testing was completed at [facility].     Stain controls for all stains resulted within this report have been reviewed and show appropriate reactivity.               Specimen Collected: 07/25/24  2:09 PM Last Resulted: 07/29/24  2:10 PM       Order Details        View Encounter        Lab and Collection Details        Routing        Result History     View All Conversations on this Encounter           Scans on Order [id number]    Document on 7/29/2024  2:10 PM by Ramsey Varma MD         Result Care Coordination      Result Notes     Agustina Masterson LPN  7/30/2024 11:24 AM CDT Back to Top      Pt made aware of results. Would like to do excision over ED&C after explaining the procedure to him. He did say he has had a few of these before.     Agustina Masterson LPN on 7/30/2024 at 11:24 AM    Boby Nazario MD  7/29/2024  3:48 PM CDT       Please call the patient with pathology results.   "   The pathologist found 3 SCCIS lesions. My treatment recommendation is Mohs for the neck and forearm. I recommend excision or ED&C for the thigh. If he is indifferent, I recommend excision for this one. Thank you.     Thank you.

## 2024-07-30 NOTE — TELEPHONE ENCOUNTER
Excision/Mohs previsit information                                                    Diagnosis: squamous cell carcinoma in situ  Site(s): R lateral distal forearm (MOHS), L lateral thigh (excision), L neck (MOHS)    Is the surgical site below the waist?  YES  If yes, instruct the patient to purchase over the counter chlorhexidine surgical soap and wash all skin below the belly button twice before surgery    No Known Allergies    Review and update allergy and medication list    Do you take the following medications:  Coumadin, Eliquis, Pradaxa, Xarelto:  NO.  If on Coumadin, INR should be checked within 7 days of surgery.  Range should be 3.5 or less or within therapeutic range.    Do you currently or have you previously had any of the following conditions:  Hepatitis:  NO  HIV/AIDS:  NO  Prolonged bleeding or bleeding disorder:  NO  Pacemaker: NO  Defibrillator:  NO  History of artificial or heart valve replacement:  NO  Endocarditis (inflammation of the inner lining of the heart's chambers and valves):  NO  Have you ever had a prosthetic joint infection:  NO  Pregnant or Breastfeeding:  NO  Mobility device (wheelchair, transfer difficulty): NO    Important Reminders:                                                      -Ok to take all of their medications as prescribed  -Patients can eat, no need to be fasting  -If face is being treated, please come with a make-up free face  -If scalp is being treated, please come with clean hair free from product  -Patient will not be able to get the site wet for 48 hrs  -No submerging wound in standing water (lake, pool, bathtub, hot tub) for 2 weeks  -No physical activity for 48 hrs (further restrictions will be discussed by MD at time of visit)    If any positives, send to RN for further review  Agustina Masterson LPN

## 2024-09-05 ENCOUNTER — OFFICE VISIT (OUTPATIENT)
Dept: DERMATOLOGY | Facility: CLINIC | Age: 79
End: 2024-09-05
Payer: COMMERCIAL

## 2024-09-05 VITALS — OXYGEN SATURATION: 99 % | HEART RATE: 73 BPM | DIASTOLIC BLOOD PRESSURE: 82 MMHG | SYSTOLIC BLOOD PRESSURE: 118 MMHG

## 2024-09-05 DIAGNOSIS — D04.72 SQUAMOUS CELL CARCINOMA IN SITU (SCCIS) OF SKIN OF LEFT THIGH: ICD-10-CM

## 2024-09-05 DIAGNOSIS — D04.4 SQUAMOUS CELL CARCINOMA IN SITU (SCCIS) OF SKIN OF NECK: ICD-10-CM

## 2024-09-05 DIAGNOSIS — D04.61 SQUAMOUS CELL CARCINOMA IN SITU (SCCIS) OF SKIN OF RIGHT FOREARM: Primary | ICD-10-CM

## 2024-09-05 PROCEDURE — 12042 INTMD RPR N-HF/GENIT2.6-7.5: CPT | Mod: GC | Performed by: DERMATOLOGY

## 2024-09-05 PROCEDURE — 17313 MOHS 1 STAGE T/A/L: CPT | Mod: 59 | Performed by: DERMATOLOGY

## 2024-09-05 PROCEDURE — 17311 MOHS 1 STAGE H/N/HF/G: CPT | Mod: 59 | Performed by: DERMATOLOGY

## 2024-09-05 PROCEDURE — 88305 TISSUE EXAM BY PATHOLOGIST: CPT | Performed by: DERMATOLOGY

## 2024-09-05 PROCEDURE — 12034 INTMD RPR S/TR/EXT 7.6-12.5: CPT | Mod: GC | Performed by: DERMATOLOGY

## 2024-09-05 PROCEDURE — 11603 EXC TR-EXT MAL+MARG 2.1-3 CM: CPT | Mod: 59 | Performed by: DERMATOLOGY

## 2024-09-05 NOTE — PATIENT INSTRUCTIONS
Leg and arm:  Caring for your skin after surgery    After your surgery, a pressure bandage will be placed over the area. This will prevent bleeding. Please follow these instructions over the next 1 to 2 weeks. Following this regimen will help to prevent complications as your wound heals.     For the first 48 hours after your surgery:    Leave the pressure dressing on and keep it dry. If it should come loose, you may re-tape it, but do not take it off.  Relax and take it easy. Do not do any vigorous exercise, heavy lifting or bending forward. This could cause the wound to bleed.  Post-operative pain is usually mild. You may alternate between 1000 mg of Tylenol (acetaminophen) and 400 mg of Ibuprofen every 4 hours.  Do not take more than 4,000 mg of acetaminophen in a 24-hour period or 3200 mg of Ibuprofen in a 24-hr period.  Avoid alcohol and vitamin E as these may increase your tendency to bleed.  You may put an ice pack around the bandaged area for 20 minutes at a time as needed. This may help reduce swelling, bruising, and pain. Make sure the ice pack is waterproof so that the pressure bandage doesn't get wet.  You may see a small amount of drainage or blood on your pressure bandage. This is normal. However:  If drainage or bleeding continues or saturates the bandage, you will need to apply firm pressure over the bandage with a clean washcloth for 15 minutes.  If bleeding continues after applying pressure for 15 minutes, apply an ice pack with gentle pressure to the bandaged area for another 15 minutes.  If bleeding still continues, call our office or go to the nearest emergency room.    48 Hours After Surgery:    Remove outer white bandage down to clear plastic film (Tegaderm).  You may notice dark brown, dried blood under the Tegaderm.  This is normal.    Leave the clear plastic film (Tegaderm) on for up to 2 weeks, as long as it is intact.  If it falls off prior to 2 weeks follow daily wound care below.  If it  stays intact for the full 2 weeks, then remove and treat as normal, healthy skin.    Daily Wound Care (if Tegaderm and Steri-Strips fall off prior to 2 weeks):    Wash wound with a mild soap and water.  Use caution when washing the wound, be gentle and do not let the forceful shower stream hit the wound directly. DO NOT WASH WITH HYDROGEN PEROXIDE AS THIS MIGHT CAUSE THE STITCHES TO DISSOLVE FASTER THAN WHAT WE WANT.    Pat dry.    Apply Vaseline (from a new container or tube) over the suture line with a Q-tip until it is completely healed. It is very important to keep the wound continuously moist, as wounds heal best in a moist environment.    Keep the site covered until it's healed.  You can cover it with a Telfa (non-stick) dressing and tape or a band-aid until healed with normal, healthy skin.      Call Us If:    You have bleeding that will not stop after applying pressure and ice.  You have pain that is not controlled with Tylenol and Ibuprofen.  You have signs or symptoms of an infection such as fever over 100 degrees Fahrenheit, redness, warmth or foul-smelling drainage from the wound    [facility]: [phone]   [facility]: [phone]  For urgent needs outside of business hours call the [facility] at [phone] and ask to speak with the dermatology resident on call       Neck: Caring for your skin after surgery    After your surgery, a pressure bandage will be placed over the area. This will prevent bleeding. Please follow these instructions over the next 1 to 2 weeks. Following this regimen will help to prevent complications as your wound heals.     For the first 48 hours after your surgery:    Leave the pressure dressing on and keep it dry. If it should come loose, you may re-tape it, but do not take it off.  Relax and take it easy. Do not do any vigorous exercise, heavy lifting or bending forward. This could cause the wound to  bleed.  Post-operative pain is usually mild. You may alternate between 1000 mg of Tylenol (acetaminophen) and 400 mg of Ibuprofen every 4 hours.  Do not take more than 4,000 mg of acetaminophen in a 24-hour period or 3200 mg of Ibuprofen in a 24-hr period.  Avoid alcohol and vitamin E as these may increase your tendency to bleed.  You may put an ice pack around the bandaged area for 20 minutes at a time as needed. This may help reduce swelling, bruising, and pain. Make sure the ice pack is waterproof so that the pressure bandage doesn't get wet.  You may see a small amount of drainage or blood on your pressure bandage. This is normal. However:  If drainage or bleeding continues or saturates the bandage, you will need to apply firm pressure over the bandage with a clean washcloth for 15 minutes.  If bleeding continues after applying pressure for 15 minutes, apply an ice pack with gentle pressure to the bandaged area for another 15 minutes.  If bleeding still continues, call our office or go to the nearest emergency room.    48 Hours After Surgery:  Carefully remove the pressure bandage. If it seems sticky or too difficult to get off, you may need to soak it off in the shower.  Wash wound with a mild soap and water.  Use caution when washing the wound, be gentle and do not let the forceful shower stream hit the wound directly.  Pat dry.  Apply Vaseline (from a new container or tube) over the suture line with a Q-tip.  Cover the site with a bandage.  Do this daily until the sutures have  dissolved.      What to expect:    The first couple of days your wound may be tender and may bleed slightly when doing wound care.  There may be swelling and bruising around the wound, especially if it is near the eyes. For your comfort, you may apply ice or cold compresses to the area.  The area around your wound may be numb for several weeks or even months.  You may experience periodic sharp pain or mild itching around the wound as  it heals.   The suture line will look dark pink at first and the edges of the wound will be reddened. This will lighten up each day.    Call Us If:    You have bleeding that will not stop after applying pressure and ice.  You have pain that is not controlled with Tylenol and Ibuprofen.  You have signs or symptoms of an infection such as fever over 100 degrees Fahrenheit, redness, warmth or foul-smelling drainage from the wound    [facility]: [phone]   [facility]: [phone]  For urgent needs outside of business hours call the [facility] at [phone] and ask to speak with the dermatology resident on call

## 2024-09-05 NOTE — NURSING NOTE
The following medication was given:     MEDICATION:  Lidocaine with epinephrine 1% 1:[id number]  ROUTE: SQ  SITE: see procedure note  DOSE: 29 mL  LOT #: [id number]  : [employer]  EXPIRATION DATE: 4/30/25  NDC#: 89048-813-05  Was there drug waste? No  Multi-dose vial: Yes    Mastisol, Steri-Strips, Tegaderm and pressure dressing applied to Mohs site on right forearm and excision site on left thigh.  Vaseline and pressure bandage applied to Mohs site on left neck.  Wound care instructions reviewed with patient and AVS provided.  Patient verbalized understanding.  Patient will follow up for suture removal: N/A.  No further questions or concerns at this time.    Jessica Reyna RN  September 5, 2024

## 2024-09-05 NOTE — PROGRESS NOTES
[facility] [place] Procedure Note    Dermatology Problem List:  Last FBSE 7/25/24     1. History of Melanoma  - Malignant Melanoma - L ventral forearm, 0.3mm breslow depth, s/p excision 2/10/22  2. History of NMSC  - SCCIs, L lateral thigh, s/p excision 9/5/24  - SCCIs, R lateral distal forearm, s/p Mohs 9/5/24  - SCCIs, L neck, s/p Mohs 9/5/24  - SCCis, L volar forearm, s/p excision 3/25/24  - SCCIS, L lower leg, s/p Mohs and linear repair 8/16/23  - BCC - L posterior neck, s/p excision 12/8/22  - sBCC - L paraspinal lower back, s/p ED&C 8/29/22  - SCCIS - right tricep, s/p ED&C 8/29/22  - SCCIS - R dorsal hand, s/p Mohs and linear repair 8/29/22   - SCCIS - L shin, s/p Mohs and linear repair 8/29/22  - SCC - left dorsal wrist, s/p MMS 7/21/21  - BCC - upper mid back, s/p excision 7/21/21  - BCC - left clavicle, s/p excision 7/21/21  - BCC - left upper back, s/p excision 12/17/2020  - SCC - base of right thumb, s/p excision 6/26/18  - SCCIS - central chest, s/p ED & C 6/11/18  - BCC - vertex scalp, s/p MMS 11/21/2016  - SCCis - R lateral thigh, s/p ED&C 11/21/2016  - SCCIS - right medial ankle, s/p Mohs 4/21/2016  - SCCIS - right ventral forearm, s/p ED&C 4/21/2016  - BCC - left preauricular, s/p Mohs 11/10/2015  - BCC, superficial type - left upper chest, s/p biopsy 4/9/2015, s/p ED&C 10/5/2015  - SCCIS - posterior right lower earlobe, records via [facility] [employer] transferred records, Dr. Arnulfo Keyes, 2011. s/p excision in 2005, recurrent  - SCCIS - left dorsal hand, s/p via [facility] transferred records, Dr. Arnulfo Keyes, 2011  3. Actinic keratosis  - s/p PDT 2/15/24, 3/12/24, 4/23/24  -  right dorsal hand lateral, s/p biopsy 7/6/23; s/p LN2 8/16/23  - HAK - left dorsal wrist, s/p bx 11/10/2020  - s/p cryotherapy  4. History of benign biopsy  - Intradermal melanocytic nevus - left postauricular scalp, s/p bx 6/18/21  - Compound melanocytic nevus - left  lower back, s/p bx 4/9/15  - BLK - right lower leg, s/p bx 4/9/2015     Family hx of melanoma is negative.  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    Informed consent was obtained and the patient underwent the procedure as follows:  The patient was placed prone on the operating table.  The cancer was identified, outlined with a marker, and verified by the patient.  The entire surgical field was prepped with ChoraPrep.  The surgical site was anesthetized using Lidocaine 1% with epi 1:100,000.      The area of clinically apparent tumor was debulked. The layer of tissue was then surgically excised using a #15 blade and was then transferred onto a specimen sheet maintaining the orientation of the specimen. Hemostasis was obtained using bipolar electrocoagulation. The wound site was then covered with a dressing while the tissue samples were processed for examination.    The excised tissue was transported to the Mohs histology laboratory maintaining the tissue orientation.  The tissue specimen was relaxed so that the entire surgical margin was in a a single horizontal plane for sectioning and inked for precise mapping.  A precise reference map was drawn to  reflect the sectioning of the specimen, colored inking of the margins, and orientation on the patient. The tissue was processed using horizontal sectioning of the base and continuous peripheral margins.  The histopathologic sections were reviewed in conjunction with the reference map.    Total blocks: 1    Total slides:  2    There were no cancer cells visualized on examination, therefore Mohs surgery was complete.    Reconstruction: Intermediate Linear Closure      The patient was taken to the operative suite and placed prone on the operating room table. The defect was identified.  Appropriate markings were made with a marking pen to plan the repair. The area was infiltrated with Lidocaine 1% with epi 1:100,000 and prepped with ChoraPrep and draped with sterile towels.     The wound was debeveled and undermined widely. Cones were excised within relaxed skin tension lines on both sides of the defect. Hemostasis was obtained using bipolar electrocoagulation. The deeper layers of subcutaneous and superficial (nonmuscle) fascia tissues were then approximated using monocryl 4-0 buried vertical mattress sutures (deep layer suturing). The wound edges were then approximated additional  buried sutures were placed in a similar fashion where needed. Fast Absorbing Gut 5-0 simple running sutures (superficial layer suturing) were carefully placed for maximum eversion and meticulous approximation.    Repair Size: 4.5 cm    The wound was cleansed with saline and ointment was applied along the wound surface.     A sterile pressure dressing was applied.  Wound care instructions were given verbally and in writing.  The patient left the operating suite in stable condition.  Patient was informed that additional refinement of the resulting surgical scar may be used as a second stage of this reconstruction.     The attending surgeon was present for key portions of the above major procedure and examination.      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    Informed consent was obtained and the patient underwent the procedure as follows:  The patient was placed supine on the operating table.  The cancer was identified, outlined with a marker, and verified by the patient.  The entire surgical field was prepped with ChoraPrep.  The surgical site was anesthetized using Lidocaine 1% with epi 1:100,000.      The area of clinically apparent tumor was debulked. The layer of tissue was then surgically excised using a #15 blade and was then transferred onto a specimen sheet maintaining the orientation of the specimen. Hemostasis was obtained using bipolar electrocoagulation. The wound site was then covered with a dressing while the tissue samples were processed for examination.    The excised tissue was transported to the Mohs histology laboratory maintaining the tissue orientation.  The tissue specimen was relaxed so that the entire surgical margin was in a a single horizontal plane for sectioning and inked for precise mapping.  A precise reference map was drawn to reflect the sectioning of the specimen, colored inking of the margins, and orientation on the patient.  The tissue was processed using  horizontal sectioning of the base and continuous peripheral margins.  The histopathologic sections were reviewed in conjunction with the reference map.    Total blocks: 1    Total slides:  2    There were no cancer cells visualized on examination, therefore Mohs surgery was complete.    Reconstruction: Intermediate Linear Closure      The patient was taken to the operative suite and placed supine on the operating room table. The defect was identified.  Appropriate markings were made with a marking pen to plan the repair. The area was infiltrated with Lidocaine 1% with epi 1:100,000 and prepped with ChoraPrep and draped with sterile towels.     The wound was debeveled and undermined widely. Cones were excised within relaxed skin tension lines on both sides of the defect. Hemostasis was obtained using bipolar electrocoagulation. The deeper layers of subcutaneous and superficial (nonmuscle) fascia tissues were then approximated using 4-0 Monocryl buried vertical mattress sutures (deep layer suturing). The wound edges were then approximated additional  buried sutures were placed in a similar fashion where needed. 4-0 Monocryl  running subcuticular sutures (superficial layer suturing) were carefully placed for maximum eversion and meticulous approximation.    Repair Size: 4.5 cm    The wound was cleansed with saline and ointment was applied along the wound surface.     A sterile pressure dressing was applied.  Wound care instructions were given verbally and in writing.  The patient left the operating suite in stable condition.  Patient was informed that additional refinement of the resulting surgical scar may be used as a second stage of this reconstruction.     The attending surgeon was present for key portions of the above major procedure and examination.     DERMATOLOGY EXCISION PROCEDURE NOTE    NAME OF PROCEDURE: Excision intermediate layered linear closure  Staff surgeon: Boby Nazario DO  Resident: Dr. Mobley  Osbaldo   Scrub Nurse: Jessica    PRE-OPERATIVE DIAGNOSIS:  Squamous cell carcinoma in situ  POST-OPERATIVE DIAGNOSIS: Same   LOCATION: left lateral thigh  FINAL EXCISION SIZE(DEFECT SIZE): 2.5x2.0 cm  MARGIN: 0.5 cm  FINAL REPAIR LENGTH: 5.5 cm   ANESTHESIA: 15mL 1% lidocaine with 1:100,000 epinephrine    INDICATIONS: This patient presented with a 1.5x1.0 cm Squamous cell carcinoma in situ. Excision was indicated. We discussed the principles of treatment and most likely complications including scarring, bleeding, infection, incomplete excision, wound dehiscence, pain, nerve damage, and recurrence. Informed consent was obtained and the patient underwent the procedure as follows:    PROCEDURE: The patient was taken to the operative suite. Time-out was performed.  The treatment area was anesthetized with 1% lidocaine with epinephrine. The area was prepped with Chlorhexidine and rinsed with sterile saline and draped with sterile towels. The lesion was delineated and excised down to subcutaneous fat in a elliptical manner. Hemostasis was obtained by electrocoagulation.     REPAIR: An intermediate layered linear closure was selected as the procedure which would maximally preserve both function and cosmesis.    After the excision of the tumor, the area was carefully undermined. Hemostasis was obtained with bipolar electrocoagulation.  Closure was oriented so that the wound was in the patient's natural skin tension lines. The deeper layers of subcutaneous and superficial (nonmuscle) fascia and dermal layers were then closed with 4-0 Monocryl sutures (deep layer suturing). The epidermis was then carefully approximated along the length of the wound using 4-0 Monocryl running subcuticular sutures (superficial layer suturing).     Estimated blood loss was less than 10 ml for all surgical sites. A sterile pressure dressing was applied and wound care instructions, with a written handout, were given. The patient was discharged  from the [facility] alert and ambulatory.    The patient elected for pathology results to automatically release and understands that the clinical staff will contact them as soon as possible to notify them of the results.    Follow-up: 1/29/25    Dr. Boby Nazario was immediately available for the entire surgery and was physicially present for the key portions of the procedure.    Anatomic Pathology Results: pending    Clinical Follow-Up: N/A    Staff Involved:  Scribe/Staff    Scribe Disclosure:   I, Sandhya Luis, am serving as a scribe; to document services personally performed by Dr. Boby Nazario - -based on data collection and the provider's statements to me.     Staff Physician Comments:   I saw and evaluated the patient with the Mohs Surgery Fellow (Dr. Itz Roblero) and I agree with the assessment and plan and the above description of the procedure as documented by the scribe. I was present for the key portions of the procedure and entire exam. I was immediately available in the clinic throughout the procedures.     Boby Nazario DO    Department of Dermatology  [facility]: Phone: [phone], Fax:[phone]  [facility]: Phone: [phone], Fax: [phone]    Care and Laboratory Testing Performed at:  [facility]   Dermatology Clinic  [address] [place] [address]

## 2024-09-05 NOTE — LETTER
9/5/2024      Andre Tyler  [address] [facility] [address]      Dear Colleague,    Thank you for referring your patient, Andre Tyler, to the [facility]. Please see a copy of my visit note below.    [facility] [place] Procedure Note    Dermatology Problem List:  Last FBSE 7/25/24     1. History of Melanoma  - Malignant Melanoma - L ventral forearm, 0.3mm breslow depth, s/p excision 2/10/22  2. History of NMSC  - SCCIs, L lateral thigh, s/p excision 9/5/24  - SCCIs, R lateral distal forearm, s/p Mohs 9/5/24  - SCCIs, L neck, s/p Mohs 9/5/24  - SCCis, L volar forearm, s/p excision 3/25/24  - SCCIS, L lower leg, s/p Mohs and linear repair 8/16/23  - BCC - L posterior neck, s/p excision 12/8/22  - sBCC - L paraspinal lower back, s/p ED&C 8/29/22  - SCCIS - right tricep, s/p ED&C 8/29/22  - SCCIS - R dorsal hand, s/p Mohs and linear repair 8/29/22   - SCCIS - L shin, s/p Mohs and linear repair 8/29/22  - SCC - left dorsal wrist, s/p MMS 7/21/21  - BCC - upper mid back, s/p excision 7/21/21  - BCC - left clavicle, s/p excision 7/21/21  - BCC - left upper back, s/p excision 12/17/2020  - SCC - base of right thumb, s/p excision 6/26/18  - SCCIS - central chest, s/p ED & C 6/11/18  - BCC - vertex scalp, s/p MMS 11/21/2016  - SCCis - R lateral thigh, s/p ED&C 11/21/2016  - SCCIS - right medial ankle, s/p Mohs 4/21/2016  - SCCIS - right ventral forearm, s/p ED&C 4/21/2016  - BCC - left preauricular, s/p Mohs 11/10/2015  - BCC, superficial type - left upper chest, s/p biopsy 4/9/2015, s/p ED&C 10/5/2015  - SCCIS - posterior right lower earlobe, records via [facility] transferred records, Dr. Arnulfo Keyes, 2011. s/p excision in 2005, recurrent  - SCCIS - left dorsal hand, s/p via [facility] transferred records, Dr. Arnulfo Keyes, 2011  3. Actinic keratosis  - s/p PDT 2/15/24, 3/12/24, 4/23/24  -  right dorsal  hand lateral, s/p biopsy 7/6/23; s/p LN2 8/16/23  - HAK - left dorsal wrist, s/p bx 11/10/2020  - s/p cryotherapy  4. History of benign biopsy  - Intradermal melanocytic nevus - left postauricular scalp, s/p bx 6/18/21  - Compound melanocytic nevus - left lower back, s/p bx 4/9/15  - BLK - right lower leg, s/p bx 4/9/2015     Family hx of melanoma is negative.  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    Informed consent was obtained and the patient underwent the procedure as follows:  The patient was placed prone on the operating table.  The cancer was identified, outlined with a marker, and verified by the patient.  The entire surgical field was prepped with ChoraPrep.  The surgical site was anesthetized using Lidocaine 1% with epi 1:100,000.      The area of clinically apparent tumor was debulked. The layer of tissue was then surgically excised using a #15 blade and was then transferred onto a specimen sheet maintaining the orientation of the specimen. Hemostasis was obtained using bipolar electrocoagulation. The wound site was then covered with a dressing while the tissue samples were processed for examination.    The excised tissue was transported to  the Mohs histology laboratory maintaining the tissue orientation.  The tissue specimen was relaxed so that the entire surgical margin was in a a single horizontal plane for sectioning and inked for precise mapping.  A precise reference map was drawn to reflect the sectioning of the specimen, colored inking of the margins, and orientation on the patient. The tissue was processed using horizontal sectioning of the base and continuous peripheral margins.  The histopathologic sections were reviewed in conjunction with the reference map.    Total blocks: 1    Total slides:  2    There were no cancer cells visualized on examination, therefore Mohs surgery was complete.    Reconstruction: Intermediate Linear Closure      The patient was taken to the operative suite and placed prone on the operating room table. The defect was identified.  Appropriate markings were made with a marking pen to plan the repair. The area was infiltrated with Lidocaine 1% with epi 1:100,000 and prepped with ChoraPrep and draped with sterile towels.     The wound was debeveled and undermined widely. Cones were excised within relaxed skin tension lines on both sides of the defect. Hemostasis was obtained using bipolar electrocoagulation. The deeper layers of subcutaneous and superficial (nonmuscle) fascia tissues were then approximated using monocryl 4-0 buried vertical mattress sutures (deep layer suturing). The wound edges were then approximated additional  buried sutures were placed in a similar fashion where needed. Fast Absorbing Gut 5-0 simple running sutures (superficial layer suturing) were carefully placed for maximum eversion and meticulous approximation.    Repair Size: 4.5 cm    The wound was cleansed with saline and ointment was applied along the wound surface.     A sterile pressure dressing was applied.  Wound care instructions were given verbally and in writing.  The patient left the operating suite in stable condition.  Patient was  informed that additional refinement of the resulting surgical scar may be used as a second stage of this reconstruction.     The attending surgeon was present for key portions of the above major procedure and examination.      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    Informed consent was obtained and the patient underwent the procedure as follows:  The patient was placed supine on the operating table.  The cancer was identified, outlined with a marker, and verified by the patient.  The entire surgical field was prepped with ChoraPrep.  The surgical site was anesthetized using Lidocaine 1% with epi 1:100,000.      The area of clinically apparent tumor was debulked. The layer of tissue was then surgically excised using a #15 blade and was then transferred onto a specimen sheet maintaining the orientation of the specimen. Hemostasis was obtained using bipolar electrocoagulation. The wound site was then covered with a dressing while the tissue samples were processed for examination.    The excised tissue was transported to the Mohs histology laboratory maintaining the tissue orientation.  The tissue specimen was relaxed so that the entire surgical margin was in a a  single horizontal plane for sectioning and inked for precise mapping.  A precise reference map was drawn to reflect the sectioning of the specimen, colored inking of the margins, and orientation on the patient.  The tissue was processed using horizontal sectioning of the base and continuous peripheral margins.  The histopathologic sections were reviewed in conjunction with the reference map.    Total blocks: 1    Total slides:  2    There were no cancer cells visualized on examination, therefore Mohs surgery was complete.    Reconstruction: Intermediate Linear Closure      The patient was taken to the operative suite and placed supine on the operating room table. The defect was identified.  Appropriate markings were made with a marking pen to plan the repair. The area was infiltrated with Lidocaine 1% with epi 1:100,000 and prepped with ChoraPrep and draped with sterile towels.     The wound was debeveled and undermined widely. Cones were excised within relaxed skin tension lines on both sides of the defect. Hemostasis was obtained using bipolar electrocoagulation. The deeper layers of subcutaneous and superficial (nonmuscle) fascia tissues were then approximated using 4-0 Monocryl buried vertical mattress sutures (deep layer suturing). The wound edges were then approximated additional  buried sutures were placed in a similar fashion where needed. 4-0 Monocryl  running subcuticular sutures (superficial layer suturing) were carefully placed for maximum eversion and meticulous approximation.    Repair Size: 4.5 cm    The wound was cleansed with saline and ointment was applied along the wound surface.     A sterile pressure dressing was applied.  Wound care instructions were given verbally and in writing.  The patient left the operating suite in stable condition.  Patient was informed that additional refinement of the resulting surgical scar may be used as a second stage of this reconstruction.     The attending  surgeon was present for key portions of the above major procedure and examination.     DERMATOLOGY EXCISION PROCEDURE NOTE    NAME OF PROCEDURE: Excision intermediate layered linear closure  Staff surgeon: Boby Nazario DO  Resident: Dr. Itz Roblero   Scrub Nurse: Jessica    PRE-OPERATIVE DIAGNOSIS:  Squamous cell carcinoma in situ  POST-OPERATIVE DIAGNOSIS: Same   LOCATION: left lateral thigh  FINAL EXCISION SIZE(DEFECT SIZE): 2.5x2.0 cm  MARGIN: 0.5 cm  FINAL REPAIR LENGTH: 5.5 cm   ANESTHESIA: 15mL 1% lidocaine with 1:100,000 epinephrine    INDICATIONS: This patient presented with a 1.5x1.0 cm Squamous cell carcinoma in situ. Excision was indicated. We discussed the principles of treatment and most likely complications including scarring, bleeding, infection, incomplete excision, wound dehiscence, pain, nerve damage, and recurrence. Informed consent was obtained and the patient underwent the procedure as follows:    PROCEDURE: The patient was taken to the operative suite. Time-out was performed.  The treatment area was anesthetized with 1% lidocaine with epinephrine. The area was prepped with Chlorhexidine and rinsed with sterile saline and draped with sterile towels. The lesion was delineated and excised down to subcutaneous fat in a elliptical manner. Hemostasis was obtained by electrocoagulation.     REPAIR: An intermediate layered linear closure was selected as the procedure which would maximally preserve both function and cosmesis.    After the excision of the tumor, the area was carefully undermined. Hemostasis was obtained with bipolar electrocoagulation.  Closure was oriented so that the wound was in the patient's natural skin tension lines. The deeper layers of subcutaneous and superficial (nonmuscle) fascia and dermal layers were then closed with 4-0 Monocryl sutures (deep layer suturing). The epidermis was then carefully approximated along the length of the wound using 4-0 Monocryl running  subcuticular sutures (superficial layer suturing).     Estimated blood loss was less than 10 ml for all surgical sites. A sterile pressure dressing was applied and wound care instructions, with a written handout, were given. The patient was [facility] from the [facility] alert and ambulatory.    The patient elected for pathology results to automatically release and understands that the clinical staff will contact them as soon as possible to notify them of the results.    Follow-up: 1/29/25    Dr. Boby Nazario was immediately available for the entire surgery and was physicially present for the key portions of the procedure.    Anatomic Pathology Results: pending    Clinical Follow-Up: N/A    Staff Involved:  Scribe/Staff    Scribe Disclosure:   I, Sandhya Luis, am serving as a scribe; to document services personally performed by Dr. Boby Nazario - -based on data collection and the provider's statements to me.     Staff Physician Comments:   I saw and evaluated the patient with the Mohs Surgery Fellow (Dr. Itz Roblero) and I agree with the assessment and plan and the above description of the procedure as documented by the scribe. I was present for the key portions of the procedure and entire exam. I was immediately available in the clinic throughout the procedures.     Boby Nazario DO    Department of Dermatology  [facility]: Phone: [phone], Fax:[phone]  [facility]: Phone: [phone], Fax: [phone]    Care and Laboratory Testing Performed at:  [facility]   Dermatology Clinic  [address] [place] [address]      Again, thank you for allowing me to participate in the care of your patient.        Sincerely,        Boby Nazario MD

## 2024-09-05 NOTE — NURSING NOTE
Andre Tyler's goals for this visit include:   Chief Complaint   Patient presents with    Procedure     3 sites  Mohs R lateral distal forearm & L neck  Excision L lateral thigh-          He requests these members of his care team be copied on today's visit information:     PCP: Arnulfo Joaquin    Referring Provider:  Boby Nazario MD  [address] [place] [address]    /82   Pulse 73   SpO2 99%     Do you need any medication refills at today's visit?       Hafsa Corrales EMT

## 2024-09-10 ENCOUNTER — TELEPHONE (OUTPATIENT)
Dept: DERMATOLOGY | Facility: CLINIC | Age: 79
End: 2024-09-10
Payer: COMMERCIAL

## 2024-09-10 NOTE — TELEPHONE ENCOUNTER
Writer called pt to discuss pathology results. Pt stated that the wound is healing well with no signs of infection. Pt denied having any questions or concerns at this time.     Tayla Mccarty RN on 9/10/2024 at 1:54 PM      Final Diagnosis  Left lateral thigh:  - Scar from the prior surgical procedure, with no evidence of residual lesion - (see description)  Electronically signed by Ramsey Varma MD on 9/9/2024 at  4:01 PM      Result Notes     Hafsa Corrales  9/10/2024  8:05 AM CDT Back to Top    Left a voicemail for patient to call us back [phone]   Boby Nazario MD  9/9/2024  8:58 PM CDT     Please call the patient with pathology results.     The pathologist thought we were successful removing the skin cancer from his left thigh. As long as the wound is healing well, no additional surgery is necessary.  Thank you.

## 2024-09-23 PROBLEM — Z77.29 EXPOSURE TO POTENTIALLY HAZARDOUS SUBSTANCE: Status: ACTIVE | Noted: 2024-03-08

## 2024-09-25 NOTE — PROGRESS NOTES
Diagnosis/Summary/Recommendations:    PATIENT: Andre Tyler  79 year old male     : 1945    JUVE: Oct 22, 2024       MRN: [id number]  [address] [facility] [place] [id number] [phone] (H)   [phone] (M)      [email or web address]     Salima Tyler   [phone]  He granted her proxy access  To [employer]        Assessment:  (R25.1) Tremor  (primary encounter diagnosis)  No family history of parkinson  Mother and Grandmother passed away at 99 years     Retired law enforcement - retired [employer]  and then worked 16 years [employer] at [facility] [place]      Wife retired exec director nonprofits     Right handed     Handwriting is different and possibly not as good and may be smaller                Notices more right than left hand tremor     Tremor is present when holding a phone and his wife commented on it.      Review of diagnosis    tremor     Avoidance of dopamine blockers   Not taking     Motor complication review   n/a     Review of Impulse control disorders   n/a     Review of surgical or medication options   reviewed     Gait/Balance/Falls   No falls      Exercise/Therapy performed/offered   Walking around helps his back pain -   Tries to get to [employer] 3 or 4 times per week   Has not been able to go  Can do treadmill     Cognitive/Driving   Does not like night driving but can drive  Can drive  Denies cognitive problems     Mood   denies     Hallucinations/delusions   denies     Sleep   Goes to bed around 10 or 1030pm and can fall asleep right away.   Sleeps in same bed as his wife  Use to snore in the past  No clear rbd behaviors  No sleep study   Wakes up around 6/630am  Gets up rarely to urinate      Bladder/Renal/Prostate/Gyn/Other  Prostate cancer 2012 - surgery - no radiation or chemo  Bladder stone 2019 lithotripsy with no recurrence  Bladder neck contracture dealt with at 2012 at time of prostate surgery in   Rare to no  nocturia   No urgency or frequency      GI/Constipation/GERD   Has daily bowel movement with the metformin has some loose stool   No heartburn      He has having some pain in the past and they removed his appendix   He was having bladder spasms and had appendix removed and this problem with bladder improved      ENDO/Lipid/DM/Bone density/Thyroid  Lipid disorder  Diabetes type 2; A1c = 7.7 on 2/16/2023  triglyceride disorder - elevated at 297  And HDL was 37 and cholesterol 157 and non hdl 120 - non fasting study on 2/16/2023  Denies thyroid problems  Bone density has not been checked for a long time     empagliflozin jardiance 10mg - has not yet started.   Metformin glucophage 500mg twice daily      Glucosamine 1500mg/vitamin d400 international unit(s) and 100mg boswellia rain     Vitamin D dose?     Cardio/heart/Hyper or Hypotensive   Hypertension  134/89 today 95 heart rate   Normal stress was normal 11/13/2015  [facility]  Seeing dr abbie quezada   Normal eCG today   No cardiologist  Lisinopril-hydrochlorothiazide 10-12.5 zestoretic      Vision/Dry Eyes/Cataracts/Glaucoma/Macular   Cataract surgery around 2002   Dry eyes     Heme/Anticoagulation/Antiplatelet/Anemia/Other  Not taking aspirin  No bleeding problems      ENT/Resp  Hearing loss  Wearing hearing aides  Has some tinnitus  Voice has been affected - slurring  Loss of smell for many years  Has had covid in the past     Speech and slurring  Swallowing and coughing  May be related to drooling         Skin/Cancer/Seborrhea/other  History of melanoma - diagnosed   History of nonmelanoma skin cancer  Squamous cell skin cancer  Dr garcia is following this      Musculoskeletal/Pain/Headache  Denies significant surgery  Has left hip pain in the morning      Other:   ecg was normal     Parkinson disease -diagnosis based on his slowness, stiffness, etc.   He has soft voice.      Genetic study  PD  generation  [email or web address]     Brain MRI to look for iron      Dopamine scan (datscan)     Because of his agent orange exposure -  He qualifies for service connected benefits [facility] as he has parkinson  Encouraged to get connected with [facility] for this benefit.     They have a motor home - she does the driving and they like to go.      Return visit with Mendy Stanton for [facility]     Discussion about the SPARX3 trial and messaged Rosita Gauthier about their interest in learning more about the study.      Consider echo and cardiology consultation for treadmill study        Return to be determined     Discussed symptomatic medication therapy and physical and speech therapy     Parkinson's Disease: Newly diagnosed with PD. Motor symptoms are currently manageable.        Drooling:     Cognitive Changes:        __  All questions answered and the following patient instructions provided: -      __  Okay to cancel the Dopamine Scan.     __  You can do the brain MRI, ECHO Cardiogram, and see the cardiologist.      __  Exercise 3 - 5 times a week.      __  Consider doing the treadmill research after you read the information.     __  Multitasking can be affected, so try to focus on one task at a time.     __  No medication for now.     __  You can consider the [facility] - Physical Therapy and Speech Therapy.     __ For drooling, you can consider Botox injection.  Also, dry candy or chewing gum can help.      __ Return in 6 months to see Dr. Galicia. You may return sooner as needed.       Has had more slowness in movement  More stooped posture.   Loss of fine motor  Problems writing and wife is signing bills  He has right sided tremors  He has more problems with coordination with his right hand     He has had more problems with slurring of speech     Did not do the dopamine scan -      Did not do the brain MRI scan      Echocardiogram was fine - EF was 55-60%     "  Loss of smell is long standing     Loss of facial expression     He granted proxy access to his mychart      Dry eyes     melanoma     Weight loss     Swallowing problems/drooling/     Physical and speech therapy     Ent consultation if needed     Genetic study  PD generation  [email or web address]     He has more problems with h is lingual speech   He has a nasal pattern     Pharmacy ([facility]) consultation and medication management     Parkinson's Disease:  Started Levodopa in Sep 2023. He reports slight improvement. He takes his last dose an hour before bedtime.  Since he doesn't have bothersome nighttime symptoms, I recommended changing the times he take Sinemet.        __  Referral to Speech Therapy. If you have not heard from the scheduling office within 2 business days, please call [phone] for [employer].    Mr. Tyler and his wife are requesting assistance with obtaining handicap parking  Three of their children play hockey during the winter and grandchildren play sports during the summer  Parking can be a challenge at these events  Mr. Tyler has to drop his wife off and park due to her knee issues  He and his family are concerned about him walking long distances, especially in icy conditions  His wife has a temporary handicap pass while awaiting knee surgery     Balance has been \"pretty good.\" He has to pay closer attention  Not quite as balanced with certain tasks such as putting pants on in the morning  Dealing with this by concentrating harder in situations where he may be off balance helps  No falls or near falls  Not using an assistive device walking  He has not worked with physical therapy on balance     He tries to get to the health club regularly  Some days he does light lifting  Also doing some walking and running - alternative around the track  He has also been doing exercises with his wife in the water  He is getting to the health club " "2-3 days/week  Plays golf once a week - he would like his golf game to be better, notices he isn't always fluid when putting     Carbidopa/Levodopa 1 tab TID - he has been on this dose for about a year  He used to have trouble coordinating brushing his teeth - this has improved since starting medication  His arms seem to swing better since starting medication  He also feels like his speech has improved  Trying not to take medication with food - avoiding one hour before or two hours after  If he has to delay a dose due to meals he may have wearing off - this usually happens because of grandchildren's hockey schedule (usually after 6 hours)  Hasn't noticed any side effects - no nausea or lightheadedness  He has a tremor in his right hand/leg and jaw - CD/LD has maybe helped this a little but it is still present  Writing has been more difficult - cursive writing is \"not where it should be.\" Smaller and also shakier.      Frequent coughing/choking - this can occur with food, liquids and with his own saliva  He had a swallow study in Oct 2023 - this showed flash penetration on thin liquids with initial swallow - not seen on subsequent swallows and no aspiration. He has not subsequently worked with speech therapy  He doesn't believe swallowing is worse than it used to be, his wife thinks that this has ana more of an issue     He met with a speech therapist once in December 2023.   Voice remains soft - he feels like his speech is ok for the most part  He tends to project better when his hearing aids are out  Waitresses may get his order wrong, have to ask him to repeat  Less slurring since he has been on CD/LD  SLP gave him a sheet with exercises to do at home but he does not do them     He is not interested in working with SLP at this time     Mr. Tyler has had more saliva - drooling both during the day and at night  He keeps a hanky around  He had excess saliva - this was one of his earliest symptoms   Discussed " "lozenges vs medication vs botulinum toxin  He does not want treatment at this time     No issues with constipation  No concerns about bladder function     Mood is good - not feeling down/depressed/anxious  \"We do a lot of laughing.\"     Sleep is good - 8 hours/night on average  Wakes feeling well rested  Rare brief jerks in sleep per his wife - \"he's kind of been like that his whole life.\" No more complex actions.  Not falling out of bed.      No concerns about thinking or memory - \"I'm as sharp as always\"  Wife has no concerns although he is not quite as good at multitasking as he used to be.        Medications      6am  10:30-11a  3-5p   Calcipotriene dovonox cream      Carbidopa/levodopa Sinemet 25/100 1 1 1   Empagliflozin Jardiance 10 mg      1   Fluoruracil efudex cream      Ketotifen Zaditor 0.025% solution no       Lisinopril hydrochlorothiazide Zestoretic 10-12.5 1       Metformin Glucophage 500 mg 1   1   MVI   1       nonform glucosamine 1500, vit d 400, bosw  1       Polyethylene glycol-propylene systane ultra         Vitamin C ascorbic acid 1000 mg 1       vitamin D cholecalciferol  OFF                                             Plan:                  Coding statement:   Medical Decision Making:  #  Chronic progressive medical conditions addressed  - see above --   Review and/or interpretation of unique test or documentation from a provider outside of neurology ***   Independent historian provided additional details  *** I  Prescription drug management and review of potential side effects and/or monitoring for side effects  -- see above ---  Health impacted by social determinants of health  ***    I have reviewed the note as documented above.  This accurately captures the substance of my conversation with the patient and total time spent preparing for visit, executing visit and completing visit on the day of the visit:  *** minutes.  The portion of this total time included face to face time ***    The " longitudinal plan of care for Andre Tyler was addressed during this visit. Due to the added complexity in care, I will continue to support Andre Tyler in the subsequent management of this condition(s) and with the ongoing continuity of care of this condition(s).      Flex Galicia MD     ______________________________________    Last visit date and details:             ______________________________________      Patient was asked about 14 Review of systems including changes in vision (dry eyes, double vision), hearing, heart, lungs, musculoskeletal, depression, anxiety, snoring, RBD, insomnia, urinary frequency, urinary urgency, constipation, swallowing problems, hematological, ID, allergies, skin problems: seborrhea, endocrinological: thyroid, diabetes, cholesterol; balance, weight changes, and other neurological problems and these were not significant at this time except for   Patient Active Problem List   Diagnosis    Hypertension goal BP (blood pressure) < 140/90    Squamous cell skin cancer    Prostate cancer - surgically treated    CARDIOVASCULAR SCREENING; LDL GOAL LESS THAN 130    Sensorineural hearing loss, bilateral    Bladder neck contracture    Bladder stone    Type 2 diabetes mellitus without complication (H)    History of melanoma    History of nonmelanoma skin cancer    Carcinoma of prostate (H)    Contact with and (suspected) exposure to other hazardous aromatic compounds    Elevated fasting blood sugar    HDL lipoprotein deficiency    Hearing loss    Hypertriglyceridemia    Other ill-defined and unknown causes of morbidity and mortality    Essential (primary) hypertension    Essential hypertension    Malignant neoplasm of prostate (H)    Tremor    H/O echocardiogram 2023    Family hx of lung cancer    Parkinson's disease (H)    H/O prostatectomy    Exposure to potentially hazardous substance        No Known Allergies  Past Surgical History:   Procedure Laterality Date    APPENDECTOMY   2015    had improvement in bladder spasms    CATARACT IOL, RT/LT  2002    20+ years ago    HC MOHS TRUNK/ARM/LEG 1ST STAGE UP T0 5 BLOCKS      LASER HOLMIUM LITHOTRIPSY URETER(S), INSERT STENT, COMBINED N/A 10/21/2019    Procedure: Urethral dilation and transurethral resection of bladder neck contracture litholapaxy (needs holmium laser) for bladder stone;  Surgeon: Arnulfo Vale MD;  Location: [facility] OR    OTHER SURGICAL HISTORY      skin cancer    PROSTATE SURGERY  08/28/2012     Past Medical History:   Diagnosis Date    Diabetes (H)     H/O echocardiogram 2023 3/10/2023    Global and regional left ventricular function is normal with an EF of 55-60%. Right ventricular function, chamber size, wall motion, and thickness are normal. Pulmonary artery systolic pressure is normal. Sinuses of Valsalva 4.1 cm. Mild dilatation of the aorta is present. The inferior vena cava is normal. No pericardial effusion is present. ________________________________________________________    History of melanoma     2022 left forearm    History of nonmelanoma skin cancer     NUMEROUS    Hypertension goal BP (blood pressure) < 140/90     Prostate cancer (H)     Squamous cell skin cancer     Tremor 2/24/2023     Social History     Socioeconomic History    Marital status:      Spouse name: Not on file    Number of children: 3    Years of education: Not on file    Highest education level: Not on file   Occupational History     Employer: RETIRED   Tobacco Use    Smoking status: Never    Smokeless tobacco: Never   Vaping Use    Vaping status: Never Used   Substance and Sexual Activity    Alcohol use: No    Drug use: No    Sexual activity: Yes     Partners: Female   Other Topics Concern    Parent/sibling w/ CABG, MI or angioplasty before 65F 55M? Not Asked   Social History Narrative    Not on file     Social Determinants of Health     Financial Resource Strain: Not on file   Food Insecurity: Not on file   Transportation Needs: Not on  file   Physical Activity: Not on file   Stress: Not on file   Social Connections: Not on file   Interpersonal Safety: Not on file   Housing Stability: Not on file       Drug and lactation database from the [place] National Library of Medicine:  http://toxnet.nlm.nih.gov/cgi-bin/sis/htmlgen?LACT      B/P: Data Unavailable, T: Data Unavailable, P: Data Unavailable, R: Data Unavailable 0 lbs 0 oz  There were no vitals taken for this visit., There is no height or weight on file to calculate BMI.  Medications and Vitals not listed above were documented in the cart and reviewed by me.     Current Outpatient Medications   Medication Sig Dispense Refill    calcipotriene (DOVONOX) 0.005 % external cream Apply topically 2 times daily Use a pea sized amount and mix with Efudex to the forearms and the top of the hands for  two times a day for 4-7 days. If after 5 days too irritated can stop 60 g 0    carbidopa-levodopa (SINEMET)  MG tablet Start with 1/2 tab daily x 3days then 1/2 tab twice daily for 3-7 days then 1 tab twice daily for one week then 1 tablet 3 times per day. The dose can be increased if there is no significant benefit. IT may take 1-3 weeks before benefit is seen 270 tablet 3    empagliflozin (JARDIANCE) 10 MG TABS tablet Take 1 tablet (10 mg) by mouth daily 90 tablet 0    fluorouracil (EFUDEX) 5 % external cream Apply topically 2 times daily Use a pea sized amount and mix with Calcipotriene to the forearms and the top of the hands for  two times a day for 4-7 days. If after 5 days too irritated can stop 40 g 0    lisinopril-hydrochlorothiazide (ZESTORETIC) 10-12.5 MG tablet Take 1 tablet by mouth daily      metFORMIN (GLUCOPHAGE) 500 MG tablet Take 500 mg by mouth 2 times daily (with meals)      NONFORMULARY Osteo biflex one per day: 400 Vitamin D, 1500 Glucosamine, 100mg boswellia rain (joint shield)      polyethylene glycol-propylene glycol (SYSTANE ULTRA) 0.4-0.3 % SOLN ophthalmic solution  Place 1 drop into both eyes every hour as needed for dry eyes      vitamin C (ASCORBIC ACID) 1000 MG TABS Take 1,000 mg by mouth 2 times daily           Flex Galicia MD

## 2024-10-22 ENCOUNTER — OFFICE VISIT (OUTPATIENT)
Dept: NEUROLOGY | Facility: CLINIC | Age: 79
End: 2024-10-22
Payer: COMMERCIAL

## 2024-10-22 VITALS
OXYGEN SATURATION: 97 % | DIASTOLIC BLOOD PRESSURE: 87 MMHG | WEIGHT: 188.8 LBS | HEIGHT: 72 IN | RESPIRATION RATE: 18 BRPM | SYSTOLIC BLOOD PRESSURE: 132 MMHG | BODY MASS INDEX: 25.57 KG/M2 | HEART RATE: 67 BPM

## 2024-10-22 DIAGNOSIS — G20.A1 PARKINSON'S DISEASE WITHOUT DYSKINESIA OR FLUCTUATING MANIFESTATIONS (H): Primary | ICD-10-CM

## 2024-10-22 PROCEDURE — 99214 OFFICE O/P EST MOD 30 MIN: CPT | Performed by: PSYCHIATRY & NEUROLOGY

## 2024-10-22 RX ORDER — CARBIDOPA AND LEVODOPA 25; 100 MG/1; MG/1
TABLET ORAL
Qty: 540 TABLET | Refills: 1 | Status: SHIPPED | OUTPATIENT
Start: 2024-10-22

## 2024-10-22 RX ORDER — CARBIDOPA AND LEVODOPA 25; 100 MG/1; MG/1
TABLET ORAL
Qty: 540 TABLET | Refills: 1 | Status: SHIPPED | OUTPATIENT
Start: 2024-10-22 | End: 2024-10-22

## 2024-10-22 RX ORDER — CARBIDOPA AND LEVODOPA 25; 100 MG/1; MG/1
TABLET ORAL
Qty: 270 TABLET | Refills: 3 | Status: SHIPPED | OUTPATIENT
Start: 2024-10-22 | End: 2024-10-22

## 2024-10-22 ASSESSMENT — PAIN SCALES - GENERAL: PAINLEVEL: NO PAIN (0)

## 2024-10-22 NOTE — NURSING NOTE
"Chief Complaint   Patient presents with    RECHECK     /87 (BP Location: Right arm, Patient Position: Sitting, Cuff Size: Adult Regular)   Pulse 67   Resp 18   Ht 1.816 m (5' 11.5\")   Wt 85.6 kg (188 lb 12.8 oz)   SpO2 97%   BMI 25.97 kg/m      CRYSTAL ANDERSNO    "

## 2024-10-22 NOTE — LETTER
10/22/2024       RE: Andre Tyler  [address] [facility] [address]     Dear Colleague,    Thank you for referring your patient, Andre Tyler, to the [facility] NEUROLOGY CL[facility]IC [place] [facility]. Please see a copy of my visit note below.        Diagnosis/Summary/Recommendations:    PATIENT: Andre Tyler  79 year old male     : 1945    JUVE: Oct 22, 2024       MRN: [id number]  [address]   [facility] [id number] [phone] (H)   [phone] (M)      [email or web address]     Salima Tyler   [phone]  He granted her proxy access  To ezeep        Assessment:  (R25.1) Tremor  (primary encounter diagnosis)  No family history of parkinson  Mother and Grandmother passed away at 99 years     Retired law enforcement - retired [employer]  and then worked 16 years [employer] at [employer] 2017     Wife retired exec director nonprofits     Right handed     Handwriting is different and possibly not as good and may be smaller                Notices more right than left hand tremor     Tremor is present when holding a phone and his wife commented on it.      Review of diagnosis    tremor     Avoidance of dopamine blockers   Not taking     Motor complication review   n/a     Review of Impulse control disorders   n/a     Review of surgical or medication options   reviewed     Gait/Balance/Falls   No falls      Exercise/Therapy performed/offered   Walking around helps his back pain -   Tries to get to [employer] 3 or 4 times per week   Has not been able to go  Can do treadmill     Cognitive/Driving   Does not like night driving but can drive  Can drive  Denies cognitive problems     Mood   denies     Hallucinations/delusions   denies     Sleep   Goes to bed around 10 or 1030pm and can fall asleep right away.   Sleeps in same bed as his wife  Use to snore in the past  No  clear rbd behaviors  No sleep study   Wakes up around 6/630am  Gets up rarely to urinate      Bladder/Renal/Prostate/Gyn/Other  Prostate cancer 2012 - surgery - no radiation or chemo  Bladder stone 2019 lithotripsy with no recurrence  Bladder neck contracture dealt with at 2012 at time of prostate surgery in 2012  Rare to no nocturia   No urgency or frequency      GI/Constipation/GERD   Has daily bowel movement with the metformin has some loose stool   No heartburn      He has having some pain in the past and they removed his appendix   He was having bladder spasms and had appendix removed and this problem with bladder improved      ENDO/Lipid/DM/Bone density/Thyroid  Lipid disorder  Diabetes type 2; A1c = 7.7 on 2/16/2023  triglyceride disorder - elevated at 297  And HDL was 37 and cholesterol 157 and non hdl 120 - non fasting study on 2/16/2023  Denies thyroid problems  Bone density has not been checked for a long time     empagliflozin jardiance 10mg - has not yet started.   Metformin glucophage 500mg twice daily      Glucosamine 1500mg/vitamin d400 international unit(s) and 100mg boswellia rain     Vitamin D dose?     Cardio/heart/Hyper or Hypotensive   Hypertension  134/89 today 95 heart rate   Normal stress was normal 11/13/2015  [facility]  [facility] dr abbie quezada   Normal eCG today   No cardiologist  Lisinopril-hydrochlorothiazide 10-12.5 zestoretic      Vision/Dry Eyes/Cataracts/Glaucoma/Macular   Cataract surgery around 2002   Dry eyes     Heme/Anticoagulation/Antiplatelet/Anemia/Other  Not taking aspirin  No bleeding problems      ENT/Resp  Hearing loss  Wearing hearing aides  Has some tinnitus  Voice has been affected - slurring  Loss of smell for many years  Has had covid in the past     Speech and slurring  Swallowing and coughing  May be related to drooling         Skin/Cancer/Seborrhea/other  History of melanoma - diagnosed   History of nonmelanoma skin cancer  Squamous  cell skin cancer  Dr garcia is following this      Musculoskeletal/Pain/Headache  Denies significant surgery  Has left hip pain in the morning      Other:   ecg was normal     Parkinson disease -diagnosis based on his slowness, stiffness, etc.   He has soft voice.      Genetic study  PD generation  [email or web address]     Brain MRI to look for iron      Dopamine scan (datscan)     Because of his agent orange exposure -  He qualifies for [facility] connected benefits [facility] as he has parkinson  Encouraged to get connected with [facility] for this benefit.     They have a motor home - she does the driving and they like to go.      Return visit with Mendy Stanton for [facility]     Discussion about the SPARX3 trial and johnd Rosita Gauthier about their interest in learning more about the study.      Consider echo and cardiology consultation for treadmill study        Return to be determined     Discussed symptomatic medication therapy and physical and speech therapy     Parkinson's Disease: Newly diagnosed with PD. Motor symptoms are currently manageable.        Drooling:     Cognitive Changes:        __  All questions answered and the following patient instructions provided: -      __  Okay to cancel the Dopamine Scan.     __  You can do the brain MRI, ECHO Cardiogram, and see the cardiologist.      __  Exercise 3 - 5 times a week.      __  Consider doing the treadmill research after you read the information.     __  Multitasking can be affected, so try to focus on one task at a time.     __  No medication for now.     __  You can consider the [facility] - Physical Therapy and Speech Therapy.     __ For drooling, you can consider Botox injection.  Also, dry candy or chewing gum can help.      __ Return in 6 months to see Dr. Galicia. You may return sooner as needed.       Has had more slowness in movement  More stooped posture.   Loss of fine motor  Problems  "writing and wife is signing bills  He has right sided tremors  He has more problems with coordination with his right hand     He has had more problems with slurring of speech     Did not do the dopamine scan -      Did not do the brain MRI scan      Echocardiogram was fine - EF was 55-60%      Loss of smell is long standing     Loss of facial expression     He granted proxy access to his mychart      Dry eyes     melanoma     Weight loss     Swallowing problems/drooling/     Physical and speech therapy     Ent consultation if needed     Genetic study  PD generation  [email or web address]     He has more problems with h is lingual speech   He has a nasal pattern     Pharmacy ([facility]) consultation and medication management     Parkinson's Disease:  Started Levodopa in Sep 2023. He reports slight improvement. He takes his last dose an hour before bedtime.  Since he doesn't have bothersome nighttime symptoms, I recommended changing the times he take Sinemet.        __  Referral to Speech Therapy. If you have not heard from the scheduling office within 2 business days, please call [phone] for [employer].    Mr. Tyler and his wife are requesting assistance with obtaining handicap parking  Three of their children play hockey during the winter and grandchildren play sports during the summer  Parking can be a challenge at these events  Mr. Tyler has to drop his wife off and park due to her knee issues  He and his family are concerned about him walking long distances, especially in icy conditions  His wife has a temporary handicap pass while awaiting knee surgery     Balance has been \"pretty good.\" He has to pay closer attention  Not quite as balanced with certain tasks such as putting pants on in the morning  Dealing with this by concentrating harder in situations where he may be off balance helps  No falls or near falls  Not using an assistive device walking  He " "has not worked with physical therapy on balance     He tries to get to the health club regularly  Some days he does light lifting  Also doing some walking and running - alternative around the track  He has also been doing exercises with his wife in the water  He is getting to the health club 2-3 days/week  Plays golf once a week - he would like his golf game to be better, notices he isn't always fluid when putting     Carbidopa/Levodopa 1 tab TID - he has been on this dose for about a year  He used to have trouble coordinating brushing his teeth - this has improved since starting medication  His arms seem to swing better since starting medication  He also feels like his speech has improved  Trying not to take medication with food - avoiding one hour before or two hours after  If he has to delay a dose due to meals he may have wearing off - this usually happens because of grandchildren's hockey schedule (usually after 6 hours)  Hasn't noticed any side effects - no nausea or lightheadedness  He has a tremor in his right hand/leg and jaw - CD/LD has maybe helped this a little but it is still present  Writing has been more difficult - cursive writing is \"not where it should be.\" Smaller and also shakier.      Frequent coughing/choking - this can occur with food, liquids and with his own saliva  He had a swallow study in Oct 2023 - this showed flash penetration on thin liquids with initial swallow - not seen on subsequent swallows and no aspiration. He has not subsequently worked with speech therapy  He doesn't believe swallowing is worse than it used to be, his wife thinks that this has ana more of an issue     He met with a speech therapist once in December 2023.   Voice remains soft - he feels like his speech is ok for the most part  He tends to project better when his hearing aids are out  Waitresses may get his order wrong, have to ask him to repeat  Less slurring since he has been on CD/LD  SLP gave him a sheet " "with exercises to do at home but he does not do them     He is not interested in working with SLP at this time     Mr. Tyler has had more saliva - drooling both during the day and at night  He keeps a hanky around  He had excess saliva - this was one of his earliest symptoms   Discussed lozenges vs medication vs botulinum toxin  He does not want treatment at this time     No issues with constipation  No concerns about bladder function     Mood is good - not feeling down/depressed/anxious  \"We do a lot of laughing.\"     Sleep is good - 8 hours/night on average  Wakes feeling well rested  Rare brief jerks in sleep per his wife - \"he's kind of been like that his whole life.\" No more complex actions.  Not falling out of bed.      No concerns about thinking or memory - \"I'm as sharp as always\"  Wife has no concerns although he is not quite as good at multitasking as he used to be.        Medications      6am  10:30-11a  3-5p   Calcipotriene dovonox cream      Carbidopa/levodopa Sinemet 25/100 1 1 1   Empagliflozin Jardiance 10 mg      1   Fluoruracil efudex cream OFF     Ketotifen Zaditor 0.025% solution no       Lisinopril hydrochlorothiazide Zestoretic 10-12.5 1       Metformin Glucophage 500 mg 1   1   MVI   1       nonform glucosamine 1500, vit d 400, bosw  1       Polyethylene glycol-propylene systane ultra  OFF       Vitamin C ascorbic acid 1000 mg 1       vitamin D cholecalciferol  OFF                                             Plan:    Here with wife Salima    Overall he feels well.     Sinemet continues to help his symptoms, but endorses wearing off characterized by increased tremor, increased word-finding difficulty, and increased slowness. He is not sure exactly what time the wearing off occurs with regards to medication because he tries not to think too much about this and prefers to \"move on.\" He is interested in increasing the dose of Sinemet as he feels symptoms are not as well-controlled as before   - " Carbidopa/levodopa: Take 1.5 tabs three times a day   - If you feel your symptoms are not well-controlled and you have no side effects after 2 weeks, take 2 tabs 3 times per day.    - [facility]    Walking is good for the most part, had an episode where he felt significantly unsteady when getting up to use the bathroom at night but otherwise has had no issues. No falls. Remains active, jogs, swims, and walks up to 5x/week.    Mood is good. No cognitive concerns, no hallucinations. No swallowing issues.    Discussed hallucinations in Parkinson's can happen. They are usually visual and are on a spectrum. On the mild end is seeing a shadow or presence in the corner of your eye that is not there. In the middle is seeing small animals or people that are not threatening. On the severe end is seeing people or other figures that are threatening or scary. Both Parkinson's itself and medications for Parkinson's can cause hallucinations, but not everyone gets them even on high doses of medications.     Pharmacy ([facility]) consultation and medication management  Please call the scheduling number @ [phone] to set up an appointment with pharmacists Serena Lombardi, Yoli Encarnacion, or Carmen Hood.     Going to [place].    Return to clinic in 6mo      Coding statement:   Medical Decision Making:  #  Chronic progressive medical conditions addressed  - see above --   Review and/or interpretation of unique test or documentation from a provider outside of neurology no   Independent historian provided additional details  yes I  Prescription drug management and review of potential side effects and/or monitoring for side effects  -- see above ---  Health impacted by social determinants of health  no    I have reviewed the note as documented above.  This accurately captures the substance of my conversation with the patient and total time spent preparing for visit, executing visit and completing visit on the day of the  visit:  30 minutes.  The portion of this total time included face to face time 30    The longitudinal plan of care for Andre Tyler was addressed during this visit. Due to the added complexity in care, I will continue to support Andre Tyler in the subsequent management of this condition(s) and with the ongoing continuity of care of this condition(s).      Flex Galicia MD     ______________________________________    Last visit date and details:             ______________________________________      Patient was asked about 14 Review of systems including changes in vision (dry eyes, double vision), hearing, heart, lungs, musculoskeletal, depression, anxiety, snoring, RBD, insomnia, urinary frequency, urinary urgency, constipation, swallowing problems, hematological, ID, allergies, skin problems: seborrhea, endocrinological: thyroid, diabetes, cholesterol; balance, weight changes, and other neurological problems and these were not significant at this time except for   Patient Active Problem List   Diagnosis     Hypertension goal BP (blood pressure) < 140/90     Squamous cell skin cancer     Prostate cancer - surgically treated     CARDIOVASCULAR SCREENING; LDL GOAL LESS THAN 130     Sensorineural hearing loss, bilateral     Bladder neck contracture     Bladder stone     Type 2 diabetes mellitus without complication (H)     History of melanoma     History of nonmelanoma skin cancer     Carcinoma of prostate (H)     Contact with and (suspected) exposure to other hazardous aromatic compounds     Elevated fasting blood sugar     HDL lipoprotein deficiency     Hearing loss     Hypertriglyceridemia     Other ill-defined and unknown causes of morbidity and mortality     Essential (primary) hypertension     Essential hypertension     Malignant neoplasm of prostate (H)     Tremor     H/O echocardiogram 2023     Family hx of lung cancer     Parkinson's disease (H)     H/O prostatectomy     Exposure to potentially  hazardous substance        No Known Allergies  Past Surgical History:   Procedure Laterality Date     APPENDECTOMY  2015    had improvement in bladder spasms     CATARACT IOL, RT/LT  2002    20+ years ago     HC MOHS TRUNK/ARM/LEG 1ST STAGE UP T0 5 BLOCKS       LASER HOLMIUM LITHOTRIPSY URETER(S), INSERT STENT, COMBINED N/A 10/21/2019    Procedure: Urethral dilation and transurethral resection of bladder neck contracture litholapaxy (needs holmium laser) for bladder stone;  Surgeon: Arnulfo Vale MD;  Location: [facility] OR     OTHER SURGICAL HISTORY      skin cancer     PROSTATE SURGERY  08/28/2012     Past Medical History:   Diagnosis Date     Diabetes (H)      H/O echocardiogram 2023 3/10/2023    Global and regional left ventricular function is normal with an EF of 55-60%. Right ventricular function, chamber size, wall motion, and thickness are normal. Pulmonary artery systolic pressure is normal. Sinuses of Valsalva 4.1 cm. Mild dilatation of the aorta is present. The inferior vena cava is normal. No pericardial effusion is present. ________________________________________________________     History of melanoma     2022 left forearm     History of nonmelanoma skin cancer     NUMEROUS     Hypertension goal BP (blood pressure) < 140/90      Prostate cancer (H)      Squamous cell skin cancer      Tremor 2/24/2023     Social History     Socioeconomic History     Marital status:      Spouse name: Not on file     Number of children: 3     Years of education: Not on file     Highest education level: Not on file   Occupational History     Employer: RETIRED   Tobacco Use     Smoking status: Never     Smokeless tobacco: Never   Vaping Use     Vaping status: Never Used   Substance and Sexual Activity     Alcohol use: No     Drug use: No     Sexual activity: Yes     Partners: Female   Other Topics Concern     Parent/sibling w/ CABG, MI or angioplasty before 65F 55M? Not Asked   Social History Narrative     Not on  file     Social Determinants of Health     Financial Resource Strain: Not on file   Food Insecurity: Not on file   Transportation Needs: Not on file   Physical Activity: Not on file   Stress: Not on file   Social Connections: Not on file   Interpersonal Safety: Not on file   Housing Stability: Not on file       Drug and lactation database from the [place] National Library of Medicine:  http://toxnet.nlm.nih.gov/cgi-bin/sis/htmlgen?LACT      B/P: Data Unavailable, T: Data Unavailable, P: Data Unavailable, R: Data Unavailable 0 lbs 0 oz  There were no vitals taken for this visit., There is no height or weight on file to calculate BMI.  Medications and Vitals not listed above were documented in the cart and reviewed by me.     Current Outpatient Medications   Medication Sig Dispense Refill     calcipotriene (DOVONOX) 0.005 % external cream Apply topically 2 times daily Use a pea sized amount and mix with Efudex to the forearms and the top of the hands for  two times a day for 4-7 days. If after 5 days too irritated can stop 60 g 0     carbidopa-levodopa (SINEMET)  MG tablet Start with 1/2 tab daily x 3days then 1/2 tab twice daily for 3-7 days then 1 tab twice daily for one week then 1 tablet 3 times per day. The dose can be increased if there is no significant benefit. IT may take 1-3 weeks before benefit is seen 270 tablet 3     empagliflozin (JARDIANCE) 10 MG TABS tablet Take 1 tablet (10 mg) by mouth daily 90 tablet 0     fluorouracil (EFUDEX) 5 % external cream Apply topically 2 times daily Use a pea sized amount and mix with Calcipotriene to the forearms and the top of the hands for  two times a day for 4-7 days. If after 5 days too irritated can stop 40 g 0     lisinopril-hydrochlorothiazide (ZESTORETIC) 10-12.5 MG tablet Take 1 tablet by mouth daily       metFORMIN (GLUCOPHAGE) 500 MG tablet Take 500 mg by mouth 2 times daily (with meals)       NONFORMULARY Osteo biflex one per day: 400 Vitamin D,  1500 Glucosamine, 100mg boswellia rain (joint shield)       polyethylene glycol-propylene glycol (SYSTANE ULTRA) 0.4-0.3 % SOLN ophthalmic solution Place 1 drop into both eyes every hour as needed for dry eyes       vitamin C (ASCORBIC ACID) 1000 MG TABS Take 1,000 mg by mouth 2 times daily           Flex Galicia MD

## 2024-10-22 NOTE — PATIENT INSTRUCTIONS
"Plan:    Here with wife Salima    Overall he feels well.     Sinemet continues to help his symptoms, but endorses wearing off characterized by increased tremor, increased word-finding difficulty, and increased slowness. He is not sure exactly what time the wearing off occurs with regards to medication because he tries not to think too much about this and prefers to \"move on.\" He is interested in increasing the dose of Sinemet as he feels symptoms are not as well-controlled as before   - Carbidopa/levodopa: Take 1.5 tabs three times a day   - If you feel your symptoms are not well-controlled and you have no side effects after 2 weeks, take 2 tabs 3 times per day.    - [facility]          Walking is good for the most part, had an episode where he felt significantly unsteady when getting up to use the bathroom at night but otherwise has had no issues. No falls. Remains active, jogs, swims, and walks up to 5x/week.    Mood is good. No cognitive concerns, no hallucinations. No swallowing issues.    Discussed hallucinations in Parkinson's can happen. They are usually visual and are on a spectrum. On the mild end is seeing a shadow or presence in the corner of your eye that is not there. In the middle is seeing small animals or people that are not threatening. On the severe end is seeing people or other figures that are threatening or scary. Both Parkinson's itself and medications for Parkinson's can cause hallucinations, but not everyone gets them even on high doses of medications.     Pharmacy ([facility]) consultation and medication management  Please call the scheduling number @ [phone] to set up an appointment with pharmacists Serena Lombardi, Yoli Enacrnacion, or Carmen Hood.       Return to clinic in 6mo  "

## 2024-11-05 ENCOUNTER — VIRTUAL VISIT (OUTPATIENT)
Dept: PHARMACY | Facility: CLINIC | Age: 79
End: 2024-11-05
Attending: PSYCHIATRY & NEUROLOGY
Payer: COMMERCIAL

## 2024-11-05 DIAGNOSIS — Z78.9 TAKES DIETARY SUPPLEMENTS: ICD-10-CM

## 2024-11-05 DIAGNOSIS — I10 HYPERTENSION GOAL BP (BLOOD PRESSURE) < 140/90: ICD-10-CM

## 2024-11-05 DIAGNOSIS — I21.3 STEMI (ST ELEVATION MYOCARDIAL INFARCTION) (H): ICD-10-CM

## 2024-11-05 DIAGNOSIS — I25.10 CAD (CORONARY ARTERY DISEASE): ICD-10-CM

## 2024-11-05 DIAGNOSIS — G20.A1 PARKINSON'S DISEASE WITHOUT DYSKINESIA OR FLUCTUATING MANIFESTATIONS (H): Primary | ICD-10-CM

## 2024-11-05 DIAGNOSIS — E11.9 TYPE 2 DIABETES MELLITUS WITHOUT COMPLICATION, WITHOUT LONG-TERM CURRENT USE OF INSULIN (H): ICD-10-CM

## 2024-11-05 RX ORDER — LISINOPRIL 10 MG/1
1 TABLET ORAL DAILY
COMMUNITY
Start: 2024-10-26

## 2024-11-05 RX ORDER — CLOPIDOGREL BISULFATE 75 MG/1
75 TABLET ORAL DAILY
COMMUNITY
Start: 2024-10-26

## 2024-11-05 RX ORDER — METOPROLOL SUCCINATE 25 MG/1
1 TABLET, EXTENDED RELEASE ORAL DAILY
COMMUNITY
Start: 2024-10-26

## 2024-11-05 RX ORDER — NITROGLYCERIN 0.4 MG/1
0.4 TABLET SUBLINGUAL EVERY 5 MIN PRN
COMMUNITY
Start: 2024-10-25

## 2024-11-05 RX ORDER — ROSUVASTATIN CALCIUM 20 MG/1
1 TABLET, COATED ORAL AT BEDTIME
COMMUNITY
Start: 2024-10-25

## 2024-11-05 RX ORDER — ASPIRIN 81 MG/1
81 TABLET, CHEWABLE ORAL DAILY
COMMUNITY
Start: 2024-10-26

## 2024-11-05 NOTE — PROGRESS NOTES
Medication Therapy Management (MTM) Encounter    ASSESSMENT:                            Medication Adherence/Access: No issues identified.    Parkinson's Disease:   Controlled.     Diabetes:   A1c close to goal of <7%. No changes recommended. Managed by primary care.     CAD/Hx STEMI/Hypertension  Managed by cardiology.     Supplements:  No changes recommended.    PLAN:                            Medication list updated and reviewed   No medication changes made today, please keep taking all medications as prescribed     Follow-up: as needed with Neuro MTM   Appointments in Next Year      Jan 29, 2025 1:00 PM  (Arrive by 12:45 PM)  Return Dermatology with Boby Nazario MD  [facility] ([facility]) [phone]     Apr 29, 2025 10:30 AM  (Arrive by 10:15 AM)  Return Movement Disorder with Flex Galicia MD  [facility] [place] ([facility] ) [phone]            SUBJECTIVE/OBJECTIVE:                          Hilario Tyler is a 79 year old male seen for an initial visit of 2024. He was referred to me from Dr. Galicia.      Reason for visit: Initial MTM.    Allergies/ADRs: Reviewed in chart  Past Medical History: Reviewed in chart  Tobacco: He reports that he has never smoked. He has never used smokeless tobacco.  Alcohol: 1-3 beverages / week    Medication Adherence/Access:  No missed doses in the past week. He reports he is very accurate and good at remembering his medications.     Parkinson's Disease:   Medication 6:30AM 10/11AM 3pm   Carbidopa/levodopa 25/100mg 1.5 tablets 1.5 tablets 1.5 tablets     No longer having wearing off since he increased his dose not too long ago at his visit with Dr. Galicia. Feels things are going good right now. No medication side effects.     Diabetes:   - Metformin 500mg twice daily   - Jardiance 10mg every day   No medication side effects reported. Does not check blood  sugars.     CAD/Hx STEMI/Hypertension  - Lisinopril 10mg every day   - Clopidogrel 75mg every day (x12 months starting in October)  - Aspirin 81mg every day (recommended lifelong)   - Metoprolol succinate 25mg every day   - Rosuvastatin 20mg every day   - Nitrostat sl tablets as needed     No medication side effects. Following with cardiology.     Supplements:  - Osteo Biflex every day   - Vitamin C 1000mg twice daily     Today's Vitals: There were no vitals taken for this visit.  ----------------  Post Discharge Medication Reconciliation Status: discharge medications reconciled, continue medications without change.    I spent 11 minutes with this patient today. A copy of the visit note was provided to the patient's provider(s).    A summary of these recommendations was sent via [employer].    Carmen Hood, Pharm.D., MPH  Medication Therapy Management Pharmacist   [facility]    Telemedicine Visit Details  The patient's medications can be safely assessed via a telemedicine encounter.  Type of service:  Telephone visit  Originating Location (pt. Location): Home    Distant Location (provider location):  Off-site  Start Time: 9:05 AM  End Time: 9:16 AM     Medication Therapy Recommendations  No medication therapy recommendations to display

## 2024-11-05 NOTE — PATIENT INSTRUCTIONS
"Recommendations from today's MTM visit:                                                    MTM (medication therapy management) is a service provided by a clinical pharmacist designed to help you get the most of out of your medicines.      Medication list updated and reviewed   No medication changes made today, please keep taking all medications as prescribed     Follow-up: as needed with Neuro MTM   Appointments in Next Year      Jan 29, 2025 1:00 PM  (Arrive by 12:45 PM)  Return Dermatology with Boby Nazario MD  [facility] ([facility]) [phone]     Apr 29, 2025 10:30 AM  (Arrive by 10:15 AM)  Return Movement Disorder with Flex Galicia MD  [facility] [place] ([facility] [place] ) [phone]            It was great speaking with you today.  I value your experience and would be very thankful for your time in providing feedback in our clinic survey. In the next few days, you may receive an email or text message from [email or web address] with a link to a survey related to your  clinical pharmacist.\"     To schedule another MTM appointment, please call the clinic directly or you may call the MTM scheduling line at [phone].    My Clinical Pharmacist's contact information:                                                      Please feel free to contact me with any questions or concerns you have.      Carmen Hood, Pharm.D., MPH  Medication Therapy Management Pharmacist   [facility]   " 10 (severe pain)

## 2024-11-23 ENCOUNTER — HEALTH MAINTENANCE LETTER (OUTPATIENT)
Age: 79
End: 2024-11-23

## 2025-01-29 ENCOUNTER — OFFICE VISIT (OUTPATIENT)
Dept: DERMATOLOGY | Facility: CLINIC | Age: 80
End: 2025-01-29
Attending: DERMATOLOGY
Payer: COMMERCIAL

## 2025-01-29 DIAGNOSIS — D18.01 CHERRY ANGIOMA: ICD-10-CM

## 2025-01-29 DIAGNOSIS — Z85.820 HISTORY OF MELANOMA: ICD-10-CM

## 2025-01-29 DIAGNOSIS — L81.4 SOLAR LENTIGINOSIS: ICD-10-CM

## 2025-01-29 DIAGNOSIS — L57.0 AK (ACTINIC KERATOSIS): Primary | ICD-10-CM

## 2025-01-29 DIAGNOSIS — L82.1 SEBORRHEIC KERATOSIS: ICD-10-CM

## 2025-01-29 DIAGNOSIS — Z85.828 HISTORY OF BASAL CELL CARCINOMA OF SKIN: ICD-10-CM

## 2025-01-29 DIAGNOSIS — Z85.828 HISTORY OF SQUAMOUS CELL CARCINOMA OF SKIN: ICD-10-CM

## 2025-01-29 DIAGNOSIS — D48.5 NEOPLASM OF UNCERTAIN BEHAVIOR OF SKIN: ICD-10-CM

## 2025-01-29 NOTE — PATIENT INSTRUCTIONS
Wound Care After a Biopsy    What is a skin biopsy?  A skin biopsy allows the doctor to examine a very small piece of tissue under the microscope to determine the diagnosis and the best treatment for the skin condition. A local anesthetic (numbing medicine) is injected with a very small needle into the skin area to be tested. A small piece of skin is taken from the area. Sometimes a suture (stitch) is used.     What are the risks of a skin biopsy?  I will experience scar, bleeding, swelling, pain, crusting and redness. I may experience incomplete removal or recurrence. Risks of this procedure are excessive bleeding, bruising, infection, nerve damage, numbness, thick (hypertrophic or keloidal) scar and non-diagnostic biopsy.    How should I care for my wound for the first 24 hours?  Keep the wound dry and covered for 24 hours  If it bleeds, hold direct pressure on the area for 15 minutes. If bleeding does not stop, call us or go to the emergency room  Avoid strenuous exercise the first 1-2 days or as your doctor instructs you    How should I care for the wound after 24 hours?  After 24 hours, remove the bandage  You may bathe or shower as normal  If you had a scalp biopsy, you can shampoo as usual and can use shower water to clean the biopsy site daily  Clean the wound once a day with gentle soap and water  Do not scrub, be gentle  Apply white petroleum/Vaseline after cleaning the wound with a cotton swab or a clean finger, and keep the site covered with a Bandaid /bandage. Bandages are not necessary with a scalp biopsy  If you are unable to cover the site with a Bandaid /bandage, re-apply ointment 2-3 times a day to keep the site moist. Moisture will help with healing  Avoid strenuous activity for first 1-2 days  Avoid lakes, rivers, pools, and oceans until the stitches are removed or the site is healed    How do I clean my wound?  Wash hands thoroughly with soap or use hand  before all wound care  Clean  the wound with gentle soap and water  Apply white petroleum/Vaseline  to wound after it is clean  Replace the Bandaid /bandage to keep the wound covered for the first few days or as instructed by your doctor  If you had a scalp biopsy, warm shower water to the area on a daily basis should suffice    What should I use to clean my wound?   Cotton-tipped applicators (Qtips )  White petroleum jelly (Vaseline ). Use a clean new container and use Q-tips to apply.  Bandaids  as needed  Gentle soap     How should I care for my wound long term?  Do not get your wound dirty  Keep up with wound care for one week or until the area is healed.  A small scab will form and fall off by itself when the area is completely healed. The area will be red and will become pink in color as it heals. Sun protection is very important for how your scar will turn out. Sunscreen with an SPF 30 or greater is recommended once the area is healed.  You should have some soreness but it should be mild and slowly go away over several days. Talk to your doctor about using tylenol for pain,    When should I call my doctor?  If you have increased:   Pain or swelling  Pus or drainage (clear or slightly yellow drainage is ok)  Temperature over 100F  Spreading redness or warmth around wound    When will I hear about my results?  The biopsy results can take 2 weeks to come back.  Your results will automatically release to [employer] before your provider has even reviewed them.  The clinic will call you with the results, send you a [employer] message, or have you schedule a follow-up clinic or phone time to discuss the results.  Contact our clinics if you do not hear from us in 2 weeks.    Who should I call with questions?  [facility]: [phone]  [facility]: [phone]  For urgent needs outside of business hours call the [facility] at [phone] and ask for the dermatology resident on call

## 2025-01-29 NOTE — NURSING NOTE
Andre Tyler's goals for this visit include:   Chief Complaint   Patient presents with    Skin Check     6 month skin check - recheck area of prior SCC excision on left volar forearm       He requests these members of his care team be copied on today's visit information: n/a    PCP: Arnulfo Joaquin    Referring Provider:  Boby Nazario MD  [id number] [place] [address]    There were no vitals taken for this visit.    Do you need any medication refills at today's visit? No  Jessica Reyna RN

## 2025-01-29 NOTE — LETTER
1/29/2025      Andre Tyler  [address] [facility] [address]      Dear Colleague,    Thank you for referring your patient, Andre Tyler, to the [facility]. Please see a copy of my visit note below.    [facility] Dermatology Note  Encounter Date: Jan 29, 2025  Office Visit     Dermatology Problem List:  Last FBSE 1/29/25    0. NUB, L volar forearm, s/p bx 1/29/25  0. NUB, R radial forearm, s/p bx 1/29/25  0. NUB, L flank, s/p bx 1/29/25  0. NUB, manubrium, s/p bx 1/29/25  0. NUB, L forehead, s/p bx 1/29/25    1. History of Melanoma  - Malignant Melanoma - L ventral forearm, 0.3mm breslow depth, s/p excision 2/10/22  2. History of NMSC  - SCCIs, L lateral thigh, s/p excision 9/5/24  - SCCIs, R lateral distal forearm, s/p Mohs 9/5/24  - SCCIs, L neck, s/p Mohs 9/5/24  - SCCis, L volar forearm, s/p excision 3/25/24  - SCCIS, L lower leg, s/p Mohs and linear repair 8/16/23  - BCC - L posterior neck, s/p excision 12/8/22  - sBCC - L paraspinal lower back, s/p ED&C 8/29/22  - SCCIS - right tricep, s/p ED&C 8/29/22  - SCCIS - R dorsal hand, s/p Mohs and linear repair 8/29/22   - SCCIS - L shin, s/p Mohs and linear repair 8/29/22  - SCC - left dorsal wrist, s/p MMS 7/21/21  - BCC - upper mid back, s/p excision 7/21/21  - BCC - left clavicle, s/p excision 7/21/21  - BCC - left upper back, s/p excision 12/17/2020  - SCC - base of right thumb, s/p excision 6/26/18  - SCCIS - central chest, s/p ED & C 6/11/18  - BCC - vertex scalp, s/p MMS 11/21/2016  - SCCis - R lateral thigh, s/p ED&C 11/21/2016  - SCCIS - right medial ankle, s/p Mohs 4/21/2016  - SCCIS - right ventral forearm, s/p ED&C 4/21/2016  - BCC - left preauricular, s/p Mohs 11/10/2015  - BCC, superficial type - left upper chest, s/p biopsy 4/9/2015, s/p ED&C 10/5/2015  - SCCIS - posterior right lower earlobe, records via [facility] transferred records, Dr. Arnulfo Keyes, 2011.  s/p excision in 2005, recurrent  - SCCIS - left dorsal hand, s/p via [facility] transferred records, Dr. Arnulfo Keyes, 2011  3. Actinic keratosis  - s/p PDT 2/15/24, 3/12/24, 4/23/24  -  right dorsal hand lateral, s/p biopsy 7/6/23; s/p LN2 8/16/23  - HAK - left dorsal wrist, s/p bx 11/10/2020  - s/p cryotherapy  4. History of benign biopsy  - Intradermal melanocytic nevus - left postauricular scalp, s/p bx 6/18/21  - Compound melanocytic nevus - left lower back, s/p bx 4/9/15  - BLK - right lower leg, s/p bx 4/9/2015     Family hx of melanoma is negative.  Sister with unknown skin issues    ____________________________________________    Assessment & Plan:    # NUB x5, left volar forearm (ddx: recurrence SCC vs suture granuloma), right radial forearm (ddx:SCCIS vs ISK), left flank (ddx: SCCIS vs HAK), manubrium (ddx: BCC, lichen planus like keratosis, intradermal nevus) and left forehead (ddx: ISK vs SCCIS)  - Shave biopsies performed today (see procedure notes below).     # Actinic Keratoses, right helix x2, right upper forehead x1, left postauricular x1, right cheek x1, left cheek x1, right dorsal hand x3, left forearm x1, left upper arm x1 and right forearm x1  - Treated with LN2 today (see procedure notes below).    # History of Melanoma: No clinical evidence of recurrence.   # History of NMSC: No clinical evidence of recurrence  - Continue sun avoidance and sun protection with SPF 30+ sunscreen over-the-counter.  - Continue with skin cancer screenings every 6 months.    # Benign lesions: cherry angiomas, solar lentigines   - Benign nature reviewed. Patient reassured. No treatment is necessary at this time unless the lesions change or become symptomatic.   - Sun precaution was advised including the use of sun screens of SPF 30 or higher, sun protective clothing, and avoidance of tanning beds.      Procedures Performed:   - Shave biopsy procedure note, location(s): left volar forearm, right radial  forearm, left flank, manubrium and left forehead. After discussion of benefits and risks including but not limited to bleeding, infection, scar, incomplete removal, recurrence, and non-diagnostic biopsy, verbal consent and photographs were obtained. The area was cleaned with isopropyl alcohol. 0.5mL of 1% lidocaine with epinephrine was injected to obtain adequate anesthesia of lesion(s). Shave biopsy at site(s) performed. Hemostasis was achieved with aluminium chloride. Petrolatum ointment and a sterile dressing were applied. The patient tolerated the procedure and no complications were noted. The patient was provided with verbal and written post care instructions.   - Cryotherapy procedure note, location(s): right helix x2, right upper forehead x1, left postauricular x1, right cheek x1, left cheek x1, right dorsal hand x3, left forearm x1, left upper arm x1 and right forearm x1. After verbal consent and discussion of risks and benefits including, but not limited to, dyspigmentation/scar, blister, and pain, 12 lesion(s) was(were) treated with 1-2 mm freeze border for 1-2 cycles with liquid nitrogen. Post cryotherapy instructions were provided.    Follow-up: pending path results    Staff and Scribe:     Scribe Disclosure:   I, Sandhya Luis, am serving as a scribe; to document services personally performed by Dr. Boby Nazario - -based on data collection and the provider's statements to me.     Provider Disclosure:   The documentation recorded by the scribe accurately reflects the services I personally performed and the decisions made by me. I personally performed the procedures today.    Boby Nazario DO    Department of Dermatology  [facility]: Phone: [phone], Fax:[phone]  [facility]: Phone: [phone], Fax: [phone]    ____________________________________________    CC: Skin  Check (6 month skin check - recheck area of prior SCC excision on left volar forearm)      HPI:  Mr. Andre Tyler is a(n) 79 year old male who presents today as a return patient for a 6 month skin check.  - Last seen 9/5/24 by Boby Nazario MD for Mohs surgery on the left neck and right lateral distal forearm and excision on the left lateral thigh. Last skin check: 7/25/24. At that time, the patient had 3 biopsies done on the right lateral distal forearm, left lateral thigh and left neck. He also had 15 AKs treated with LN2.  - Today the patient reports concern with a red bump on the left lower forearm, at site of prior Mohs. He states that it was more dome shaped and inflamed before he was prescribed a course of antibiotics by his PCP. It is no longer dome shaped but has not resolved no weeping.   - He denies anything new growing or changing on his skin.         Patient is otherwise feeling well, without additional skin concerns.    Labs Reviewed:  N/A    Physical Exam:  Vitals: There were no vitals taken for this visit.  SKIN: Total skin excluding the undergarment areas was performed. The exam included the head/face, neck, both arms, chest, back, abdomen, both legs, digits and/or nails.   - left volar forearm, there is a pink scaly papule adjacent to the linear surgical scar.  - right radial forearm, there is a pink poorly defined patch with superficial scale and comma shaped blood vessels on dermoscopy.   - left flank, there is a 1 cm pink papule with scale and crust.  - manubrium, there is a 5 mm skin colored pearly papule with arborizing vessels. - left forehead, there is a approximately 7 mm poorly defined tan scaly papule.  - There are erythematous macules with overyling adherent scale on the right helix x2, right upper forehead x1, left postauricular x1, right cheek x1, left cheek x1, right dorsal hand x3, left forearm x1, left upper arm x1 and right forearm x1.   - There are erythematous macules  with overyling adherent scale scattered on dorsal hands and forearms.   - There are dome shaped bright red papules scattered on the scalp, trunk and extremities.  - Scattered brown macules on sun exposed areas on the forehead, neck and shoulders.    - Well healed scar at site of previous melanoma, no repigmentation or nodularity noted.   - Well healed scars at sites of previous NMSC, no repigmentation or nodularity noted.  - No other lesions of concern on areas examined.                 Medications:  Current Outpatient Medications   Medication Sig Dispense Refill     aspirin (ASA) 81 MG chewable tablet Take 81 mg by mouth daily.       carbidopa-levodopa (SINEMET)  MG tablet 1.5 tab 3/day. If you feel your symptoms are not well-controlled and you have no side effects after 2 weeks, take 2 tabs 3 times per day. 540 tablet 1     clopidogrel (PLAVIX) 75 MG tablet Take 75 mg by mouth daily.       empagliflozin (JARDIANCE) 10 MG TABS tablet Take 1 tablet (10 mg) by mouth daily 90 tablet 0     lisinopril (ZESTRIL) 10 MG tablet Take 1 tablet by mouth daily.       metFORMIN (GLUCOPHAGE) 500 MG tablet Take 500 mg by mouth 2 times daily (with meals)       metoprolol succinate ER (TOPROL XL) 25 MG 24 hr tablet Take 1 tablet by mouth daily.       nitroGLYcerin (NITROSTAT) 0.4 MG sublingual tablet Place 0.4 mg under the tongue every 5 minutes as needed for chest pain.       NONFORMULARY Osteo biflex one per day: 400 Vitamin D, 1500 Glucosamine, 100mg boswellia rain (joint shield)       rosuvastatin (CRESTOR) 20 MG tablet Take 1 tablet by mouth at bedtime.       vitamin C (ASCORBIC ACID) 1000 MG TABS Take 1,000 mg by mouth 2 times daily       No current facility-administered medications for this visit.      Past Medical History:   Patient Active Problem List   Diagnosis     Hypertension goal BP (blood pressure) < 140/90     Squamous cell skin cancer     Prostate cancer - surgically treated     CARDIOVASCULAR SCREENING;  LDL GOAL LESS THAN 130     Sensorineural hearing loss, bilateral     Bladder neck contracture     Bladder stone     Type 2 diabetes mellitus without complication (H)     History of melanoma     History of nonmelanoma skin cancer     Carcinoma of prostate (H)     Contact with and (suspected) exposure to other hazardous aromatic compounds     Elevated fasting blood sugar     HDL lipoprotein deficiency     Hearing loss     Hypertriglyceridemia     Other ill-defined and unknown causes of morbidity and mortality     Essential (primary) hypertension     Essential hypertension     Malignant neoplasm of prostate (H)     Tremor     H/O echocardiogram 2023     Family hx of lung cancer     Parkinson's disease (H)     H/O prostatectomy     Exposure to potentially hazardous substance     Past Medical History:   Diagnosis Date     Diabetes (H)      H/O echocardiogram 2023 03/10/2023    Global and regional left ventricular function is normal with an EF of 55-60%. Right ventricular function, chamber size, wall motion, and thickness are normal. Pulmonary artery systolic pressure is normal. Sinuses of Valsalva 4.1 cm. Mild dilatation of the aorta is present. The inferior vena cava is normal. No pericardial effusion is present. ________________________________________________________     History of melanoma     2022 left forearm     History of nonmelanoma skin cancer     NUMEROUS     Hypertension goal BP (blood pressure) < 140/90      Prostate cancer (H)      Squamous cell skin cancer      STEMI (ST elevation myocardial infarction) (H) 10/23/2024     Tremor 02/24/2023        CC Boby Nazario MD  [address] [place] [address] on close of this encounter.           Again, thank you for allowing me to participate in the care of your patient.        Sincerely,        Boby Nazario MD    Electronically signed

## 2025-01-29 NOTE — PROGRESS NOTES
[facility] Dermatology Note  Encounter Date: Jan 29, 2025  Office Visit     Dermatology Problem List:  Last FBSE 1/29/25    0. NUB, L volar forearm, s/p bx 1/29/25  0. NUB, R radial forearm, s/p bx 1/29/25  0. NUB, L flank, s/p bx 1/29/25  0. NUB, manubrium, s/p bx 1/29/25  0. NUB, L forehead, s/p bx 1/29/25    1. History of Melanoma  - Malignant Melanoma - L ventral forearm, 0.3mm breslow depth, s/p excision 2/10/22  2. History of NMSC  - SCCIs, L lateral thigh, s/p excision 9/5/24  - SCCIs, R lateral distal forearm, s/p Mohs 9/5/24  - SCCIs, L neck, s/p Mohs 9/5/24  - SCCis, L volar forearm, s/p excision 3/25/24  - SCCIS, L lower leg, s/p Mohs and linear repair 8/16/23  - BCC - L posterior neck, s/p excision 12/8/22  - sBCC - L paraspinal lower back, s/p ED&C 8/29/22  - SCCIS - right tricep, s/p ED&C 8/29/22  - SCCIS - R dorsal hand, s/p Mohs and linear repair 8/29/22   - SCCIS - L shin, s/p Mohs and linear repair 8/29/22  - SCC - left dorsal wrist, s/p MMS 7/21/21  - BCC - upper mid back, s/p excision 7/21/21  - BCC - left clavicle, s/p excision 7/21/21  - BCC - left upper back, s/p excision 12/17/2020  - SCC - base of right thumb, s/p excision 6/26/18  - SCCIS - central chest, s/p ED & C 6/11/18  - BCC - vertex scalp, s/p MMS 11/21/2016  - SCCis - R lateral thigh, s/p ED&C 11/21/2016  - SCCIS - right medial ankle, s/p Mohs 4/21/2016  - SCCIS - right ventral forearm, s/p ED&C 4/21/2016  - BCC - left preauricular, s/p Mohs 11/10/2015  - BCC, superficial type - left upper chest, s/p biopsy 4/9/2015, s/p ED&C 10/5/2015  - SCCIS - posterior right lower earlobe, records via [facility] transferred records, Dr. Arnulfo Keyes, 2011. s/p excision in 2005, recurrent  - SCCIS - left dorsal hand, s/p via [facility] transferred records, Dr. Arnulfo Keyes, 2011  3. Actinic keratosis  - s/p PDT 2/15/24, 3/12/24, 4/23/24  -  right dorsal hand lateral, s/p biopsy 7/6/23; s/p LN2  8/16/23  - HAK - left dorsal wrist, s/p bx 11/10/2020  - s/p cryotherapy  4. History of benign biopsy  - Intradermal melanocytic nevus - left postauricular scalp, s/p bx 6/18/21  - Compound melanocytic nevus - left lower back, s/p bx 4/9/15  - BLK - right lower leg, s/p bx 4/9/2015     Family hx of melanoma is negative.  Sister with unknown skin issues    ____________________________________________    Assessment & Plan:    # NUB x5, left volar forearm (ddx: recurrence SCC vs suture granuloma), right radial forearm (ddx:SCCIS vs ISK), left flank (ddx: SCCIS vs HAK), manubrium (ddx: BCC, lichen planus like keratosis, intradermal nevus) and left forehead (ddx: ISK vs SCCIS)  - Shave biopsies performed today (see procedure notes below).     # Actinic Keratoses, right helix x2, right upper forehead x1, left postauricular x1, right cheek x1, left cheek x1, right dorsal hand x3, left forearm x1, left upper arm x1 and right forearm x1  - Treated with LN2 today (see procedure notes below).    # History of Melanoma: No clinical evidence of recurrence.   # History of NMSC: No clinical evidence of recurrence  - Continue sun avoidance and sun protection with SPF 30+ sunscreen over-the-counter.  - Continue with skin cancer screenings every 6 months.    # Benign lesions: cherry angiomas, solar lentigines   - Benign nature reviewed. Patient reassured. No treatment is necessary at this time unless the lesions change or become symptomatic.   - Sun precaution was advised including the use of sun screens of SPF 30 or higher, sun protective clothing, and avoidance of tanning beds.      Procedures Performed:   - Shave biopsy procedure note, location(s): left volar forearm, right radial forearm, left flank, manubrium and left forehead. After discussion of benefits and risks including but not limited to bleeding, infection, scar, incomplete removal, recurrence, and non-diagnostic biopsy, verbal consent and photographs were obtained. The  area was cleaned with isopropyl alcohol. 0.5mL of 1% lidocaine with epinephrine was injected to obtain adequate anesthesia of lesion(s). Shave biopsy at site(s) performed. Hemostasis was achieved with aluminium chloride. Petrolatum ointment and a sterile dressing were applied. The patient tolerated the procedure and no complications were noted. The patient was provided with verbal and written post care instructions.   - Cryotherapy procedure note, location(s): right helix x2, right upper forehead x1, left postauricular x1, right cheek x1, left cheek x1, right dorsal hand x3, left forearm x1, left upper arm x1 and right forearm x1. After verbal consent and discussion of risks and benefits including, but not limited to, dyspigmentation/scar, blister, and pain, 12 lesion(s) was(were) treated with 1-2 mm freeze border for 1-2 cycles with liquid nitrogen. Post cryotherapy instructions were provided.    Follow-up: pending path results    Staff and Scribe:     Scribe Disclosure:   I, Sandhya Luis, am serving as a scribe; to document services personally performed by Dr. Boby Nazario - -based on data collection and the provider's statements to me.     Provider Disclosure:   The documentation recorded by the scribe accurately reflects the services I personally performed and the decisions made by me. I personally performed the procedures today.    Boby Nazario DO    Department of Dermatology  [facility]: Phone: [phone], Fax:[phone]  [facility]: Phone: [phone], Fax: [phone]    ____________________________________________    CC: Skin Check (6 month skin check - recheck area of prior SCC excision on left volar forearm)      HPI:  Mr. Andre Tyler is a(n) 79 year old male who presents today as a return patient for a 6 month skin check.  - Last seen 9/5/24 by Boby Nazario MD  for Mohs surgery on the left neck and right lateral distal forearm and excision on the left lateral thigh. Last skin check: 7/25/24. At that time, the patient had 3 biopsies done on the right lateral distal forearm, left lateral thigh and left neck. He also had 15 AKs treated with LN2.  - Today the patient reports concern with a red bump on the left lower forearm, at site of prior Mohs. He states that it was more dome shaped and inflamed before he was prescribed a course of antibiotics by his PCP. It is no longer dome shaped but has not resolved no weeping.   - He denies anything new growing or changing on his skin.         Patient is otherwise feeling well, without additional skin concerns.    Labs Reviewed:  N/A    Physical Exam:  Vitals: There were no vitals taken for this visit.  SKIN: Total skin excluding the undergarment areas was performed. The exam included the head/face, neck, both arms, chest, back, abdomen, both legs, digits and/or nails.   - left volar forearm, there is a pink scaly papule adjacent to the linear surgical scar.  - right radial forearm, there is a pink poorly defined patch with superficial scale and comma shaped blood vessels on dermoscopy.   - left flank, there is a 1 cm pink papule with scale and crust.  - manubrium, there is a 5 mm skin colored pearly papule with arborizing vessels. - left forehead, there is a approximately 7 mm poorly defined tan scaly papule.  - There are erythematous macules with overyling adherent scale on the right helix x2, right upper forehead x1, left postauricular x1, right cheek x1, left cheek x1, right dorsal hand x3, left forearm x1, left upper arm x1 and right forearm x1.   - There are erythematous macules with overyling adherent scale scattered on dorsal hands and forearms.   - There are dome shaped bright red papules scattered on the scalp, trunk and extremities.  - Scattered brown macules on sun exposed areas on the forehead, neck and shoulders.    -  Well healed scar at site of previous melanoma, no repigmentation or nodularity noted.   - Well healed scars at sites of previous NMSC, no repigmentation or nodularity noted.  - No other lesions of concern on areas examined.                 Medications:  Current Outpatient Medications   Medication Sig Dispense Refill    aspirin (ASA) 81 MG chewable tablet Take 81 mg by mouth daily.      carbidopa-levodopa (SINEMET)  MG tablet 1.5 tab 3/day. If you feel your symptoms are not well-controlled and you have no side effects after 2 weeks, take 2 tabs 3 times per day. 540 tablet 1    clopidogrel (PLAVIX) 75 MG tablet Take 75 mg by mouth daily.      empagliflozin (JARDIANCE) 10 MG TABS tablet Take 1 tablet (10 mg) by mouth daily 90 tablet 0    lisinopril (ZESTRIL) 10 MG tablet Take 1 tablet by mouth daily.      metFORMIN (GLUCOPHAGE) 500 MG tablet Take 500 mg by mouth 2 times daily (with meals)      metoprolol succinate ER (TOPROL XL) 25 MG 24 hr tablet Take 1 tablet by mouth daily.      nitroGLYcerin (NITROSTAT) 0.4 MG sublingual tablet Place 0.4 mg under the tongue every 5 minutes as needed for chest pain.      NONFORMULARY Osteo biflex one per day: 400 Vitamin D, 1500 Glucosamine, 100mg boswellia rain (joint shield)      rosuvastatin (CRESTOR) 20 MG tablet Take 1 tablet by mouth at bedtime.      vitamin C (ASCORBIC ACID) 1000 MG TABS Take 1,000 mg by mouth 2 times daily       No current facility-administered medications for this visit.      Past Medical History:   Patient Active Problem List   Diagnosis    Hypertension goal BP (blood pressure) < 140/90    Squamous cell skin cancer    Prostate cancer - surgically treated    CARDIOVASCULAR SCREENING; LDL GOAL LESS THAN 130    Sensorineural hearing loss, bilateral    Bladder neck contracture    Bladder stone    Type 2 diabetes mellitus without complication (H)    History of melanoma    History of nonmelanoma skin cancer    Carcinoma of prostate (H)    Contact with  and (suspected) exposure to other hazardous aromatic compounds    Elevated fasting blood sugar    HDL lipoprotein deficiency    Hearing loss    Hypertriglyceridemia    Other ill-defined and unknown causes of morbidity and mortality    Essential (primary) hypertension    Essential hypertension    Malignant neoplasm of prostate (H)    Tremor    H/O echocardiogram 2023    Family hx of lung cancer    Parkinson's disease (H)    H/O prostatectomy    Exposure to potentially hazardous substance     Past Medical History:   Diagnosis Date    Diabetes (H)     H/O echocardiogram 2023 03/10/2023    Global and regional left ventricular function is normal with an EF of 55-60%. Right ventricular function, chamber size, wall motion, and thickness are normal. Pulmonary artery systolic pressure is normal. Sinuses of Valsalva 4.1 cm. Mild dilatation of the aorta is present. The inferior vena cava is normal. No pericardial effusion is present. ________________________________________________________    History of melanoma     2022 left forearm    History of nonmelanoma skin cancer     NUMEROUS    Hypertension goal BP (blood pressure) < 140/90     Prostate cancer (H)     Squamous cell skin cancer     STEMI (ST elevation myocardial infarction) (H) 10/23/2024    Tremor 02/24/2023        CC Boby Nazario MD  [address] [place] [address] on close of this encounter.

## 2025-02-03 ENCOUNTER — TELEPHONE (OUTPATIENT)
Dept: DERMATOLOGY | Facility: CLINIC | Age: 80
End: 2025-02-03
Payer: COMMERCIAL

## 2025-02-03 NOTE — TELEPHONE ENCOUNTER
Excision/Mohs previsit information                                                    Diagnosis: squamous cell carcinoma in situ  Site(s): right radial forearm    Is the surgical site below the waist?  NO  If yes, instruct the patient to purchase over the counter chlorhexidine surgical soap and wash all skin below the belly button twice before surgery    No Known Allergies    Review and update allergy and medication list    Do you take the following medications:  Coumadin, Eliquis, Pradaxa, Xarelto:  NO.  If on Coumadin, INR should be checked within 7 days of surgery.  Range should be 3.5 or less or within therapeutic range.    Do you currently or have you previously had any of the following conditions:  Hepatitis:  NO  HIV/AIDS:  NO  Prolonged bleeding or bleeding disorder:  NO  Pacemaker: NO  Defibrillator:  NO  History of artificial or heart valve replacement:  NO  Endocarditis (inflammation of the inner lining of the heart's chambers and valves):  NO  Have you ever had a prosthetic joint infection:  NO  Pregnant or Breastfeeding:  N/A  Mobility device (wheelchair, transfer difficulty): NO    Important Reminders:                                                      -For Mohs, notify patient they may be here through the lunch hour.  Be prepared to have down time and we recommend they bring a book or something to do.  -Ok to take all of their medications as prescribed  -Notify patient to eat prior to appointment.  The medication is more likely to cause you to feel jittery if you have an empty stomach.  -If face is being treated, please come with a make-up free face  -Avoid wearing earrings and necklaces  -If scalp is being treated, please come with clean hair free from product  -Patient will not be able to get the site wet for 48 hrs  -No submerging wound in standing water (lake, pool, bathtub, hot tub) for 2 weeks  -No physical activity for 48 hrs (further restrictions will be discussed by MD at time of  visit)    Jessica Reyna RN

## 2025-02-03 NOTE — TELEPHONE ENCOUNTER
Results and plan reviewed with Hilario.  Procedure scheduled for 2/20/25.     I advised patient to plan on being in clinic through the lunch hour.  I advised patient that they don't need to be fasting and they can take medications as scheduled.  I reviewed that they will have a pressure bandage on for 48 hrs following procedure and that we don't want this to get wet.  I reviewed that following the 48 hrs they will begin daily wound care for 2 weeks, but should not submerge the wound in standing water such as a bathtub, pool, hot tub, etc.     Patient verbalized understanding and agreed with the plan.     MARINA Hughes MD  2/3/2025  1:00 PM CST       Please call the patient with pathology results.     The pathologist diagnosed another SCCIS on the right forearm. My treatment recommendation is Mohs.     The other 4 were not skin cancer. The biopsy on his left wrist was an irritated hair follicle. The biopsies on his forehead and left flank were irritated (benign) SK's, the upper chest was a healthy mole. As long as these 4 wounds are healing well, no additional surgery is necessary.  Thank you.     Final Diagnosis   A. Left forehead:  - Seborrheic keratosis, inflamed - (see description)      B. Manubrium:  - Intradermal melanocytic nevus - (see description)     C. Left flank:  - Seborrheic keratosis, inflamed - (see description)      D. Right radial forearm:  - Squamous cell carcinoma in situ - (see description)      E. Left volar forearm:  - Consistent with ruptured folliculitis, with no evidence of malignancy

## 2025-02-20 ENCOUNTER — OFFICE VISIT (OUTPATIENT)
Dept: DERMATOLOGY | Facility: CLINIC | Age: 80
End: 2025-02-20
Payer: COMMERCIAL

## 2025-02-20 VITALS — HEART RATE: 70 BPM | SYSTOLIC BLOOD PRESSURE: 112 MMHG | OXYGEN SATURATION: 96 % | DIASTOLIC BLOOD PRESSURE: 76 MMHG

## 2025-02-20 DIAGNOSIS — D04.61: Primary | ICD-10-CM

## 2025-02-20 NOTE — PROGRESS NOTES
[facility] D[facility] Surgery [facility] [place] Procedure Note    Dermatology Problem List:  Last FBSE 1/29/25     1. History of Melanoma  - Malignant Melanoma - L ventral forearm, 0.3mm breslow depth, s/p excision 2/10/22  2. 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  - SCCIs, L lateral thigh, s/p excision 9/5/24  - SCCIs, R lateral distal forearm, s/p Mohs 9/5/24  - SCCIs, L neck, s/p Mohs 9/5/24  - SCCis, L volar forearm, s/p excision 3/25/24  - SCCIS, L lower leg, s/p Mohs and linear repair 8/16/23  - BCC - L posterior neck, s/p excision 12/8/22  - sBCC - L paraspinal lower back, s/p ED&C 8/29/22  - SCCIS - right tricep, s/p ED&C 8/29/22  - SCCIS - R dorsal hand, s/p Mohs and linear repair 8/29/22   - SCCIS - L shin, s/p Mohs and linear repair 8/29/22  - SCC - left dorsal wrist, s/p MMS 7/21/21  - BCC - upper mid back, s/p excision 7/21/21  - BCC - left clavicle, s/p excision 7/21/21  - BCC - left upper back, s/p excision 12/17/2020  - SCC - base of right thumb, s/p excision 6/26/18  - SCCIS - central chest, s/p ED & C 6/11/18  - BCC - vertex scalp, s/p MMS 11/21/2016  - SCCis - R lateral thigh, s/p ED&C 11/21/2016  - SCCIS - right medial ankle, s/p Mohs 4/21/2016  - SCCIS - right ventral forearm, s/p ED&C 4/21/2016  - BCC - left preauricular, s/p Mohs 11/10/2015  - BCC, superficial type - left upper chest, s/p biopsy 4/9/2015, s/p ED&C 10/5/2015  - SCCIS - posterior right lower earlobe, records via [facility] transferred records, Dr. Arnulfo Keyes, 2011. s/p excision in 2005, recurrent  - SCCIS - left dorsal hand, s/p via [facility] transferred records, Dr. Arnulfo Keyes, 2011  3. Actinic keratosis  - s/p PDT 2/15/24, 3/12/24, 4/23/24  -  right dorsal hand lateral, s/p biopsy 7/6/23; s/p LN2 8/16/23  - HAK - left dorsal wrist, s/p bx 11/10/2020  - s/p cryotherapy  4. Benign biopsies  - Ruptured folliculitis, L volar forearm, s/p bx 1/29/25  - Intradermal  melanocytic nevus, manubrium, s/p bx 1/29/25  - Intradermal melanocytic nevus - left postauricular scalp, s/p bx 6/18/21  - Compound melanocytic nevus - left lower back, s/p bx 4/9/15  - BLK - right lower leg, s/p bx 4/9/2015  5. Seborrheic keratoses  - iSK, L flank, s/p bx 1/29/25  - iSK, L forehead, s/p bx 1/29/25     FMHx: melanoma is negative. Sister with unknown skin issues     _______________________________    Date of Service:  Feb 20, 2025  Surgery: Mohs micrographic surgery    Case 1  Repair Type: intermediate  Repair Size: 7.3 cm  Suture Material: 4-0 monocryl, vicryl rapide 5-0  Tumor Type: SCCIS - Squamous cell carcinoma in situ  Location: right radial forearm  Derm-Path Accession #: [id number]  PreOp Size: 3.0x1.5 cm  PostOp Size: 3.8x2.4 cm  Mohs Accession #: [id number]  Level of Defect: fascia      Procedure:  We discussed the principles of treatment and most likely complications including scarring, bleeding, infection, swelling, pain, crusting, nerve damage, large wound,  incomplete excision, wound dehiscence,  nerve damage, recurrence, and a second procedure may be recommended to obtain the best cosmetic or functional result.    Informed consent was obtained and the patient underwent the procedure as follows:  The patient was placed supine on the operating table.  The cancer was identified, outlined with a marker, and verified by the patient.  The entire surgical field was prepped with Chlorhexadine.  The surgical site was anesthetized using Lidocaine 1% with epi 1:100,000.      The area of clinically apparent tumor was debulked. The layer of tissue was then surgically excised using a #15 blade and was then transferred onto a specimen sheet maintaining the orientation of the specimen. Hemostasis was obtained using bipolar electrocoagulation. The wound site was then covered with a dressing while the tissue samples were processed for examination.    The excised tissue was transported to the Mohs  histology laboratory maintaining the tissue orientation.  The tissue specimen was relaxed so that the entire surgical margin was in a a single horizontal plane for sectioning and inked for precise mapping.  A precise reference map was drawn to reflect the sectioning of the specimen, colored inking of the margins, and orientation on the patient. The tissue was processed using horizontal sectioning of the base and continuous peripheral margins.  The histopathologic sections were reviewed in conjunction with the reference map.    Total blocks: 1    Total slides:  2    There were no cancer cells visualized on examination, therefore Mohs surgery was complete.    Reconstruction: Intermediate Linear Closure      The patient was taken to the operative suite and placed supine on the operating room table. The defect was identified. Appropriate markings were made with a marking pen to plan the repair. The area was infiltrated with Lidocaine 1% with epi 1:100,000 and prepped with Chlorhexidine and draped with sterile towels.     The wound was debeveled and undermined widely. Cones were excised within relaxed skin tension lines on both sides of the defect. Hemostasis was obtained using bipolar electrocoagulation. The deeper layers of subcutaneous and superficial (nonmuscle) fascia tissues were then approximated using vicryl 3-0 buried vertical mattress sutures (deep layer suturing). The wound edges were then approximated; additional buried sutures were placed in a similar fashion where needed. vicryl rapide 5-0 simple running (superficial layer suturing) were carefully placed for maximum eversion and meticulous approximation.    Estimated blood loss was less than 10 ml for all surgical sites. The wound was cleansed with saline and ointment was applied along the wound surface. A sterile pressure dressing was applied and wound care instructions were given verbally and in writing. Patient was informed that additional refinement of  the resulting surgical scar may be used as a second stage of this reconstruction. The patient was discharged from the [facility] alert and ambulatory.    The patient elected for pathology results to automatically release and understands that the clinical staff will contact them as soon as possible to notify them of the results.    Repair Size: 7.3 cm    The attending surgeon was present for key portions of the above major procedure and examination.    Staff Involved:  Scribe/Staff    Scribe Disclosure:   I, Sandhya uLis, am serving as a scribe; to document services personally performed by Dr. Boby Nazario - -based on data collection and the provider's statements to me.     Staff attestation:  The documentation recorded by the scribe accurately reflects the services I personally performed and the decisions I personally made. I have made edits where needed.    Electronically signed by:     Staff Physician Comments:   I saw and evaluated the patient with the Mohs Surgery Fellow (Dr. Itz Roblero) and I agree with the assessment and plan and the above description of the procedure as documented by the scribe. I was present for the key portions of the procedure and entire exam. I was immediately available in the clinic throughout the procedures.     Boby Nazario DO    Department of Dermatology  [facility]: Phone: [phone], Fax:[phone]  [facility]: Phone: [phone], Fax: [phone]    Care and Laboratory Testing Performed at:  [facility]   Dermatology Clinic  [address] [place] [address]

## 2025-02-20 NOTE — PATIENT INSTRUCTIONS
Caring for your skin after surgery    For the first 48 hours after your surgery:  Leave the pressure dressing on and keep it dry. If it should come loose, you may re-tape it, but do not take it off.  Relax and take it easy.  Do not do any vigorous exercise, heavy lifting or bending forward. This could cause the wound to bleed.  If the wound is on your head, sleeping with your head elevated for the first few nights will help with swelling and bleeding. (Use linens/pillow cases that would be ok to get blood on in the event there is some oozing from the bandage).  Do not submerge the wound in any standing water for two weeks or until the site is healed.  Post-operative pain is usually mild.  You may alternate between 1000 mg of Tylenol (acetaminophen) and 400 mg of Ibuprofen every 4 hours.  This means, for example, that you could take the followin,000 mg of Tylenol, followed 4 hours later by 400 mg Ibuprofen, followed 4 hours later with 1,000 mg of Tylenol, and so forth.  Do not take more than 4,000 mg of acetaminophen in a 24-hour period or 3200 mg of Ibuprofen in a 24-hr period.    Avoid alcohol and vitamin E as these may increase your tendency to bleed.  Apply an ice pack for 20 min every 2-3 hours while awake.  This may help reduce swelling, bruising, and pain.  Make sure the ice pack is waterproof so that the pressure bandage doesn't get wet.  You may see a small amount of drainage or blood on your pressure bandage. This is normal. However:  If drainage or bleeding continues or saturates the bandage, you will need to apply firm pressure over the bandage with a clean washcloth for 15 minutes.    Remove the saturated bandage.  If bleeding has stopped, apply Vaseline over the suture line and cover with a non-stick bandage.  To add some pressure over the wound, fold a piece of gauze and tape over the area.  If bleeding continues after applying pressure for 15 minutes, apply an ice pack with gentle pressure to the  bandaged area for another 15 minutes.  If bleeding still continues, call our office or go to the nearest emergency room.    48 Hours After Surgery:  Your surgical site has been closed with DermaBond, a  super glue,   for skin procedures.  DermaBond seals your incision site.  Carefully remove the pressure bandage.  If it seems sticky or difficult to remove, you may need to soak it off in the shower.  Once the pressure bandage has been removed, avoid prolonged wetness to the area covered with DermaBond.  Pat dry when out of the shower.  Avoid rubbing or scrubbing the site.  Avoid topical medications, lotions, creams, ointment, or oils.  Cover with a non-stick bandage.    If skin glue and sutures are still intact at 2 weeks after your procedure, you can use an adhesive remover wipe to loosen the skin glue. If sutures are still present, start applying Vaseline daily and cover with non-stick gauze OR you may use hydrogen peroxide to help the sutures dissolve.       What to expect:  The first couple of days your wound may be tender and may bleed slightly when doing wound care.  There may be swelling and bruising around the wound, especially if it is near the eyes. For your comfort, you may apply ice or cold compresses to the area.  The area around your wound may be numb for several weeks or even months.  You may experience periodic sharp pain or mild itching around the wound as it heals.   The suture line will look dark pink at first and the edges of the wound will be reddened. This will lighten up each day.    Call Us If:  You have bleeding that will not stop after applying pressure and ice.  You have pain that is not controlled with Tylenol and Ibuprofen.  You have signs or symptoms of an infection such as fever over 100 degrees Fahrenheit, redness, swelling, or warmth spreading from the wound, increasing pain after the first 48 hours, or white/yellow/green drainage from the wound (may or may not have a foul  odor).    [facility], Dermatology [phone].  Ask to speak with the staff in [place].  For urgent needs outside of business hours call the [facility] at [phone] and ask to speak with/page the dermatology resident on call.

## 2025-02-20 NOTE — NURSING NOTE
The following medication was given:     MEDICATION:  Lidocaine with epinephrine 1% [id number]  ROUTE: SQ  SITE: see procedure note  DOSE: 7.5 mL  LOT #: [id number]  : [employer] Route  EXPIRATION DATE: 6/30/26  NDC#: 36533-548-08  Was there drug waste? No  Multi-dose vial: Yes    Dermabond and pressure dressing applied to Mohs site on right radial forearm.  Wound care instructions reviewed with patient and AVS provided.  Patient verbalized understanding.  Patient will follow up for suture removal: N/A.  No further questions or concerns at this time.    Jessica Reyna RN  February 20, 2025

## 2025-02-20 NOTE — LETTER
2/20/2025      Andre Tyler  [address] [facility] [address]      Dear Colleague,    Thank you for referring your patient, Andre Tyler, to the [facility]. Please see a copy of my visit note below.    [facility] [place] Procedure Note    Dermatology Problem List:  Last FBSE 1/29/25     1. History of Melanoma  - Malignant Melanoma - L ventral forearm, 0.3mm breslow depth, s/p excision 2/10/22  2. 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  - SCCIs, L lateral thigh, s/p excision 9/5/24  - SCCIs, R lateral distal forearm, s/p Mohs 9/5/24  - SCCIs, L neck, s/p Mohs 9/5/24  - SCCis, L volar forearm, s/p excision 3/25/24  - SCCIS, L lower leg, s/p Mohs and linear repair 8/16/23  - BCC - L posterior neck, s/p excision 12/8/22  - sBCC - L paraspinal lower back, s/p ED&C 8/29/22  - SCCIS - right tricep, s/p ED&C 8/29/22  - SCCIS - R dorsal hand, s/p Mohs and linear repair 8/29/22   - SCCIS - L shin, s/p Mohs and linear repair 8/29/22  - SCC - left dorsal wrist, s/p MMS 7/21/21  - BCC - upper mid back, s/p excision 7/21/21  - BCC - left clavicle, s/p excision 7/21/21  - BCC - left upper back, s/p excision 12/17/2020  - SCC - base of right thumb, s/p excision 6/26/18  - SCCIS - central chest, s/p ED & C 6/11/18  - BCC - vertex scalp, s/p MMS 11/21/2016  - SCCis - R lateral thigh, s/p ED&C 11/21/2016  - SCCIS - right medial ankle, s/p Mohs 4/21/2016  - SCCIS - right ventral forearm, s/p ED&C 4/21/2016  - BCC - left preauricular, s/p Mohs 11/10/2015  - BCC, superficial type - left upper chest, s/p biopsy 4/9/2015, s/p ED&C 10/5/2015  - SCCIS - posterior right lower earlobe, records via [facility] transferred records, Dr. Arnulfo Keyes, 2011. s/p excision in 2005, recurrent  - SCCIS - left dorsal hand, s/p via [facility] transferred records, Dr. Arnulfo Keyes, 2011  3. Actinic keratosis  - s/p  PDT 2/15/24, 3/12/24, 4/23/24  -  right dorsal hand lateral, s/p biopsy 7/6/23; s/p LN2 8/16/23  - HAK - left dorsal wrist, s/p bx 11/10/2020  - s/p cryotherapy  4. Benign biopsies  - Ruptured folliculitis, L volar forearm, s/p bx 1/29/25  - Intradermal melanocytic nevus, manubrium, s/p bx 1/29/25  - Intradermal melanocytic nevus - left postauricular scalp, s/p bx 6/18/21  - Compound melanocytic nevus - left lower back, s/p bx 4/9/15  - BLK - right lower leg, s/p bx 4/9/2015  5. Seborrheic keratoses  - iSK, L flank, s/p bx 1/29/25  - iSK, L forehead, s/p bx 1/29/25     FMHx: melanoma is negative. 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    Informed consent was obtained and the patient underwent the procedure as follows:  The patient was placed supine on the operating table.  The cancer was identified, outlined with a marker, and verified by the patient.  The entire surgical field was prepped with Chlorhexadine.  The surgical site was anesthetized using Lidocaine 1% with epi 1:100,000.      The area of clinically apparent tumor was debulked. The layer of tissue was then surgically excised using a #15 blade and was then transferred onto a specimen sheet maintaining  the orientation of the specimen. Hemostasis was obtained using bipolar electrocoagulation. The wound site was then covered with a dressing while the tissue samples were processed for examination.    The excised tissue was transported to the [facility] histology laboratory maintaining the tissue orientation.  The tissue specimen was relaxed so that the entire surgical margin was in a a single horizontal plane for sectioning and inked for precise mapping.  A precise reference map was drawn to reflect the sectioning of the specimen, colored inking of the margins, and orientation on the patient. The tissue was processed using horizontal sectioning of the base and continuous peripheral margins.  The histopathologic sections were reviewed in conjunction with the reference map.    Total blocks: 1    Total slides:  2    There were no cancer cells visualized on examination, therefore Mohs surgery was complete.    Reconstruction: Intermediate Linear Closure      The patient was taken to the operative suite and placed supine on the operating room table. The defect was identified. Appropriate markings were made with a marking pen to plan the repair. The area was infiltrated with Lidocaine 1% with epi 1:100,000 and prepped with Chlorhexidine and draped with sterile towels.     The wound was debeveled and undermined widely. Cones were excised within relaxed skin tension lines on both sides of the defect. Hemostasis was obtained using bipolar electrocoagulation. The deeper layers of subcutaneous and superficial (nonmuscle) fascia tissues were then approximated using vicryl 3-0 buried vertical mattress sutures (deep layer suturing). The wound edges were then approximated; additional buried sutures were placed in a similar fashion where needed. vicryl rapide 5-0 simple running (superficial layer suturing) were carefully placed for maximum eversion and meticulous approximation.    Estimated blood loss was less than 10 ml for all surgical  sites. The wound was cleansed with saline and ointment was applied along the wound surface. A sterile pressure dressing was applied and wound care instructions were given verbally and in writing. Patient was informed that additional refinement of the resulting surgical scar may be used as a second stage of this reconstruction. The patient was discharged from the [facility] alert and ambulatory.    The patient elected for pathology results to automatically release and understands that the clinical staff will contact them as soon as possible to notify them of the results.    Repair Size: 7.3 cm    The attending surgeon was present for key portions of the above major procedure and examination.    Staff Involved:  Scribe/Staff    Scribe Disclosure:   I, Sandhya Luis, am serving as a scribe; to document services personally performed by Dr. Boby Nazario - -based on data collection and the provider's statements to me.     Staff attestation:  The documentation recorded by the scribe accurately reflects the services I personally performed and the decisions I personally made. I have made edits where needed.    Electronically signed by:     Staff Physician Comments:   I saw and evaluated the patient with the Mohs Surgery Fellow (Dr. Itz Roblero) and I agree with the assessment and plan and the above description of the procedure as documented by the scribe. I was present for the key portions of the procedure and entire exam. I was immediately available in the clinic throughout the procedures.     Boby Nazario DO    Department of Dermatology  [facility]: Phone: [phone], Fax:[phone]  [facility]: Phone: [phone], Fax: [phone]    Care and Laboratory Testing Performed at:  [facility]   Dermatology Clinic  [address] [place]  [address]      Again, thank you for allowing me to participate in the care of your patient.        Sincerely,        Boby Nazario MD    Electronically signed

## 2025-02-20 NOTE — NURSING NOTE
Andre Tyler's goals for this visit include:   Chief Complaint   Patient presents with    Procedure     Mohs - R radial forearm, SCCIS       He requests these members of his care team be copied on today's visit information:     PCP: Arnulfo Joaquin    Referring Provider:  Referred Self, MD  No address on file    /76 (BP Location: Right arm, Patient Position: Sitting, Cuff Size: Adult Regular)   Pulse 70   SpO2 96%     Do you need any medication refills at today's visit?     Agustina Masterson LPN on 2/20/2025 at 7:30 AM

## 2025-02-26 ENCOUNTER — DOCUMENTATION ONLY (OUTPATIENT)
Dept: FAMILY MEDICINE | Facility: CLINIC | Age: 80
End: 2025-02-26
Payer: COMMERCIAL

## 2025-02-26 NOTE — PROGRESS NOTES
Andre Tyler has an upcoming lab appointment:Please place lab order in epic     Thank you    [facility] lab team  [phone]     Future Appointments   Date Time Provider Department Center   2/27/2025  2:30 PM FK LAB [facility]   4/29/2025 10:30 AM Flex Galicia MD [facility]   7/31/2025  2:00 PM Boby Nazario MD [facility]     Patient is scheduled for the following lab(s):     There is no order available. Please review and place either future orders or HMPO (Review of Health Maintenance Protocol Orders), as appropriate.    Health Maintenance Due   Topic    HEPATITIS C SCREENING     ANNUAL REVIEW OF HM ORDERS     A1C     BMP      Kenroy Greenwood

## 2025-03-09 ENCOUNTER — HEALTH MAINTENANCE LETTER (OUTPATIENT)
Age: 80
End: 2025-03-09

## 2025-04-16 PROBLEM — I25.2 HISTORY OF MYOCARDIAL INFARCTION IN LAST YEAR: Status: ACTIVE | Noted: 2025-04-16

## 2025-04-16 PROBLEM — G20.A1 PARKINSON'S DISEASE WITHOUT DYSKINESIA, WITHOUT MENTION OF FLUCTUATIONS (H): Status: ACTIVE | Noted: 2025-04-16

## 2025-04-16 NOTE — PROGRESS NOTES
Diagnosis/Summary/Recommendations:    PATIENT: Andre Tyler  79 year old male     : 1945    JUVE: 2025     MRN: [id number]  [address] [facility] [id number] [phone] (H)   [phone] (M)      [email or web address]     Salima Tyler   [phone]  He granted her proxy access  To [employer]       Assessment:  (R25.1) Tremor  (primary encounter diagnosis)  No family history of parkinson  Mother and Grandmother passed away at 99 years     Retired law enforcement - retired [employer]  and then worked 16 years [employer] at [employer]      Wife retired exec director nonprofits     Right handed     Handwriting is different and possibly not as good and may be smaller                Notices more right than left hand tremor     Tremor is present when holding a phone and his wife commented on it.      Review of diagnosis    tremor     Avoidance of dopamine blockers   Not taking     Motor complication review   n/a     Review of Impulse control disorders   n/a     Review of surgical or medication options   reviewed     Gait/Balance/Falls   No falls      Exercise/Therapy performed/offered   Walking around helps his back pain -   Tries to get to [employer] 3 or 4 times per week   Has not been able to go  Can do treadmill     Cognitive/Driving   Does not like night driving but can drive  Can drive  Denies cognitive problems     Mood   denies     Hallucinations/delusions   denies     Sleep   Goes to bed around 10 or 1030pm and can fall asleep right away.   Sleeps in same bed as his wife  Use to snore in the past  No clear rbd behaviors  No sleep study   Wakes up around 6/630am  Gets up rarely to urinate      Bladder/Renal/Prostate/Gyn/Other  Prostate cancer 2012 - surgery - no radiation or chemo  Bladder stone 2019 lithotripsy with no recurrence  Bladder neck contracture dealt with at 2012 at time of prostate surgery in   Rare to no nocturia    No urgency or frequency      GI/Constipation/GERD   Has daily bowel movement with the metformin has some loose stool   No heartburn      He has having some pain in the past and they removed his appendix   He was having bladder spasms and had appendix removed and this problem with bladder improved      ENDO/Lipid/DM/Bone density/Thyroid  Lipid disorder  Diabetes type 2; A1c = 7.7 on 2/16/2023  triglyceride disorder - elevated at 297  And HDL was 37 and cholesterol 157 and non hdl 120 - non fasting study on 2/16/2023  Denies thyroid problems  Bone density has not been checked for a long time     empagliflozin jardiance 10mg - has not yet started.   Metformin glucophage 500mg twice daily      Glucosamine 1500mg/vitamin d400 international unit(s) and 100mg boswellia rain     Vitamin D dose?     Cardio/heart/Hyper or Hypotensive   Hypertension  134/89 today 95 heart rate   Normal stress was normal 11/13/2015  [facility]  [facility] dr abbie quezada   Normal eCG today   No cardiologist  Lisinopril-hydrochlorothiazide 10-12.5 zestoretic      Vision/Dry Eyes/Cataracts/Glaucoma/Macular   Cataract surgery around 2002   Dry eyes     Heme/Anticoagulation/Antiplatelet/Anemia/Other  Not taking aspirin  No bleeding problems      ENT/Resp  Hearing loss  Wearing hearing aides  Has some tinnitus  Voice has been affected - slurring  Loss of smell for many years  Has had covid in the past     Speech and slurring  Swallowing and coughing  May be related to drooling       Skin/Cancer/Seborrhea/other  History of melanoma - diagnosed   History of nonmelanoma skin cancer  Squamous cell skin cancer  Dr garcia is following this      Musculoskeletal/Pain/Headache  Denies significant surgery  Has left hip pain in the morning      Other:   ecg was normal     Parkinson disease -diagnosis based on his slowness, stiffness, etc.   He has soft voice.      Genetic study  PD  generation  [email or web address]     Brain MRI to look for iron      Dopamine scan (datscan)     Because of his agent orange exposure -  He qualifies for service connected benefits [facility] as he has parkinson  Encouraged to get connected with [facility] for this benefit.     They have a motor home - she does the driving and they like to go.      Return visit with Mendy Stanton for [facility]     Discussion about the SPARX3 trial and messaged Rostia Gauthier about their interest in learning more about the study.      Consider echo and cardiology consultation for treadmill study        Return to be determined     Discussed symptomatic medication therapy and physical and speech therapy     Parkinson's Disease: Newly diagnosed with PD. Motor symptoms are currently manageable.        Drooling:     Cognitive Changes:        __  All questions answered and the following patient instructions provided: -      __  Okay to cancel the Dopamine Scan.     __  You can do the brain MRI, ECHO Cardiogram, and see the cardiologist.      __  Exercise 3 - 5 times a week.      __  Consider doing the treadmill research after you read the information.     __  Multitasking can be affected, so try to focus on one task at a time.     __  No medication for now.     __  You can consider the [facility] - Physical Therapy and Speech Therapy.     __ For drooling, you can consider Botox injection.  Also, dry candy or chewing gum can help.      __ Return in 6 months to see Dr. Galicia. You may return sooner as needed.       Has had more slowness in movement  More stooped posture.   Loss of fine motor  Problems writing and wife is signing bills  He has right sided tremors  He has more problems with coordination with his right hand     He has had more problems with slurring of speech     Did not do the dopamine scan -      Did not do the brain MRI scan      Echocardiogram was fine - EF was 55-60%     "  Loss of smell is long standing     Loss of facial expression     He granted proxy access to his mychart      Dry eyes     melanoma     Weight loss     Swallowing problems/drooling/     Physical and speech therapy     Ent consultation if needed     Genetic study  PD generation  [email or web address]     He has more problems with h is lingual speech   He has a nasal pattern     Pharmacy ([facility]) consultation and medication management     Parkinson's Disease:  Started Levodopa in Sep 2023. He reports slight improvement. He takes his last dose an hour before bedtime.  Since he doesn't have bothersome nighttime symptoms, I recommended changing the times he take Sinemet.      __  Referral to Speech Therapy. If you have not heard from the scheduling office within 2 business days, please call [phone] for [facility].     Mr. Tyler and his wife are requesting assistance with obtaining handicap parking  Three of their children play hockey during the winter and grandchildren play sports during the summer  Parking can be a challenge at these events  Mr. Tyler has to drop his wife off and park due to her knee issues  He and his family are concerned about him walking long distances, especially in icy conditions  His wife has a temporary handicap pass while awaiting knee surgery     Balance has been \"pretty good.\" He has to pay closer attention  Not quite as balanced with certain tasks such as putting pants on in the morning  Dealing with this by concentrating harder in situations where he may be off balance helps  No falls or near falls  Not using an assistive device walking  He has not worked with physical therapy on balance     He tries to get to the health club regularly  Some days he does light lifting  Also doing some walking and running - alternative around the track  He has also been doing exercises with his wife in the water  He is getting to the health club " "2-3 days/week  Plays golf once a week - he would like his golf game to be better, notices he isn't always fluid when putting     Carbidopa/Levodopa 1 tab TID - he has been on this dose for about a year  He used to have trouble coordinating brushing his teeth - this has improved since starting medication  His arms seem to swing better since starting medication  He also feels like his speech has improved  Trying not to take medication with food - avoiding one hour before or two hours after  If he has to delay a dose due to meals he may have wearing off - this usually happens because of grandchildren's hockey schedule (usually after 6 hours)  Hasn't noticed any side effects - no nausea or lightheadedness  He has a tremor in his right hand/leg and jaw - CD/LD has maybe helped this a little but it is still present  Writing has been more difficult - cursive writing is \"not where it should be.\" Smaller and also shakier.      Frequent coughing/choking - this can occur with food, liquids and with his own saliva  He had a swallow study in Oct 2023 - this showed flash penetration on thin liquids with initial swallow - not seen on subsequent swallows and no aspiration. He has not subsequently worked with speech therapy  He doesn't believe swallowing is worse than it used to be, his wife thinks that this has ana more of an issue     He met with a speech therapist once in December 2023.   Voice remains soft - he feels like his speech is ok for the most part  He tends to project better when his hearing aids are out  Waitresses may get his order wrong, have to ask him to repeat  Less slurring since he has been on CD/LD  SLP gave him a sheet with exercises to do at home but he does not do them     He is not interested in working with SLP at this time     Mr. Tyler has had more saliva - drooling both during the day and at night  He keeps a hanky around  He had excess saliva - this was one of his earliest symptoms   Discussed " "lozenges vs medication vs botulinum toxin  He does not want treatment at this time     No issues with constipation  No concerns about bladder function     Mood is good - not feeling down/depressed/anxious  \"We do a lot of laughing.\"     Sleep is good - 8 hours/night on average  Wakes feeling well rested  Rare brief jerks in sleep per his wife - \"he's kind of been like that his whole life.\" No more complex actions.  Not falling out of bed.      No concerns about thinking or memory - \"I'm as sharp as always\"  Wife has no concerns although he is not quite as good at multitasking as he used to be.       Here with wife Salima     Overall he feels well.      Sinemet continues to help his symptoms, but endorses wearing off characterized by increased tremor, increased word-finding difficulty, and increased slowness. He is not sure exactly what time the wearing off occurs with regards to medication because he tries not to think too much about this and prefers to \"move on.\" He is interested in increasing the dose of Sinemet as he feels symptoms are not as well-controlled as before              - Carbidopa/levodopa: Take 1.5 tabs three times a day              - If you feel your symptoms are not well-controlled and you have no side effects after 2 weeks, take 2 tabs 3 times per day.               - [facility]     Walking is good for the most part, had an episode where he felt significantly unsteady when getting up to use the bathroom at night but otherwise has had no issues. No falls. Remains active, jogs, swims, and walks up to 5x/week.     Mood is good. No cognitive concerns, no hallucinations. No swallowing issues.     Discussed hallucinations in Parkinson's can happen. They are usually visual and are on a spectrum. On the mild end is seeing a shadow or presence in the corner of your eye that is not there. In the middle is seeing small animals or people that are not threatening. On the severe end is seeing " people or other figures that are threatening or scary. Both Parkinson's itself and medications for Parkinson's can cause hallucinations, but not everyone gets them even on high doses of medications.      Pharmacy ([facility]) consultation and medication management     Going to [facility].    10/22/2024 last visit    Was going to play golf and had a feeling of gas  10/23 to 10/25/2024  He became clammy sweaty  STEMI  One IMMANUEL and first RPL  Was transferred to [facility] from [place]  Had a stent placed.     Echo: 10/23/2024  Final Conclusion  3D Calculated EF: 65%  Normal left ventricular size and wall thickness with preserved systolic function.  The right ventricle is normal in size and function.  No significant valvular heart disease noted.  Estimated pulmonary artery pressure of 30.3 mmHg + RA pressure.        Medications      6a  11a  3/330p   Aspirin 81mg 1     Calcipotriene dovonox cream         Carbidopa/levodopa Sinemet 25/100 1 (rare 1.5) 2 1.5 or 2   Clopidogrel plavix 75mg 1     Empagliflozin Jardiance 10 mg 1     1   Fluoruracil efudex cream OFF       Glucosamine chondroitin  1  1   Ketotifen Zaditor 0.025% sol no       Lisinopril zestril 10mg 1       Metformin Glucophage 500 mg 1   1   Metoprolol succ ER Toprol XL 25mg 24 1     MVI   1       nonform glucos 1500, vit d 400, bosw  1       Polyethylene glycol-propylene systane   OFF       Rosuvastatin crestor 20mg 1     Vitamin C ascorbic acid 1000 mg 1       vitamin D cholecalciferol  OFF                                          Plan:  STEMI heart attack   Metoprolol succ ER Toprol XL 25mg 24  Lisinopril zestril 10mg    Aspirin 81mg  Clopidogrel plavix 75mg    Eats oatmeal with fruit  Eating eggs with veggies  Likes coffee with coffee cake  Denies constipation  Dairy [facility] study after this    Endocrinology  [facility]  Empagliflozin Jardiance 10 mg  Metformin Glucophage 500 mg twice daily   Lab Results   Component Value Date     A1C 7.3 07/12/2024    A1C 7.7 02/16/2023    A1C 6.9 08/06/2019    A1C 6.2 02/11/2013        Lab Test 07/12/24  0946 02/16/23  1753   CHOL 164 157   HDL 38* 37*   LDL 89 61   TRIG 186* 297*     Lipid level 10/23/2024  CHOLESTEROL,TOTAL  100 - 199 mg/dL 166   Comment: Cholesterol, Total Reference Ranges    Desirable <200 mg/dL  Borderline 200-239 mg/dL  High >=240 mg/dL   TRIGLYCERIDES  <150 mg/dL 92   HDL CHOLESTEROL  >40 mg/dL 41   NON-HDL CHOLESTEROL  <145 mg/dl 125   CHOL/HDL RATIO  <4.50 4.05   LDL CHOLESTEROL  <=130 mg/dL 107   VLDL CHOLESTEROL  <=30 mg/dL 18     Denies bladder issues.       Here with wife Salima  Helping out with a grand child.   Was at the [employer] foster of [facility]     Carbidopa/levodopa Sinemet 25/100  He is taking as listed.   Took one pill this morning   No falls   Tries to stay on schedule  No clear wearing off unless he is late in taking his medication  He is aware of the protein impact on the sinemet   Golfing on Wednesday  Goes to gym most days.     No change in medications for now  Last seen 6 months ago  Return in 6 months.  He is taking 5 sinemet per day; Rx says 6/day  When he gets up he is stiff and moving slowly  He has not had freezing     https://www.nature.com/articles/r08502-654-06304-1  https://www.sciencedirect.com/science/article/pii/T4622604927614906           Coding statement:   Medical Decision Making:  #  Chronic progressive medical conditions addressed  - see above --   Review and/or interpretation of unique test or documentation from a provider outside of neurology no   Independent historian provided additional details  yes I  Prescription drug management and review of potential side effects and/or monitoring for side effects  -- see above ---  Health impacted by social determinants of health  no    I have reviewed the note as documented above.  This accurately captures the substance of my conversation with the patient and total time spent preparing for visit, executing  visit and completing visit on the day of the visit:  40 minutes.  The portion of this total time included face to face time     The longitudinal plan of care for Andre Tyler was addressed during this visit. Due to the added complexity in care, I will continue to support Andre Tyler in the subsequent management of this condition(s) and with the ongoing continuity of care of this condition(s).      Flex Galicia MD     ______________________________________    Last visit date and details:             ______________________________________      Patient was asked about 14 Review of systems including changes in vision (dry eyes, double vision), hearing, heart, lungs, musculoskeletal, depression, anxiety, snoring, RBD, insomnia, urinary frequency, urinary urgency, constipation, swallowing problems, hematological, ID, allergies, skin problems: seborrhea, endocrinological: thyroid, diabetes, cholesterol; balance, weight changes, and other neurological problems and these were not significant at this time except for   Patient Active Problem List   Diagnosis    Hypertension goal BP (blood pressure) < 140/90    Squamous cell skin cancer    Prostate cancer - surgically treated    CARDIOVASCULAR SCREENING; LDL GOAL LESS THAN 130    Sensorineural hearing loss, bilateral    Bladder neck contracture    Bladder stone    Type 2 diabetes mellitus without complication (H)    History of melanoma    History of nonmelanoma skin cancer    Carcinoma of prostate (H)    Contact with and (suspected) exposure to other hazardous aromatic compounds    Elevated fasting blood sugar    HDL lipoprotein deficiency    Hearing loss    Hypertriglyceridemia    Other ill-defined and unknown causes of morbidity and mortality    Essential (primary) hypertension    Essential hypertension    Malignant neoplasm of prostate (H)    Tremor    H/O echocardiogram 2023    Family hx of lung cancer    Parkinson's disease (H)    H/O prostatectomy    Exposure  to potentially hazardous substance    History of myocardial infarction in last year    Parkinson's disease without dyskinesia, without mention of fluctuations (H)        No Known Allergies  Past Surgical History:   Procedure Laterality Date    APPENDECTOMY  2015    had improvement in bladder spasms    CATARACT IOL, RT/LT  2002    20+ years ago    HC MOHS TRUNK/ARM/LEG 1ST STAGE UP T0 5 BLOCKS      HEART CATH DRUG ELUTING STENT PLACEMENT  10/23/2024    LASER HOLMIUM LITHOTRIPSY URETER(S), INSERT STENT, COMBINED N/A 10/21/2019    Procedure: Urethral dilation and transurethral resection of bladder neck contracture litholapaxy (needs holmium laser) for bladder stone;  Surgeon: Arnulfo Vale MD;  Location: [facility] OR    OTHER SURGICAL HISTORY      skin cancer    PROSTATE SURGERY  08/28/2012     Past Medical History:   Diagnosis Date    Diabetes (H)     H/O echocardiogram 2023 03/10/2023    Global and regional left ventricular function is normal with an EF of 55-60%. Right ventricular function, chamber size, wall motion, and thickness are normal. Pulmonary artery systolic pressure is normal. Sinuses of Valsalva 4.1 cm. Mild dilatation of the aorta is present. The inferior vena cava is normal. No pericardial effusion is present. ________________________________________________________    History of melanoma     2022 left forearm    History of nonmelanoma skin cancer     NUMEROUS    Hypertension goal BP (blood pressure) < 140/90     Prostate cancer (H)     Squamous cell skin cancer     STEMI (ST elevation myocardial infarction) (H) 10/23/2024    Tremor 02/24/2023     Social History     Socioeconomic History    Marital status:      Spouse name: Not on file    Number of children: 3    Years of education: Not on file    Highest education level: Not on file   Occupational History     Employer: RETIRED   Tobacco Use    Smoking status: Never    Smokeless tobacco: Never   Vaping Use    Vaping status: Never Used   Substance  and Sexual Activity    Alcohol use: No    Drug use: No    Sexual activity: Yes     Partners: Female   Other Topics Concern    Parent/sibling w/ CABG, MI or angioplasty before 65F 55M? Not Asked   Social History Narrative    Not on file     Social Drivers of Health     Financial Resource Strain: Not on file   Food Insecurity: Not on file   Transportation Needs: Not on file   Physical Activity: Not on file   Stress: Not on file   Social Connections: Not on file   Interpersonal Safety: Not on file   Housing Stability: Not on file       Drug and lactation database from the [place] National Library of Medicine:  http://toxnet.nlm.nih.gov/cgi-bin/sis/htmlgen?LACT      B/P: Data Unavailable, T: Data Unavailable, P: Data Unavailable, R: Data Unavailable 0 lbs 0 oz  There were no vitals taken for this visit., There is no height or weight on file to calculate BMI.  Medications and Vitals not listed above were documented in the cart and reviewed by me.     Current Outpatient Medications   Medication Sig Dispense Refill    GLUCOSAMINE-CHONDROITIN PO Take by mouth.      aspirin (ASA) 81 MG chewable tablet Take 81 mg by mouth daily.      carbidopa-levodopa (SINEMET)  MG tablet 1.5 tab 3/day. If you feel your symptoms are not well-controlled and you have no side effects after 2 weeks, take 2 tabs 3 times per day. 540 tablet 1    clopidogrel (PLAVIX) 75 MG tablet Take 75 mg by mouth daily.      empagliflozin (JARDIANCE) 10 MG TABS tablet Take 1 tablet (10 mg) by mouth daily 90 tablet 0    lisinopril (ZESTRIL) 10 MG tablet Take 1 tablet by mouth daily.      metFORMIN (GLUCOPHAGE) 500 MG tablet Take 500 mg by mouth 2 times daily (with meals)      metoprolol succinate ER (TOPROL XL) 25 MG 24 hr tablet Take 1 tablet by mouth daily.      nitroGLYcerin (NITROSTAT) 0.4 MG sublingual tablet Place 0.4 mg under the tongue every 5 minutes as needed for chest pain. (Patient not taking: Reported on 2/20/2025)      NONFORMULARY Osteo  biflex one per day: 400 Vitamin D, 1500 Glucosamine, 100mg boswellia rain (joint shield)      rosuvastatin (CRESTOR) 20 MG tablet Take 1 tablet by mouth at bedtime.      vitamin C (ASCORBIC ACID) 1000 MG TABS Take 1,000 mg by mouth 2 times daily           Flex aGlicia MD

## 2025-04-29 ENCOUNTER — OFFICE VISIT (OUTPATIENT)
Dept: NEUROLOGY | Facility: CLINIC | Age: 80
End: 2025-04-29
Payer: COMMERCIAL

## 2025-04-29 VITALS
DIASTOLIC BLOOD PRESSURE: 89 MMHG | WEIGHT: 185 LBS | HEART RATE: 74 BPM | OXYGEN SATURATION: 98 % | BODY MASS INDEX: 25.44 KG/M2 | SYSTOLIC BLOOD PRESSURE: 133 MMHG

## 2025-04-29 DIAGNOSIS — G20.A1 PARKINSON'S DISEASE WITHOUT DYSKINESIA OR FLUCTUATING MANIFESTATIONS (H): Primary | ICD-10-CM

## 2025-04-29 PROCEDURE — G2211 COMPLEX E/M VISIT ADD ON: HCPCS | Performed by: PSYCHIATRY & NEUROLOGY

## 2025-04-29 PROCEDURE — 99215 OFFICE O/P EST HI 40 MIN: CPT | Performed by: PSYCHIATRY & NEUROLOGY

## 2025-04-29 PROCEDURE — 3079F DIAST BP 80-89 MM HG: CPT | Performed by: PSYCHIATRY & NEUROLOGY

## 2025-04-29 PROCEDURE — 3075F SYST BP GE 130 - 139MM HG: CPT | Performed by: PSYCHIATRY & NEUROLOGY

## 2025-04-29 RX ORDER — CARBIDOPA AND LEVODOPA 25; 100 MG/1; MG/1
TABLET ORAL
Qty: 540 TABLET | Refills: 3 | Status: SHIPPED | OUTPATIENT
Start: 2025-04-29 | End: 2025-04-29

## 2025-04-29 RX ORDER — CARBIDOPA AND LEVODOPA 25; 100 MG/1; MG/1
TABLET ORAL
Qty: 540 TABLET | Refills: 3 | Status: SHIPPED | OUTPATIENT
Start: 2025-04-29

## 2025-04-29 NOTE — PATIENT INSTRUCTIONS
Medications      6a  11a  3/330p   Aspirin 81mg 1     Calcipotriene dovonox cream         Carbidopa/levodopa Sinemet 25/100 1 (rare 1.5) 2 1.5 or 2   Clopidogrel plavix 75mg 1     Empagliflozin Jardiance 10 mg 1     1   Fluoruracil efudex cream OFF       Glucosamine chondroitin  1  1   Ketotifen Zaditor 0.025% sol no       Lisinopril zestril 10mg 1       Metformin Glucophage 500 mg 1   1   Metoprolol succ ER Toprol XL 25mg 24 1     MVI   1       nonform glucos 1500, vit d 400, bosw  1       Polyethylene glycol-propylene systane   OFF       Rosuvastatin crestor 20mg 1     Vitamin C ascorbic acid 1000 mg 1       vitamin D cholecalciferol  OFF                                          Plan:  STEMI heart attack   Metoprolol succ ER Toprol XL 25mg 24  Lisinopril zestril 10mg    Aspirin 81mg  Clopidogrel plavix 75mg    Eats oatmeal with fruit  Eating eggs with veggies  Likes coffee with coffee cake  Denies constipation  Dairy queen - [facility] study after this    Endocrinology  [facility]  Empagliflozin Jardiance 10 mg  Metformin Glucophage 500 mg twice daily   Lab Results   Component Value Date    A1C 7.3 07/12/2024    A1C 7.7 02/16/2023    A1C 6.9 08/06/2019    A1C 6.2 02/11/2013        Lab Test 07/12/24  0946 02/16/23  1753   CHOL 164 157   HDL 38* 37*   LDL 89 61   TRIG 186* 297*     Lipid level 10/23/2024  CHOLESTEROL,TOTAL  100 - 199 mg/dL 166   Comment: Cholesterol, Total Reference Ranges    Desirable <200 mg/dL  Borderline 200-239 mg/dL  High >=240 mg/dL   TRIGLYCERIDES  <150 mg/dL 92   HDL CHOLESTEROL  >40 mg/dL 41   NON-HDL CHOLESTEROL  <145 mg/dl 125   CHOL/HDL RATIO  <4.50 4.05   LDL CHOLESTEROL  <=130 mg/dL 107   VLDL CHOLESTEROL  <=30 mg/dL 18     Denies bladder issues.       Here with wife Salima  Helping out with a grand child.   Was at the [facility]     Carbidopa/levodopa Sinemet 25/100  He is taking as listed.   Took one pill this morning   No falls   Tries to  stay on schedule  No clear wearing off unless he is late in taking his medication  He is aware of the protein impact on the sinemet   Golfing on Wednesday  Goes to gym most days.     No change in medications for now  Last seen 6 months ago  Return in 6 months.  He is taking 5 sinemet per day; Rx says 6/day  When he gets up he is stiff and moving slowly  He has not had freezing     [email or web address]  [email or web address]

## 2025-04-29 NOTE — LETTER
2025       RE: Andre Tyler  [address] [address]     Dear Colleague,    Thank you for referring your patient, Andre Tyler, to the [facility] NEUROLOG[facility] [place] at [facility]. Please see a copy of my visit note below.        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      Review of diagnosis    tremor     Avoidance of dopamine blockers   Not taking     Motor complication review   n/a     Review of Impulse control disorders   n/a     Review of surgical or medication options   reviewed     Gait/Balance/Falls   No falls      Exercise/Therapy performed/offered   Walking around helps his back pain -   Tries to get to [employer] 3 or 4 times per week   Has not been able to go  Can do treadmill     Cognitive/Driving   Does not like night driving but can drive  Can drive  Denies cognitive problems     Mood   denies     Hallucinations/delusions   denies     Sleep   Goes to bed around 10 or 1030pm and can fall asleep right away.   Sleeps in same bed as his wife  Use to snore in the past  No  clear rbd behaviors  No sleep study   Wakes up around 6/630am  Gets up rarely to urinate      Bladder/Renal/Prostate/Gyn/Other  Prostate cancer 2012 - surgery - no radiation or chemo  Bladder stone 2019 lithotripsy with no recurrence  Bladder neck contracture dealt with at 2012 at time of prostate surgery in 2012  Rare to no nocturia   No urgency or frequency      GI/Constipation/GERD   Has daily bowel movement with the metformin has some loose stool   No heartburn      He has having some pain in the past and they removed his appendix   He was having bladder spasms and had appendix removed and this problem with bladder improved      ENDO/Lipid/DM/Bone density/Thyroid  Lipid disorder  Diabetes type 2; A1c = 7.7 on 2/16/2023  triglyceride disorder - elevated at 297  And HDL was 37 and cholesterol 157 and non hdl 120 - non fasting study on 2/16/2023  Denies thyroid problems  Bone density has not been checked for a long time     empagliflozin jardiance 10mg - has not yet started.   Metformin glucophage 500mg twice daily      Glucosamine 1500mg/vitamin d400 international unit(s) and 100mg boswellia rain     Vitamin D dose?     Cardio/heart/Hyper or Hypotensive   Hypertension  134/89 today 95 heart rate   Normal stress was normal 11/13/2015  [facility]  [facility] dr abbie quezada   Normal eCG today   No cardiologist  Lisinopril-hydrochlorothiazide 10-12.5 zestoretic      Vision/Dry Eyes/Cataracts/Glaucoma/Macular   Cataract surgery around 2002   Dry eyes     Heme/Anticoagulation/Antiplatelet/Anemia/Other  Not taking aspirin  No bleeding problems      ENT/Resp  Hearing loss  Wearing hearing aides  Has some tinnitus  Voice has been affected - slurring  Loss of smell for many years  Has had covid in the past     Speech and slurring  Swallowing and coughing  May be related to drooling       Skin/Cancer/Seborrhea/other  History of melanoma - diagnosed   History of nonmelanoma skin cancer  Squamous cell  skin cancer  Dr garcia is following this      Musculoskeletal/Pain/Headache  Denies significant surgery  Has left hip pain in the morning      Other:   ecg was normal     Parkinson disease -diagnosis based on his slowness, stiffness, etc.   He has soft voice.      Genetic study  PD generation  [email or web address]     Brain MRI to look for iron      Dopamine scan (datscan)     Because of his agent orange exposure -  He qualifies for service connected benefits [facility] the [facility] as he has parkinson  Encouraged to get connected with [facility] for this benefit.     They have a motor home - she does the driving and they like to go.      Return visit with Mendy Stanton for [facility]     Discussion about the SPARX3 trial and johnd Rosita Gauthier about their interest in learning more about the study.      Consider echo and cardiology consultation for treadmill study        Return to be determined     Discussed symptomatic medication therapy and physical and speech therapy     Parkinson's Disease: Newly diagnosed with PD. Motor symptoms are currently manageable.        Drooling:     Cognitive Changes:        __  All questions answered and the following patient instructions provided: -      __  Okay to cancel the Dopamine Scan.     __  You can do the brain MRI, ECHO Cardiogram, and see the cardiologist.      __  Exercise 3 - 5 times a week.      __  Consider doing the treadmill research after you read the information.     __  Multitasking can be affected, so try to focus on one task at a time.     __  No medication for now.     __  You can consider the [facility] - Physical Therapy and Speech Therapy.     __ For drooling, you can consider Botox injection.  Also, dry candy or chewing gum can help.      __ Return in 6 months to see Dr. Galicia. You may return sooner as needed.       Has had more slowness in movement  More stooped posture.   Loss of fine motor  Problems writing and  "wife is signing bills  He has right sided tremors  He has more problems with coordination with his right hand     He has had more problems with slurring of speech     Did not do the dopamine scan -      Did not do the brain MRI scan      Echocardiogram was fine - EF was 55-60%      Loss of smell is long standing     Loss of facial expression     He granted proxy access to his mychart      Dry eyes     melanoma     Weight loss     Swallowing problems/drooling/     Physical and speech therapy     Ent consultation if needed     Genetic study  PD generation  [email or web address]     He has more problems with h is lingual speech   He has a nasal pattern     Pharmacy ([facility]) consultation and medication management     Parkinson's Disease:  Started Levodopa in Sep 2023. He reports slight improvement. He takes his last dose an hour before bedtime.  Since he doesn't have bothersome nighttime symptoms, I recommended changing the times he take Sinemet.      __  Referral to Speech Therapy. If you have not heard from the scheduling office within 2 business days, please call [phone] for [email or web address] [place].     Mr. Tyler and his wife are requesting assistance with obtaining handicap parking  Three of their children play hockey during the winter and grandchildren play sports during the summer  Parking can be a challenge at these events  Mr. Tyler has to drop his wife off and park due to her knee issues  He and his family are concerned about him walking long distances, especially in icy conditions  His wife has a temporary handicap pass while awaiting knee surgery     Balance has been \"pretty good.\" He has to pay closer attention  Not quite as balanced with certain tasks such as putting pants on in the morning  Dealing with this by concentrating harder in situations where he may be off balance helps  No falls or near falls  Not using an assistive device walking  He has not " "worked with physical therapy on balance     He tries to get to the health club regularly  Some days he does light lifting  Also doing some walking and running - alternative around the track  He has also been doing exercises with his wife in the water  He is getting to the health club 2-3 days/week  Plays golf once a week - he would like his golf game to be better, notices he isn't always fluid when putting     Carbidopa/Levodopa 1 tab TID - he has been on this dose for about a year  He used to have trouble coordinating brushing his teeth - this has improved since starting medication  His arms seem to swing better since starting medication  He also feels like his speech has improved  Trying not to take medication with food - avoiding one hour before or two hours after  If he has to delay a dose due to meals he may have wearing off - this usually happens because of grandchildren's hockey schedule (usually after 6 hours)  Hasn't noticed any side effects - no nausea or lightheadedness  He has a tremor in his right hand/leg and jaw - CD/LD has maybe helped this a little but it is still present  Writing has been more difficult - cursive writing is \"not where it should be.\" Smaller and also shakier.      Frequent coughing/choking - this can occur with food, liquids and with his own saliva  He had a swallow study in Oct 2023 - this showed flash penetration on thin liquids with initial swallow - not seen on subsequent swallows and no aspiration. He has not subsequently worked with speech therapy  He doesn't believe swallowing is worse than it used to be, his wife thinks that this has ana more of an issue     He met with a speech therapist once in December 2023.   Voice remains soft - he feels like his speech is ok for the most part  He tends to project better when his hearing aids are out  Waitresses may get his order wrong, have to ask him to repeat  Less slurring since he has been on CD/LD  SLP gave him a sheet with " "exercises to do at home but he does not do them     He is not interested in working with SLP at this time     Mr. Tyler has had more saliva - drooling both during the day and at night  He keeps a hanky around  He had excess saliva - this was one of his earliest symptoms   Discussed lozenges vs medication vs botulinum toxin  He does not want treatment at this time     No issues with constipation  No concerns about bladder function     Mood is good - not feeling down/depressed/anxious  \"We do a lot of laughing.\"     Sleep is good - 8 hours/night on average  Wakes feeling well rested  Rare brief jerks in sleep per his wife - \"he's kind of been like that his whole life.\" No more complex actions.  Not falling out of bed.      No concerns about thinking or memory - \"I'm as sharp as always\"  Wife has no concerns although he is not quite as good at multitasking as he used to be.       Here with wife Salima     Overall he feels well.      Sinemet continues to help his symptoms, but endorses wearing off characterized by increased tremor, increased word-finding difficulty, and increased slowness. He is not sure exactly what time the wearing off occurs with regards to medication because he tries not to think too much about this and prefers to \"move on.\" He is interested in increasing the dose of Sinemet as he feels symptoms are not as well-controlled as before              - Carbidopa/levodopa: Take 1.5 tabs three times a day              - If you feel your symptoms are not well-controlled and you have no side effects after 2 weeks, take 2 tabs 3 times per day.               - [facility]     Walking is good for the most part, had an episode where he felt significantly unsteady when getting up to use the bathroom at night but otherwise has had no issues. No falls. Remains active, jogs, swims, and walks up to 5x/week.     Mood is good. No cognitive concerns, no hallucinations. No swallowing issues.   "   Discussed hallucinations in Parkinson's can happen. They are usually visual and are on a spectrum. On the mild end is seeing a shadow or presence in the corner of your eye that is not there. In the middle is seeing small animals or people that are not threatening. On the severe end is seeing people or other figures that are threatening or scary. Both Parkinson's itself and medications for Parkinson's can cause hallucinations, but not everyone gets them even on high doses of medications.      Pharmacy ([facility]) consultation and medication management     Going to [place].    10/22/2024 last visit    Was going to play golf and had a feeling of gas  10/23 to 10/25/2024  He became clammy sweaty  STEMI  One IMMANUEL and first RPL  Was transferred to [facility] from [place]  Had a stent placed.     Echo: 10/23/2024  Final Conclusion  3D Calculated EF: 65%  Normal left ventricular size and wall thickness with preserved systolic function.  The right ventricle is normal in size and function.  No significant valvular heart disease noted.  Estimated pulmonary artery pressure of 30.3 mmHg + RA pressure.        Medications      6a  11a  3/330p   Aspirin 81mg 1     Calcipotriene dovonox cream         Carbidopa/levodopa Sinemet 25/100 1 (rare 1.5) 2 1.5 or 2   Clopidogrel plavix 75mg 1     Empagliflozin Jardiance 10 mg 1     1   Fluoruracil efudex cream OFF       Glucosamine chondroitin  1  1   Ketotifen Zaditor 0.025% sol no       Lisinopril zestril 10mg 1       Metformin Glucophage 500 mg 1   1   Metoprolol succ ER Toprol XL 25mg 24 1     MVI   1       nonform glucos 1500, vit d 400, bosw  1       Polyethylene glycol-propylene systane   OFF       Rosuvastatin crestor 20mg 1     Vitamin C ascorbic acid 1000 mg 1       vitamin D cholecalciferol  OFF                                          Plan:  STEMI heart attack   Metoprolol succ ER Toprol XL 25mg 24  Lisinopril zestril 10mg    Aspirin 81mg  Clopidogrel plavix 75mg    Eats oatmeal with  fruit  Eating eggs with veggies  Likes coffee with coffee cake  Denies constipation  Dairy queen - [place] [facility] study after this    Endocrinology  [facility]  Empagliflozin Jardiance 10 mg  Metformin Glucophage 500 mg twice daily   Lab Results   Component Value Date    A1C 7.3 07/12/2024    A1C 7.7 02/16/2023    A1C 6.9 08/06/2019    A1C 6.2 02/11/2013        Lab Test 07/12/24  0946 02/16/23  1753   CHOL 164 157   HDL 38* 37*   LDL 89 61   TRIG 186* 297*     Lipid level 10/23/2024  CHOLESTEROL,TOTAL  100 - 199 mg/dL 166   Comment: Cholesterol, Total Reference Ranges    Desirable <200 mg/dL  Borderline 200-239 mg/dL  High >=240 mg/dL   TRIGLYCERIDES  <150 mg/dL 92   HDL CHOLESTEROL  >40 mg/dL 41   NON-HDL CHOLESTEROL  <145 mg/dl 125   CHOL/HDL RATIO  <4.50 4.05   LDL CHOLESTEROL  <=130 mg/dL 107   VLDL CHOLESTEROL  <=30 mg/dL 18     Denies bladder issues.       Here with wife Salima  Helping out with a grand child.   Was at the [place] [facility]     Carbidopa/levodopa Sinemet 25/100  He is taking as listed.   Took one pill this morning   No falls   Tries to stay on schedule  No clear wearing off unless he is late in taking his medication  He is aware of the protein impact on the sinemet   Golfing on Wednesday  Goes to gym most days.     No change in medications for now  Last seen 6 months ago  Return in 6 months.  He is taking 5 sinemet per day; Rx says 6/day  When he gets up he is stiff and moving slowly  He has not had freezing     https://www.nature.com/articles/n22621-795-04891-4  https://www.sciencedirect.com/science/article/pii/K9996079884438451           Coding statement:   Medical Decision Making:  #  Chronic progressive medical conditions addressed  - see above --   Review and/or interpretation of unique test or documentation from a provider outside of neurology no   Independent historian provided additional details  yes I  Prescription drug management and review of potential  side effects and/or monitoring for side effects  -- see above ---  Health impacted by social determinants of health  no    I have reviewed the note as documented above.  This accurately captures the substance of my conversation with the patient and total time spent preparing for visit, executing visit and completing visit on the day of the visit:  40 minutes.  The portion of this total time included face to face time     The longitudinal plan of care for Andre Tyler was addressed during this visit. Due to the added complexity in care, I will continue to support Andre Tyler in the subsequent management of this condition(s) and with the ongoing continuity of care of this condition(s).      Flex Galicia MD     ______________________________________    Last visit date and details:             ______________________________________      Patient was asked about 14 Review of systems including changes in vision (dry eyes, double vision), hearing, heart, lungs, musculoskeletal, depression, anxiety, snoring, RBD, insomnia, urinary frequency, urinary urgency, constipation, swallowing problems, hematological, ID, allergies, skin problems: seborrhea, endocrinological: thyroid, diabetes, cholesterol; balance, weight changes, and other neurological problems and these were not significant at this time except for   Patient Active Problem List   Diagnosis     Hypertension goal BP (blood pressure) < 140/90     Squamous cell skin cancer     Prostate cancer - surgically treated     CARDIOVASCULAR SCREENING; LDL GOAL LESS THAN 130     Sensorineural hearing loss, bilateral     Bladder neck contracture     Bladder stone     Type 2 diabetes mellitus without complication (H)     History of melanoma     History of nonmelanoma skin cancer     Carcinoma of prostate (H)     Contact with and (suspected) exposure to other hazardous aromatic compounds     Elevated fasting blood sugar     HDL lipoprotein deficiency     Hearing loss      Hypertriglyceridemia     Other ill-defined and unknown causes of morbidity and mortality     Essential (primary) hypertension     Essential hypertension     Malignant neoplasm of prostate (H)     Tremor     H/O echocardiogram 2023     Family hx of lung cancer     Parkinson's disease (H)     H/O prostatectomy     Exposure to potentially hazardous substance     History of myocardial infarction in last year     Parkinson's disease without dyskinesia, without mention of fluctuations (H)        No Known Allergies  Past Surgical History:   Procedure Laterality Date     APPENDECTOMY  2015    had improvement in bladder spasms     CATARACT IOL, RT/LT  2002    20+ years ago     HC MOHS TRUNK/ARM/LEG 1ST STAGE UP T0 5 BLOCKS       HEART CATH DRUG ELUTING STENT PLACEMENT  10/23/2024     LASER HOLMIUM LITHOTRIPSY URETER(S), INSERT STENT, COMBINED N/A 10/21/2019    Procedure: Urethral dilation and transurethral resection of bladder neck contracture litholapaxy (needs holmium laser) for bladder stone;  Surgeon: Arnulfo Vale MD;  Location: [facility] OR     OTHER SURGICAL HISTORY      skin cancer     PROSTATE SURGERY  08/28/2012     Past Medical History:   Diagnosis Date     Diabetes (H)      H/O echocardiogram 2023 03/10/2023    Global and regional left ventricular function is normal with an EF of 55-60%. Right ventricular function, chamber size, wall motion, and thickness are normal. Pulmonary artery systolic pressure is normal. Sinuses of Valsalva 4.1 cm. Mild dilatation of the aorta is present. The inferior vena cava is normal. No pericardial effusion is present. ________________________________________________________     History of melanoma     2022 left forearm     History of nonmelanoma skin cancer     NUMEROUS     Hypertension goal BP (blood pressure) < 140/90      Prostate cancer (H)      Squamous cell skin cancer      STEMI (ST elevation myocardial infarction) (H) 10/23/2024     Tremor 02/24/2023     Social History      Socioeconomic History     Marital status:      Spouse name: Not on file     Number of children: 3     Years of education: Not on file     Highest education level: Not on file   Occupational History     Employer: RETIRED   Tobacco Use     Smoking status: Never     Smokeless tobacco: Never   Vaping Use     Vaping status: Never Used   Substance and Sexual Activity     Alcohol use: No     Drug use: No     Sexual activity: Yes     Partners: Female   Other Topics Concern     Parent/sibling w/ CABG, MI or angioplasty before 65F 55M? Not Asked   Social History Narrative     Not on file     Social Drivers of Health     Financial Resource Strain: Not on file   Food Insecurity: Not on file   Transportation Needs: Not on file   Physical Activity: Not on file   Stress: Not on file   Social Connections: Not on file   Interpersonal Safety: Not on file   Housing Stability: Not on file       Drug and lactation database from the [place] National Library of Medicine:  http://toxnet.nlm.nih.gov/cgi-bin/sis/htmlgen?LACT      B/P: Data Unavailable, T: Data Unavailable, P: Data Unavailable, R: Data Unavailable 0 lbs 0 oz  There were no vitals taken for this visit., There is no height or weight on file to calculate BMI.  Medications and Vitals not listed above were documented in the cart and reviewed by me.     Current Outpatient Medications   Medication Sig Dispense Refill     GLUCOSAMINE-CHONDROITIN PO Take by mouth.       aspirin (ASA) 81 MG chewable tablet Take 81 mg by mouth daily.       carbidopa-levodopa (SINEMET)  MG tablet 1.5 tab 3/day. If you feel your symptoms are not well-controlled and you have no side effects after 2 weeks, take 2 tabs 3 times per day. 540 tablet 1     clopidogrel (PLAVIX) 75 MG tablet Take 75 mg by mouth daily.       empagliflozin (JARDIANCE) 10 MG TABS tablet Take 1 tablet (10 mg) by mouth daily 90 tablet 0     lisinopril (ZESTRIL) 10 MG tablet Take 1 tablet by mouth daily.        metFORMIN (GLUCOPHAGE) 500 MG tablet Take 500 mg by mouth 2 times daily (with meals)       metoprolol succinate ER (TOPROL XL) 25 MG 24 hr tablet Take 1 tablet by mouth daily.       nitroGLYcerin (NITROSTAT) 0.4 MG sublingual tablet Place 0.4 mg under the tongue every 5 minutes as needed for chest pain. (Patient not taking: Reported on 2/20/2025)       NONFORMULARY Osteo biflex one per day: 400 Vitamin D, 1500 Glucosamine, 100mg boswellia rain (joint shield)       rosuvastatin (CRESTOR) 20 MG tablet Take 1 tablet by mouth at bedtime.       vitamin C (ASCORBIC ACID) 1000 MG TABS Take 1,000 mg by mouth 2 times daily           Flex Galicia MD      Again, thank you for allowing me to participate in the care of your patient.      Sincerely,    Flex Galicia MD

## 2025-06-22 ENCOUNTER — HEALTH MAINTENANCE LETTER (OUTPATIENT)
Age: 80
End: 2025-06-22

## 2025-07-13 ENCOUNTER — HEALTH MAINTENANCE LETTER (OUTPATIENT)
Age: 80
End: 2025-07-13

## 2025-08-05 ENCOUNTER — RESULTS FOLLOW-UP (OUTPATIENT)
Dept: DERMATOLOGY | Facility: CLINIC | Age: 80
End: 2025-08-05
Payer: COMMERCIAL

## 2025-08-21 ENCOUNTER — PATIENT OUTREACH (OUTPATIENT)
Dept: CARE COORDINATION | Facility: CLINIC | Age: 80
End: 2025-08-21
Payer: COMMERCIAL

## (undated) DEVICE — Device

## (undated) DEVICE — GLOVE PROTEXIS W/NEU-THERA 7.0  2D73TE70

## (undated) DEVICE — SPECIMEN CONTAINER 4OZ

## (undated) DEVICE — ESU ELEC COAGULATE 24FR POINTED  27050L-S

## (undated) DEVICE — SOL WATER IRRIG 1000ML BOTTLE 07139-09

## (undated) DEVICE — GOWN XLG DISP 9545

## (undated) DEVICE — EVACUATOR BLADDER UROVAC LATEX M0067301250

## (undated) DEVICE — SOL WATER INJ 2000ML BAG 07118-07

## (undated) DEVICE — SYR 30ML LL W/O NDL

## (undated) DEVICE — INFLATION DEVICE ENCORE  26 PRESSURE GAUGE 20ML M0067101140

## (undated) DEVICE — KIT ENDO FIRST STEP DISINFECTANT 200ML W/POUCH EP-4

## (undated) DEVICE — ESU GROUND PAD ADULT W/CORD E7507

## (undated) RX ORDER — DEXAMETHASONE SODIUM PHOSPHATE 4 MG/ML
INJECTION, SOLUTION INTRA-ARTICULAR; INTRALESIONAL; INTRAMUSCULAR; INTRAVENOUS; SOFT TISSUE
Status: DISPENSED
Start: 2019-10-21

## (undated) RX ORDER — ONDANSETRON 2 MG/ML
INJECTION INTRAMUSCULAR; INTRAVENOUS
Status: DISPENSED
Start: 2019-10-21

## (undated) RX ORDER — ACETAMINOPHEN 325 MG/1
TABLET ORAL
Status: DISPENSED
Start: 2019-10-21

## (undated) RX ORDER — FENTANYL CITRATE 50 UG/ML
INJECTION, SOLUTION INTRAMUSCULAR; INTRAVENOUS
Status: DISPENSED
Start: 2019-10-21

## (undated) RX ORDER — CEFAZOLIN SODIUM 2 G/100ML
INJECTION, SOLUTION INTRAVENOUS
Status: DISPENSED
Start: 2019-10-21